# Patient Record
Sex: FEMALE | Race: BLACK OR AFRICAN AMERICAN | Employment: OTHER | URBAN - METROPOLITAN AREA
[De-identification: names, ages, dates, MRNs, and addresses within clinical notes are randomized per-mention and may not be internally consistent; named-entity substitution may affect disease eponyms.]

---

## 2017-01-24 PROBLEM — N64.4 BREAST PAIN: Status: ACTIVE | Noted: 2017-01-24

## 2017-01-30 PROBLEM — N64.4 BREAST PAIN: Status: RESOLVED | Noted: 2017-01-24 | Resolved: 2017-01-30

## 2017-10-05 PROBLEM — M17.12 PRIMARY OSTEOARTHRITIS OF LEFT KNEE: Status: ACTIVE | Noted: 2017-10-05

## 2018-03-22 ENCOUNTER — OFFICE VISIT (OUTPATIENT)
Dept: FAMILY MEDICINE CLINIC | Age: 83
End: 2018-03-22
Payer: MEDICARE

## 2018-03-22 VITALS
BODY MASS INDEX: 31.65 KG/M2 | RESPIRATION RATE: 16 BRPM | TEMPERATURE: 98.2 F | OXYGEN SATURATION: 98 % | WEIGHT: 190 LBS | DIASTOLIC BLOOD PRESSURE: 86 MMHG | HEART RATE: 76 BPM | SYSTOLIC BLOOD PRESSURE: 126 MMHG | HEIGHT: 65 IN

## 2018-03-22 DIAGNOSIS — M17.0 PRIMARY OSTEOARTHRITIS OF BOTH KNEES: Chronic | ICD-10-CM

## 2018-03-22 DIAGNOSIS — R14.0 ABDOMINAL BLOATING: ICD-10-CM

## 2018-03-22 DIAGNOSIS — M48.061 SPINAL STENOSIS OF LUMBAR REGION, UNSPECIFIED WHETHER NEUROGENIC CLAUDICATION PRESENT: Chronic | ICD-10-CM

## 2018-03-22 DIAGNOSIS — L50.9 LOCALIZED HIVES: Primary | ICD-10-CM

## 2018-03-22 PROCEDURE — 99214 OFFICE O/P EST MOD 30 MIN: CPT | Performed by: NURSE PRACTITIONER

## 2018-03-22 PROCEDURE — 99212 OFFICE O/P EST SF 10 MIN: CPT | Performed by: NURSE PRACTITIONER

## 2018-03-22 RX ORDER — TRIAMCINOLONE ACETONIDE 1 MG/G
CREAM TOPICAL
Qty: 60 G | Refills: 1 | Status: SHIPPED | OUTPATIENT
Start: 2018-03-22 | End: 2019-02-04 | Stop reason: ALTCHOICE

## 2018-03-22 RX ORDER — OXYCODONE HYDROCHLORIDE AND ACETAMINOPHEN 5; 325 MG/1; MG/1
1 TABLET ORAL EVERY 8 HOURS PRN
Refills: 0 | COMMUNITY
Start: 2018-03-04 | End: 2019-09-23

## 2018-03-22 RX ORDER — LIDOCAINE 50 MG/G
OINTMENT TOPICAL
Qty: 30 G | Refills: 1 | Status: SHIPPED | OUTPATIENT
Start: 2018-03-22 | End: 2019-02-04 | Stop reason: SDUPTHER

## 2018-03-22 RX ORDER — GREEN TEA/HOODIA GORDONII 315-12.5MG
1 CAPSULE ORAL DAILY
Qty: 30 TABLET | Refills: 2 | Status: SHIPPED | OUTPATIENT
Start: 2018-03-22 | End: 2018-10-14 | Stop reason: SDUPTHER

## 2018-03-22 ASSESSMENT — ENCOUNTER SYMPTOMS
COUGH: 0
BLURRED VISION: 0
DOUBLE VISION: 0
VOMITING: 0
WHEEZING: 0
ROS SKIN COMMENTS: AS ABOVE
SHORTNESS OF BREATH: 0
DIARRHEA: 0
NAUSEA: 0
CONSTIPATION: 0

## 2018-04-24 ENCOUNTER — TELEPHONE (OUTPATIENT)
Dept: FAMILY MEDICINE CLINIC | Age: 83
End: 2018-04-24

## 2018-04-24 DIAGNOSIS — Z96.659 HISTORY OF KNEE REPLACEMENT, UNSPECIFIED LATERALITY: Primary | ICD-10-CM

## 2018-04-24 RX ORDER — AMOXICILLIN 500 MG/1
2000 CAPSULE ORAL ONCE
Qty: 4 CAPSULE | Refills: 0 | Status: SHIPPED | OUTPATIENT
Start: 2018-04-24 | End: 2018-04-24

## 2018-06-28 ENCOUNTER — OFFICE VISIT (OUTPATIENT)
Dept: FAMILY MEDICINE CLINIC | Age: 83
End: 2018-06-28
Payer: MEDICARE

## 2018-06-28 VITALS
HEIGHT: 65 IN | WEIGHT: 193 LBS | OXYGEN SATURATION: 97 % | DIASTOLIC BLOOD PRESSURE: 60 MMHG | BODY MASS INDEX: 32.15 KG/M2 | HEART RATE: 60 BPM | SYSTOLIC BLOOD PRESSURE: 120 MMHG | TEMPERATURE: 98.1 F | RESPIRATION RATE: 16 BRPM

## 2018-06-28 DIAGNOSIS — I10 ESSENTIAL HYPERTENSION: Chronic | ICD-10-CM

## 2018-06-28 DIAGNOSIS — M48.061 SPINAL STENOSIS OF LUMBAR REGION, UNSPECIFIED WHETHER NEUROGENIC CLAUDICATION PRESENT: Chronic | ICD-10-CM

## 2018-06-28 DIAGNOSIS — Z23 NEED FOR SHINGLES VACCINE: ICD-10-CM

## 2018-06-28 DIAGNOSIS — M17.0 PRIMARY OSTEOARTHRITIS OF BOTH KNEES: Primary | Chronic | ICD-10-CM

## 2018-06-28 DIAGNOSIS — R14.0 ABDOMINAL BLOATING: ICD-10-CM

## 2018-06-28 PROCEDURE — G8399 PT W/DXA RESULTS DOCUMENT: HCPCS | Performed by: FAMILY MEDICINE

## 2018-06-28 PROCEDURE — G8427 DOCREV CUR MEDS BY ELIG CLIN: HCPCS | Performed by: FAMILY MEDICINE

## 2018-06-28 PROCEDURE — G8417 CALC BMI ABV UP PARAM F/U: HCPCS | Performed by: FAMILY MEDICINE

## 2018-06-28 PROCEDURE — 1123F ACP DISCUSS/DSCN MKR DOCD: CPT | Performed by: FAMILY MEDICINE

## 2018-06-28 PROCEDURE — 4040F PNEUMOC VAC/ADMIN/RCVD: CPT | Performed by: FAMILY MEDICINE

## 2018-06-28 PROCEDURE — 1090F PRES/ABSN URINE INCON ASSESS: CPT | Performed by: FAMILY MEDICINE

## 2018-06-28 PROCEDURE — 1036F TOBACCO NON-USER: CPT | Performed by: FAMILY MEDICINE

## 2018-06-28 PROCEDURE — 99214 OFFICE O/P EST MOD 30 MIN: CPT | Performed by: FAMILY MEDICINE

## 2018-06-28 PROCEDURE — 99212 OFFICE O/P EST SF 10 MIN: CPT | Performed by: FAMILY MEDICINE

## 2018-06-28 RX ORDER — GREEN TEA/HOODIA GORDONII 315-12.5MG
1 CAPSULE ORAL DAILY
Qty: 30 TABLET | Refills: 2 | Status: SHIPPED | OUTPATIENT
Start: 2018-06-28 | End: 2018-11-08 | Stop reason: SDUPTHER

## 2018-06-28 RX ORDER — SUCRALFATE 1 G/1
TABLET ORAL
Qty: 360 TABLET | Refills: 3 | Status: CANCELLED | OUTPATIENT
Start: 2018-06-28

## 2018-06-28 RX ORDER — VILAZODONE HYDROCHLORIDE 10 MG/1
TABLET ORAL
Refills: 1 | COMMUNITY
Start: 2018-05-22

## 2018-08-15 ENCOUNTER — TELEPHONE (OUTPATIENT)
Dept: FAMILY MEDICINE CLINIC | Age: 83
End: 2018-08-15

## 2018-08-15 ENCOUNTER — OFFICE VISIT (OUTPATIENT)
Dept: FAMILY MEDICINE CLINIC | Age: 83
End: 2018-08-15
Payer: MEDICARE

## 2018-08-15 VITALS
TEMPERATURE: 98.4 F | OXYGEN SATURATION: 97 % | DIASTOLIC BLOOD PRESSURE: 86 MMHG | HEART RATE: 67 BPM | HEIGHT: 61 IN | BODY MASS INDEX: 36.82 KG/M2 | RESPIRATION RATE: 16 BRPM | SYSTOLIC BLOOD PRESSURE: 144 MMHG | WEIGHT: 195 LBS

## 2018-08-15 DIAGNOSIS — K12.0 APHTHOUS ULCER OF TONGUE: Primary | ICD-10-CM

## 2018-08-15 PROCEDURE — 99213 OFFICE O/P EST LOW 20 MIN: CPT | Performed by: NURSE PRACTITIONER

## 2018-08-15 PROCEDURE — G8417 CALC BMI ABV UP PARAM F/U: HCPCS | Performed by: NURSE PRACTITIONER

## 2018-08-15 PROCEDURE — 1090F PRES/ABSN URINE INCON ASSESS: CPT | Performed by: NURSE PRACTITIONER

## 2018-08-15 PROCEDURE — 1123F ACP DISCUSS/DSCN MKR DOCD: CPT | Performed by: NURSE PRACTITIONER

## 2018-08-15 PROCEDURE — G8427 DOCREV CUR MEDS BY ELIG CLIN: HCPCS | Performed by: NURSE PRACTITIONER

## 2018-08-15 PROCEDURE — 1036F TOBACCO NON-USER: CPT | Performed by: NURSE PRACTITIONER

## 2018-08-15 PROCEDURE — 4040F PNEUMOC VAC/ADMIN/RCVD: CPT | Performed by: NURSE PRACTITIONER

## 2018-08-15 PROCEDURE — G8399 PT W/DXA RESULTS DOCUMENT: HCPCS | Performed by: NURSE PRACTITIONER

## 2018-08-15 PROCEDURE — 99212 OFFICE O/P EST SF 10 MIN: CPT | Performed by: NURSE PRACTITIONER

## 2018-08-15 PROCEDURE — 1101F PT FALLS ASSESS-DOCD LE1/YR: CPT | Performed by: NURSE PRACTITIONER

## 2018-08-15 RX ORDER — TRIAMCINOLONE ACETONIDE 0.1 %
PASTE (GRAM) DENTAL
Qty: 1 TUBE | Refills: 1 | Status: SHIPPED | OUTPATIENT
Start: 2018-08-15 | End: 2018-08-22

## 2018-08-15 ASSESSMENT — ENCOUNTER SYMPTOMS
VOMITING: 0
CONSTIPATION: 0
BLURRED VISION: 0
SHORTNESS OF BREATH: 0
WHEEZING: 0
DOUBLE VISION: 0
DIARRHEA: 0
NAUSEA: 0
COUGH: 0

## 2018-08-15 NOTE — PROGRESS NOTES
Chief Complaint   Patient presents with    Mouth Lesions     tongue x 1 month painful        HPI:  Patient presents today for  A sore on her tongue, Reports that it has been there for about a month now. She denies fever, chills. No recent illness. She denies trauma to her tonigue. She does have trouble eating at times because her tongue is sore. She has been drinking hot tea which has been helping. She reports that the sore has not changes since it first appeared. She is a non-smoker and non-drinker. She has dentures therefore she does not follow with a dentist on a regular basis. Prior to Visit Medications    Medication Sig Taking? Authorizing Provider   NONFORMULARY Apply 1 applicator topically every 6 hours as needed Yes Historical Provider, MD   VILAZODONE HCL 10 MG Tablet TK 1 T PO D Yes Historical Provider, MD   Lactobacillus (PROBIOTIC ACIDOPHILUS) TABS Take 1 tablet by mouth daily Yes Sim Vallecillo MD   oxyCODONE-acetaminophen (PERCOCET) 5-325 MG per tablet Take 1 tablet by mouth every 8 hours as needed. Yes Historical Provider, MD   lidocaine (XYLOCAINE) 5 % ointment APPLY EXT TO THE BACK AND LEGS BID PRN Yes JULIANN Hopkins CNP   triamcinolone (KENALOG) 0.1 % cream Apply topically 2 times daily.  Yes JULIANN Hopkins CNP   Multiple Vitamins-Minerals (AIRBORNE PO) Take 3 tablets by mouth daily Yes Historical Provider, MD   amLODIPine-benazepril (LOTREL) 10-20 MG per capsule TAKE 1 CAPSULE DAILY Yes Sim Vallecillo MD   sucralfate (CARAFATE) 1 GM tablet TAKE 1 TABLET FOUR TIMES A DAY Yes Sim Vallecillo MD   omeprazole (PRILOSEC) 20 MG delayed release capsule Take 1 capsule by mouth daily Yes Sim Vallecillo MD   polyvinyl alcohol-povidone (HYPOTEARS) 1.4-0.6 % ophthalmic solution Place 1-2 drops into both eyes as needed Yes Historical Provider, MD   fluticasone (FLONASE) 50 MCG/ACT nasal spray 2 sprays by Nasal route daily 2 sprays in each nostril daily Yes Lainey Navarrete,

## 2018-09-21 ENCOUNTER — HOSPITAL ENCOUNTER (OUTPATIENT)
Age: 83
Setting detail: OBSERVATION
Discharge: HOME | End: 2018-09-22
Attending: EMERGENCY MEDICINE | Admitting: FAMILY MEDICINE
Payer: MEDICARE

## 2018-09-21 ENCOUNTER — APPOINTMENT (OUTPATIENT)
Dept: GENERAL RADIOLOGY | Age: 83
End: 2018-09-21
Payer: MEDICARE

## 2018-09-21 DIAGNOSIS — R07.9 CHEST PAIN, UNSPECIFIED TYPE: Primary | ICD-10-CM

## 2018-09-21 DIAGNOSIS — R11.2 NON-INTRACTABLE VOMITING WITH NAUSEA, UNSPECIFIED VOMITING TYPE: ICD-10-CM

## 2018-09-21 DIAGNOSIS — R42 DIZZINESS: ICD-10-CM

## 2018-09-21 DIAGNOSIS — R79.81 LOW OXYGEN SATURATION: ICD-10-CM

## 2018-09-21 PROBLEM — R11.10 EMESIS: Status: ACTIVE | Noted: 2018-09-21

## 2018-09-21 LAB
ALBUMIN SERPL-MCNC: 4.3 G/DL (ref 3.5–5.2)
ALP BLD-CCNC: 97 U/L (ref 35–104)
ALT SERPL-CCNC: 20 U/L (ref 0–32)
ANION GAP SERPL CALCULATED.3IONS-SCNC: 14 MMOL/L (ref 7–16)
AST SERPL-CCNC: 30 U/L (ref 0–31)
BASOPHILS ABSOLUTE: 0.04 E9/L (ref 0–0.2)
BASOPHILS RELATIVE PERCENT: 0.8 % (ref 0–2)
BILIRUB SERPL-MCNC: 0.5 MG/DL (ref 0–1.2)
BUN BLDV-MCNC: 7 MG/DL (ref 8–23)
CALCIUM SERPL-MCNC: 9.4 MG/DL (ref 8.6–10.2)
CHLORIDE BLD-SCNC: 101 MMOL/L (ref 98–107)
CO2: 24 MMOL/L (ref 22–29)
CREAT SERPL-MCNC: 0.7 MG/DL (ref 0.5–1)
EOSINOPHILS ABSOLUTE: 0.14 E9/L (ref 0.05–0.5)
EOSINOPHILS RELATIVE PERCENT: 2.7 % (ref 0–6)
GFR AFRICAN AMERICAN: >60
GFR NON-AFRICAN AMERICAN: >60 ML/MIN/1.73
GLUCOSE BLD-MCNC: 138 MG/DL (ref 74–109)
HCT VFR BLD CALC: 40.5 % (ref 34–48)
HEMOGLOBIN: 13.8 G/DL (ref 11.5–15.5)
IMMATURE GRANULOCYTES #: 0 E9/L
IMMATURE GRANULOCYTES %: 0 % (ref 0–5)
LACTIC ACID: 1.6 MMOL/L (ref 0.5–2.2)
LIPASE: 18 U/L (ref 13–60)
LYMPHOCYTES ABSOLUTE: 2.65 E9/L (ref 1.5–4)
LYMPHOCYTES RELATIVE PERCENT: 50.2 % (ref 20–42)
MCH RBC QN AUTO: 31.5 PG (ref 26–35)
MCHC RBC AUTO-ENTMCNC: 34.1 % (ref 32–34.5)
MCV RBC AUTO: 92.5 FL (ref 80–99.9)
MONOCYTES ABSOLUTE: 0.47 E9/L (ref 0.1–0.95)
MONOCYTES RELATIVE PERCENT: 8.9 % (ref 2–12)
NEUTROPHILS ABSOLUTE: 1.98 E9/L (ref 1.8–7.3)
NEUTROPHILS RELATIVE PERCENT: 37.4 % (ref 43–80)
PDW BLD-RTO: 13.7 FL (ref 11.5–15)
PLATELET # BLD: 266 E9/L (ref 130–450)
PMV BLD AUTO: 9 FL (ref 7–12)
POTASSIUM SERPL-SCNC: 3.4 MMOL/L (ref 3.5–5)
PRO-BNP: 42 PG/ML (ref 0–450)
RBC # BLD: 4.38 E12/L (ref 3.5–5.5)
SODIUM BLD-SCNC: 139 MMOL/L (ref 132–146)
TOTAL PROTEIN: 7.5 G/DL (ref 6.4–8.3)
TROPONIN: <0.01 NG/ML (ref 0–0.03)
TROPONIN: <0.01 NG/ML (ref 0–0.03)
WBC # BLD: 5.3 E9/L (ref 4.5–11.5)

## 2018-09-21 PROCEDURE — 83690 ASSAY OF LIPASE: CPT

## 2018-09-21 PROCEDURE — 83880 ASSAY OF NATRIURETIC PEPTIDE: CPT

## 2018-09-21 PROCEDURE — 6360000002 HC RX W HCPCS: Performed by: STUDENT IN AN ORGANIZED HEALTH CARE EDUCATION/TRAINING PROGRAM

## 2018-09-21 PROCEDURE — 71046 X-RAY EXAM CHEST 2 VIEWS: CPT

## 2018-09-21 PROCEDURE — 2580000003 HC RX 258: Performed by: PHYSICIAN ASSISTANT

## 2018-09-21 PROCEDURE — 96375 TX/PRO/DX INJ NEW DRUG ADDON: CPT

## 2018-09-21 PROCEDURE — 83605 ASSAY OF LACTIC ACID: CPT

## 2018-09-21 PROCEDURE — 85025 COMPLETE CBC W/AUTO DIFF WBC: CPT

## 2018-09-21 PROCEDURE — 84484 ASSAY OF TROPONIN QUANT: CPT

## 2018-09-21 PROCEDURE — 99285 EMERGENCY DEPT VISIT HI MDM: CPT

## 2018-09-21 PROCEDURE — 96376 TX/PRO/DX INJ SAME DRUG ADON: CPT

## 2018-09-21 PROCEDURE — 87486 CHLMYD PNEUM DNA AMP PROBE: CPT

## 2018-09-21 PROCEDURE — 2580000003 HC RX 258: Performed by: STUDENT IN AN ORGANIZED HEALTH CARE EDUCATION/TRAINING PROGRAM

## 2018-09-21 PROCEDURE — 96372 THER/PROPH/DIAG INJ SC/IM: CPT

## 2018-09-21 PROCEDURE — 93005 ELECTROCARDIOGRAM TRACING: CPT | Performed by: PHYSICIAN ASSISTANT

## 2018-09-21 PROCEDURE — G0378 HOSPITAL OBSERVATION PER HR: HCPCS

## 2018-09-21 PROCEDURE — 87633 RESP VIRUS 12-25 TARGETS: CPT

## 2018-09-21 PROCEDURE — 80053 COMPREHEN METABOLIC PANEL: CPT

## 2018-09-21 PROCEDURE — 96374 THER/PROPH/DIAG INJ IV PUSH: CPT

## 2018-09-21 PROCEDURE — 6360000002 HC RX W HCPCS: Performed by: PHYSICIAN ASSISTANT

## 2018-09-21 PROCEDURE — 2700000000 HC OXYGEN THERAPY PER DAY

## 2018-09-21 PROCEDURE — 36415 COLL VENOUS BLD VENIPUNCTURE: CPT

## 2018-09-21 PROCEDURE — 87581 M.PNEUMON DNA AMP PROBE: CPT

## 2018-09-21 PROCEDURE — 87798 DETECT AGENT NOS DNA AMP: CPT

## 2018-09-21 RX ORDER — GUAIFENESIN 400 MG/1
400 TABLET ORAL 3 TIMES DAILY
Status: DISCONTINUED | OUTPATIENT
Start: 2018-09-21 | End: 2018-09-22 | Stop reason: HOSPADM

## 2018-09-21 RX ORDER — ASPIRIN 81 MG/1
81 TABLET, CHEWABLE ORAL DAILY
Status: DISCONTINUED | OUTPATIENT
Start: 2018-09-22 | End: 2018-09-22 | Stop reason: HOSPADM

## 2018-09-21 RX ORDER — VILAZODONE HYDROCHLORIDE 10 MG/1
10 TABLET ORAL DAILY
Status: DISCONTINUED | OUTPATIENT
Start: 2018-09-21 | End: 2018-09-22 | Stop reason: HOSPADM

## 2018-09-21 RX ORDER — PRAVASTATIN SODIUM 20 MG
40 TABLET ORAL DAILY
Status: DISCONTINUED | OUTPATIENT
Start: 2018-09-21 | End: 2018-09-22 | Stop reason: HOSPADM

## 2018-09-21 RX ORDER — LANOLIN ALCOHOL/MO/W.PET/CERES
1000 CREAM (GRAM) TOPICAL DAILY
Status: DISCONTINUED | OUTPATIENT
Start: 2018-09-21 | End: 2018-09-22 | Stop reason: HOSPADM

## 2018-09-21 RX ORDER — POTASSIUM CHLORIDE 7.45 MG/ML
10 INJECTION INTRAVENOUS
Status: COMPLETED | OUTPATIENT
Start: 2018-09-21 | End: 2018-09-21

## 2018-09-21 RX ORDER — MECLIZINE HCL 12.5 MG/1
12.5 TABLET ORAL ONCE
Status: DISCONTINUED | OUTPATIENT
Start: 2018-09-21 | End: 2018-09-22 | Stop reason: HOSPADM

## 2018-09-21 RX ORDER — ONDANSETRON 2 MG/ML
4 INJECTION INTRAMUSCULAR; INTRAVENOUS ONCE
Status: COMPLETED | OUTPATIENT
Start: 2018-09-21 | End: 2018-09-21

## 2018-09-21 RX ORDER — ONDANSETRON 2 MG/ML
4 INJECTION INTRAMUSCULAR; INTRAVENOUS EVERY 6 HOURS PRN
Status: DISCONTINUED | OUTPATIENT
Start: 2018-09-21 | End: 2018-09-22 | Stop reason: HOSPADM

## 2018-09-21 RX ORDER — SODIUM CHLORIDE 0.9 % (FLUSH) 0.9 %
10 SYRINGE (ML) INJECTION PRN
Status: DISCONTINUED | OUTPATIENT
Start: 2018-09-21 | End: 2018-09-22 | Stop reason: HOSPADM

## 2018-09-21 RX ORDER — PANTOPRAZOLE SODIUM 40 MG/1
40 TABLET, DELAYED RELEASE ORAL
Status: DISCONTINUED | OUTPATIENT
Start: 2018-09-22 | End: 2018-09-22 | Stop reason: HOSPADM

## 2018-09-21 RX ORDER — SUCRALFATE 1 G/1
1 TABLET ORAL EVERY 6 HOURS SCHEDULED
Status: DISCONTINUED | OUTPATIENT
Start: 2018-09-21 | End: 2018-09-22 | Stop reason: HOSPADM

## 2018-09-21 RX ORDER — LACTOBACILLUS RHAMNOSUS GG 10B CELL
1 CAPSULE ORAL DAILY
Status: DISCONTINUED | OUTPATIENT
Start: 2018-09-21 | End: 2018-09-22 | Stop reason: HOSPADM

## 2018-09-21 RX ORDER — LABETALOL HYDROCHLORIDE 5 MG/ML
10 INJECTION, SOLUTION INTRAVENOUS EVERY 6 HOURS PRN
Status: DISCONTINUED | OUTPATIENT
Start: 2018-09-21 | End: 2018-09-22 | Stop reason: HOSPADM

## 2018-09-21 RX ORDER — 0.9 % SODIUM CHLORIDE 0.9 %
1000 INTRAVENOUS SOLUTION INTRAVENOUS ONCE
Status: COMPLETED | OUTPATIENT
Start: 2018-09-21 | End: 2018-09-21

## 2018-09-21 RX ORDER — FLUTICASONE PROPIONATE 50 MCG
2 SPRAY, SUSPENSION (ML) NASAL DAILY
Status: DISCONTINUED | OUTPATIENT
Start: 2018-09-21 | End: 2018-09-22 | Stop reason: HOSPADM

## 2018-09-21 RX ORDER — OXYCODONE HYDROCHLORIDE AND ACETAMINOPHEN 5; 325 MG/1; MG/1
1 TABLET ORAL EVERY 8 HOURS PRN
Status: DISCONTINUED | OUTPATIENT
Start: 2018-09-21 | End: 2018-09-22 | Stop reason: HOSPADM

## 2018-09-21 RX ORDER — SODIUM CHLORIDE 9 MG/ML
INJECTION, SOLUTION INTRAVENOUS CONTINUOUS
Status: DISCONTINUED | OUTPATIENT
Start: 2018-09-21 | End: 2018-09-22

## 2018-09-21 RX ORDER — ACETAMINOPHEN 325 MG/1
650 TABLET ORAL EVERY 4 HOURS PRN
Status: DISCONTINUED | OUTPATIENT
Start: 2018-09-21 | End: 2018-09-22 | Stop reason: HOSPADM

## 2018-09-21 RX ORDER — SODIUM CHLORIDE 0.9 % (FLUSH) 0.9 %
10 SYRINGE (ML) INJECTION EVERY 12 HOURS SCHEDULED
Status: DISCONTINUED | OUTPATIENT
Start: 2018-09-21 | End: 2018-09-22 | Stop reason: HOSPADM

## 2018-09-21 RX ADMIN — ONDANSETRON 4 MG: 2 INJECTION INTRAMUSCULAR; INTRAVENOUS at 15:49

## 2018-09-21 RX ADMIN — POTASSIUM CHLORIDE 10 MEQ: 7.46 INJECTION, SOLUTION INTRAVENOUS at 20:00

## 2018-09-21 RX ADMIN — POTASSIUM CHLORIDE 10 MEQ: 7.46 INJECTION, SOLUTION INTRAVENOUS at 22:00

## 2018-09-21 RX ADMIN — SODIUM CHLORIDE 1000 ML: 9 INJECTION, SOLUTION INTRAVENOUS at 15:49

## 2018-09-21 RX ADMIN — POTASSIUM CHLORIDE 10 MEQ: 7.46 INJECTION, SOLUTION INTRAVENOUS at 21:22

## 2018-09-21 RX ADMIN — ENOXAPARIN SODIUM 40 MG: 40 INJECTION SUBCUTANEOUS at 21:20

## 2018-09-21 RX ADMIN — Medication 10 ML: at 20:59

## 2018-09-21 RX ADMIN — SODIUM CHLORIDE: 9 INJECTION, SOLUTION INTRAVENOUS at 20:55

## 2018-09-21 ASSESSMENT — PAIN SCALES - GENERAL: PAINLEVEL_OUTOF10: 0

## 2018-09-21 NOTE — H&P
[facility] - Family Medicine Resident Inpatient  History and Physical      HPI: History obtained from patient. Manolo Gould is a 81 y.o. female with a PMH of diverticular disease, HTN, GERD, anxiety and depression, and spinal stenosis, who presents to ED for Emesis (pt states she took her morning pills twice today but can't remember all of pills-did remember percocet and prilosec-took at 0800 today-[id number]. )    Patient reports that when she woke up this morning she felt heartburn and took 2 of her Prilosec tablets. A few hours later she suddenly developed dizziness, described as lightheadedness, followed by emesis. She vomited once consistent of yellow vomit, nonbloody nonbilious. The lightheadedness has been constant. Before getting to the ED she also developed sharp midsternal chest pain, which she describes as burning pain, with radiation to her left shoulder. She reports that the event lasted just a minute and resolved afterwards. However her dizziness, nausea and vomited persisted. She had a couple of more episodes of emesis in the ED. Patient denies any cardiac history. Patient denies any fever or chills. However she reports URI symptoms have been going on for a few weeks now, including dry cough and congestion. ED Course: The patient remained unchanged. She remained nauseous with lightheadedness. Patient was given bolus fluids and Zofran. . EKG Rhythm strip normal sinus rhythm, RBBB, left axis deviation, unchanged from previous tracings. Troponin was negative. The significant laboratory data obtained by ED work up was significant for hypokalemia with potassium of 3.4. Chest x-ray with possible bilateral pleural effusion otherwise unremarkable.     Medications   0.9 % sodium chloride bolus (0 mLs Intravenous Stopped 9/21/18 1809)   ondansetron (ZOFRAN) injection 4 mg (4 mg Intravenous Given 9/21/18 2899)        ED orders:   Orders Placed This Encounter   Procedures    Respiratory Panel, Film Array    XR CHEST

## 2018-09-21 NOTE — Clinical Note
Patient Class: Observation [104]   REQUIRED: Diagnosis: Chest pain [804577]   Estimated Length of Stay: Estimated stay of less than 2 midnights   Future Attending Provider: Jolanta Fowler   Telemetry Bed Required?: Yes

## 2018-09-21 NOTE — ED PROVIDER NOTES
ED Attending  CC: No     HPI:  9/21/18, Time: 2:39 PM         Li Hurtado is a 81 y.o. female presenting to the ED for emesis beginning approximately 2-3 hours ago. Patient states that she woke up this morning and was having some acid reflux and she decided to take 2 of her Prilosec. Then patient proceeded to take her amlodipine and Vicodin. Patient then decided to take another Prilosec due to her stomach but still bothering her. Patient then left for the day and then around 12:00 became dizzy and started vomiting. Patient states that when that started to happen a \"warmth came over top of her and she started to get chest pain that radiated into her left arm\". Patient then came to the emergency department. The complaint has been persistent with the vomitting, moderate in severity, and worsened by nothing. Patient is no longer having any chest pain at all. Patient states that it came and went at that exact time and has not returned. Patient denies any cardiac symptoms. Patient denies fever/chills, cough, congestion, shortness of breath, edema, headache, visual disturbances, hemoptysis, focal paresthesias, focal weakness, abdominal pain, nausea,  diarrhea, constipation, dysuria, hematuria, trauma, neck or back pain or other complaints. ROS:   Pertinent positives and negatives are stated within HPI, all other systems reviewed and are negative.      --------------------------------------------- PAST HISTORY ---------------------------------------------  Past Medical History:  has a past medical history of Anxiety; Breast cancer ([facility] [address] Depression; Diverticular disease; GERD (gastroesophageal reflux disease); HTN (hypertension); Hyperlipidemia; Osteoarthritis; Osteoporosis; Spinal stenosis; and Use of cane as ambulatory aid. Past Surgical History:  has a past surgical history that includes Hysterectomy (1980s); Hemorrhoid surgery (1980s); Carpal tunnel release (1980s); Carpal tunnel release (1980s);  Breast RADIOLOGY:  Interpreted by Radiologist.  XR CHEST STANDARD (2 VW)   Final Result   Tortuous ectatic aorta   Cardiomegaly    Airspace disease compatible with atelectasis or pneumonia   There are small bilateral pleural effusions                        EKG Interpretation  Interpreted by emergency department physician,    Time: 1525  Rhythm: 1 degree AV block, sinus rhythm  Rate: 66 bpm  Axis: left  Conduction: right bundle branch block (incomplete)  ST Segments: no acute change  T Waves: no acute change  Clinical Impression: right bundle branch block and 1st degree AV block  Comparison to prior EKG: stable as compared to patient's most recent EKG, done 9/14/17      ------------------------- NURSING NOTES AND VITALS REVIEWED ---------------------------   The nursing notes within the ED encounter and vital signs as below have been reviewed by myself. /69   Pulse 71   Temp 97.8 °F (36.6 °C) (Oral)   Resp 15   Ht 5' 5\" (1.651 m)   Wt 193 lb (87.5 kg)   SpO2 93%   BMI 32.12 kg/m²   Oxygen Saturation Interpretation: Normal    The patients available past medical records and past encounters were reviewed. ------------------------------ ED COURSE/MEDICAL DECISION MAKING----------------------  Medications   meclizine (ANTIVERT) tablet 12.5 mg (not administered)   0.9 % sodium chloride bolus (1,000 mLs Intravenous New Bag 9/21/18 1549)   ondansetron (ZOFRAN) injection 4 mg (4 mg Intravenous Given 9/21/18 1549)         Medical Decision Making:    The patient is an 41-year-old female that presented to emergency Department with emesis, dizziness and acute onset of chest pain radiating to left arm. Labs as well as a chest x-ray done were done. Due to new onset of chest pain, bilateral pleural effusions noted on chest x-ray, and low SPO2 stats, Family Medicine was called for admission. Dr. Rachel Martin returned the phone call and admitted the patient for cardiac rule out.     This patient's ED course

## 2018-09-22 ENCOUNTER — APPOINTMENT (OUTPATIENT)
Dept: NUCLEAR MEDICINE | Age: 83
End: 2018-09-22
Payer: MEDICARE

## 2018-09-22 VITALS
WEIGHT: 196.5 LBS | SYSTOLIC BLOOD PRESSURE: 152 MMHG | HEIGHT: 65 IN | HEART RATE: 78 BPM | DIASTOLIC BLOOD PRESSURE: 80 MMHG | RESPIRATION RATE: 16 BRPM | BODY MASS INDEX: 32.74 KG/M2 | TEMPERATURE: 97.8 F | OXYGEN SATURATION: 98 %

## 2018-09-22 PROBLEM — R42 DIZZINESS: Status: RESOLVED | Noted: 2018-09-21 | Resolved: 2018-09-22

## 2018-09-22 PROBLEM — R07.9 CHEST PAIN: Status: RESOLVED | Noted: 2018-09-21 | Resolved: 2018-09-22

## 2018-09-22 PROBLEM — R11.10 EMESIS: Status: RESOLVED | Noted: 2018-09-21 | Resolved: 2018-09-22

## 2018-09-22 LAB
ANION GAP SERPL CALCULATED.3IONS-SCNC: 16 MMOL/L (ref 7–16)
BILIRUBIN URINE: NEGATIVE
BLOOD, URINE: NEGATIVE
BUN BLDV-MCNC: 5 MG/DL (ref 8–23)
CALCIUM SERPL-MCNC: 9.2 MG/DL (ref 8.6–10.2)
CHLORIDE BLD-SCNC: 100 MMOL/L (ref 98–107)
CHOLESTEROL, TOTAL: 207 MG/DL (ref 0–199)
CLARITY: CLEAR
CO2: 21 MMOL/L (ref 22–29)
COLOR: YELLOW
CREAT SERPL-MCNC: 0.7 MG/DL (ref 0.5–1)
EKG ATRIAL RATE: 71 BPM
EKG P AXIS: 40 DEGREES
EKG P-R INTERVAL: 264 MS
EKG Q-T INTERVAL: 446 MS
EKG QRS DURATION: 162 MS
EKG QTC CALCULATION (BAZETT): 484 MS
EKG R AXIS: -34 DEGREES
EKG T AXIS: 23 DEGREES
EKG VENTRICULAR RATE: 71 BPM
FILM ARRAY ADENOVIRUS: NORMAL
FILM ARRAY BORDETELLA PERTUSSIS: NORMAL
FILM ARRAY CHLAMYDOPHILIA PNEUMONIAE: NORMAL
FILM ARRAY CORONAVIRUS 229E: NORMAL
FILM ARRAY CORONAVIRUS HKU1: NORMAL
FILM ARRAY CORONAVIRUS NL63: NORMAL
FILM ARRAY CORONAVIRUS OC43: NORMAL
FILM ARRAY INFLUENZA A VIRUS 09H1: NORMAL
FILM ARRAY INFLUENZA A VIRUS H1: NORMAL
FILM ARRAY INFLUENZA A VIRUS H3: NORMAL
FILM ARRAY INFLUENZA A VIRUS: NORMAL
FILM ARRAY INFLUENZA B: NORMAL
FILM ARRAY METAPNEUMOVIRUS: NORMAL
FILM ARRAY MYCOPLASMA PNEUMONIAE: NORMAL
FILM ARRAY PARAINFLUENZA VIRUS 1: NORMAL
FILM ARRAY PARAINFLUENZA VIRUS 2: NORMAL
FILM ARRAY PARAINFLUENZA VIRUS 3: NORMAL
FILM ARRAY PARAINFLUENZA VIRUS 4: NORMAL
FILM ARRAY RESPIRATORY SYNCITIAL VIRUS: NORMAL
FILM ARRAY RHINOVIRUS/ENTEROVIRUS: NORMAL
GFR AFRICAN AMERICAN: >60
GFR NON-AFRICAN AMERICAN: >60 ML/MIN/1.73
GLUCOSE BLD-MCNC: 105 MG/DL (ref 74–109)
GLUCOSE URINE: NEGATIVE MG/DL
HCT VFR BLD CALC: 36.9 % (ref 34–48)
HDLC SERPL-MCNC: 63 MG/DL
HEMOGLOBIN: 12.7 G/DL (ref 11.5–15.5)
KETONES, URINE: NEGATIVE MG/DL
LDL CHOLESTEROL CALCULATED: 127 MG/DL (ref 0–99)
LEFT VENTRICULAR EJECTION FRACTION MODE: NORMAL
LEUKOCYTE ESTERASE, URINE: NEGATIVE
LV EF: 64 %
LV EF: 64 %
LVEF MODALITY: NORMAL
MCH RBC QN AUTO: 31.7 PG (ref 26–35)
MCHC RBC AUTO-ENTMCNC: 34.4 % (ref 32–34.5)
MCV RBC AUTO: 92 FL (ref 80–99.9)
NITRITE, URINE: NEGATIVE
PDW BLD-RTO: 13.7 FL (ref 11.5–15)
PH UA: 7 (ref 5–9)
PLATELET # BLD: 264 E9/L (ref 130–450)
PMV BLD AUTO: 9.6 FL (ref 7–12)
POTASSIUM REFLEX MAGNESIUM: 4 MMOL/L (ref 3.5–5)
PROTEIN UA: NEGATIVE MG/DL
RBC # BLD: 4.01 E12/L (ref 3.5–5.5)
SODIUM BLD-SCNC: 137 MMOL/L (ref 132–146)
SPECIFIC GRAVITY UA: 1.01 (ref 1–1.03)
TRIGL SERPL-MCNC: 83 MG/DL (ref 0–149)
TROPONIN: <0.01 NG/ML (ref 0–0.03)
UROBILINOGEN, URINE: 0.2 E.U./DL
VLDLC SERPL CALC-MCNC: 17 MG/DL
WBC # BLD: 6.2 E9/L (ref 4.5–11.5)

## 2018-09-22 PROCEDURE — 36415 COLL VENOUS BLD VENIPUNCTURE: CPT

## 2018-09-22 PROCEDURE — 99236 HOSP IP/OBS SAME DATE HI 85: CPT | Performed by: FAMILY MEDICINE

## 2018-09-22 PROCEDURE — A9500 TC99M SESTAMIBI: HCPCS | Performed by: RADIOLOGY

## 2018-09-22 PROCEDURE — 6360000002 HC RX W HCPCS: Performed by: STUDENT IN AN ORGANIZED HEALTH CARE EDUCATION/TRAINING PROGRAM

## 2018-09-22 PROCEDURE — G0378 HOSPITAL OBSERVATION PER HR: HCPCS

## 2018-09-22 PROCEDURE — 93016 CV STRESS TEST SUPVJ ONLY: CPT | Performed by: INTERNAL MEDICINE

## 2018-09-22 PROCEDURE — 81003 URINALYSIS AUTO W/O SCOPE: CPT

## 2018-09-22 PROCEDURE — 78452 HT MUSCLE IMAGE SPECT MULT: CPT

## 2018-09-22 PROCEDURE — 6370000000 HC RX 637 (ALT 250 FOR IP): Performed by: STUDENT IN AN ORGANIZED HEALTH CARE EDUCATION/TRAINING PROGRAM

## 2018-09-22 PROCEDURE — 3430000000 HC RX DIAGNOSTIC RADIOPHARMACEUTICAL: Performed by: RADIOLOGY

## 2018-09-22 PROCEDURE — 80061 LIPID PANEL: CPT

## 2018-09-22 PROCEDURE — 93018 CV STRESS TEST I&R ONLY: CPT | Performed by: INTERNAL MEDICINE

## 2018-09-22 PROCEDURE — 80048 BASIC METABOLIC PNL TOTAL CA: CPT

## 2018-09-22 PROCEDURE — 93017 CV STRESS TEST TRACING ONLY: CPT

## 2018-09-22 PROCEDURE — 84484 ASSAY OF TROPONIN QUANT: CPT

## 2018-09-22 PROCEDURE — 85027 COMPLETE CBC AUTOMATED: CPT

## 2018-09-22 RX ADMIN — SUCRALFATE 1 G: 1 TABLET ORAL at 11:57

## 2018-09-22 RX ADMIN — Medication 1 CAPSULE: at 11:55

## 2018-09-22 RX ADMIN — VILAZODONE HYDROCHLORIDE 10 MG: 10 TABLET ORAL at 11:57

## 2018-09-22 RX ADMIN — OXYCODONE AND ACETAMINOPHEN 1 TABLET: 5; 325 TABLET ORAL at 12:03

## 2018-09-22 RX ADMIN — Medication 34 MILLICURIE: at 09:42

## 2018-09-22 RX ADMIN — Medication 11 MILLICURIE: at 07:48

## 2018-09-22 RX ADMIN — ASPIRIN 81 MG 81 MG: 81 TABLET ORAL at 11:54

## 2018-09-22 RX ADMIN — REGADENOSON 0.4 MG: 0.08 INJECTION, SOLUTION INTRAVENOUS at 09:29

## 2018-09-22 RX ADMIN — FLUTICASONE PROPIONATE 2 SPRAY: 50 SPRAY, METERED NASAL at 11:54

## 2018-09-22 RX ADMIN — Medication 1000 MCG: at 11:56

## 2018-09-22 ASSESSMENT — PAIN DESCRIPTION - LOCATION
LOCATION: BACK
LOCATION: BACK

## 2018-09-22 ASSESSMENT — PAIN SCALES - GENERAL
PAINLEVEL_OUTOF10: 8
PAINLEVEL_OUTOF10: 0

## 2018-09-22 ASSESSMENT — PAIN DESCRIPTION - PAIN TYPE
TYPE: CHRONIC PAIN
TYPE: CHRONIC PAIN

## 2018-09-22 NOTE — PROGRESS NOTES
[facility] Inpatient   Resident Progress Note    S:  Hospital day: 0:   Brief Synopsis: Macario Khan is a 81 y.o. female with a PMH of diverticular disease, HTN, GERD, anxiety and depression, and spinal stenosis, who presents to ED for emesis, dizziness, and a short episode of chest pain. No Acute events overnight. Patient was seen and examined this morning. Patient reports to be feeling much better this AM. Dizziness and nausea are gone and she didn't have any more episodes of chest pain. No fever or chills. Cont meds:    sodium chloride 937.26 mL/hr at 09/22/18 6537     Scheduled meds:    meclizine  12.5 mg Oral Once    fluticasone  2 spray Nasal Daily    lactobacillus  1 capsule Oral Daily    pantoprazole  40 mg Oral QAM AC    pravastatin  40 mg Oral Daily    sucralfate  1 g Oral 4 times per day    vilazodone HCl  10 mg Oral Daily    vitamin B-12  1,000 mcg Oral Daily    sodium chloride flush  10 mL Intravenous 2 times per day    aspirin  81 mg Oral Daily    enoxaparin  40 mg Subcutaneous Daily    guaiFENesin  400 mg Oral TID     PRN meds: oxyCODONE-acetaminophen, sodium chloride flush, acetaminophen, magnesium hydroxide, ondansetron, labetalol, regadenoson     I reviewed the patient's past medical and surgical history, Medications and Allergies. O:  Vitals:    09/21/18 1809 09/21/18 1940 09/22/18 0015 09/22/18 0600   BP: (!) 140/68 139/66 (!) 158/77    Pulse: 93 59 70    Resp: 18 16 16    Temp: 98.6 °F (37 °C) 97.8 °F (36.6 °C) 98.4 °F (36.9 °C)    TempSrc: Oral Temporal Temporal    SpO2: 98% 95% 94%    Weight:    196 lb 8 oz (89.1 kg)   Height:         24 hour I&O: I/O last 3 completed shifts:  In: -   Out: 1350 [Urine:1350]  No intake/output data recorded. General: Alert, cooperative, no acute distress. HEENT: Normocephalic, atraumatic. PERRLA, conjunctiva/corneas clear, EOM's intact, no pallor or icterus. Neck: Supple, symmetrical, trachea midline, no cervical LAD. No carotid bruit or JVD  Chest: No tenderness or deformity, full & symmetric excursion  Lung: Clear to auscultation bilaterally,  respirations unlabored. No rales/wheezing/rubs  Heart: RRR, S1 and S2 normal, no murmur, rub or gallop. DP pulses 2/4  Abdomen: SNTND, no masses, no organomegaly, no guarding, rebound or rigidity. Extremities:  Extremities normal, atraumatic, no cyanosis or edema. Distal pulses equal bilaterally  Skin: Skin color, texture, turgor normal, no rashes or lesions  Musculoskeletal: No joint swelling, no muscle tenderness. Normal ROM in extremities. Lymph nodes: no lymph node enlargement appreciated  Neurologic: Alert & Oriented;  CNII-XII intact. Labs:  Na/K/Cl/CO2:  139/3.4/101/24 ( 1445)  BUN/Cr/glu/ALT/AST/amyl/lip:  7/0.7/--/20/30/--/18 ( 1445)  WBC/Hgb/Hct/Plts:  6.2/12.7/36.9/264 ( 0556)  [unfilled]  estimated creatinine clearance is 68 mL/min (based on SCr of 0.7 mg/dL). Other pertinent labs as noted below    New Imaging:  Xr Chest Standard (2 Vw)    Result Date: 2018  Patient MRN: [id number] : 1935 Age:  81 years Gender: Female Order Date: 2018 3:00 PM Exam: XR CHEST (2 VW) Number of Images: 2 views Indication:   pain, emesis pain, emesis Comparison: 2016 Findings: There is a suggestion of bilateral pleural effusions The heart is enlarged. The lung fields demonstrate evidence for airspace disease. The aorta is tortuous and ectatic. Tortuous ectatic aorta Cardiomegaly Airspace disease compatible with atelectasis or pneumonia There are small bilateral pleural effusions       A/P:  Principal Problem:    Chest pain  Active Problems:    Hyperlipidemia    Anxiety    Depression    GERD (gastroesophageal reflux disease)    Diverticulosis of colon    Dizziness    Emesis  Resolved Problems:    * No resolved hospital problems.  *    Chest pain, ACS r/o  Initial and cycled troponin negative  EKG unchanged from previously - F/U repeat EKG in the morning  Stress test in the morning     Dizziness   With Emesis  Possibly due to ACS vs dehydration vs vertigo  Given bolus fluids in the ED, currently on maintenance fluids - symptoms much improved today  Zofran PRN  Monitor vitals, I/O     URI symptoms  F/U respiratory panel - negative  Mucinex     Hypokalemia  Likely due to emesis  Resolved after receiving potassium chloride IV x 3    HTN  Patient on amlodipine-benazepril home  BP controlled for now  Labetalol PRN  Monitor vitals     HLD  Continue home Pravachol     GERD  On PPI     Diverticulosis  Patient on stool softeners at home, regular BMs in the past couple days, no d/c  MoM PRN     Anxiety and depression  Continue home Vilazodone    DVT / GI prophylaxis: Lovenox and PPI     Dispo - observation on telemetry     Diet - NPO after midnight (stress test)     Code - full   Electronically signed by Anant Roman MD PGY-2 on 9/22/2018 at 7:19 AM  This case was discussed with senior resident and attending physician: Dr. Carlo Barnes

## 2018-09-24 ENCOUNTER — TELEPHONE (OUTPATIENT)
Dept: FAMILY MEDICINE CLINIC | Age: 83
End: 2018-09-24

## 2018-09-28 LAB
EKG ATRIAL RATE: 73 BPM
EKG P AXIS: 51 DEGREES
EKG P-R INTERVAL: 240 MS
EKG Q-T INTERVAL: 420 MS
EKG QRS DURATION: 146 MS
EKG QTC CALCULATION (BAZETT): 462 MS
EKG R AXIS: -33 DEGREES
EKG T AXIS: 31 DEGREES
EKG VENTRICULAR RATE: 73 BPM

## 2018-10-03 ENCOUNTER — OFFICE VISIT (OUTPATIENT)
Dept: CARDIOLOGY CLINIC | Age: 83
End: 2018-10-03
Payer: MEDICARE

## 2018-10-03 VITALS
DIASTOLIC BLOOD PRESSURE: 60 MMHG | HEART RATE: 65 BPM | SYSTOLIC BLOOD PRESSURE: 122 MMHG | RESPIRATION RATE: 16 BRPM | WEIGHT: 198.6 LBS | BODY MASS INDEX: 33.09 KG/M2 | HEIGHT: 65 IN

## 2018-10-03 DIAGNOSIS — I10 ESSENTIAL HYPERTENSION: Primary | ICD-10-CM

## 2018-10-03 DIAGNOSIS — I44.0 FIRST DEGREE AV BLOCK: ICD-10-CM

## 2018-10-03 DIAGNOSIS — R07.9 CHEST PAIN, UNSPECIFIED TYPE: ICD-10-CM

## 2018-10-03 DIAGNOSIS — I45.10 RBBB: ICD-10-CM

## 2018-10-03 PROCEDURE — G8399 PT W/DXA RESULTS DOCUMENT: HCPCS | Performed by: NURSE PRACTITIONER

## 2018-10-03 PROCEDURE — G8417 CALC BMI ABV UP PARAM F/U: HCPCS | Performed by: NURSE PRACTITIONER

## 2018-10-03 PROCEDURE — G8484 FLU IMMUNIZE NO ADMIN: HCPCS | Performed by: NURSE PRACTITIONER

## 2018-10-03 PROCEDURE — 99213 OFFICE O/P EST LOW 20 MIN: CPT | Performed by: NURSE PRACTITIONER

## 2018-10-03 PROCEDURE — G8427 DOCREV CUR MEDS BY ELIG CLIN: HCPCS | Performed by: NURSE PRACTITIONER

## 2018-10-03 PROCEDURE — 1101F PT FALLS ASSESS-DOCD LE1/YR: CPT | Performed by: NURSE PRACTITIONER

## 2018-10-03 PROCEDURE — 1036F TOBACCO NON-USER: CPT | Performed by: NURSE PRACTITIONER

## 2018-10-03 PROCEDURE — 4040F PNEUMOC VAC/ADMIN/RCVD: CPT | Performed by: NURSE PRACTITIONER

## 2018-10-03 PROCEDURE — 1123F ACP DISCUSS/DSCN MKR DOCD: CPT | Performed by: NURSE PRACTITIONER

## 2018-10-03 PROCEDURE — 93000 ELECTROCARDIOGRAM COMPLETE: CPT | Performed by: INTERNAL MEDICINE

## 2018-10-03 PROCEDURE — 1090F PRES/ABSN URINE INCON ASSESS: CPT | Performed by: NURSE PRACTITIONER

## 2018-10-03 NOTE — PROGRESS NOTES
[facility] PHYSICIAN CARDIOLOGY   OFFICE VISIT        PRIMARY CARE PHYSICIAN:      Flaquita Blackmon MD         ALLERGIES / Gold Aliya:      No Known Allergies       REVIEWED MEDICATIONS:       Current Outpatient Prescriptions   Medication Sig Dispense Refill    NONFORMULARY Apply 1 applicator topically every 6 hours as needed  3    Magic Mouthwash (MIRACLE MOUTHWASH) Swish and spit 5 mLs 4 times daily as needed for Irritation or Pain 60 mL 0    VILAZODONE HCL 10 MG Tablet TK 1 T PO D  1    Lactobacillus (PROBIOTIC ACIDOPHILUS) TABS Take 1 tablet by mouth daily 30 tablet 2    oxyCODONE-acetaminophen (PERCOCET) 5-325 MG per tablet Take 1 tablet by mouth every 8 hours as needed. 0    lidocaine (XYLOCAINE) 5 % ointment APPLY EXT TO THE BACK AND LEGS BID PRN 30 g 1    triamcinolone (KENALOG) 0.1 % cream Apply topically 2 times daily. 60 g 1    Multiple Vitamins-Minerals (AIRBORNE PO) Take 3 tablets by mouth daily      amLODIPine-benazepril (LOTREL) 10-20 MG per capsule TAKE 1 CAPSULE DAILY 90 capsule 3    sucralfate (CARAFATE) 1 GM tablet TAKE 1 TABLET FOUR TIMES A  tablet 3    omeprazole (PRILOSEC) 20 MG delayed release capsule Take 1 capsule by mouth daily 90 capsule 3    polyvinyl alcohol-povidone (HYPOTEARS) 1.4-0.6 % ophthalmic solution Place 1-2 drops into both eyes as needed      fluticasone (FLONASE) 50 MCG/ACT nasal spray 2 sprays by Nasal route daily 2 sprays in each nostril daily 1 Bottle 3    MOVANTIK 12.5 MG TABS tablet Take 1 tablet by mouth every morning 90 tablet 3    vitamin B-12 (CYANOCOBALAMIN) 1000 MCG tablet Take 1,000 mcg by mouth daily       Pyridoxine HCl (VITAMIN B-6) 100 MG tablet Take 100 mg by mouth daily      Multiple Vitamin TABS Take 1 capsule by mouth daily Hair ,skin and nails       No current facility-administered medications for this visit.             S: REASON FOR VISIT:     She is followed here by  and was seen for cardiac risk stratification

## 2018-10-12 DIAGNOSIS — R14.0 ABDOMINAL BLOATING: ICD-10-CM

## 2018-10-14 RX ORDER — LACTOBACILLUS ACIDOPHILUS 0.5 MG
TABLET ORAL
Qty: 30 TABLET | Refills: 1 | Status: SHIPPED | OUTPATIENT
Start: 2018-10-14 | End: 2018-11-09 | Stop reason: ALTCHOICE

## 2018-11-08 ENCOUNTER — OFFICE VISIT (OUTPATIENT)
Dept: FAMILY MEDICINE CLINIC | Age: 83
End: 2018-11-08
Payer: MEDICARE

## 2018-11-08 VITALS
TEMPERATURE: 98.4 F | BODY MASS INDEX: 33.42 KG/M2 | OXYGEN SATURATION: 92 % | HEIGHT: 65 IN | RESPIRATION RATE: 16 BRPM | SYSTOLIC BLOOD PRESSURE: 110 MMHG | WEIGHT: 200.6 LBS | HEART RATE: 83 BPM | DIASTOLIC BLOOD PRESSURE: 63 MMHG

## 2018-11-08 DIAGNOSIS — R14.0 ABDOMINAL BLOATING: ICD-10-CM

## 2018-11-08 DIAGNOSIS — Z85.3 HISTORY OF BREAST CANCER: ICD-10-CM

## 2018-11-08 DIAGNOSIS — R42 VERTIGO: ICD-10-CM

## 2018-11-08 DIAGNOSIS — R11.2 NON-INTRACTABLE VOMITING WITH NAUSEA, UNSPECIFIED VOMITING TYPE: ICD-10-CM

## 2018-11-08 PROCEDURE — 1090F PRES/ABSN URINE INCON ASSESS: CPT | Performed by: STUDENT IN AN ORGANIZED HEALTH CARE EDUCATION/TRAINING PROGRAM

## 2018-11-08 PROCEDURE — G8417 CALC BMI ABV UP PARAM F/U: HCPCS | Performed by: STUDENT IN AN ORGANIZED HEALTH CARE EDUCATION/TRAINING PROGRAM

## 2018-11-08 PROCEDURE — G8399 PT W/DXA RESULTS DOCUMENT: HCPCS | Performed by: STUDENT IN AN ORGANIZED HEALTH CARE EDUCATION/TRAINING PROGRAM

## 2018-11-08 PROCEDURE — G8427 DOCREV CUR MEDS BY ELIG CLIN: HCPCS | Performed by: STUDENT IN AN ORGANIZED HEALTH CARE EDUCATION/TRAINING PROGRAM

## 2018-11-08 PROCEDURE — 99212 OFFICE O/P EST SF 10 MIN: CPT | Performed by: STUDENT IN AN ORGANIZED HEALTH CARE EDUCATION/TRAINING PROGRAM

## 2018-11-08 PROCEDURE — 1123F ACP DISCUSS/DSCN MKR DOCD: CPT | Performed by: STUDENT IN AN ORGANIZED HEALTH CARE EDUCATION/TRAINING PROGRAM

## 2018-11-08 PROCEDURE — 99213 OFFICE O/P EST LOW 20 MIN: CPT | Performed by: STUDENT IN AN ORGANIZED HEALTH CARE EDUCATION/TRAINING PROGRAM

## 2018-11-08 PROCEDURE — 4040F PNEUMOC VAC/ADMIN/RCVD: CPT | Performed by: STUDENT IN AN ORGANIZED HEALTH CARE EDUCATION/TRAINING PROGRAM

## 2018-11-08 PROCEDURE — 1036F TOBACCO NON-USER: CPT | Performed by: STUDENT IN AN ORGANIZED HEALTH CARE EDUCATION/TRAINING PROGRAM

## 2018-11-08 PROCEDURE — 1101F PT FALLS ASSESS-DOCD LE1/YR: CPT | Performed by: STUDENT IN AN ORGANIZED HEALTH CARE EDUCATION/TRAINING PROGRAM

## 2018-11-08 PROCEDURE — G8484 FLU IMMUNIZE NO ADMIN: HCPCS | Performed by: STUDENT IN AN ORGANIZED HEALTH CARE EDUCATION/TRAINING PROGRAM

## 2018-11-08 RX ORDER — OMEPRAZOLE 20 MG/1
20 CAPSULE, DELAYED RELEASE ORAL DAILY
Qty: 90 CAPSULE | Refills: 3 | Status: SHIPPED | OUTPATIENT
Start: 2018-11-08 | End: 2018-12-10 | Stop reason: SDUPTHER

## 2018-11-08 RX ORDER — GREEN TEA/HOODIA GORDONII 315-12.5MG
1 CAPSULE ORAL DAILY
Qty: 30 TABLET | Refills: 2 | Status: SHIPPED | OUTPATIENT
Start: 2018-11-08 | End: 2019-02-04 | Stop reason: SDUPTHER

## 2018-11-08 NOTE — PROGRESS NOTES
MG Tablet TK 1 T PO D  1    Lactobacillus (PROBIOTIC ACIDOPHILUS) TABS Take 1 tablet by mouth daily 30 tablet 2    oxyCODONE-acetaminophen (PERCOCET) 5-325 MG per tablet Take 1 tablet by mouth every 8 hours as needed. 0    lidocaine (XYLOCAINE) 5 % ointment APPLY EXT TO THE BACK AND LEGS BID PRN 30 g 1    triamcinolone (KENALOG) 0.1 % cream Apply topically 2 times daily. 60 g 1    Multiple Vitamins-Minerals (AIRBORNE PO) Take 3 tablets by mouth daily      sucralfate (CARAFATE) 1 GM tablet TAKE 1 TABLET FOUR TIMES A  tablet 3    omeprazole (PRILOSEC) 20 MG delayed release capsule Take 1 capsule by mouth daily 90 capsule 3    polyvinyl alcohol-povidone (HYPOTEARS) 1.4-0.6 % ophthalmic solution Place 1-2 drops into both eyes as needed      fluticasone (FLONASE) 50 MCG/ACT nasal spray 2 sprays by Nasal route daily 2 sprays in each nostril daily 1 Bottle 3    MOVANTIK 12.5 MG TABS tablet Take 1 tablet by mouth every morning 90 tablet 3    Pyridoxine HCl (VITAMIN B-6) 100 MG tablet Take 100 mg by mouth daily      Multiple Vitamin TABS Take 1 capsule by mouth daily Hair ,skin and nails      amLODIPine-benazepril (LOTREL) 10-20 MG per capsule TAKE 1 CAPSULE DAILY 90 capsule 3    vitamin B-12 (CYANOCOBALAMIN) 1000 MCG tablet Take 1,000 mcg by mouth daily        No current facility-administered medications for this visit. Allergies: Patient has no known allergies. Review of Systems   Constitutional: Negative for chills, diaphoresis, fatigue and fever. HENT: Negative for congestion, ear discharge, ear pain, hearing loss, rhinorrhea, sinus pain, sinus pressure, sore throat and tinnitus. Eyes: Negative for pain, discharge and visual disturbance. Respiratory: Negative for cough and shortness of breath. Cardiovascular: Negative for palpitations. Gastrointestinal: Positive for nausea and vomiting. Negative for constipation and diarrhea. Musculoskeletal: Negative for gait problem.

## 2018-11-09 RX ORDER — OLOPATADINE HYDROCHLORIDE 1 MG/ML
SOLUTION/ DROPS OPHTHALMIC
COMMUNITY
Start: 2018-10-10 | End: 2019-02-04 | Stop reason: SDUPTHER

## 2018-11-09 ASSESSMENT — ENCOUNTER SYMPTOMS
EYE PAIN: 0
RHINORRHEA: 0
SINUS PAIN: 0
COUGH: 0
VOMITING: 1
CONSTIPATION: 0
NAUSEA: 1
EYE DISCHARGE: 0
SHORTNESS OF BREATH: 0
SORE THROAT: 0
DIARRHEA: 0
SINUS PRESSURE: 0

## 2018-12-04 ENCOUNTER — HOSPITAL ENCOUNTER (OUTPATIENT)
Dept: ULTRASOUND IMAGING | Age: 83
End: 2018-12-04
Payer: MEDICARE

## 2018-12-04 ENCOUNTER — TELEPHONE (OUTPATIENT)
Dept: FAMILY MEDICINE CLINIC | Age: 83
End: 2018-12-04

## 2018-12-04 ENCOUNTER — HOSPITAL ENCOUNTER (OUTPATIENT)
Dept: MAMMOGRAPHY | Age: 83
Discharge: HOME | End: 2018-12-06
Payer: MEDICARE

## 2018-12-04 DIAGNOSIS — Z85.3 HISTORY OF BREAST CANCER: ICD-10-CM

## 2018-12-04 PROCEDURE — 77066 DX MAMMO INCL CAD BI: CPT

## 2018-12-10 ENCOUNTER — OFFICE VISIT (OUTPATIENT)
Dept: FAMILY MEDICINE CLINIC | Age: 83
End: 2018-12-10
Payer: MEDICARE

## 2018-12-10 VITALS
HEART RATE: 87 BPM | DIASTOLIC BLOOD PRESSURE: 71 MMHG | BODY MASS INDEX: 33.42 KG/M2 | WEIGHT: 200.6 LBS | OXYGEN SATURATION: 97 % | TEMPERATURE: 98.1 F | HEIGHT: 65 IN | SYSTOLIC BLOOD PRESSURE: 115 MMHG

## 2018-12-10 DIAGNOSIS — E78.5 HYPERLIPIDEMIA, UNSPECIFIED HYPERLIPIDEMIA TYPE: Primary | Chronic | ICD-10-CM

## 2018-12-10 DIAGNOSIS — K21.9 GASTROESOPHAGEAL REFLUX DISEASE, ESOPHAGITIS PRESENCE NOT SPECIFIED: Chronic | ICD-10-CM

## 2018-12-10 DIAGNOSIS — I10 ESSENTIAL HYPERTENSION: Chronic | ICD-10-CM

## 2018-12-10 PROCEDURE — G8417 CALC BMI ABV UP PARAM F/U: HCPCS | Performed by: FAMILY MEDICINE

## 2018-12-10 PROCEDURE — G8428 CUR MEDS NOT DOCUMENT: HCPCS | Performed by: FAMILY MEDICINE

## 2018-12-10 PROCEDURE — 1101F PT FALLS ASSESS-DOCD LE1/YR: CPT | Performed by: FAMILY MEDICINE

## 2018-12-10 PROCEDURE — 1123F ACP DISCUSS/DSCN MKR DOCD: CPT | Performed by: FAMILY MEDICINE

## 2018-12-10 PROCEDURE — 1036F TOBACCO NON-USER: CPT | Performed by: FAMILY MEDICINE

## 2018-12-10 PROCEDURE — 1090F PRES/ABSN URINE INCON ASSESS: CPT | Performed by: FAMILY MEDICINE

## 2018-12-10 PROCEDURE — 4040F PNEUMOC VAC/ADMIN/RCVD: CPT | Performed by: FAMILY MEDICINE

## 2018-12-10 PROCEDURE — G8484 FLU IMMUNIZE NO ADMIN: HCPCS | Performed by: FAMILY MEDICINE

## 2018-12-10 PROCEDURE — G8399 PT W/DXA RESULTS DOCUMENT: HCPCS | Performed by: FAMILY MEDICINE

## 2018-12-10 PROCEDURE — 99212 OFFICE O/P EST SF 10 MIN: CPT | Performed by: FAMILY MEDICINE

## 2018-12-10 PROCEDURE — 99213 OFFICE O/P EST LOW 20 MIN: CPT | Performed by: FAMILY MEDICINE

## 2018-12-10 RX ORDER — OMEPRAZOLE 20 MG/1
20 CAPSULE, DELAYED RELEASE ORAL DAILY
Qty: 90 CAPSULE | Refills: 3 | Status: SHIPPED | OUTPATIENT
Start: 2018-12-10 | End: 2019-02-04 | Stop reason: SDUPTHER

## 2018-12-10 RX ORDER — AZELASTINE HYDROCHLORIDE 0.5 MG/ML
1 SOLUTION/ DROPS OPHTHALMIC 2 TIMES DAILY
Qty: 6 ML | Refills: 2 | Status: SHIPPED | OUTPATIENT
Start: 2018-12-10 | End: 2019-01-09

## 2018-12-10 RX ORDER — AMLODIPINE BESYLATE AND BENAZEPRIL HYDROCHLORIDE 5; 20 MG/1; MG/1
1 CAPSULE ORAL DAILY
Qty: 90 CAPSULE | Refills: 3 | Status: SHIPPED | OUTPATIENT
Start: 2018-12-10 | End: 2019-12-14 | Stop reason: SDUPTHER

## 2018-12-10 NOTE — PROGRESS NOTES
[facility]   Ambulatory visit note  DATE OF VISIT : 12/10/2018    Patient : Sabra Lanes   Sex : female   Age : 81 y.o.  : 1935   MRN : [id number]            Chief Complaint :   Chief Complaint   Patient presents with    1 Month Follow-Up    Dizziness    Nausea    Choking     all the time when she is eating    Tinnitus     right    Bloated     has lots of gas    Medication Refill     90 day supply, pt would like Nexium prescribed       HPI:  Sabra Lanes presents to the office today for evaluation of vertigo, nausea and dizziness   Current status:      Symptoms related to above problems: No    Tolerating medication:   Yes   Patient does report new complaints today. Frequent choking episodes. Says that she needs refills of her stomach medicine   Also has chronic bloating and would like refill of her probiotic   Dizziness and nausea have resolved. Eye irritation and itching. Relieved with patanol, but cost is prohibitive. Past Medical History :  has a past medical history of Anxiety; Breast cancer ([address] Depression; Diverticular disease; GERD (gastroesophageal reflux disease); HTN (hypertension); Hyperlipidemia; Osteoarthritis; Osteoporosis; Spinal stenosis; and Use of cane as ambulatory aid.     Past Surgical history :    Past Surgical History:   Procedure Laterality Date    BACK SURGERY      BONE GRAFT      with back surgery    BREAST LUMPECTOMY      left, with chemo and rad, [facility]    CARPAL TUNNEL RELEASE      left    CARPAL TUNNEL RELEASE      repeat left    CATARACT REMOVAL  2010    bilateral [facility]    COLONOSCOPY  2/3/2011    extensive diverticulosis, Dr. Garcia Me, [facility] COLONOSCOPY      \"could not get around colon due to diverticulitis, [address] COLONOSCOPY  2015    severe diverticulosis transverse and left colon without diverticulitis, rectal polyp bx, Dr. Hemalatha Weston, [facility] ROM  LUNGS : Clear to auscultation, without wheezes, rales, or rhonchi. Normal chest expansion. HEART : RRR, normal S1/S2, no murmurs, gallops, or abnormal sounds  SKIN : Warm and dry, no rash or abnormal skin lesions noted  PSYCHIATRIC : Appropriate affect, AAO X 3         Assessment & Plan : Denisse Hitchcock was seen today for 1 month follow-up, dizziness, nausea, choking, tinnitus, bloated and medication refill. Diagnoses and all orders for this visit:    Hyperlipidemia, unspecified hyperlipidemia type    Essential hypertension    Gastroesophageal reflux disease, esophagitis presence not specified    Other orders  -     omeprazole (PRILOSEC) 20 MG delayed release capsule; Take 1 capsule by mouth daily  -     amLODIPine-benazepril (LOTREL) 5-20 MG per capsule; Take 1 capsule by mouth daily  -     azelastine (OPTIVAR) 0.05 % ophthalmic solution; Place 1 drop into both eyes 2 times daily        Health Maintenance     Health Maintenance Due   Topic Date Due    Shingles Vaccine (1 of 2 - 2 Dose Series) 05/07/1986    Flu vaccine (1) 09/01/2018       Other plans: Will decrease BP med to 5 mg amlodipine   Trial with azelastine drops -she will check on price and effectiveness   RF her omeprazole. If dysphagia persists, would recommend EGD/barium swallow   She has numerous other questions and concerns and questions were answered. She states that her medicine coverage will change next month, so I will wait to here from her regarding adjusting meds further at that time. Reviewed indicated health maintenance and answered questions as needed. Advised patient of risks of foregoing needed health maintenance. Patient advised of all findings and recommendations. Questions answered, and instructions provided where indicated, including reasons for contacting office or returning sooner than requested. Reviewed indications and potential side-effects of medications, including any new medications ordered today.     Future

## 2019-02-04 ENCOUNTER — HOSPITAL ENCOUNTER (OUTPATIENT)
Age: 84
Discharge: HOME | End: 2019-02-06
Payer: MEDICARE

## 2019-02-04 ENCOUNTER — HOSPITAL ENCOUNTER (OUTPATIENT)
Dept: GENERAL RADIOLOGY | Age: 84
Discharge: HOME | End: 2019-02-06
Payer: MEDICARE

## 2019-02-04 ENCOUNTER — TELEPHONE (OUTPATIENT)
Dept: FAMILY MEDICINE CLINIC | Age: 84
End: 2019-02-04

## 2019-02-04 ENCOUNTER — OFFICE VISIT (OUTPATIENT)
Dept: FAMILY MEDICINE CLINIC | Age: 84
End: 2019-02-04
Payer: MEDICARE

## 2019-02-04 VITALS
TEMPERATURE: 98.8 F | WEIGHT: 212 LBS | HEIGHT: 65 IN | RESPIRATION RATE: 16 BRPM | SYSTOLIC BLOOD PRESSURE: 164 MMHG | BODY MASS INDEX: 35.32 KG/M2 | HEART RATE: 77 BPM | DIASTOLIC BLOOD PRESSURE: 77 MMHG | OXYGEN SATURATION: 98 %

## 2019-02-04 DIAGNOSIS — M25.511 ACUTE PAIN OF RIGHT SHOULDER: Primary | ICD-10-CM

## 2019-02-04 DIAGNOSIS — M17.0 PRIMARY OSTEOARTHRITIS OF BOTH KNEES: Chronic | ICD-10-CM

## 2019-02-04 DIAGNOSIS — M48.061 SPINAL STENOSIS OF LUMBAR REGION, UNSPECIFIED WHETHER NEUROGENIC CLAUDICATION PRESENT: Chronic | ICD-10-CM

## 2019-02-04 DIAGNOSIS — M25.511 ACUTE PAIN OF RIGHT SHOULDER: ICD-10-CM

## 2019-02-04 DIAGNOSIS — K21.9 GASTROESOPHAGEAL REFLUX DISEASE WITHOUT ESOPHAGITIS: ICD-10-CM

## 2019-02-04 DIAGNOSIS — R14.0 ABDOMINAL BLOATING: ICD-10-CM

## 2019-02-04 PROCEDURE — 99212 OFFICE O/P EST SF 10 MIN: CPT | Performed by: STUDENT IN AN ORGANIZED HEALTH CARE EDUCATION/TRAINING PROGRAM

## 2019-02-04 PROCEDURE — G8484 FLU IMMUNIZE NO ADMIN: HCPCS | Performed by: STUDENT IN AN ORGANIZED HEALTH CARE EDUCATION/TRAINING PROGRAM

## 2019-02-04 PROCEDURE — 1123F ACP DISCUSS/DSCN MKR DOCD: CPT | Performed by: STUDENT IN AN ORGANIZED HEALTH CARE EDUCATION/TRAINING PROGRAM

## 2019-02-04 PROCEDURE — 1036F TOBACCO NON-USER: CPT | Performed by: STUDENT IN AN ORGANIZED HEALTH CARE EDUCATION/TRAINING PROGRAM

## 2019-02-04 PROCEDURE — 1090F PRES/ABSN URINE INCON ASSESS: CPT | Performed by: STUDENT IN AN ORGANIZED HEALTH CARE EDUCATION/TRAINING PROGRAM

## 2019-02-04 PROCEDURE — 99213 OFFICE O/P EST LOW 20 MIN: CPT | Performed by: STUDENT IN AN ORGANIZED HEALTH CARE EDUCATION/TRAINING PROGRAM

## 2019-02-04 PROCEDURE — 4040F PNEUMOC VAC/ADMIN/RCVD: CPT | Performed by: STUDENT IN AN ORGANIZED HEALTH CARE EDUCATION/TRAINING PROGRAM

## 2019-02-04 PROCEDURE — 72040 X-RAY EXAM NECK SPINE 2-3 VW: CPT

## 2019-02-04 PROCEDURE — 73030 X-RAY EXAM OF SHOULDER: CPT

## 2019-02-04 PROCEDURE — G8417 CALC BMI ABV UP PARAM F/U: HCPCS | Performed by: STUDENT IN AN ORGANIZED HEALTH CARE EDUCATION/TRAINING PROGRAM

## 2019-02-04 PROCEDURE — G8427 DOCREV CUR MEDS BY ELIG CLIN: HCPCS | Performed by: STUDENT IN AN ORGANIZED HEALTH CARE EDUCATION/TRAINING PROGRAM

## 2019-02-04 PROCEDURE — G8399 PT W/DXA RESULTS DOCUMENT: HCPCS | Performed by: STUDENT IN AN ORGANIZED HEALTH CARE EDUCATION/TRAINING PROGRAM

## 2019-02-04 PROCEDURE — 1101F PT FALLS ASSESS-DOCD LE1/YR: CPT | Performed by: STUDENT IN AN ORGANIZED HEALTH CARE EDUCATION/TRAINING PROGRAM

## 2019-02-04 RX ORDER — GREEN TEA/HOODIA GORDONII 315-12.5MG
1 CAPSULE ORAL DAILY
Qty: 30 TABLET | Refills: 2 | Status: SHIPPED | OUTPATIENT
Start: 2019-02-04 | End: 2019-09-23 | Stop reason: SDUPTHER

## 2019-02-04 RX ORDER — LIDOCAINE 50 MG/G
OINTMENT TOPICAL
Qty: 30 G | Refills: 1 | Status: SHIPPED
Start: 2019-02-04 | End: 2021-06-14

## 2019-02-04 RX ORDER — NALOXONE HYDROCHLORIDE 4 MG/.1ML
0.1 SPRAY NASAL ONCE
Refills: 1 | COMMUNITY
Start: 2019-01-15

## 2019-02-04 RX ORDER — OLOPATADINE HYDROCHLORIDE 1 MG/ML
1 SOLUTION/ DROPS OPHTHALMIC 2 TIMES DAILY
Qty: 5 ML | Refills: 0 | Status: SHIPPED | OUTPATIENT
Start: 2019-02-04 | End: 2019-03-04

## 2019-02-04 RX ORDER — TIZANIDINE 2 MG/1
2 TABLET ORAL EVERY 6 HOURS PRN
Qty: 60 TABLET | Refills: 0 | Status: SHIPPED | OUTPATIENT
Start: 2019-02-04 | End: 2019-03-04

## 2019-02-04 RX ORDER — OMEPRAZOLE 20 MG/1
20 CAPSULE, DELAYED RELEASE ORAL DAILY
Qty: 90 CAPSULE | Refills: 3 | Status: SHIPPED | OUTPATIENT
Start: 2019-02-04 | End: 2019-09-23 | Stop reason: SDUPTHER

## 2019-02-04 ASSESSMENT — ENCOUNTER SYMPTOMS
COUGH: 0
NAUSEA: 0
VOMITING: 0
ABDOMINAL PAIN: 0
SHORTNESS OF BREATH: 0

## 2019-02-07 ENCOUNTER — TELEPHONE (OUTPATIENT)
Dept: FAMILY MEDICINE CLINIC | Age: 84
End: 2019-02-07

## 2019-02-07 DIAGNOSIS — M75.41 IMPINGEMENT SYNDROME OF RIGHT SHOULDER: Primary | ICD-10-CM

## 2019-02-08 ENCOUNTER — TELEPHONE (OUTPATIENT)
Dept: FAMILY MEDICINE CLINIC | Age: 84
End: 2019-02-08

## 2019-03-01 ENCOUNTER — HOSPITAL ENCOUNTER (OUTPATIENT)
Dept: PHYSICAL THERAPY | Age: 84
Setting detail: THERAPIES SERIES
Discharge: HOME | End: 2019-03-01
Payer: MEDICARE

## 2019-03-01 PROCEDURE — 97162 PT EVAL MOD COMPLEX 30 MIN: CPT

## 2019-03-04 ENCOUNTER — HOSPITAL ENCOUNTER (OUTPATIENT)
Age: 84
Discharge: HOME | End: 2019-03-06
Payer: MEDICARE

## 2019-03-04 ENCOUNTER — OFFICE VISIT (OUTPATIENT)
Dept: FAMILY MEDICINE CLINIC | Age: 84
End: 2019-03-04
Payer: MEDICARE

## 2019-03-04 VITALS
RESPIRATION RATE: 20 BRPM | DIASTOLIC BLOOD PRESSURE: 83 MMHG | SYSTOLIC BLOOD PRESSURE: 129 MMHG | HEART RATE: 80 BPM | HEIGHT: 65 IN | WEIGHT: 198.3 LBS | BODY MASS INDEX: 33.04 KG/M2 | OXYGEN SATURATION: 97 %

## 2019-03-04 DIAGNOSIS — E78.5 HYPERLIPIDEMIA, UNSPECIFIED HYPERLIPIDEMIA TYPE: Chronic | ICD-10-CM

## 2019-03-04 DIAGNOSIS — I10 ESSENTIAL HYPERTENSION: ICD-10-CM

## 2019-03-04 DIAGNOSIS — Z91.81 AT HIGH RISK FOR FALLS: ICD-10-CM

## 2019-03-04 DIAGNOSIS — M25.511 ACUTE PAIN OF RIGHT SHOULDER: ICD-10-CM

## 2019-03-04 DIAGNOSIS — M48.061 SPINAL STENOSIS OF LUMBAR REGION, UNSPECIFIED WHETHER NEUROGENIC CLAUDICATION PRESENT: Chronic | ICD-10-CM

## 2019-03-04 DIAGNOSIS — M17.0 PRIMARY OSTEOARTHRITIS OF BOTH KNEES: Primary | Chronic | ICD-10-CM

## 2019-03-04 DIAGNOSIS — M75.41 SHOULDER IMPINGEMENT SYNDROME, RIGHT: ICD-10-CM

## 2019-03-04 DIAGNOSIS — F33.41 RECURRENT MAJOR DEPRESSIVE DISORDER, IN PARTIAL REMISSION (HCC): Chronic | ICD-10-CM

## 2019-03-04 PROBLEM — M17.12 PRIMARY OSTEOARTHRITIS OF LEFT KNEE: Status: RESOLVED | Noted: 2017-10-05 | Resolved: 2019-03-04

## 2019-03-04 PROCEDURE — 36415 COLL VENOUS BLD VENIPUNCTURE: CPT

## 2019-03-04 PROCEDURE — G8417 CALC BMI ABV UP PARAM F/U: HCPCS | Performed by: FAMILY MEDICINE

## 2019-03-04 PROCEDURE — 36415 COLL VENOUS BLD VENIPUNCTURE: CPT | Performed by: FAMILY MEDICINE

## 2019-03-04 PROCEDURE — G8399 PT W/DXA RESULTS DOCUMENT: HCPCS | Performed by: FAMILY MEDICINE

## 2019-03-04 PROCEDURE — 99214 OFFICE O/P EST MOD 30 MIN: CPT | Performed by: FAMILY MEDICINE

## 2019-03-04 PROCEDURE — 1123F ACP DISCUSS/DSCN MKR DOCD: CPT | Performed by: FAMILY MEDICINE

## 2019-03-04 PROCEDURE — G8484 FLU IMMUNIZE NO ADMIN: HCPCS | Performed by: FAMILY MEDICINE

## 2019-03-04 PROCEDURE — 4040F PNEUMOC VAC/ADMIN/RCVD: CPT | Performed by: FAMILY MEDICINE

## 2019-03-04 PROCEDURE — 99212 OFFICE O/P EST SF 10 MIN: CPT | Performed by: FAMILY MEDICINE

## 2019-03-04 PROCEDURE — G8427 DOCREV CUR MEDS BY ELIG CLIN: HCPCS | Performed by: FAMILY MEDICINE

## 2019-03-04 PROCEDURE — 80053 COMPREHEN METABOLIC PANEL: CPT

## 2019-03-04 PROCEDURE — 80061 LIPID PANEL: CPT

## 2019-03-04 PROCEDURE — 1090F PRES/ABSN URINE INCON ASSESS: CPT | Performed by: FAMILY MEDICINE

## 2019-03-04 PROCEDURE — 1101F PT FALLS ASSESS-DOCD LE1/YR: CPT | Performed by: FAMILY MEDICINE

## 2019-03-04 PROCEDURE — 1036F TOBACCO NON-USER: CPT | Performed by: FAMILY MEDICINE

## 2019-03-04 RX ORDER — GABAPENTIN 800 MG/1
TABLET ORAL
COMMUNITY
Start: 2019-02-11 | End: 2019-03-04

## 2019-03-04 RX ORDER — AZELASTINE HYDROCHLORIDE 0.5 MG/ML
SOLUTION/ DROPS OPHTHALMIC
Refills: 2 | COMMUNITY
Start: 2019-02-04 | End: 2019-06-27 | Stop reason: SDUPTHER

## 2019-03-05 LAB
ALBUMIN SERPL-MCNC: 4.5 G/DL (ref 3.5–5.2)
ALP BLD-CCNC: 104 U/L (ref 35–104)
ALT SERPL-CCNC: 17 U/L (ref 0–32)
ANION GAP SERPL CALCULATED.3IONS-SCNC: 19 MMOL/L (ref 7–16)
AST SERPL-CCNC: 21 U/L (ref 0–31)
BILIRUB SERPL-MCNC: 0.3 MG/DL (ref 0–1.2)
BUN BLDV-MCNC: 8 MG/DL (ref 8–23)
CALCIUM SERPL-MCNC: 9.6 MG/DL (ref 8.6–10.2)
CHLORIDE BLD-SCNC: 102 MMOL/L (ref 98–107)
CHOLESTEROL, TOTAL: 231 MG/DL (ref 0–199)
CO2: 19 MMOL/L (ref 22–29)
CREAT SERPL-MCNC: 0.7 MG/DL (ref 0.5–1)
GFR AFRICAN AMERICAN: >60
GFR NON-AFRICAN AMERICAN: >60 ML/MIN/1.73
GLUCOSE BLD-MCNC: 99 MG/DL (ref 74–99)
HDLC SERPL-MCNC: 64 MG/DL
LDL CHOLESTEROL CALCULATED: 136 MG/DL (ref 0–99)
POTASSIUM SERPL-SCNC: 3.8 MMOL/L (ref 3.5–5)
SODIUM BLD-SCNC: 140 MMOL/L (ref 132–146)
TOTAL PROTEIN: 7.9 G/DL (ref 6.4–8.3)
TRIGL SERPL-MCNC: 153 MG/DL (ref 0–149)
VLDLC SERPL CALC-MCNC: 31 MG/DL

## 2019-03-05 RX ORDER — ATORVASTATIN CALCIUM 10 MG/1
10 TABLET, FILM COATED ORAL DAILY
Qty: 30 TABLET | Refills: 5 | Status: SHIPPED | OUTPATIENT
Start: 2019-03-05 | End: 2019-03-28

## 2019-03-08 ENCOUNTER — HOSPITAL ENCOUNTER (OUTPATIENT)
Dept: PHYSICAL THERAPY | Age: 84
Setting detail: THERAPIES SERIES
Discharge: HOME | End: 2019-03-08
Payer: MEDICARE

## 2019-03-08 PROCEDURE — 97110 THERAPEUTIC EXERCISES: CPT

## 2019-03-08 PROCEDURE — 97530 THERAPEUTIC ACTIVITIES: CPT

## 2019-03-13 ENCOUNTER — APPOINTMENT (OUTPATIENT)
Dept: PHYSICAL THERAPY | Age: 84
End: 2019-03-13
Payer: MEDICARE

## 2019-03-15 ENCOUNTER — HOSPITAL ENCOUNTER (OUTPATIENT)
Dept: PHYSICAL THERAPY | Age: 84
Setting detail: THERAPIES SERIES
Discharge: HOME | End: 2019-03-15
Payer: MEDICARE

## 2019-03-15 PROCEDURE — 97110 THERAPEUTIC EXERCISES: CPT

## 2019-03-15 PROCEDURE — 97530 THERAPEUTIC ACTIVITIES: CPT

## 2019-03-18 ENCOUNTER — HOSPITAL ENCOUNTER (OUTPATIENT)
Dept: PHYSICAL THERAPY | Age: 84
Setting detail: THERAPIES SERIES
Discharge: HOME | End: 2019-03-18
Payer: MEDICARE

## 2019-03-18 PROCEDURE — 97110 THERAPEUTIC EXERCISES: CPT

## 2019-03-18 PROCEDURE — 97530 THERAPEUTIC ACTIVITIES: CPT

## 2019-03-19 ENCOUNTER — HOSPITAL ENCOUNTER (OUTPATIENT)
Dept: PHYSICAL THERAPY | Age: 84
Setting detail: THERAPIES SERIES
Discharge: HOME | End: 2019-03-19
Payer: MEDICARE

## 2019-03-19 PROCEDURE — 97110 THERAPEUTIC EXERCISES: CPT

## 2019-03-19 PROCEDURE — 97530 THERAPEUTIC ACTIVITIES: CPT

## 2019-03-27 ENCOUNTER — HOSPITAL ENCOUNTER (OUTPATIENT)
Dept: PHYSICAL THERAPY | Age: 84
Setting detail: THERAPIES SERIES
Discharge: HOME | End: 2019-03-27
Payer: MEDICARE

## 2019-03-27 PROCEDURE — 97530 THERAPEUTIC ACTIVITIES: CPT

## 2019-03-27 PROCEDURE — 97110 THERAPEUTIC EXERCISES: CPT

## 2019-03-28 ENCOUNTER — HOSPITAL ENCOUNTER (OUTPATIENT)
Age: 84
Discharge: HOME | End: 2019-03-30
Payer: MEDICARE

## 2019-03-28 ENCOUNTER — OFFICE VISIT (OUTPATIENT)
Dept: FAMILY MEDICINE CLINIC | Age: 84
End: 2019-03-28
Payer: MEDICARE

## 2019-03-28 VITALS
DIASTOLIC BLOOD PRESSURE: 79 MMHG | BODY MASS INDEX: 32.65 KG/M2 | HEART RATE: 74 BPM | TEMPERATURE: 98 F | SYSTOLIC BLOOD PRESSURE: 132 MMHG | OXYGEN SATURATION: 99 % | WEIGHT: 196 LBS | HEIGHT: 65 IN

## 2019-03-28 DIAGNOSIS — Z20.6 CONTACT WITH AND (SUSPECTED) EXPOSURE TO HUMAN IMMUNODEFICIENCY VIRUS (HIV): ICD-10-CM

## 2019-03-28 DIAGNOSIS — Z20.2 POSSIBLE EXPOSURE TO STD: ICD-10-CM

## 2019-03-28 DIAGNOSIS — Z20.2 POSSIBLE EXPOSURE TO STD: Primary | ICD-10-CM

## 2019-03-28 PROCEDURE — 87491 CHLMYD TRACH DNA AMP PROBE: CPT

## 2019-03-28 PROCEDURE — 36415 COLL VENOUS BLD VENIPUNCTURE: CPT | Performed by: FAMILY MEDICINE

## 2019-03-28 PROCEDURE — 86703 HIV-1/HIV-2 1 RESULT ANTBDY: CPT

## 2019-03-28 PROCEDURE — G8399 PT W/DXA RESULTS DOCUMENT: HCPCS | Performed by: FAMILY MEDICINE

## 2019-03-28 PROCEDURE — 36415 COLL VENOUS BLD VENIPUNCTURE: CPT

## 2019-03-28 PROCEDURE — G8427 DOCREV CUR MEDS BY ELIG CLIN: HCPCS | Performed by: FAMILY MEDICINE

## 2019-03-28 PROCEDURE — G8417 CALC BMI ABV UP PARAM F/U: HCPCS | Performed by: FAMILY MEDICINE

## 2019-03-28 PROCEDURE — 4040F PNEUMOC VAC/ADMIN/RCVD: CPT | Performed by: FAMILY MEDICINE

## 2019-03-28 PROCEDURE — 99213 OFFICE O/P EST LOW 20 MIN: CPT | Performed by: FAMILY MEDICINE

## 2019-03-28 PROCEDURE — 1090F PRES/ABSN URINE INCON ASSESS: CPT | Performed by: FAMILY MEDICINE

## 2019-03-28 PROCEDURE — G8484 FLU IMMUNIZE NO ADMIN: HCPCS | Performed by: FAMILY MEDICINE

## 2019-03-28 PROCEDURE — 87210 SMEAR WET MOUNT SALINE/INK: CPT | Performed by: FAMILY MEDICINE

## 2019-03-28 PROCEDURE — 1123F ACP DISCUSS/DSCN MKR DOCD: CPT | Performed by: FAMILY MEDICINE

## 2019-03-28 PROCEDURE — 99214 OFFICE O/P EST MOD 30 MIN: CPT | Performed by: FAMILY MEDICINE

## 2019-03-28 PROCEDURE — 1036F TOBACCO NON-USER: CPT | Performed by: FAMILY MEDICINE

## 2019-03-28 PROCEDURE — 86592 SYPHILIS TEST NON-TREP QUAL: CPT

## 2019-03-28 PROCEDURE — 87591 N.GONORRHOEAE DNA AMP PROB: CPT

## 2019-03-28 RX ORDER — CHLORHEXIDINE GLUCONATE 0.12 MG/ML
15 RINSE ORAL 2 TIMES DAILY
COMMUNITY
Start: 2019-03-15 | End: 2019-06-27 | Stop reason: SDUPTHER

## 2019-03-28 RX ORDER — PRAVASTATIN SODIUM 40 MG
TABLET ORAL
Qty: 90 TABLET | Refills: 3 | Status: SHIPPED | OUTPATIENT
Start: 2019-03-28 | End: 2019-09-23 | Stop reason: SDUPTHER

## 2019-03-29 ENCOUNTER — HOSPITAL ENCOUNTER (OUTPATIENT)
Dept: PHYSICAL THERAPY | Age: 84
Setting detail: THERAPIES SERIES
Discharge: HOME | End: 2019-03-29
Payer: MEDICARE

## 2019-03-29 LAB
HIV-1 AND HIV-2 ANTIBODIES: NORMAL
RPR: NORMAL

## 2019-03-29 PROCEDURE — 97530 THERAPEUTIC ACTIVITIES: CPT

## 2019-03-29 PROCEDURE — 97110 THERAPEUTIC EXERCISES: CPT

## 2019-04-01 LAB
CHLAMYDIA TRACHOMATIS AMPLIFIED DET: NORMAL
N GONORRHOEAE AMPLIFIED DET: NORMAL

## 2019-04-11 ENCOUNTER — OFFICE VISIT (OUTPATIENT)
Dept: ENT CLINIC | Age: 84
End: 2019-04-11
Payer: MEDICARE

## 2019-04-11 ENCOUNTER — PROCEDURE VISIT (OUTPATIENT)
Dept: AUDIOLOGY | Age: 84
End: 2019-04-11
Payer: MEDICARE

## 2019-04-11 VITALS
WEIGHT: 195 LBS | OXYGEN SATURATION: 98 % | HEIGHT: 65 IN | SYSTOLIC BLOOD PRESSURE: 148 MMHG | BODY MASS INDEX: 32.49 KG/M2 | HEART RATE: 71 BPM | DIASTOLIC BLOOD PRESSURE: 76 MMHG

## 2019-04-11 DIAGNOSIS — J30.1 SEASONAL ALLERGIC RHINITIS DUE TO POLLEN: Primary | ICD-10-CM

## 2019-04-11 DIAGNOSIS — H90.3 SENSORINEURAL HEARING LOSS (SNHL) OF BOTH EARS: ICD-10-CM

## 2019-04-11 PROCEDURE — 1090F PRES/ABSN URINE INCON ASSESS: CPT | Performed by: OTOLARYNGOLOGY

## 2019-04-11 PROCEDURE — 99213 OFFICE O/P EST LOW 20 MIN: CPT | Performed by: OTOLARYNGOLOGY

## 2019-04-11 PROCEDURE — 4040F PNEUMOC VAC/ADMIN/RCVD: CPT | Performed by: OTOLARYNGOLOGY

## 2019-04-11 PROCEDURE — 92567 TYMPANOMETRY: CPT | Performed by: AUDIOLOGIST

## 2019-04-11 PROCEDURE — 1123F ACP DISCUSS/DSCN MKR DOCD: CPT | Performed by: OTOLARYNGOLOGY

## 2019-04-11 PROCEDURE — G8417 CALC BMI ABV UP PARAM F/U: HCPCS | Performed by: OTOLARYNGOLOGY

## 2019-04-11 PROCEDURE — G8427 DOCREV CUR MEDS BY ELIG CLIN: HCPCS | Performed by: OTOLARYNGOLOGY

## 2019-04-11 PROCEDURE — 1036F TOBACCO NON-USER: CPT | Performed by: OTOLARYNGOLOGY

## 2019-04-11 PROCEDURE — 92557 COMPREHENSIVE HEARING TEST: CPT | Performed by: AUDIOLOGIST

## 2019-04-11 PROCEDURE — G8399 PT W/DXA RESULTS DOCUMENT: HCPCS | Performed by: OTOLARYNGOLOGY

## 2019-04-11 ASSESSMENT — ENCOUNTER SYMPTOMS
SHORTNESS OF BREATH: 0
ABDOMINAL PAIN: 0
EYES NEGATIVE: 1
GASTROINTESTINAL NEGATIVE: 1
RESPIRATORY NEGATIVE: 1
SINUS PRESSURE: 1
COLOR CHANGE: 0

## 2019-04-11 NOTE — PROGRESS NOTES
Subjective:      Patient ID:  Ileana Riley is a 81 y.o. female. HPI:    Ileana Riley presents for recheck today for hearing loss. There are hearing changes noted by the patient. There are not new medical issues. Patient's medications, allergies, past medical, surgical, social and family histories were reviewed and updated as appropriate. Review of Systems   Constitutional: Negative. HENT: Positive for hearing loss, nosebleeds, postnasal drip and sinus pressure. Negative for ear pain. Eyes: Negative. Negative for visual disturbance. Respiratory: Negative. Negative for shortness of breath. Cardiovascular: Negative. Negative for chest pain. Gastrointestinal: Negative. Negative for abdominal pain. Genitourinary: Negative. Musculoskeletal: Negative. Skin: Negative. Negative for color change. Neurological: Negative. Psychiatric/Behavioral: Negative. Negative for behavioral problems and hallucinations. All other systems reviewed and are negative. Objective:   Physical Exam   Constitutional: She is oriented to person, place, and time. She appears well-developed and well-nourished. HENT:   Head: Normocephalic and atraumatic. Right Ear: Hearing, tympanic membrane, external ear and ear canal normal.   Left Ear: Hearing, tympanic membrane, external ear and ear canal normal.   Nose: Mucosal edema and rhinorrhea present. Mouth/Throat: Uvula is midline, oropharynx is clear and moist and mucous membranes are normal.   Inferior turbinates pale, swollen, edematous     DNS - Yes   Side - left   Amount - 30%       Eyes: Pupils are equal, round, and reactive to light. Conjunctivae and EOM are normal.   Neck: Normal range of motion. Neck supple. Cardiovascular: Normal rate, regular rhythm and normal heart sounds. Pulmonary/Chest: Effort normal and breath sounds normal.   Abdominal: Soft.  Bowel sounds are normal.   Neurological: She is alert and oriented to person, place, and time. Skin: Skin is warm and dry. Nursing note and vitals reviewed. Audiogram -                        Assessment:       Diagnosis Orders   1. Seasonal allergic rhinitis due to pollen     2. Sensorineural hearing loss (SNHL) of both ears     3. Asymmetrical hearing loss of right ear                Plan:      Hearing is diminished. will send new audio to [facility] for recheck of her hearing aids. Pt has hearing aids at this time. Pt is  a candidate for hearing aids and is  interested in discussing this with audiology. Also discussed the importance of hearing protection from now on to preserve remaining hearing.      Call or return to clinic prn if these symptoms worsen or fail to improve as anticipated    Follow up in 1 year(s)

## 2019-04-11 NOTE — PROGRESS NOTES
This patient was referred for audiometric/tympanometric testing by Dr. Mil Ferguson due to hearing loss. Audiometry revealed mild mostly sensorineural hearing loss in the left ear - conductive component at 4000 Hz. Right ear revealed a moderate to severe mostly sensorineural hearing loss - conductive component at 4000 Hz. . Reliability was good. Speech reception thresholds were in fair agreement with the pure tone averages, bilaterally. Speech discrimination scores were poor, in the right ear and excellent in the left ear. Tympanometry revealed normal peak pressure and high compliance (2.45 ml) in the right ear and no seal obtained in the left ear. .    The results were reviewed with the patient and Dr. Mil Ferguson. Both ears have decreased since last audiogram.     Recommendations for follow up will be made pending physician consult. [address] [place] [address], [facility]  [address] # [id number]    Electronically signed by JC Cruz on 4/11/2019 at 4:40 PM

## 2019-04-26 ENCOUNTER — OFFICE VISIT (OUTPATIENT)
Dept: FAMILY MEDICINE CLINIC | Age: 84
End: 2019-04-26
Payer: MEDICARE

## 2019-04-26 VITALS
HEIGHT: 65 IN | DIASTOLIC BLOOD PRESSURE: 78 MMHG | BODY MASS INDEX: 33.32 KG/M2 | HEART RATE: 82 BPM | WEIGHT: 200 LBS | SYSTOLIC BLOOD PRESSURE: 131 MMHG | TEMPERATURE: 98.2 F | OXYGEN SATURATION: 95 % | RESPIRATION RATE: 18 BRPM
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
## 2019-04-26 DIAGNOSIS — Z91.81 AT LOW RISK FOR FALL: ICD-10-CM

## 2019-04-26 DIAGNOSIS — M54.5 LOW BACK PAIN, UNSPECIFIED BACK PAIN LATERALITY, UNSPECIFIED CHRONICITY, WITH SCIATICA PRESENCE UNSPECIFIED: ICD-10-CM

## 2019-04-26 PROCEDURE — 1123F ACP DISCUSS/DSCN MKR DOCD: CPT | Performed by: STUDENT IN AN ORGANIZED HEALTH CARE EDUCATION/TRAINING PROGRAM

## 2019-04-26 PROCEDURE — 4040F PNEUMOC VAC/ADMIN/RCVD: CPT | Performed by: STUDENT IN AN ORGANIZED HEALTH CARE EDUCATION/TRAINING PROGRAM

## 2019-04-26 PROCEDURE — G8427 DOCREV CUR MEDS BY ELIG CLIN: HCPCS | Performed by: STUDENT IN AN ORGANIZED HEALTH CARE EDUCATION/TRAINING PROGRAM

## 2019-04-26 PROCEDURE — G8417 CALC BMI ABV UP PARAM F/U: HCPCS | Performed by: STUDENT IN AN ORGANIZED HEALTH CARE EDUCATION/TRAINING PROGRAM

## 2019-04-26 PROCEDURE — 1036F TOBACCO NON-USER: CPT | Performed by: STUDENT IN AN ORGANIZED HEALTH CARE EDUCATION/TRAINING PROGRAM

## 2019-04-26 PROCEDURE — G8399 PT W/DXA RESULTS DOCUMENT: HCPCS | Performed by: STUDENT IN AN ORGANIZED HEALTH CARE EDUCATION/TRAINING PROGRAM

## 2019-04-26 PROCEDURE — 99213 OFFICE O/P EST LOW 20 MIN: CPT | Performed by: STUDENT IN AN ORGANIZED HEALTH CARE EDUCATION/TRAINING PROGRAM

## 2019-04-26 PROCEDURE — 1090F PRES/ABSN URINE INCON ASSESS: CPT | Performed by: STUDENT IN AN ORGANIZED HEALTH CARE EDUCATION/TRAINING PROGRAM

## 2019-04-26 PROCEDURE — 99212 OFFICE O/P EST SF 10 MIN: CPT | Performed by: STUDENT IN AN ORGANIZED HEALTH CARE EDUCATION/TRAINING PROGRAM

## 2019-04-26 NOTE — PROGRESS NOTES
[address] [facility]  Department of Family Medicine  Family Medicine Residency Program      Patient:  Shantal Hollis 81 y.o. female     Date of Service: 4/26/19      Chief complaint:   Chief Complaint   Patient presents with    Leg Pain     left with sore right painful patient fell going up the steps 6 days ago    Back Pain    Dry Eye         History of Present Illness   The patient is a 81 y.o. female who presented to the clinic for leg pain from fall. Patient fell 5 days ago while climbing stairs. She fell forward and in the process her hit her legs on the stairs and suffered some bruises. She denies losing consciousness, before or after the fall. She denies any preceding dizziness, headaches, visual changes, or palpitations. She denies weakness. She says she just tripped and fell. She did not hit her head during the fall. She reports some leg soreness. Patient also reports acute on chronic low back pain since the fall. Her right lower back has been her chronic problem but since the fall she feels pain also from left lower back. She denies any sciatica symptoms. She already taking Percocet for back pain.     Past Medical History:      Diagnosis Date    Anxiety 10/11/2010    Breast cancer ([facility] [address] approx 2000    right, treated lumpectomy, radiation, chemo    Depression 10/11/2010    no issues    Diverticular disease 10/11/2010    GERD (gastroesophageal reflux disease)     HTN (hypertension) 10/11/2010    Hyperlipidemia 10/11/2010    Osteoarthritis 10/11/2010    fot left knee arthroplasty 10/21/2016    Osteoporosis 10/11/2010    Spinal stenosis 10/11/2010    Use of cane as ambulatory aid        Past Surgical History:        Procedure Laterality Date    BACK SURGERY      BONE GRAFT      with back surgery    BREAST LUMPECTOMY  2000    left, with [facility] and rad, [facility]    CARPAL TUNNEL RELEASE  1980s    left    CARPAL TUNNEL RELEASE  1980s    repeat left    CATARACT REMOVAL  2010    [facility]    COLONOSCOPY  2/3/2011    extensive diverticulosis, Dr. Gregory Chadwick, [facility] COLONOSCOPY  2013    \"could not get around colon due to diverticulitis, [address] COLONOSCOPY  2/24/2015    severe diverticulosis transverse and left colon without diverticulitis, rectal polyp bx, Dr. Anju Del Cid, [address] [place] [address]    HYSTERECTOMY  1980s    still has ovaries, no cancer, [facility] SPINAL FUSION  2009    [facility], pins and rods in place    TOTAL KNEE ARTHROPLASTY Right 10/21/2016    TOTAL KNEE ARTHROPLASTY Left 10/2017    replacement, dr Goldstein Salvage  2/3/2011    gastritis, Dr. Gregory Chadwick, [address] GASTROINTESTINAL ENDOSCOPY  2013    gastritis, [facility]    UPPER GASTROINTESTINAL ENDOSCOPY  2/24/2015    mild to moderate gastritis and duodenitis, gastric bx for H pylori, Dr. Anju Del Cid       Allergies:    Patient has no known allergies. Social History:   Social History     Socioeconomic History    Marital status:       Spouse name: Not on file    Number of children: Not on file    Years of education: Not on file    Highest education level: Not on file   Occupational History    Not on file   Social Needs    Financial resource strain: Not on file    Food insecurity:     Worry: Not on file     Inability: Not on file    Transportation needs:     Medical: Not on file     Non-medical: Not on file   Tobacco Use    Smoking status: Never Smoker    Smokeless tobacco: Never Used   Substance and Sexual Activity    Alcohol use: No     Alcohol/week: 0.0 oz    Drug use: No    Sexual activity: Never   Lifestyle    Physical activity:     Days per week: Not on file     Minutes per session: Not on file    Stress: Not on file   Relationships    Social connections:     Talks on phone: Not on file     Gets together: Not on file     Attends Latter day service: Not on file     Active member of club or organization: Not on file     Attends meetings of clubs or organizations: Not on file     Relationship status: Not on file    Intimate partner violence:     Fear of current or ex partner: Not on file     Emotionally abused: Not on file     Physically abused: Not on file     Forced sexual activity: Not on file   Other Topics Concern    Not on file   Social History Narrative    Drinks 3 cups of tea daily       Family History:       Problem Relation Age of Onset    Cancer Mother         throat    High Blood Pressure Mother     Mental Illness Father     Colon Cancer Father     Cancer Brother     Heart Disease Brother     Mental Illness Daughter     Mental Illness Son     Arthritis Sister     Cancer Brother     Pacemaker Brother     No Known Problems Brother        Reviewof Systems:   Review of Systems   Constitutional: Negative for fever. Respiratory: Negative for shortness of breath and wheezing. Cardiovascular: Negative for chest pain and palpitations. Gastrointestinal: Negative for abdominal pain. Musculoskeletal: Positive for back pain. Bilateral shin soreness   Neurological: Negative for dizziness, syncope, weakness, numbness and headaches. Physical Exam   Vitals: /78 (Site: Right Upper Arm, Position: Sitting, Cuff Size: Medium Adult)   Pulse 82   Temp 98.2 °F (36.8 °C) (Oral)   Resp 18   Ht 5' 5\" (1.651 m)   Wt 200 lb (90.7 kg)   SpO2 95%   BMI 33.28 kg/m²   General Appearance: alert, oriented, no acute distress  Chest wall/Lung: Clear to auscultation bilaterally,  respirations unlabored. No ronchi/wheezing/rales  Heart: Regular rate and rhythm, S1 and S2 normal, no murmur, rub or gallop.   Extremities: Bruises on bilateral shins, small wound scab on right shin; no edema  Back: Tenderness to palpation on left lumbar paraspinal, tenderness to palpation on the left SI joint, normal sensation, strength 5/5  Musculokeletal: ROM grossly normal in all joints of extremities, no obvious joint swelling  Neurologic: alert & oriented        Psychiatric: has a normal mood and affect. Behavior is normal.       Assessment and Plan       1. Fall (on) (from) other stairs and steps, initial encounter  Supportive management. 2. At low risk for fall  Discussed about preventative measures, including home environment safety. Referral to PT    3. Low back pain, unspecified back pain laterality, unspecified chronicity, with sciatica presence unspecified  Continue present management. Referral to PT      Issues to address at future appointment/s: none    Return to Office:Return if symptoms worsen or fail to improve. Medication List:    Current Outpatient Medications   Medication Sig Dispense Refill    chlorhexidine (PERIDEX) 0.12 % solution       pravastatin (PRAVACHOL) 40 MG tablet TAKE 1 TABLET DAILY 90 tablet 3    azelastine (OPTIVAR) 0.05 % ophthalmic solution INSTILL ONE DROP INTO EACH EYE TWICE DAILY  2    diclofenac sodium 1 % GEL Apply 2 g topically 4 times daily 1 Tube 0    sucralfate (CARAFATE) 1 GM tablet TAKE ONE TABLET BY MOUTH FOUR TIMES A  tablet 2    NARCAN 4 MG/0.1ML LIQD nasal spray 0.1 mLs by Nasal route once  1    omeprazole (PRILOSEC) 20 MG delayed release capsule Take 1 capsule by mouth daily 90 capsule 3    Lactobacillus (PROBIOTIC ACIDOPHILUS) TABS Take 1 tablet by mouth daily 30 tablet 2    lidocaine (XYLOCAINE) 5 % ointment APPLY EXT TO THE BACK AND LEGS BID PRN 30 g 1    amLODIPine-benazepril (LOTREL) 5-20 MG per capsule Take 1 capsule by mouth daily 90 capsule 3    VILAZODONE HCL 10 MG Tablet TK 1 T PO D  1    oxyCODONE-acetaminophen (PERCOCET) 5-325 MG per tablet Take 1 tablet by mouth every 8 hours as needed.   0    Multiple Vitamins-Minerals (AIRBORNE PO) Take 3 tablets by mouth daily      polyvinyl alcohol-povidone (HYPOTEARS) 1.4-0.6 % ophthalmic solution Place 1-2 drops into both eyes as needed      fluticasone (FLONASE) 50 MCG/ACT nasal spray 2 sprays by Nasal route daily 2 sprays in each nostril daily 1 Bottle 3    MOVANTIK 12.5 MG TABS tablet Take 1 tablet by mouth every morning 90 tablet 3    vitamin B-12 (CYANOCOBALAMIN) 1000 MCG tablet Take 1,000 mcg by mouth daily       Pyridoxine HCl (VITAMIN B-6) 100 MG tablet Take 100 mg by mouth daily      Multiple Vitamin TABS Take 1 capsule by mouth daily Hair ,skin and nails       No current facility-administered medications for this visit.          Abena Pedro MD     Electronically signed by Abena Pedro MD on 4/29/2019 at 10:16 AM

## 2019-04-26 NOTE — PROGRESS NOTES
S: 81 y.o. female here for complaints of a fall 6 days ago. She was walking up stairs and fell and hit her shins/feet. Acute on chronic back pain. Started on the right and now is on the left since the fall. No red flag symptoms. O: VS: /78 (Site: Right Upper Arm, Position: Sitting, Cuff Size: Medium Adult)   Pulse 82   Temp 98.2 °F (36.8 °C) (Oral)   Resp 18   Ht 5' 5\" (1.651 m)   Wt 200 lb (90.7 kg)   SpO2 95%   BMI 33.28 kg/m²    General: NAD   CV:  RRR, no gallops, rubs, or murmurs   Resp: CTAB no R/R/W   Abd:  Soft, nontender, no masses    Ext:  no C/C/' tr b/l Edema; ecchymoses and crusting on lower legs   Back: left lumbar paraspinal muscles and si joint ttp; ls spine nonttp, nl sensation, ms 5/5   Knees: no ecchymoses  Impression/Plan:   1. Fall-no inciting incident, discussed safe environment maintenance  2. Generalized muscle aches-heat, conservative measures and PT  3. Lumbar muscle spasm-as above, declined additional medications    rto 6/27 with pcp    Attending Physician Statement  I have discussed the case, including pertinent history and exam findings with the resident. I agree with the documented assessment and plan.         Brianna Urena MD

## 2019-04-28 ASSESSMENT — ENCOUNTER SYMPTOMS
WHEEZING: 0
ABDOMINAL PAIN: 0
SHORTNESS OF BREATH: 0

## 2019-04-29 ASSESSMENT — ENCOUNTER SYMPTOMS: BACK PAIN: 1

## 2019-05-14 ENCOUNTER — APPOINTMENT (OUTPATIENT)
Dept: PHYSICAL THERAPY | Age: 84
End: 2019-05-14
Payer: MEDICARE

## 2019-05-21 ENCOUNTER — HOSPITAL ENCOUNTER (OUTPATIENT)
Dept: PHYSICAL THERAPY | Age: 84
Setting detail: THERAPIES SERIES
Discharge: HOME | End: 2019-05-21
Payer: MEDICARE

## 2019-05-21 PROCEDURE — 97162 PT EVAL MOD COMPLEX 30 MIN: CPT

## 2019-05-21 NOTE — PROGRESS NOTES
[address] [facility]                Phone: [phone]   Fax: [phone]    Physical Therapy Daily Treatment Note  Date:  2019    Patient Name:  Inna Martinez    :  1935  MRN: [id number]    Referring Physician:  Dr. Tristan Lea Information:  Medicare      Evaluation date:  2019  Diagnosis:  LBP  Cert Dates:  4605 - 2019  ICD-10 Codes:  M54.9, R29.3  Evaluating Physical Therapist:  Viviana Ann PT, LUCERO      Visit# / total visits:  -   Time In:    Time Out:      Subjective:      Exercises:   Exercise/Equipment Resistance/Repetitions During  After  Other comments      Press-ups (modified)                    ANYA                    Standing hip 3-way          Mini-squats                                         Assessment/Comments:      Home Exercise Program:  2019 - posture/use of lumbar roll      Treatment/Activity Tolerance:  [] Patient tolerated treatment well [] Patient limited by fatigue  [] Patient limited by pain  [] Patient limited by other medical complications  [] Other:     Prognosis: [] Good [x] Fair  [x] Poor    Patient Requires Follow-up: [x] Yes  [] No    Plan:   [] Continue per plan of care [] Alter current plan (see comments)  [x] Plan of care initiated [] Hold pending MD visit [] Discharge    Plan for Next Session:  Begin extension principle trial and assess pt's response/tolerance.     See Progress Note: []  Yes  [x]  No  Next due:        Electronically signed by:  Viviana Ann PT, LUCERO

## 2019-05-21 NOTE — PROGRESS NOTES
[address] [facility]                Phone: [phone]   Fax: [phone]    Physical Therapy Initial Evaluation  Date:  2019    Patient Name:  Jimena Pickering    :  1935  MRN: [id number]    Referring Physician:  Dr. Ramesh Claire Information:  Medicare     Evaluation date:  2019  Diagnosis:  LBP  Cert Dates:   - 2019  ICD-10 Codes:  M54.9, R29.3  Evaluating Physical Therapist:  Bebe Snyder, PT, DPT      [facility] [place] [address] Lumbar Spine Assessment    Functional disability score: Oswestry LBP Disability Questionnaire score 72% (indicates pt is crippled)  VAS Score (0-10): 7/10 currently; ranges between 5/10 and 10/10    HISTORY  Present symptoms: nagging and aching across LB with intermittent sharp pain on R LB  Present since: about 1 month, improving  Commenced as a result of: Pt fell up her steps. Symptoms at onset: back  Constant symptoms: back  Intermittent symptoms: NT    Worse: bending, standing, walking, am, as the day progress     Better: sitting, lying, as the day progresses, when still     Disturbed sleep: yes    Sleeping posture: R side lying      Previous episodes: 11+  Year of first episode: Per pt, about 8 years ago. Previous history: Pt stated she had previous spinal surgeries at [facility], including laminectomy, fusion, and yessenia placement. Pt not sure of what levels of her lumbar spine she had surgery on. SPECIFIC QUESTIONS  cough/sneeze/strain/+ve/-ve    Bladder: Pt reported urinary urgency and difficulty initiating urination since falling 1 month ago.   Gait: forward flexed posture, mild unsteadiness  Medications: Percocet 3x/day       Recent or major surgery: B TKA  Night pain: no  Accidents: fall about 1 month ago  Unexplained weight loss: no  Other: NA      EXAMINATION    POSTURE  Sitting: poor  Standing: fair   Lordosis: reduced       Lateral shift: nil  Relevant: NA      NEUROLOGICAL  Motor deficit: B hips 3/5, B knees 4/5, B ankles 4+/5  Sensory deficit: denies numbness/tingling to all extremities  Reflexes: NT  Dural signs: NT    MOVEMENT LOSS   Alli Mod Min Nil Pain   Flexion   x  Increases    Extension x    Increases    Side gliding R   x  Increases    Side gliding L   x  Increases          PROVISIONAL CLASSIFICATION    Derangement vs Other       PRINCIPLE OF MANAGEMENT    Education - posture/use of lumbar roll  Mechanical therapy - trial  Extension principle    Pt will be seen for 1-2 visits per week for 5-6 weeks for a total of 6-8 visits to accomplish goals set below:        Short/Long Term Goals: (5-6 weeks)      1. Pt will report decreased LBP to 3-4/10 with activity. 2.  Pt will improve lumbar spine extension AROM to minimal movement loss. 3.  Pt will improve posture to good. 4.  Pt will be independent with HEP. Pt's potential for reaching Physical Therapy goals: fair-poor. Time In:  1300  Time Out:  [address] [facility], [address] [facility], [facility]   [id number]      Anna Garcias  T: [phone]   F: [phone]     If you have any questions or concerns, please don't hesitate to call. Thank you for your referral.    Physician Signature:________________________________Date:__________________  By signing above, therapists plan is approved by physician. All patients under [employer]   must be signed by physician.

## 2019-05-28 ENCOUNTER — HOSPITAL ENCOUNTER (OUTPATIENT)
Dept: PHYSICAL THERAPY | Age: 84
Setting detail: THERAPIES SERIES
Discharge: HOME | End: 2019-05-28
Payer: MEDICARE

## 2019-05-28 PROCEDURE — 97530 THERAPEUTIC ACTIVITIES: CPT

## 2019-05-28 PROCEDURE — 97110 THERAPEUTIC EXERCISES: CPT

## 2019-05-28 NOTE — PROGRESS NOTES
[address] [facility]                Phone: [phone]   Fax: [phone]    Physical Therapy Daily Treatment Note  Date:  2019    Patient Name:  Anamika Urbina    :  1935  MRN: [id number]    Referring Physician:  Dr. Sebastien Ryder Information:  Medicare      Evaluation date:  2019  Diagnosis:  LBP  Cert Dates:   - 2019  ICD-10 Codes:  M54.9, R29.3  Evaluating Physical Therapist:  James Frazier PT, DPT      Visit# / total visits:  -   Time In:  1400  Time Out:  1454    Subjective:  Pt c/o chronic LBP and also very concerned about scars on B lower legs from her fall. Exercises:   Exercise/Equipment Resistance/Repetitions Other comments      Bike  10:00, L5               ANYA 3x10 Parallel bar for support; attempting to achieve neutral spine              Standing hip 3-way 2x10 B LE's Parallel bars for support      Mini-squats 3x10 Parallel bars for support                               Assessment/Comments:  Upon arrival, pt somewhat agitated that her script was not for previous wounds on B LE's. PT explained to pt that her script was for falls and LBP; pt verbalized understanding. Pt educated on importance of maintaining upright posture with all exercises and at all times when at home. Pt given demonstrations and verbal cues for proper form with all exercises this afternoon. Pt tolerated session well without any c/o increased pain.       Home Exercise Program:  2019 - posture/use of lumbar roll      Treatment/Activity Tolerance:  [x] Patient tolerated treatment well [] Patient limited by fatigue  [] Patient limited by pain  [] Patient limited by other medical complications  [] Other:     Prognosis: [] Good [x] Fair  [x] Poor    Patient Requires Follow-up: [x] Yes  [] No    Plan:   [x] Continue per plan of care [] Alter current plan (see comments)  [] Plan of care initiated [] Hold pending MD visit [] Discharge    Plan for Next Session:  Progress LE exercises with focus on upright posture.     See Progress Note: []  Yes  [x]  No  Next due:        Electronically signed by:  Jayce Delacruz PT, DPT

## 2019-06-04 ENCOUNTER — HOSPITAL ENCOUNTER (OUTPATIENT)
Dept: PHYSICAL THERAPY | Age: 84
Setting detail: THERAPIES SERIES
Discharge: HOME | End: 2019-06-04
Payer: MEDICARE

## 2019-06-04 PROCEDURE — 97530 THERAPEUTIC ACTIVITIES: CPT

## 2019-06-04 PROCEDURE — 97110 THERAPEUTIC EXERCISES: CPT

## 2019-06-04 NOTE — PROGRESS NOTES
[address] [facility]                Phone: [phone]   Fax: [phone]    Physical Therapy Daily Treatment Note  Date:  2019    Patient Name:  Marciano Moore    :  1935  MRN: [id number]    Referring Physician:  Dr. Jarrett Maldonado Information:  Medicare      Evaluation date:  2019  Diagnosis:  LBP  Cert Dates:   - 2019  ICD-10 Codes:  M54.9, R29.3  Evaluating Physical Therapist:  Jimbo Casiano PT, DPT      Visit# / total visits:  3/6-8   Time In:  1409  Time Out:  1449    Subjective:  Pt with no new complaints this afternoon. Exercises:   Exercise/Equipment Resistance/Repetitions Other comments      Bike  10:00, L5               ANYA 3x10 Parallel bar for support; attempting to achieve neutral spine              Standing hip 3-way 2x10 B LE's Parallel bars for support      Mini-squats 3x10 Parallel bars for support                               Assessment/Comments:  Pt tolerated session well without any complaints. Pt given verbal cues for upright posture throughout session. Home Exercise Program:  2019 - posture/use of lumbar roll      Treatment/Activity Tolerance:  [x] Patient tolerated treatment well [] Patient limited by fatigue  [] Patient limited by pain  [] Patient limited by other medical complications  [] Other:     Prognosis: [] Good [x] Fair  [x] Poor    Patient Requires Follow-up: [x] Yes  [] No    Plan:   [x] Continue per plan of care [] Alter current plan (see comments)  [] Plan of care initiated [] Hold pending MD visit [] Discharge    Plan for Next Session:  Progress LE exercises with focus on upright posture.     See Progress Note: []  Yes  [x]  No  Next due:        Electronically signed by:  Jimbo Casiano PT, LUCERO

## 2019-06-12 ENCOUNTER — HOSPITAL ENCOUNTER (OUTPATIENT)
Dept: PHYSICAL THERAPY | Age: 84
Setting detail: THERAPIES SERIES
Discharge: HOME | End: 2019-06-12
Payer: MEDICARE

## 2019-06-12 PROCEDURE — 97110 THERAPEUTIC EXERCISES: CPT

## 2019-06-12 PROCEDURE — 97530 THERAPEUTIC ACTIVITIES: CPT

## 2019-06-12 NOTE — PROGRESS NOTES
[address] [facility]                Phone: [phone]   Fax: [phone]    Physical Therapy Daily Treatment Note  Date:  2019    Patient Name:  Jimena Pickering    :  1935  MRN: [id number]    Referring Physician:  Dr. Ramesh Claire Information:  Medicare      Evaluation date:  2019  Diagnosis:  LBP  Cert Dates:  2288 - 2019  ICD-10 Codes:  M54.9, R29.3  Evaluating Physical Therapist:  Bebe Snyder PT, DPT      Visit# / total visits:     Time In:  5947  Time Out:  0935    Subjective:  Pt with no new complaints this morning. Exercises:   Exercise/Equipment Resistance/Repetitions Other comments      Bike  10:00, L5               ANYA 3x10 Parallel bar for support; attempting to achieve neutral spine              Standing hip 3-way 3x10 B LE's Parallel bars for support      Mini-squats 3x10 Parallel bars for support                               Assessment/Comments:  Pt tolerated session well without any complaints. Pt given verbal cues for upright posture throughout session. Home Exercise Program:  2019 - posture/use of lumbar roll      Treatment/Activity Tolerance:  [x] Patient tolerated treatment well [] Patient limited by fatigue  [] Patient limited by pain  [] Patient limited by other medical complications  [] Other:     Prognosis: [] Good [x] Fair  [x] Poor    Patient Requires Follow-up: [x] Yes  [] No    Plan:   [x] Continue per plan of care [] Alter current plan (see comments)  [] Plan of care initiated [] Hold pending MD visit [] Discharge    Plan for Next Session:  Progress LE exercises with focus on upright posture.     See Progress Note: []  Yes  [x]  No  Next due:        Electronically signed by:  Bebe Snyder PT, DPT

## 2019-06-18 ENCOUNTER — HOSPITAL ENCOUNTER (OUTPATIENT)
Dept: PHYSICAL THERAPY | Age: 84
Setting detail: THERAPIES SERIES
Discharge: HOME | End: 2019-06-18
Payer: MEDICARE

## 2019-06-18 PROCEDURE — 97110 THERAPEUTIC EXERCISES: CPT

## 2019-06-18 PROCEDURE — 97530 THERAPEUTIC ACTIVITIES: CPT

## 2019-06-18 NOTE — PROGRESS NOTES
[address] [facility]                Phone: [phone]   Fax: [phone]    Physical Therapy Daily Treatment Note  Date:  2019    Patient Name:  Sally Torres    :  1935  MRN: [id number]    Referring Physician:  Dr. Jose Armando Ames Information:  Medicare      Evaluation date:  2019  Diagnosis:  LBP  Cert Dates:   - 2019  ICD-10 Codes:  M54.9, R29.3  Evaluating Physical Therapist:  Gissel Javier PT, DPT      Visit# / total visits:     Time In:  1400  Time Out:  1443    Subjective:  Pt with no new complaints this afternoon. Exercises:   Exercise/Equipment Resistance/Repetitions Other comments      Bike  10:00, L5               ANYA 3x10 Parallel bar for support; attempting to achieve neutral spine              Standing hip 3-way 3x10 B LE's Parallel bars for support      Mini-squats 3x10 Parallel bars for support                               Assessment/Comments:  Pt tolerated session well without any complaints. Pt agreeable to discharge at this time with HEP. Home Exercise Program:  2019 - posture/use of lumbar roll   2019 - handout administered      Treatment/Activity Tolerance:  [x] Patient tolerated treatment well [] Patient limited by fatigue  [] Patient limited by pain  [] Patient limited by other medical complications  [] Other:     Prognosis: [] Good [x] Fair  [x] Poor    Patient Requires Follow-up: [] Yes  [x] No    Plan:   [] Continue per plan of care [] Alter current plan (see comments)  [] Plan of care initiated [] Hold pending MD visit [x] Discharge    Plan for Next Session:  Pt now discharged.     See Progress Note: []  Yes  [x]  No  Next due:        Electronically signed by:  Gissel Javier PT, DPT

## 2019-06-27 ENCOUNTER — HOSPITAL ENCOUNTER (OUTPATIENT)
Age: 84
Discharge: HOME | End: 2019-06-29
Payer: MEDICARE

## 2019-06-27 ENCOUNTER — OFFICE VISIT (OUTPATIENT)
Dept: FAMILY MEDICINE CLINIC | Age: 84
End: 2019-06-27
Payer: MEDICARE

## 2019-06-27 VITALS
HEART RATE: 72 BPM | WEIGHT: 199 LBS | SYSTOLIC BLOOD PRESSURE: 106 MMHG | HEIGHT: 65 IN | BODY MASS INDEX: 33.15 KG/M2 | DIASTOLIC BLOOD PRESSURE: 66 MMHG | TEMPERATURE: 98.3 F | RESPIRATION RATE: 18 BRPM

## 2019-06-27 DIAGNOSIS — I10 ESSENTIAL HYPERTENSION: Chronic | ICD-10-CM

## 2019-06-27 DIAGNOSIS — M75.41 SHOULDER IMPINGEMENT SYNDROME, RIGHT: ICD-10-CM

## 2019-06-27 DIAGNOSIS — M15.9 PRIMARY OSTEOARTHRITIS INVOLVING MULTIPLE JOINTS: ICD-10-CM

## 2019-06-27 DIAGNOSIS — E78.5 HYPERLIPIDEMIA, UNSPECIFIED HYPERLIPIDEMIA TYPE: Primary | Chronic | ICD-10-CM

## 2019-06-27 DIAGNOSIS — M25.511 ACUTE PAIN OF RIGHT SHOULDER: ICD-10-CM

## 2019-06-27 DIAGNOSIS — E78.5 HYPERLIPIDEMIA, UNSPECIFIED HYPERLIPIDEMIA TYPE: Chronic | ICD-10-CM

## 2019-06-27 LAB
ALBUMIN SERPL-MCNC: 4.4 G/DL (ref 3.5–5.2)
ALP BLD-CCNC: 87 U/L (ref 35–104)
ALT SERPL-CCNC: 16 U/L (ref 0–32)
ANION GAP SERPL CALCULATED.3IONS-SCNC: 16 MMOL/L (ref 7–16)
AST SERPL-CCNC: 26 U/L (ref 0–31)
BILIRUB SERPL-MCNC: 0.3 MG/DL (ref 0–1.2)
BUN BLDV-MCNC: 11 MG/DL (ref 8–23)
CALCIUM SERPL-MCNC: 10.3 MG/DL (ref 8.6–10.2)
CHLORIDE BLD-SCNC: 95 MMOL/L (ref 98–107)
CHOLESTEROL, TOTAL: 179 MG/DL (ref 0–199)
CO2: 22 MMOL/L (ref 22–29)
CREAT SERPL-MCNC: 0.7 MG/DL (ref 0.5–1)
GFR AFRICAN AMERICAN: >60
GFR NON-AFRICAN AMERICAN: >60 ML/MIN/1.73
GLUCOSE BLD-MCNC: 89 MG/DL (ref 74–99)
HDLC SERPL-MCNC: 53 MG/DL
LDL CHOLESTEROL CALCULATED: 95 MG/DL (ref 0–99)
POTASSIUM SERPL-SCNC: 4.7 MMOL/L (ref 3.5–5)
SODIUM BLD-SCNC: 133 MMOL/L (ref 132–146)
TOTAL PROTEIN: 7.6 G/DL (ref 6.4–8.3)
TRIGL SERPL-MCNC: 153 MG/DL (ref 0–149)
VLDLC SERPL CALC-MCNC: 31 MG/DL

## 2019-06-27 PROCEDURE — 80061 LIPID PANEL: CPT

## 2019-06-27 PROCEDURE — 99214 OFFICE O/P EST MOD 30 MIN: CPT | Performed by: FAMILY MEDICINE

## 2019-06-27 PROCEDURE — G8399 PT W/DXA RESULTS DOCUMENT: HCPCS | Performed by: FAMILY MEDICINE

## 2019-06-27 PROCEDURE — 1036F TOBACCO NON-USER: CPT | Performed by: FAMILY MEDICINE

## 2019-06-27 PROCEDURE — 80053 COMPREHEN METABOLIC PANEL: CPT

## 2019-06-27 PROCEDURE — 1123F ACP DISCUSS/DSCN MKR DOCD: CPT | Performed by: FAMILY MEDICINE

## 2019-06-27 PROCEDURE — G8417 CALC BMI ABV UP PARAM F/U: HCPCS | Performed by: FAMILY MEDICINE

## 2019-06-27 PROCEDURE — 4040F PNEUMOC VAC/ADMIN/RCVD: CPT | Performed by: FAMILY MEDICINE

## 2019-06-27 PROCEDURE — 1090F PRES/ABSN URINE INCON ASSESS: CPT | Performed by: FAMILY MEDICINE

## 2019-06-27 PROCEDURE — G8427 DOCREV CUR MEDS BY ELIG CLIN: HCPCS | Performed by: FAMILY MEDICINE

## 2019-06-27 PROCEDURE — 99212 OFFICE O/P EST SF 10 MIN: CPT | Performed by: FAMILY MEDICINE

## 2019-06-27 PROCEDURE — 36415 COLL VENOUS BLD VENIPUNCTURE: CPT

## 2019-06-27 RX ORDER — CHLORHEXIDINE GLUCONATE 0.12 MG/ML
15 RINSE ORAL 2 TIMES DAILY
Qty: 473 ML | Refills: 5 | Status: SHIPPED | OUTPATIENT
Start: 2019-06-27 | End: 2020-01-27 | Stop reason: SDUPTHER

## 2019-06-27 RX ORDER — AZELASTINE HYDROCHLORIDE 0.5 MG/ML
SOLUTION/ DROPS OPHTHALMIC
Qty: 1 BOTTLE | Refills: 2 | Status: SHIPPED | OUTPATIENT
Start: 2019-06-27 | End: 2019-09-23 | Stop reason: SDUPTHER

## 2019-06-27 NOTE — PROGRESS NOTES
10/11/2010    Use of cane as ambulatory aid        Current Outpatient Medications on File Prior to Visit   Medication Sig Dispense Refill    pravastatin (PRAVACHOL) 40 MG tablet TAKE 1 TABLET DAILY 90 tablet 3    sucralfate (CARAFATE) 1 GM tablet TAKE ONE TABLET BY MOUTH FOUR TIMES A  tablet 2    NARCAN 4 MG/0.1ML LIQD nasal spray 0.1 mLs by Nasal route once  1    omeprazole (PRILOSEC) 20 MG delayed release capsule Take 1 capsule by mouth daily 90 capsule 3    Lactobacillus (PROBIOTIC ACIDOPHILUS) TABS Take 1 tablet by mouth daily 30 tablet 2    lidocaine (XYLOCAINE) 5 % ointment APPLY EXT TO THE BACK AND LEGS BID PRN 30 g 1    amLODIPine-benazepril (LOTREL) 5-20 MG per capsule Take 1 capsule by mouth daily 90 capsule 3    VILAZODONE HCL 10 MG Tablet TK 1 T PO D  1    oxyCODONE-acetaminophen (PERCOCET) 5-325 MG per tablet Take 1 tablet by mouth every 8 hours as needed. 0    Multiple Vitamins-Minerals (AIRBORNE PO) Take 3 tablets by mouth daily      polyvinyl alcohol-povidone (HYPOTEARS) 1.4-0.6 % ophthalmic solution Place 1-2 drops into both eyes as needed      fluticasone (FLONASE) 50 MCG/ACT nasal spray 2 sprays by Nasal route daily 2 sprays in each nostril daily 1 Bottle 3    MOVANTIK 12.5 MG TABS tablet Take 1 tablet by mouth every morning 90 tablet 3    vitamin B-12 (CYANOCOBALAMIN) 1000 MCG tablet Take 1,000 mcg by mouth daily       Pyridoxine HCl (VITAMIN B-6) 100 MG tablet Take 100 mg by mouth daily      Multiple Vitamin TABS Take 1 capsule by mouth daily Hair ,skin and nails       No current facility-administered medications on file prior to visit.         No Known Allergies    Family History   Problem Relation Age of Onset    Cancer Mother         throat    High Blood Pressure Mother     Mental Illness Father     Colon Cancer Father     Cancer Brother     Heart Disease Brother     Mental Illness Daughter     Mental Illness Son     Arthritis Sister     Cancer Brother    [place] Pacemaker Brother     No Known Problems Brother        Past Surgical History:   Procedure Laterality Date    BACK SURGERY      BONE GRAFT      with back surgery    BREAST LUMPECTOMY  2000    left, with chemo and rad, [facility]    CARPAL TUNNEL RELEASE  1980s    left    CARPAL TUNNEL RELEASE  1980s    repeat left    CATARACT REMOVAL  2010    [facility]    COLONOSCOPY  2/3/2011    extensive diverticulosis, Dr. Traci Ann, [facility] COLONOSCOPY  2013    \"could not get around colon due to diverticulitis, [address] COLONOSCOPY  2/24/2015    severe diverticulosis transverse and left colon without diverticulitis, rectal polyp bx, Dr. Fannie Apodaca, [address] [place] [address]    HYSTERECTOMY  1980s    still has ovaries, no cancer, [facility]    SPINAL FUSION  2009    [facility], pins and rods in place    TOTAL KNEE ARTHROPLASTY Right 10/21/2016    TOTAL KNEE ARTHROPLASTY Left 10/2017    replacement, dr Scott Howe  2/3/2011    gastritis, Dr. Traci Ann, [address] ENDOSCOPY  2013    gastritis, [facility]    UPPER GASTROINTESTINAL ENDOSCOPY  2/24/2015    mild to moderate gastritis and duodenitis, gastric bx for H pylori, Dr. Johana George History     Tobacco Use    Smoking status: Never Smoker    Smokeless tobacco: Never Used   Substance Use Topics    Alcohol use: No     Alcohol/week: 0.0 oz    Drug use: No       Review of Systems - History obtained from the patient  General ROS: positive for  - fatigue  Psychological ROS: negative  Respiratory ROS: no cough, shortness of breath, or wheezing  Cardiovascular ROS: no chest pain or dyspnea on exertion  Gastrointestinal ROS: positive for - gas/bloating  Genito-Urinary ROS: positive for - urinary frequency/urgency  Musculoskeletal ROS: positive for - joint pain, joint stiffness and pain in left hand and bilateral knee  Dermatological ROS: positive for - pruritus    Objective:    VS:  Blood pressure 106/66, pulse 72, temperature 98.3 °F (36.8 °C), temperature source Oral, resp. rate 18, height 5' 5\" (1.651 m), weight 199 lb (90.3 kg), not currently breastfeeding. General Appearance: Well developed, awake, alert, oriented, no acute distress  Neck: Supple, symmetrical, trachea midline. No JVD. Thyroid smooth, not enlarged. Chest wall/Lung: Clear to auscultation bilaterally,  respirations unlabored. No rhonchi/wheezing/rales  Heart[de-identified]  Regular rate and rhythm, S1and S2 normal, no murmur, rub or gallop. Abdomen: Soft, non-tender, bowel sounds normoactive, no masses, no organomegaly  Extremities:  Extremities normal, atraumatic, no cyanosis. no edema. Skin: Skin color, texture, turgor normal, no rashes or lesions. No lesion on back where she complains of pruritus  Musculoskeletal: Swelling of MC and DIP joints of right hand. Some decreased  strength. No erythema or warmth  Neurologic:    Alert, oriented. Normal gait and coordination   No focal neurologic deficits noted. Psychiatric: has a normal mood and affect. Behavior is normal.     Most recent labs and imaging reviewed. Findings include:     N/A    Beatrice Abdi was seen today for pain, bloated, back pain and other. Diagnoses and all orders for this visit:    Hyperlipidemia, unspecified hyperlipidemia type  -     Lipid Panel; Future    Acute pain of right shoulder  -     diclofenac sodium 1 % GEL; Apply 2 g topically 4 times daily    Shoulder impingement syndrome, right  -     diclofenac sodium 1 % GEL; Apply 2 g topically 4 times daily    Essential hypertension  -     Comprehensive Metabolic Panel; Future    Primary osteoarthritis involving multiple joints    Other orders  -     chlorhexidine (PERIDEX) 0.12 % solution;  Take 15 mLs by mouth 2 times daily  -     azelastine (OPTIVAR) 0.05 % ophthalmic solution; INSTILL ONE DROP INTO EACH EYE TWICE DAILY        Additional Plan:  Refilled meds, obtained labs   I did sign a prescription for home health aide and for paraffin bath treatment unit    RTO in in 4 month(s) or sooner for any persistent, new, or worsening symptoms. Please see Patient Instructions for further counseling and information given. Advised to be adherent to the treatment plans discussed today, and please call with any questions or concerns, letting the office know of any reasons that the plans may not be followed. The risks of untreated conditions include worsening illness, injury, disability, and possibly, death. Please call if symptoms change in any way, worsen, or fail to completely resolve, as this could necessitate a change to treatment plans. Patient and/or caregiver expressed understanding. Indications and proper use of medication(s) reviewed. Potential side-effects and risks of medication(s) also explained. Patient and/or caregiver was instructed to call if any new symptoms develop prior to next visit. Health risk factors discussed and addressed. I have personally reviewed labs and/or imaging (if any).       Signed by : Leydi Alonso M.D.

## 2019-06-27 NOTE — PATIENT INSTRUCTIONS
Patient Education        Learning About Durable Power of  for Health Care  What is a durable power of  for health care? A durable power of  for health care is one type of the legal forms called advance directives. It lets you decide who you want to make treatment decisions for you if you cannot speak or decide for yourself. The person you choose is called your health care agent. Another type of advance directive is a living will. It lets you write down what kinds of treatment or life support you want or do not want. What should you think about when choosing a health care agent? Choose your health care agent carefully. This person may or may not be a family member. Talk to the person before you make your final decision. Make sure he or she is comfortable with this responsibility. It's a good idea to choose someone who:  · Is at least 25years old. · Knows you well and understands what makes life meaningful for you. · Understands your Shinto and moral values. · Will do what you want, not what he or she wants. · Will be able to make difficult choices at a stressful time. · Will be able to refuse or stop treatment, if that is what you would want, even if you could die. · Will be firm and confident with health professionals if needed. · Will ask questions to get necessary information. · Lives near you or agrees to travel to you if needed. Your family may help you make medical decisions while you can still be part of that process. But it is important to choose one person to be your health care agent in case you are not able to make decisions for yourself. If you don't fill out the legal form and name a health care agent, the decisions your family can make may be limited. Who will make decisions for you if you do not have a health care agent? If you don't have a health care agent or a living will, your family members may disagree about your medical care.  And then some medical professionals who may not know you as well might have to make decisions for you. In some cases, a  makes the decisions. When you name a health care agent, it is very clear who has the power to make health decisions for you. How do you name a health care agent? You name your health care agent on a legal form. It is usually called a durable power of  for health care. Ask your hospital, state bar association, or office on aging where to find these forms. You must sign the form to make it legal. Some states require you to get the form notarized. This means that a person called a  watches you sign the form and then he or she signs the form. Some states also require that two or more witnesses sign the form. Be sure to tell your family members and doctors who your health care agent is. Keep your forms in a safe place. But make sure that your loved ones know where the forms are. This could be in your desk where you keep other important papers. Make sure your doctor has a copy of your forms. Where can you learn more? Go to https://chpepiceweb.[email or web address] org and sign in to your Testin account. Enter [id number] in the Gradalis box to learn more about \"Learning About Durable Power of  for Health Care. \"     If you do not have an account, please click on the \"Sign Up Now\" link. Current as of: April 18, 2018  Content Version: 12.0  © 2855-2534 Healthwise, Incorporated. Care instructions adapted under license by [facility] ([facility]). If you have questions about a medical condition or this instruction, always ask your healthcare professional. [place] [address] any warranty or liability for your use of this information. Patient Education        Learning About Living Madhavi Giraldo  What is a living will? A living will is a legal form you use to write down the kind of care you want at the end of your life.  It is used by the health professionals who will treat you if you aren't able to decide for yourself. If you put your wishes in writing, your loved ones and others will know what kind of care you want. They won't need to guess. This can ease your mind and be helpful to others. A living will is not the same as an estate or property will. An estate will explains what you want to happen with your money and property after you die. Is a living will a legal document? A living will is a legal document. Each state has its own laws about living [place]. If you move to another state, make sure that your living will is legal in the state where you now live. Or you might use a universal form that has been approved by many states. This kind of form can sometimes be completed and stored online. Your electronic copy will then be available wherever you have a connection to the Internet. In most cases, doctors will respect your wishes even if you have a form from a different state. · You don't need an  to complete a living will. But legal advice can be helpful if your state's laws are unclear, your health history is complicated, or your family can't agree on what should be in your living will. · You can change your living will at any time. Some people find that their wishes about end-of-life care change as their health changes. · In addition to making a living will, think about completing a medical power of  form. This form lets you name the person you want to make end-of-life treatment decisions for you (your \"health care agent\") if you're not able to. Many hospitals and nursing homes will give you the forms you need to complete a living will and a medical power of . · Your living will is used only if you can't make or communicate decisions for yourself anymore. If you become able to make decisions again, you can accept or refuse any treatment, no matter what you wrote in your living will. · Your state may offer an online registry.  This is a place where you can store your living will online so the doctors and nurses who need to treat you can find it right away. What should you think about when creating a living will? Talk about your end-of-life wishes with your family members and your doctor. Let them know what you want. That way the people making decisions for you won't be surprised by your choices. Think about these questions as you make your living will:  · Do you know enough about life support methods that might be used? If not, talk to your doctor so you know what might be done if you can't breathe on your own, your heart stops, or you're unable to swallow. · What things would you still want to be able to do after you receive life-support methods? Would you want to be able to walk? To speak? To eat on your own? To live without the help of machines? · If you have a choice, where do you want to be cared for? In your home? At a hospital or nursing home? · Do you want certain Quaker practices performed if you become very ill? · If you have a choice at the end of your life, where would you prefer to die? At home? In a hospital or nursing home? Somewhere else? · Would you prefer to be buried or cremated? · Do you want your organs to be donated after you die? What should you do with your living will? · Make sure that your family members and your health care agent have copies of your living will. · Give your doctor a copy of your living will to keep in your medical record. If you have more than one doctor, make sure that each one has a copy. · You may want to put a copy of your living will where it can be easily found. Where can you learn more? Go to https://chpehoracioeweb.[email or web address] org and sign in to your OneMln account. Enter B131 in the T[place]ah box to learn more about \"Learning About Living [place]. \"     If you do not have an account, please click on the \"Sign Up Now\" link.   Current as of: April 18, 2018  Content Version: 12.0  © 4949-2016 Healthwise, Incorporated. Care instructions adapted under license by [facility] ([facility]). If you have questions about a medical condition or this instruction, always ask your healthcare professional. [address] any warranty or liability for your use of this information. Patient Education        Preventing Falls: Care Instructions  Your Care Instructions    Getting around your home safely can be a challenge if you have injuries or health problems that make it easy for you to fall. Loose rugs and furniture in walkways are among the dangers for many older people who have problems walking or who have poor eyesight. People who have conditions such as arthritis, osteoporosis, or dementia also have to be careful not to fall. You can make your home safer with a few simple measures. Follow-up care is a key part of your treatment and safety. Be sure to make and go to all appointments, and call your doctor if you are having problems. It's also a good idea to know your test results and keep a list of the medicines you take. How can you care for yourself at home? Taking care of yourself  · You may get dizzy if you do not drink enough water. To prevent dehydration, drink plenty of fluids, enough so that your urine is light yellow or clear like water. Choose water and other caffeine-free clear liquids. If you have kidney, heart, or liver disease and have to limit fluids, talk with your doctor before you increase the amount of fluids you drink. · Exercise regularly to improve your strength, muscle tone, and balance. Walk if you can. Swimming may be a good choice if you cannot walk easily. · Have your vision and hearing checked each year or any time you notice a change. If you have trouble seeing and hearing, you might not be able to avoid objects and could lose your balance. · Know the side effects of the medicines you take.  Ask your doctor or pharmacist whether the medicines you take can live in an area that gets snow and ice in the winter, sprinkle salt on slippery steps and sidewalks. Preventing falls in the bath  · Install grab bars and nonskid mats inside and outside your shower or tub and near the toilet and sinks. · Use shower chairs and bath benches. · Use a hand-held shower head that will allow you to sit while showering. · Get into a tub or shower by putting the weaker leg in first. Get out of a tub or shower with your strong side first.  · Repair loose toilet seats and consider installing a raised toilet seat to make getting on and off the toilet easier. · Keep your bathroom door unlocked while you are in the shower. Where can you learn more? Go to https://[email or web address].[email or web address] org and sign in to your Bill.Forward account. Enter [phone] in the [facility] box to learn more about \"Preventing Falls: Care Instructions. \"     If you do not have an account, please click on the \"Sign Up Now\" link. Current as of: November 7, 2018  Content Version: 12.0  © 6876-9831 Healthwise, Incorporated. Care instructions adapted under license by [facility] ([facility]). If you have questions about a medical condition or this instruction, always ask your healthcare professional. [address] any warranty or liability for your use of this information.

## 2019-08-13 ENCOUNTER — TELEPHONE (OUTPATIENT)
Dept: ADMINISTRATIVE | Age: 84
End: 2019-08-13

## 2019-09-23 ENCOUNTER — OFFICE VISIT (OUTPATIENT)
Dept: FAMILY MEDICINE CLINIC | Age: 84
End: 2019-09-23
Payer: MEDICARE

## 2019-09-23 VITALS
SYSTOLIC BLOOD PRESSURE: 124 MMHG | RESPIRATION RATE: 16 BRPM | DIASTOLIC BLOOD PRESSURE: 72 MMHG | WEIGHT: 199 LBS | TEMPERATURE: 98 F | BODY MASS INDEX: 33.15 KG/M2 | HEIGHT: 65 IN

## 2019-09-23 DIAGNOSIS — M17.0 PRIMARY OSTEOARTHRITIS OF BOTH KNEES: Chronic | ICD-10-CM

## 2019-09-23 DIAGNOSIS — K21.9 GASTROESOPHAGEAL REFLUX DISEASE WITHOUT ESOPHAGITIS: ICD-10-CM

## 2019-09-23 DIAGNOSIS — R14.0 ABDOMINAL BLOATING: ICD-10-CM

## 2019-09-23 DIAGNOSIS — L29.9 PRURITUS: ICD-10-CM

## 2019-09-23 DIAGNOSIS — E78.5 HYPERLIPIDEMIA, UNSPECIFIED HYPERLIPIDEMIA TYPE: Chronic | ICD-10-CM

## 2019-09-23 DIAGNOSIS — F33.41 RECURRENT MAJOR DEPRESSIVE DISORDER, IN PARTIAL REMISSION (HCC): Primary | ICD-10-CM

## 2019-09-23 DIAGNOSIS — L29.9 SCALP PRURITUS: ICD-10-CM

## 2019-09-23 DIAGNOSIS — M48.061 SPINAL STENOSIS OF LUMBAR REGION, UNSPECIFIED WHETHER NEUROGENIC CLAUDICATION PRESENT: Chronic | ICD-10-CM

## 2019-09-23 DIAGNOSIS — I10 ESSENTIAL HYPERTENSION: Chronic | ICD-10-CM

## 2019-09-23 PROCEDURE — 99212 OFFICE O/P EST SF 10 MIN: CPT | Performed by: FAMILY MEDICINE

## 2019-09-23 PROCEDURE — G8417 CALC BMI ABV UP PARAM F/U: HCPCS | Performed by: FAMILY MEDICINE

## 2019-09-23 PROCEDURE — 99214 OFFICE O/P EST MOD 30 MIN: CPT | Performed by: FAMILY MEDICINE

## 2019-09-23 PROCEDURE — G8399 PT W/DXA RESULTS DOCUMENT: HCPCS | Performed by: FAMILY MEDICINE

## 2019-09-23 PROCEDURE — 1123F ACP DISCUSS/DSCN MKR DOCD: CPT | Performed by: FAMILY MEDICINE

## 2019-09-23 PROCEDURE — 1090F PRES/ABSN URINE INCON ASSESS: CPT | Performed by: FAMILY MEDICINE

## 2019-09-23 PROCEDURE — G8427 DOCREV CUR MEDS BY ELIG CLIN: HCPCS | Performed by: FAMILY MEDICINE

## 2019-09-23 PROCEDURE — 4040F PNEUMOC VAC/ADMIN/RCVD: CPT | Performed by: FAMILY MEDICINE

## 2019-09-23 PROCEDURE — 1036F TOBACCO NON-USER: CPT | Performed by: FAMILY MEDICINE

## 2019-09-23 RX ORDER — GREEN TEA/HOODIA GORDONII 315-12.5MG
1 CAPSULE ORAL DAILY
Qty: 90 TABLET | Refills: 3 | Status: SHIPPED | OUTPATIENT
Start: 2019-09-23 | End: 2020-01-27 | Stop reason: SDUPTHER

## 2019-09-23 RX ORDER — FLUOCINOLONE ACETONIDE 0.1 MG/ML
SOLUTION TOPICAL
Qty: 60 ML | Refills: 2 | Status: SHIPPED
Start: 2019-09-23 | End: 2021-06-14

## 2019-09-23 RX ORDER — AZELASTINE HYDROCHLORIDE 0.5 MG/ML
SOLUTION/ DROPS OPHTHALMIC
Qty: 1 BOTTLE | Refills: 2 | Status: SHIPPED | OUTPATIENT
Start: 2019-09-23 | End: 2020-01-27 | Stop reason: SDUPTHER

## 2019-09-23 RX ORDER — PRAVASTATIN SODIUM 40 MG
TABLET ORAL
Qty: 90 TABLET | Refills: 3 | Status: SHIPPED | OUTPATIENT
Start: 2019-09-23 | End: 2020-01-27 | Stop reason: SDUPTHER

## 2019-09-23 RX ORDER — OMEPRAZOLE 20 MG/1
20 CAPSULE, DELAYED RELEASE ORAL DAILY
Qty: 90 CAPSULE | Refills: 3 | Status: SHIPPED | OUTPATIENT
Start: 2019-09-23 | End: 2020-01-27 | Stop reason: SDUPTHER

## 2019-09-23 NOTE — PROGRESS NOTES
knees    Spinal stenosis of lumbar region, unspecified whether neurogenic claudication present    Essential hypertension    Scalp pruritus    Pruritus    Other orders  -     pravastatin (PRAVACHOL) 40 MG tablet; TAKE 1 TABLET DAILY  -     azelastine (OPTIVAR) 0.05 % ophthalmic solution; INSTILL ONE DROP INTO EACH EYE TWICE DAILY  -     fluocinolone acetonide (SYNALAR) 0.01 % external solution; Apply topically 2 times daily. -     zoster recombinant adjuvanted vaccine [facility]) 50 MCG/0.5ML SUSR injection; Inject 0.5 mLs into the muscle once for 1 dose        Additional Plan:  She was reassured that her symptoms were annoying, but not dangerous. She did not want additional medication, but agreed to try steroid solution on scalp. I suggest trying a larger size shoe, as I suspect that she has some compression neuropathy (previous labs all normal)  She will obtain flu and shingles vaccine at her pharmacy    RTO in in 4 month(s) or sooner for any persistent, new, or worsening symptoms. Please see Patient Instructions for further counseling and information given. Advised to be adherent to the treatment plans discussed today, and please call with any questions or concerns, letting the office know of any reasons that the plans may not be followed. The risks of untreated conditions include worsening illness, injury, disability, and possibly, death. Please call if symptoms change in any way, worsen, or fail to completely resolve, as this could necessitate a change to treatment plans. Patient and/or caregiver expressed understanding. Indications and proper use of medication(s) reviewed. Potential side-effects and risks of medication(s) also explained. Patient and/or caregiver was instructed to call if any new symptoms develop prior to next visit. Health risk factors discussed and addressed. I have personally reviewed labs and/or imaging (if any).       Signed by : Gabe Herrera M.D.

## 2019-11-21 ENCOUNTER — OFFICE VISIT (OUTPATIENT)
Dept: FAMILY MEDICINE CLINIC | Age: 84
End: 2019-11-21
Payer: MEDICARE

## 2019-11-21 VITALS
RESPIRATION RATE: 18 BRPM | SYSTOLIC BLOOD PRESSURE: 128 MMHG | OXYGEN SATURATION: 99 % | HEART RATE: 75 BPM | TEMPERATURE: 98.7 F | BODY MASS INDEX: 32.32 KG/M2 | DIASTOLIC BLOOD PRESSURE: 75 MMHG | WEIGHT: 194 LBS | HEIGHT: 65 IN

## 2019-11-21 DIAGNOSIS — L82.0 SEBORRHEIC KERATOSES, INFLAMED: Primary | ICD-10-CM

## 2019-11-21 PROCEDURE — 17110 DESTRUCTION B9 LES UP TO 14: CPT | Performed by: FAMILY MEDICINE

## 2019-11-21 PROCEDURE — 17003 DESTRUCT PREMALG LES 2-14: CPT | Performed by: FAMILY MEDICINE

## 2019-11-21 PROCEDURE — 99212 OFFICE O/P EST SF 10 MIN: CPT | Performed by: FAMILY MEDICINE

## 2019-11-21 PROCEDURE — 17000 DESTRUCT PREMALG LESION: CPT | Performed by: FAMILY MEDICINE

## 2019-11-21 RX ORDER — OXYCODONE HYDROCHLORIDE AND ACETAMINOPHEN 5; 325 MG/1; MG/1
TABLET ORAL
Refills: 0 | COMMUNITY
Start: 2019-11-13 | End: 2020-01-27

## 2019-11-21 RX ORDER — FLUTICASONE PROPIONATE 50 MCG
2 SPRAY, SUSPENSION (ML) NASAL DAILY
Qty: 1 BOTTLE | Refills: 3 | Status: SHIPPED | OUTPATIENT
Start: 2019-11-21 | End: 2020-01-27 | Stop reason: SDUPTHER

## 2019-12-06 ENCOUNTER — TELEPHONE (OUTPATIENT)
Dept: FAMILY MEDICINE CLINIC | Age: 84
End: 2019-12-06

## 2019-12-06 DIAGNOSIS — Z85.3 PERSONAL HISTORY OF MALIGNANT NEOPLASM OF BREAST: Primary | Chronic | ICD-10-CM

## 2019-12-16 ENCOUNTER — HOSPITAL ENCOUNTER (OUTPATIENT)
Dept: GENERAL RADIOLOGY | Age: 84
Discharge: HOME | End: 2019-12-18
Payer: MEDICARE

## 2019-12-16 DIAGNOSIS — Z85.3 PERSONAL HISTORY OF MALIGNANT NEOPLASM OF BREAST: Chronic | ICD-10-CM

## 2019-12-16 PROCEDURE — G0279 TOMOSYNTHESIS, MAMMO: HCPCS

## 2019-12-16 RX ORDER — AMLODIPINE BESYLATE AND BENAZEPRIL HYDROCHLORIDE 5; 20 MG/1; MG/1
CAPSULE ORAL
Qty: 90 CAPSULE | Refills: 0 | Status: SHIPPED | OUTPATIENT
Start: 2019-12-16 | End: 2020-01-27 | Stop reason: SDUPTHER

## 2019-12-19 ENCOUNTER — TELEPHONE (OUTPATIENT)
Dept: FAMILY MEDICINE CLINIC | Age: 84
End: 2019-12-19

## 2020-01-27 ENCOUNTER — HOSPITAL ENCOUNTER (OUTPATIENT)
Age: 85
Discharge: HOME | End: 2020-01-29
Payer: MEDICARE

## 2020-01-27 ENCOUNTER — OFFICE VISIT (OUTPATIENT)
Dept: FAMILY MEDICINE CLINIC | Age: 85
End: 2020-01-27
Payer: MEDICARE

## 2020-01-27 VITALS
SYSTOLIC BLOOD PRESSURE: 116 MMHG | TEMPERATURE: 98 F | OXYGEN SATURATION: 96 % | WEIGHT: 199 LBS | HEART RATE: 61 BPM | BODY MASS INDEX: 33.15 KG/M2 | HEIGHT: 65 IN | DIASTOLIC BLOOD PRESSURE: 78 MMHG

## 2020-01-27 PROBLEM — J31.0 CHRONIC RHINITIS: Status: ACTIVE | Noted: 2020-01-27

## 2020-01-27 LAB
ALBUMIN SERPL-MCNC: 4.4 G/DL (ref 3.5–5.2)
ALP BLD-CCNC: 86 U/L (ref 35–104)
ALT SERPL-CCNC: 17 U/L (ref 0–32)
ANION GAP SERPL CALCULATED.3IONS-SCNC: 14 MMOL/L (ref 7–16)
AST SERPL-CCNC: 25 U/L (ref 0–31)
BILIRUB SERPL-MCNC: 0.3 MG/DL (ref 0–1.2)
BUN BLDV-MCNC: 8 MG/DL (ref 8–23)
CALCIUM SERPL-MCNC: 9.8 MG/DL (ref 8.6–10.2)
CHLORIDE BLD-SCNC: 96 MMOL/L (ref 98–107)
CHOLESTEROL, TOTAL: 173 MG/DL (ref 0–199)
CO2: 23 MMOL/L (ref 22–29)
CREAT SERPL-MCNC: 0.8 MG/DL (ref 0.5–1)
GFR AFRICAN AMERICAN: >60
GFR NON-AFRICAN AMERICAN: >60 ML/MIN/1.73
GLUCOSE BLD-MCNC: 89 MG/DL (ref 74–99)
HDLC SERPL-MCNC: 62 MG/DL
LDL CHOLESTEROL CALCULATED: 86 MG/DL (ref 0–99)
POTASSIUM SERPL-SCNC: 4.5 MMOL/L (ref 3.5–5)
SODIUM BLD-SCNC: 133 MMOL/L (ref 132–146)
TOTAL PROTEIN: 7.8 G/DL (ref 6.4–8.3)
TRIGL SERPL-MCNC: 125 MG/DL (ref 0–149)
VLDLC SERPL CALC-MCNC: 25 MG/DL

## 2020-01-27 PROCEDURE — 80053 COMPREHEN METABOLIC PANEL: CPT

## 2020-01-27 PROCEDURE — G8427 DOCREV CUR MEDS BY ELIG CLIN: HCPCS | Performed by: FAMILY MEDICINE

## 2020-01-27 PROCEDURE — 4040F PNEUMOC VAC/ADMIN/RCVD: CPT | Performed by: FAMILY MEDICINE

## 2020-01-27 PROCEDURE — 99214 OFFICE O/P EST MOD 30 MIN: CPT | Performed by: FAMILY MEDICINE

## 2020-01-27 PROCEDURE — G8399 PT W/DXA RESULTS DOCUMENT: HCPCS | Performed by: FAMILY MEDICINE

## 2020-01-27 PROCEDURE — G8417 CALC BMI ABV UP PARAM F/U: HCPCS | Performed by: FAMILY MEDICINE

## 2020-01-27 PROCEDURE — 99212 OFFICE O/P EST SF 10 MIN: CPT | Performed by: FAMILY MEDICINE

## 2020-01-27 PROCEDURE — 1123F ACP DISCUSS/DSCN MKR DOCD: CPT | Performed by: FAMILY MEDICINE

## 2020-01-27 PROCEDURE — G8482 FLU IMMUNIZE ORDER/ADMIN: HCPCS | Performed by: FAMILY MEDICINE

## 2020-01-27 PROCEDURE — 80061 LIPID PANEL: CPT

## 2020-01-27 PROCEDURE — 1090F PRES/ABSN URINE INCON ASSESS: CPT | Performed by: FAMILY MEDICINE

## 2020-01-27 PROCEDURE — 1036F TOBACCO NON-USER: CPT | Performed by: FAMILY MEDICINE

## 2020-01-27 PROCEDURE — 36415 COLL VENOUS BLD VENIPUNCTURE: CPT | Performed by: FAMILY MEDICINE

## 2020-01-27 RX ORDER — AZELASTINE HYDROCHLORIDE 0.5 MG/ML
SOLUTION/ DROPS OPHTHALMIC
Qty: 1 BOTTLE | Refills: 2 | Status: SHIPPED
Start: 2020-01-27 | End: 2020-08-03 | Stop reason: SDUPTHER

## 2020-01-27 RX ORDER — FLUOCINOLONE ACETONIDE 0.1 MG/ML
SOLUTION TOPICAL
Qty: 60 ML | Refills: 2 | Status: CANCELLED | OUTPATIENT
Start: 2020-01-27

## 2020-01-27 RX ORDER — GREEN TEA/HOODIA GORDONII 315-12.5MG
1 CAPSULE ORAL DAILY
Qty: 90 TABLET | Refills: 3 | Status: SHIPPED
Start: 2020-01-27 | End: 2020-12-03 | Stop reason: SDUPTHER

## 2020-01-27 RX ORDER — CHLORHEXIDINE GLUCONATE 0.12 MG/ML
15 RINSE ORAL 2 TIMES DAILY
Qty: 473 ML | Refills: 5 | Status: SHIPPED
Start: 2020-01-27 | End: 2020-06-08 | Stop reason: SDUPTHER

## 2020-01-27 RX ORDER — OMEPRAZOLE 20 MG/1
20 CAPSULE, DELAYED RELEASE ORAL DAILY
Qty: 90 CAPSULE | Refills: 3 | Status: SHIPPED
Start: 2020-01-27 | End: 2020-12-03 | Stop reason: SDUPTHER

## 2020-01-27 RX ORDER — AMLODIPINE BESYLATE AND BENAZEPRIL HYDROCHLORIDE 5; 20 MG/1; MG/1
CAPSULE ORAL
Qty: 90 CAPSULE | Refills: 3 | Status: SHIPPED
Start: 2020-01-27 | End: 2020-11-30

## 2020-01-27 RX ORDER — FLUTICASONE PROPIONATE 50 MCG
2 SPRAY, SUSPENSION (ML) NASAL DAILY
Qty: 1 BOTTLE | Refills: 3 | Status: SHIPPED
Start: 2020-01-27 | End: 2020-06-08 | Stop reason: SDUPTHER

## 2020-01-27 RX ORDER — PRAVASTATIN SODIUM 40 MG
TABLET ORAL
Qty: 90 TABLET | Refills: 3 | Status: SHIPPED
Start: 2020-01-27 | End: 2020-12-03 | Stop reason: SDUPTHER

## 2020-01-27 NOTE — PROGRESS NOTES
boggy with mucoserous discharge  Neck: Supple, symmetrical, trachea midline. No JVD. Thyroid smooth, not enlarged. Chest wall/Lung: Clear to auscultation bilaterally,  respirations unlabored. No rhonchi/wheezing/rales  Heart[de-identified]  Regular rate and rhythm, S1and S2 normal, no murmur, rub or gallop. Skin: Skin color, texture, turgor normal, no rashes or lesions. No lesions noted on areas of pruritus  Previous seborrheic keratoses gone and areas are well healed  Musculoskeletal: Bony enlargement of PIP joints of all fingers. ROM full but somewhat painful  Neurologic:    Alert, oriented. Normal gait and coordination   No focal neurologic deficits noted. Psychiatric: has a normal mood and affect. Behavior is normal.     Most recent labs and imaging reviewed. Findings include:     N/A    Dani Osman was seen today for other, nasal congestion, pharyngitis and headache. Diagnoses and all orders for this visit:    Essential hypertension  -     Comprehensive Metabolic Panel; Future    Gastroesophageal reflux disease, esophagitis presence not specified    Hyperlipidemia, unspecified hyperlipidemia type  -     Lipid Panel; Future    Anxiety    Primary osteoarthritis of both knees    Recurrent major depressive disorder, in partial remission (HCC)    Gastroesophageal reflux disease without esophagitis  -     omeprazole (PRILOSEC) 20 MG delayed release capsule; Take 1 capsule by mouth daily    Abdominal bloating  -     Lactobacillus (PROBIOTIC ACIDOPHILUS) TABS; Take 1 tablet by mouth daily    Pruritus    Chronic rhinitis    Other orders  -     amLODIPine-benazepril (LOTREL) 5-20 MG per capsule; TAKE ONE CAPSULE BY MOUTH EVERY DAY  -     pravastatin (PRAVACHOL) 40 MG tablet; TAKE 1 TABLET DAILY  -     azelastine (OPTIVAR) 0.05 % ophthalmic solution; INSTILL ONE DROP INTO EACH EYE TWICE DAILY  -     chlorhexidine (PERIDEX) 0.12 % solution;  Take 15 mLs by mouth 2 times daily  -     fluticasone (FLONASE) 50 MCG/ACT nasal spray; 2 sprays by Nasal route daily 2 sprays in each nostril daily  -     carboxymethylcellulose 1 % ophthalmic solution; Place 1 drop into both eyes 3 times daily        Additional Plan:  Refilled flonase and some artificial tears for her dry eyes. Refilled all other meds. Obtained labs. Reassured her that she was doing well, and should just use emollients on skin  I talked to her again about usual treatment for OA. She has not really tried many of the things that we had previously spoken about. I will see her every 6 months or prn. I would consider evaluating her for some memory loss, since she seems to forget much of what I tell her to do. RTO in in 6 month(s) or sooner for any persistent, new, or worsening symptoms. Please see Patient Instructions for further counseling and information given. Advised to be adherent to the treatment plans discussed today, and please call with any questions or concerns, letting the office know of any reasons that the plans may not be followed. The risks of untreated conditions include worsening illness, injury, disability, and possibly, death. Please call if symptoms change in any way, worsen, or fail to completely resolve, as this could necessitate a change to treatment plans. Patient and/or caregiver expressed understanding. Indications and proper use of medication(s) reviewed. Potential side-effects and risks of medication(s) also explained. Patient and/or caregiver was instructed to call if any new symptoms develop prior to next visit. Health risk factors discussed and addressed. I have personally reviewed labs and/or imaging (if any).       Signed by : Olesya Lozada M.D.

## 2020-03-18 ENCOUNTER — TELEPHONE (OUTPATIENT)
Dept: ADMINISTRATIVE | Age: 85
End: 2020-03-18

## 2020-06-08 ENCOUNTER — OFFICE VISIT (OUTPATIENT)
Dept: FAMILY MEDICINE CLINIC | Age: 85
End: 2020-06-08
Payer: MEDICARE

## 2020-06-08 VITALS
TEMPERATURE: 98.6 F | HEIGHT: 65 IN | OXYGEN SATURATION: 96 % | WEIGHT: 210 LBS | SYSTOLIC BLOOD PRESSURE: 134 MMHG | HEART RATE: 70 BPM | DIASTOLIC BLOOD PRESSURE: 72 MMHG | BODY MASS INDEX: 34.99 KG/M2 | RESPIRATION RATE: 18 BRPM

## 2020-06-08 PROCEDURE — G8417 CALC BMI ABV UP PARAM F/U: HCPCS | Performed by: FAMILY MEDICINE

## 2020-06-08 PROCEDURE — 4040F PNEUMOC VAC/ADMIN/RCVD: CPT | Performed by: FAMILY MEDICINE

## 2020-06-08 PROCEDURE — 1123F ACP DISCUSS/DSCN MKR DOCD: CPT | Performed by: FAMILY MEDICINE

## 2020-06-08 PROCEDURE — 99212 OFFICE O/P EST SF 10 MIN: CPT | Performed by: FAMILY MEDICINE

## 2020-06-08 PROCEDURE — G8399 PT W/DXA RESULTS DOCUMENT: HCPCS | Performed by: FAMILY MEDICINE

## 2020-06-08 PROCEDURE — 1036F TOBACCO NON-USER: CPT | Performed by: FAMILY MEDICINE

## 2020-06-08 PROCEDURE — 1090F PRES/ABSN URINE INCON ASSESS: CPT | Performed by: FAMILY MEDICINE

## 2020-06-08 PROCEDURE — 99213 OFFICE O/P EST LOW 20 MIN: CPT | Performed by: FAMILY MEDICINE

## 2020-06-08 PROCEDURE — G8427 DOCREV CUR MEDS BY ELIG CLIN: HCPCS | Performed by: FAMILY MEDICINE

## 2020-06-08 RX ORDER — NALOXONE HYDROCHLORIDE 4 MG/.1ML
SPRAY NASAL
Refills: 1 | Status: CANCELLED | OUTPATIENT
Start: 2020-06-08

## 2020-06-08 RX ORDER — CHLORHEXIDINE GLUCONATE 0.12 MG/ML
15 RINSE ORAL 2 TIMES DAILY
Qty: 473 ML | Refills: 5 | Status: SHIPPED
Start: 2020-06-08 | End: 2020-12-03 | Stop reason: SDUPTHER

## 2020-06-08 RX ORDER — SUCRALFATE 1 G/1
TABLET ORAL
Qty: 360 TABLET | Refills: 2 | Status: SHIPPED
Start: 2020-06-08 | End: 2021-08-30 | Stop reason: SDUPTHER

## 2020-06-08 RX ORDER — FLUTICASONE PROPIONATE 50 MCG
2 SPRAY, SUSPENSION (ML) NASAL DAILY
Qty: 1 BOTTLE | Refills: 3 | Status: SHIPPED
Start: 2020-06-08 | End: 2020-12-03 | Stop reason: SDUPTHER

## 2020-06-10 ENCOUNTER — HOSPITAL ENCOUNTER (OUTPATIENT)
Dept: PHYSICAL THERAPY | Age: 85
Setting detail: THERAPIES SERIES
Discharge: HOME | End: 2020-06-10
Payer: MEDICARE

## 2020-06-10 PROCEDURE — 97161 PT EVAL LOW COMPLEX 20 MIN: CPT | Performed by: PHYSICAL THERAPIST

## 2020-06-10 ASSESSMENT — PAIN SCALES - GENERAL: PAINLEVEL_OUTOF10: 10

## 2020-06-10 ASSESSMENT — PAIN DESCRIPTION - ORIENTATION: ORIENTATION: RIGHT;LEFT

## 2020-06-10 ASSESSMENT — PAIN DESCRIPTION - LOCATION: LOCATION: FOOT

## 2020-06-10 ASSESSMENT — PAIN DESCRIPTION - FREQUENCY: FREQUENCY: INTERMITTENT

## 2020-06-10 ASSESSMENT — PAIN DESCRIPTION - PAIN TYPE: TYPE: ACUTE PAIN

## 2020-06-10 NOTE — PROGRESS NOTES
Physical Therapy  Initial Assessment  Date: 6/10/2020  Patient Name: Ariadna Heller  MRN: [id number]  : 1935          Restrictions       Subjective   General  Chart Reviewed: Yes  Referring Practitioner: Aquilino Cho  Diagnosis: plantar fasciitis right foot   Follows Commands: Within Functional Limits  PT Visit Information  PT Insurance Information: Medicare  cert dates:  to 9/10/20   ICD10: M72.2  Total # of Visits to Date: 1  Subjective  Subjective: Patient presents to PT with c/o pain across bilateral feet. Pain located across plantar surface between arch and heel region. Pain present for approx 2 weeks with insidious onset. Symptoms are worse when initially standing with pain decreasing slightly with activity. She reports having no symptom relief. Not taking any medication for pain reduction. she has not attempted any exercises. No apparent weakness   Pain Screening  Patient Currently in Pain: Yes  Pain Assessment  Pain Assessment: 0-10  Pain Level: 10(8/10 at rest; 10/10 with activity )  Pain Type: Acute pain  Pain Location: Foot  Pain Orientation: Right;Left  Pain Frequency: Intermittent  Vital Signs  Patient Currently in Pain: Yes    Objective     Observation/Palpation  Palpation: pain with palpation across B plantar surfaces R > L.    Edema: no obvious edema across LEs     AROM RLE (degrees)  RLE AROM: WFL  AROM LLE (degrees)  LLE AROM : WFL    Strength RLE  Comment: R hip/knee- WFL; R ankle- grossly 4/5   Strength LLE  Comment: L hip/knee- WFL; L ankle- grossly 4/5         Sensation  Overall Sensation Status: WFL             Ambulation  Ambulation?: Yes  Ambulation 1  Surface: level tile  Device: No Device  Assistance: Independent  Quality of Gait: fwd trunk flexion with limited heel-toe mechanics; no AD; mild med/lat trunk deviation   Balance  Sitting - Static: Good  Sitting - Dynamic: Good  Standing - Static: Good;-  Standing - Dynamic: Good;-     Assessment   Conditions Requiring Skilled Therapeutic Intervention  Body structures, Functions, Activity limitations: Decreased strength; Increased pain;Decreased posture  Assessment: pt presents to PT with c/o B foot pain; pain noted across plantar surfaces bilaterally R > L. Limited strength noted across B ankle with hindered gait/posturing   Prognosis: Fair;Good  Decision Making: Low Complexity  REQUIRES PT FOLLOW UP: Yes  Activity Tolerance  Activity Tolerance: Patient Tolerated treatment well         Plan   Plan  Times per week: 1-2x/week x 5 weeks   Current Treatment Recommendations: Strengthening, Functional Mobility Training, Gait Training, Manual Therapy - Soft Tissue Mobilization, Pain Management, Home Exercise Program, Safety Education & Training, Patient/Caregiver Education & Training, Modalities         Goals  Short term goals  Time Frame for Short term goals: 3 weeks  Short term goal 1: increase strength B ankles to grossly 4 to 4+/5 to improve stability and improve functional gait   Short term goal 2: decrease pain to < 5/10 across B feet with activity   Long term goals  Time Frame for Long term goals : 5 weeks   Long term goal 1: increase strength B ankles to grossly 4+ to 5/5 to improve stability and improve functional gait   Long term goal 2: decrease pain to < 3/10 across B feet with activity   Long term goal 3: pt demonstrates independence with HEP  Patient Goals   Patient goals : to reduce foot pain        Therapy Time   Individual Concurrent Group Co-treatment   Time In 1505         Time Out 1548         Minutes [address]   T: [phone]   F: [phone]     If you have any questions or concerns, please don't hesitate to call. Thank you for your referral.    Physician Signature:________________________________Date:__________________  By signing above, therapists plan is approved by physician. All patients under [employer]   must be signed by physician.

## 2020-06-15 ENCOUNTER — HOSPITAL ENCOUNTER (OUTPATIENT)
Dept: PHYSICAL THERAPY | Age: 85
Setting detail: THERAPIES SERIES
Discharge: HOME | End: 2020-06-15
Payer: MEDICARE

## 2020-06-15 PROCEDURE — 97140 MANUAL THERAPY 1/> REGIONS: CPT | Performed by: PHYSICAL THERAPIST

## 2020-06-15 PROCEDURE — G0283 ELEC STIM OTHER THAN WOUND: HCPCS | Performed by: PHYSICAL THERAPIST

## 2020-06-17 ENCOUNTER — HOSPITAL ENCOUNTER (OUTPATIENT)
Dept: PHYSICAL THERAPY | Age: 85
Setting detail: THERAPIES SERIES
Discharge: HOME | End: 2020-06-17
Payer: MEDICARE

## 2020-06-17 PROCEDURE — 97140 MANUAL THERAPY 1/> REGIONS: CPT | Performed by: PHYSICAL THERAPIST

## 2020-06-17 PROCEDURE — G0283 ELEC STIM OTHER THAN WOUND: HCPCS | Performed by: PHYSICAL THERAPIST

## 2020-06-17 PROCEDURE — 97110 THERAPEUTIC EXERCISES: CPT | Performed by: PHYSICAL THERAPIST

## 2020-06-22 ENCOUNTER — HOSPITAL ENCOUNTER (OUTPATIENT)
Dept: PHYSICAL THERAPY | Age: 85
Setting detail: THERAPIES SERIES
Discharge: HOME | End: 2020-06-22
Payer: MEDICARE

## 2020-06-22 ENCOUNTER — TELEPHONE (OUTPATIENT)
Dept: FAMILY MEDICINE CLINIC | Age: 85
End: 2020-06-22

## 2020-06-22 PROCEDURE — 97140 MANUAL THERAPY 1/> REGIONS: CPT | Performed by: PHYSICAL THERAPIST

## 2020-06-22 PROCEDURE — G0283 ELEC STIM OTHER THAN WOUND: HCPCS | Performed by: PHYSICAL THERAPIST

## 2020-06-22 PROCEDURE — 97110 THERAPEUTIC EXERCISES: CPT | Performed by: PHYSICAL THERAPIST

## 2020-06-22 NOTE — PROGRESS NOTES
[facility]  Phone: [phone] Fax: [phone]       Physical Therapy Daily Treatment Note  Date:  2020    Patient Name:  Jeff Rice    :  1935  MRN: [id number]    Restrictions/Precautions:    Diagnosis:  plantar fasciitis right foot   Treatment Diagnosis:    Insurance/Certification information:  Medicare  Referring Physician:  Guillermo Orellana of care signed (Y/N):    Visit# / total visits:  4/  Pain level: 8/10  Time In:    1108  Time Out:   1203     Subjective:  Pt presents to PT for third treatment session. Pt reports plantar fascia regions are feeling better. Exercises:  Exercise/Equipment Resistance/Repetitions Other comments    bike   [id number]            Towel str DF/IV/EV  5x10s ea (bilaterally)                                                                                                                         Other Therapeutic Activities:      Home Exercise Program:      Manual Treatments:   PROM/STM [id number] (10mins each LE)     Modalities: MH/premod x 15mins (bilateral plantar fascia regions)     Timed Code Treatment Minutes: Total Treatment Minutes:      Treatment/Activity Tolerance:  [x] Patient tolerated treatment well [] Patient limited by fatigue  [] Patient limited by pain  [] Patient limited by other medical complications  [] Other: No noticeable pain with palpation across plantar surfaces. Cont there ex followed by manual ROM/STM to increase flexibility of plantar region. Cont modalities following for pain relief.      Plan:   [x] Continue per plan of care [] Alter current plan (see comments)  [] Plan of care initiated [] Hold pending MD visit [] Discharge  Plan for Next Session:         Treatment Charges: Mins Units   Initial Evaluation     Re-Evaluation     Ther Exercise         TE 15 1   Manual Therapy     MT 20 1   Ther Activities        TA     Gait Training          GT     Neuro Re-education NR     Modalities 15 1   Non-Billable

## 2020-06-22 NOTE — TELEPHONE ENCOUNTER
Patient advised and verbalized understanding. She would like her order to be faxed to [address] [place] [address] in [facility].

## 2020-06-23 ENCOUNTER — TELEPHONE (OUTPATIENT)
Dept: FAMILY MEDICINE CLINIC | Age: 85
End: 2020-06-23

## 2020-06-23 NOTE — TELEPHONE ENCOUNTER
[facility] called and stated that they do not do insurance for Thumb Spica Splint. Informed patient she could purchase it. Patient was upset and would like it sent to some place else.   Thanks, Alejandra RICE

## 2020-06-23 NOTE — TELEPHONE ENCOUNTER
[facility] does not have thumb spica splints, however [facility] does and they take the patient's insurance. I faxed demographics and the script to them and they will review and call the patient to set up an appointment to fit appropriately once they are able to get the splint covered by insurance provider.

## 2020-06-23 NOTE — TELEPHONE ENCOUNTER
Thank you so much! I would greatly appreciate it if you could let Ms. Queenjenny Rinne know that Dennis Gill is working on this for her. Thank you!

## 2020-06-23 NOTE — TELEPHONE ENCOUNTER
Please call [facility] to find out if they can provide the thumb spica splint through her insurance. If they can, please fax them the prescription. If they cannot, perhaps they can direct us to other medical supply companies in the area. Please let me know. Thank you!

## 2020-06-24 ENCOUNTER — HOSPITAL ENCOUNTER (OUTPATIENT)
Dept: PHYSICAL THERAPY | Age: 85
Setting detail: THERAPIES SERIES
Discharge: HOME | End: 2020-06-24
Payer: MEDICARE

## 2020-06-24 PROCEDURE — 97110 THERAPEUTIC EXERCISES: CPT | Performed by: PHYSICAL THERAPIST

## 2020-06-24 PROCEDURE — G0283 ELEC STIM OTHER THAN WOUND: HCPCS | Performed by: PHYSICAL THERAPIST

## 2020-06-24 PROCEDURE — 97140 MANUAL THERAPY 1/> REGIONS: CPT | Performed by: PHYSICAL THERAPIST

## 2020-06-24 NOTE — PROGRESS NOTES
[facility]  Phone: [phone] Fax: [phone]       Physical Therapy Daily Treatment Note  Date:  2020    Patient Name:  Vira Shelton    :  1935  MRN: [id number]    Restrictions/Precautions:    Diagnosis:  plantar fasciitis right foot   Treatment Diagnosis:    Insurance/Certification information:  Medicare  Referring Physician:  Chris Davis of care signed (Y/N):    Visit# / total visits:  5/  Pain level: 8/10  Time In:    1110  Time Out:   1205     Subjective:  Pt presents to PT for fourth treatment session. Pt reports plantar fascia regions cont to feel better. She is c/o pain to wrist region. Did receive call to begin OT. Exercises:  Exercise/Equipment Resistance/Repetitions Other comments    bike   [id number]            Towel str DF/IV/EV  5x10s ea (bilaterally)                                                                                                                         Other Therapeutic Activities:      Home Exercise Program:      Manual Treatments:   PROM/STM [id number] (10mins each LE)     Modalities: MH/premod x 15mins (bilateral plantar fascia regions)     Timed Code Treatment Minutes: Total Treatment Minutes:      Treatment/Activity Tolerance:  [x] Patient tolerated treatment well [] Patient limited by fatigue  [] Patient limited by pain  [] Patient limited by other medical complications  [] Other: No noticeable pain with palpation across plantar surfaces. Cont there ex followed by manual ROM/STM to increase flexibility of plantar region. Cont modalities following for pain relief. Patient ambulating with mild trunk deviation otherwise heel-toe gait with no c/o pain. PT to see pt for 1-2 more sessions- if there are no increase in symptoms pt will be discharged to [facility].      Plan:   [x] Continue per plan of care [] Alter current plan (see comments)  [] Plan of care initiated [] Hold pending MD visit [] Discharge  Plan for Next Session: Treatment Charges: Mins Units   Initial Evaluation     Re-Evaluation     Ther Exercise         TE 15 1   Manual Therapy     MT 20 1   Ther Activities        TA     Gait Training          GT     Neuro Re-education NR     Modalities 15 1   Non-Billable Service Time 5 0   Other     Total Time/Units 55 3     Electronically signed by:  Neo Pizarro DPT

## 2020-07-01 ENCOUNTER — HOSPITAL ENCOUNTER (OUTPATIENT)
Dept: OCCUPATIONAL THERAPY | Age: 85
Setting detail: THERAPIES SERIES
Discharge: HOME | End: 2020-07-01
Payer: MEDICARE

## 2020-07-01 PROCEDURE — 97018 PARAFFIN BATH THERAPY: CPT | Performed by: OCCUPATIONAL THERAPIST

## 2020-07-01 PROCEDURE — 97165 OT EVAL LOW COMPLEX 30 MIN: CPT | Performed by: OCCUPATIONAL THERAPIST

## 2020-07-01 PROCEDURE — 97140 MANUAL THERAPY 1/> REGIONS: CPT | Performed by: OCCUPATIONAL THERAPIST

## 2020-07-01 PROCEDURE — 97110 THERAPEUTIC EXERCISES: CPT | Performed by: OCCUPATIONAL THERAPIST

## 2020-07-01 NOTE — PROGRESS NOTES
Occupational Therapy      OCCUPATIONAL THERAPY INITIAL EVALUATION    [facility]  [facility]  [address] [facility] [address]  Dept: [phone]  Loc: [phone]   [facility] Fax: [phone]    Date:  2020  Initial Evaluation Date: 2020   Evaluating Therapist: Oleg Langley    Patient Name:  Joana Schulte    :  1935    Restrictions/Precautions:  OT Eval and treat, low fall risk  Diagnosis:  M65.4 (ICD-10-CM) - Burnie Poles Quervain's syndrome (tenosynovitis)       Insurance/Certification information: Medicare  Referring Practitioner: Kristal Bello MD  Date of Surgery/Injury: 2020  Plan of care signed (Y/N):  N  Visit# / total visits:     Past Medical History:   Past Medical History:   Diagnosis Date    Anxiety 10/11/2010    Breast cancer ([address] approx     right, treated lumpectomy, radiation, chemo    Depression 10/11/2010    no issues    Diverticular disease 10/11/2010    GERD (gastroesophageal reflux disease)     HTN (hypertension) 10/11/2010    Hyperlipidemia 10/11/2010    Osteoarthritis 10/11/2010    fot left knee arthroplasty 10/21/2016    Osteoporosis 10/11/2010    Spinal stenosis 10/11/2010    Use of cane as ambulatory aid      Past Surgical History:   Past Surgical History:   Procedure Laterality Date    BACK SURGERY      BONE GRAFT      with back surgery    BREAST LUMPECTOMY      left, with chemo and rad, [facility]    CARPAL TUNNEL RELEASE      left    CARPAL TUNNEL RELEASE      repeat left    CATARACT REMOVAL      b[facility]    COLONOSCOPY  2/3/2011    extensive diverticulosis, Dr. Pilar Goldman, [facility] COLONOSCOPY      \"could not get around colon due to diverticulitis, [address] COLONOSCOPY  2015    severe diverticulosis transverse and left colon without diverticulitis, rectal polyp bx, Dr. Jaquelin Lake, [address] [place] [address] 1980s    still has ovaries, no cancer, [facility]    SPINAL FUSION  2009    [facility], pins and rods in place    TOTAL KNEE ARTHROPLASTY Right 10/21/2016    TOTAL KNEE ARTHROPLASTY Left 10/2017    replacement, dr Jania Brown  2/3/2011    gastritis, Dr. Shantell Iniguez, [address] GASTROINTESTINAL ENDOSCOPY  2013    gastritis, [facility]    UPPER GASTROINTESTINAL ENDOSCOPY  2/24/2015    mild to moderate gastritis and duodenitis, gastric bx for H pylori, Dr. Daren Luevano       Reason for Referral: Pt is a 81 yr old female who was diagnosed with [facility] Quervain's syndrome (tenosynovitis) on her R hand. Her family doctor had ordered a thumb spica splint from  to be worn as much as possible which is still being processed. Pt was referred for outpatient Occupational therapy and reports pain and weakness. Home Living: Pt lives alone  Prior Level of Function: Independent    Cognition:   Alert/Oriented x3     IADL STATUS:      Ind  Mod I  Min A  Mod A  Max A  Dep  N/A    Homemaking Responsibility:  []   []   []   [x]   []   []  []  Shopping Responsibility:  [x]   []   []   []   []   []  []  Mode of Transportation:  [x]   []   []   []   []   []  []  Leisure & Hobbies:   []   []   [x]   []   []   []  []  Work:     []   []   []   []   []   []  [x]  Comments:Retired and is able to complete puzzles and crossword books however reports pain in thumb. Pt has assistance with cooking, cleaning and laundry 2x/week.       ADL STATUS:    Ind      Mod I     Min A  Mod A  Max A  Dep  N/A  Feeding:  [x]   []   []   []   []   []  []  Grooming:  [x]   []   []   []   []   []  []  Bathing:  []   []   []   []   [x]   []  []  UE Dressing:  [x]   []   []   []   []   []  []  LE Dressing:  [x]   []   []   []   []   []  []  Toileting:  [x]   []   []   []   []   []  []  Transfer:  [x]   []   []   []   []   []  []  Comments: Pt receives assistance with bathing 2x a week, Pt unable to lift up objects, open management ( contrast baths)    Assessment of current deficits   Functional mobility []  ADLs [] Strength [x]  Cognition []  Functional transfers  [] IADLs [] Safety Awareness [x]  Endurance [x]  Fine Motor Coordination [x] Balance [] Vision/perception [] Sensation [x]   Gross Motor Coordination [x] ROM [x]     Eval Complexity: low  Profile and History- Chart reviewed  Assessment of Occupational Performance and Identification of Deficits- 7   Clinical Decision Making- no additional modifications required    Rehab Potential:                                 [x] Good  [] Fair  [] Poor        Suggested Professional Referral:       [x] No  [] Yes:  Barriers to Goal Achievement[de-identified]          [x] No  [] Yes:  Domestic Concerns:                           [x] No  [] Yes:     Goal Formulation: Patient  Time In: 1:00 pm            Time Out: 2:00 pm                      Timed Code Treatment Minutes: 40 minutes        PLAN      Treatment to include:   [x] Instruction in HEP                   Modalities:  [x] Therapeutic Exercise        [x] Ultrasound               [x] Electrical Stimulation/Attended  [x] PROM/Stretching                    [x] Fluidotherapy          [x]  Paraffin                   [x] AAROM  [x] AROM                 [x] Iontophoresis: 4 mg/mL;  Dexamethasone Sodium                                        [] Neuromuscular Re-education [x] Splinting         [x] Desensitization          [x] Therapeutic Activity       [x] Pain Management with/without modalities PRN                 [x] Manual Therapy/Fascial release                            [x] Tendon Glides        [x]Joint Protection/Training  []Ergonomics                             [x] Joint Mobilization        [] Adaptive Equipment Assessment/Training                             [x] Manual Edema Mobilization    [] Energy Conservation/Work Simplification  [x] GM/FM Coordination       [x] Safety retraining/education per  individual diagnosis/goals  [] Scar Management [] ADL/IADL re-training       Patient Specific Goal: Pt would like functional use of RUE with no pain                              GOALS (Long term same as Short term):  1) Patient will demonstrate good understanding of home program (exercises/activities/diagnosis/prognosis/goals) with good accuracy. 2) Patient will demonstrate increased active/passive range of motion of their RUE to [facility] for ADL/IADL completion. 3) Patient will demonstrate increased /pinch strength of at least 10 / 5 pinch pounds of their RUE hand. 4) Patient to report decreased pain in their affected R upper extremity from 8/10 to 2/10 or less with resistive functional use. 5) Increase in fine motor function as evidenced by decreased time to complete 9-hole peg test and/or MRMT test by at least 5-10 seconds. 6) Patient to report 100% compliance with their splint wear, care, and precautions if needed. 7) Patient to demonstrate decreased guarding of their affected extremity from 100% to 20% or less. 8) Patient will be knowledgeable of edema control techniques as evident with decreases from mild to none. 9) Patient will report ADL functions as Mod I/I using RUE. 10) Patient will demonstrate improved functional activity tolerance from good to fair for ADL/IADL completion. 11) Patient will decrease QuickDASH score by 50% for increased participation in daily functional activities. Patient. Education:  [x] Plans/Goals, Risks/Benefits discussed  [x] Home exercise program  Method of Education: [x] Verbal  [x] Demo  [x] Written  Comprehension of Education:  [x] Verbalizes understanding. [x] Demonstrates understanding. [x] Needs Review. [x] Demonstrates/verbalizes understanding of HEP/Ed previously given.         Patient understands diagnosis/prognosis and consents to treatment, plan and goals: [x] Yes    [] No      Electronically signed by: Jaison Guillen [address], OTR/L #[id number]      [facility] Certification / Comments Frequency/Duration 1-2x / week for 12-18 visits. Certification period From: 07/01/2020  To: 09/23/2020     I have reviewed the Plan of Care established for skilled therapy services and certify that the services are required and that they will be provided while the patient is under my care. Physician's Comments/Revisions:                   Physicians's Printed Name: Marika Lara MD                                   Physician's Signature:                                                               Date:       Please review Patient's OT evaluation and if you agree sign/date and fax back to us at our [facility] OT Fax: [phone].  Thank you for your referral!

## 2020-07-06 ENCOUNTER — HOSPITAL ENCOUNTER (OUTPATIENT)
Dept: OCCUPATIONAL THERAPY | Age: 85
Setting detail: THERAPIES SERIES
Discharge: HOME | End: 2020-07-06
Payer: MEDICARE

## 2020-07-06 PROCEDURE — 97110 THERAPEUTIC EXERCISES: CPT

## 2020-07-06 PROCEDURE — 97018 PARAFFIN BATH THERAPY: CPT

## 2020-07-06 PROCEDURE — 97140 MANUAL THERAPY 1/> REGIONS: CPT

## 2020-07-06 NOTE — PROGRESS NOTES
Occupational Therapy    OCCUPATIONAL THERAPY PROGRESS NOTE    Date:  2020 Initial Evaluation Date: 2020                          Evaluating Therapist: Darin Fonseca     Patient Name:  Fabian Marshall                      :  1935     Restrictions/Precautions:  OT Eval and treat, low fall risk  Diagnosis:  M65.4 (ICD-10-CM) - Shanice Conquest Quervain's syndrome (tenosynovitis)                                                             Insurance/Certification information: Medicare  Referring Practitioner: Conor Shane MD  Date of Surgery/Injury: 2020  Plan of care signed (Y/N):  N  Visit# / total visits:     Pain LevelPain Level: 8 on scale of 1-10, aching, sharp, shooting, tight (pulling) and uncomfortable    Subjective: \" I don't have that splint yet. \"     Objective:  Updated POC to be completed by 10th visit. INTERVENTION: COMPLETED: SPECIFICS/COMMENTS:   Modality:     Paraffin Bath X 10 mins Soft tissue warm up start of session   Icing with ice cube to first dorsal compartment X 8 mins Pt applied the ice to herself   AROM:     Thumb, digits, wrist, forearm, all planes x         AAROM:               PROM/Stretching:     Thumb,digits, wrist, forearm, all planes x Thru first dorsal compt as donna        Scar Mass/Edema Control:     Cross friction massage to first dorsal compartment x         Strengthening:               Other:     Issued & instructed pt with universal thumb spica splint for L UE x           Assessment/Comments: Pt is making Good progress toward stated plan of care. Pt presents to therapy reporting 7/10 pain level. Pt reports she has been doing radial abduction exercise at home but that's it. Therapist encouraged her to add palmar abduction to her exercise regimen. Therapist also encouraged pt to add massage & ice cube icing to first dorsal compartment. Pt completed in clinic x 8 mins with good results of decreased pain.  Will cont to progress as tolerated and will monitor to see if thumb spica splint is helping pt rest her tendon and decrease her pain level.    -Rehab Potential: Good  -Requires OT Follow Up: Yes  Time In:1:00 pm            Time Out: 2:00 pm             Visit #: 2    Treatment Charges: Mins Units   Modalities-Wax 10/8 1/0   Ther Exercise 12 1   Manual Therapy 30 2   Thera Activities     ADL/Home Mgt      Neuro Re-education     Gait Training     Group Therapy     Non-Billable Service Time     Other     Total Time/Units 60 4       -Response to Treatment: Good. Pt is happy to have splint to try. GOALS (Long term same as Short term):  1) Patient will demonstrate good understanding of home program (exercises/activities/diagnosis/prognosis/goals) with good accuracy. 2) Patient will demonstrate increased active/passive range of motion of their RUE to [facility] for ADL/IADL completion. 3) Patient will demonstrate increased /pinch strength of at least 10 / 5 pinch pounds of their RUE hand. 4) Patient to report decreased pain in their affected R upper extremity from 8/10 to 2/10 or less with resistive functional use. 5) Increase in fine motor function as evidenced by decreased time to complete 9-hole peg test and/or MRMT test by at least 5-10 seconds. 6) Patient to report 100% compliance with their splint wear, care, and precautions if needed. 7) Patient to demonstrate decreased guarding of their affected extremity from 100% to 20% or less. 8) Patient will be knowledgeable of edema control techniques as evident with decreases from mild to none. 9) Patient will report ADL functions as Mod I/I using RUE. 10) Patient will demonstrate improved functional activity tolerance from good to fair for ADL/IADL completion. 11) Patient will decrease QuickDASH score by 50% for increased participation in daily functional activities.     Murali Chambers:   [x]  Continues Plan of care: Treatment covered based on POC and graduated to patient's progress.  Pt education continues at each visit to obtain maximum benefits from skilled OT intervention.   []  Alter Plan of care:   []  Discharge:      Manjula DAMIAN/GRIS, [id number] no exudate/uvula midline/no vesicles/no tonsilar swelling/NO DROOLING

## 2020-07-07 ENCOUNTER — OFFICE VISIT (OUTPATIENT)
Dept: PRIMARY CARE CLINIC | Age: 85
End: 2020-07-07
Payer: MEDICARE

## 2020-07-07 ENCOUNTER — TELEPHONE (OUTPATIENT)
Dept: ADMINISTRATIVE | Age: 85
End: 2020-07-07

## 2020-07-07 VITALS
SYSTOLIC BLOOD PRESSURE: 112 MMHG | HEART RATE: 67 BPM | WEIGHT: 200 LBS | OXYGEN SATURATION: 98 % | TEMPERATURE: 98 F | BODY MASS INDEX: 33.32 KG/M2 | HEIGHT: 65 IN | DIASTOLIC BLOOD PRESSURE: 70 MMHG

## 2020-07-07 PROCEDURE — 1123F ACP DISCUSS/DSCN MKR DOCD: CPT | Performed by: NURSE PRACTITIONER

## 2020-07-07 PROCEDURE — 1036F TOBACCO NON-USER: CPT | Performed by: NURSE PRACTITIONER

## 2020-07-07 PROCEDURE — G8399 PT W/DXA RESULTS DOCUMENT: HCPCS | Performed by: NURSE PRACTITIONER

## 2020-07-07 PROCEDURE — 1090F PRES/ABSN URINE INCON ASSESS: CPT | Performed by: NURSE PRACTITIONER

## 2020-07-07 PROCEDURE — 99213 OFFICE O/P EST LOW 20 MIN: CPT | Performed by: NURSE PRACTITIONER

## 2020-07-07 PROCEDURE — G8417 CALC BMI ABV UP PARAM F/U: HCPCS | Performed by: NURSE PRACTITIONER

## 2020-07-07 PROCEDURE — 4040F PNEUMOC VAC/ADMIN/RCVD: CPT | Performed by: NURSE PRACTITIONER

## 2020-07-07 PROCEDURE — G8427 DOCREV CUR MEDS BY ELIG CLIN: HCPCS | Performed by: NURSE PRACTITIONER

## 2020-07-07 RX ORDER — DIPHENHYDRAMINE HCL 25 MG
1 CAPSULE ORAL EVERY 4 HOURS PRN
Qty: 15 ML | Refills: 1 | Status: SHIPPED | OUTPATIENT
Start: 2020-07-07 | End: 2020-08-06

## 2020-07-07 NOTE — PROGRESS NOTES
Wiley Hale  1935    Chief Complaint   Patient presents with    Nasal Congestion     x 1 mo    Pharyngitis    Eye Pain     bilat       Respiratory Symptoms:  Patient complains of 1 month(s) history of nasal congestion, eye pain and nasal congestion. She reports that her eyes are still bothering her despite her going to her eye doctor and getting on various eye drops. Denies fever, chills, cough, shortness of breath. Occasional headache. Symptoms have been unchanged with time. She denies any other symptoms . Relevant PMH: No pertinent PMH. Smoking history:  She  reports that she has never smoked. She has never used smokeless tobacco.     She has had no known ill contacts. Treatment to date: none. Travel screen completed:  Yes          Vitals:    07/07/20 0903   BP: 112/70   Pulse: 67   Temp: 98 °F (36.7 °C)   SpO2: 98%   Weight: 200 lb (90.7 kg)   Height: 5' 5\" (1.651 m)      Physical Exam  Constitutional:       Appearance: She is well-developed. HENT:      Head: Normocephalic. Eyes:      Pupils: Pupils are equal, round, and reactive to light. Neck:      Musculoskeletal: Normal range of motion and neck supple. Thyroid: No thyromegaly. Cardiovascular:      Rate and Rhythm: Normal rate and regular rhythm. Pulmonary:      Effort: Pulmonary effort is normal.      Breath sounds: Normal breath sounds. Abdominal:      General: Bowel sounds are normal.      Palpations: Abdomen is soft. Musculoskeletal: Normal range of motion. Lymphadenopathy:      Cervical: No cervical adenopathy. Skin:     General: Skin is warm and dry. Neurological:      Mental Status: She is alert and oriented to person, place, and time. Psychiatric:         Behavior: Behavior normal.              Assessment/Plan:    1. COVID-19  Tylenol as needed for pain/fever  - COVID-19; Future    2.  Pain of both eyes  Follow with opthamology  - dextran 70-hypromellose (ARTIFICIAL TEARS) 0.1-0.3 % SOLN opthalmic solution; Place 1 drop into both eyes every 4 hours as needed (dry eyes)  Dispense: 15 mL; Refill: 1          Advised of current Covid-19 pandemic and lack of availability of testing. It is possible that this could be a covid infection and as such certain precautions/isolation should be followed. Gave CDC info on both flu and Covid-19 precautions including remaining at home while sick and avoiding people who could possibly develop severe infection if exposed. JULIANN Choi CNP  7/7/20     This visit was provided as a focused evaluation during the COVID -19 pandemic/national emergency. A comprehensive review of all previous patient history and testing was not conducted. Pertinent findings were elicited during the visit.

## 2020-07-07 NOTE — PATIENT INSTRUCTIONS
Patient Education        Coronavirus (KJP-22): Care Instructions  Overview  The coronavirus disease (COVID-19) is caused by a virus. Symptoms may include a fever, a cough, and shortness of breath. It mainly spreads person-to-person through droplets from coughing and sneezing. The virus also can spread when people are in close contact with someone who is infected. Most people have mild symptoms and can take care of themselves at home. If their symptoms get worse, they may need care in a hospital. There is no medicine to fight the virus. It's important to not spread the virus to others. If you have COVID-19, wear a face cover anytime you are around other people. You need to isolate yourself while you are sick. Your doctor or local public health official will tell you when you no longer need to be isolated. Leave your home only if you need to get medical care. Follow-up care is a key part of your treatment and safety. Be sure to make and go to all appointments, and call your doctor if you are having problems. It's also a good idea to know your test results and keep a list of the medicines you take. How can you care for yourself at home? · Get extra rest. It can help you feel better. · Drink plenty of fluids. This helps replace fluids lost from fever. Fluids also help ease a scratchy throat. Water, soup, fruit juice, and hot tea with lemon are good choices. · Take acetaminophen (such as Tylenol) to reduce a fever. It may also help with muscle aches. Read and follow all instructions on the label. · Sponge your body with lukewarm water to help with fever. Don't use cold water or ice. · Use petroleum jelly on sore skin. This can help if the skin around your nose and lips becomes sore from rubbing a lot with tissues. Tips for isolation  · Wear a cloth face cover when you are around other people. It can help stop the spread of the virus when you cough or sneeze. · Limit contact with people in your home.  If possible, stay in a separate bedroom and use a separate bathroom. · If you have to leave home, avoid crowds and try to stay at least 6 feet away from other people. · Avoid contact with pets and other animals. · Cover your mouth and nose with a tissue when you cough or sneeze. Then throw it in the trash right away. · Wash your hands often, especially after you cough or sneeze. Use soap and water, and scrub for at least 20 seconds. If soap and water aren't available, use an alcohol-based hand . · Don't share personal household items. These include bedding, towels, cups and glasses, and eating utensils. · [address] in the warmest water allowed for the fabric type, and dry it completely. It's okay to wash other people's laundry with yours. · Clean and disinfect your home every day. Use household  and disinfectant wipes or sprays. Take special care to clean things that you grab with your hands. These include doorknobs, remote controls, phones, and handles on your refrigerator and microwave. And don't forget countertops, tabletops, bathrooms, and computer keyboards. When should you call for help? [id number] anytime you think you may need emergency care. For example, call if you have life-threatening symptoms, such as:  · You have severe trouble breathing. (You can't talk at all.)  · You have constant chest pain or pressure. · You are severely dizzy or lightheaded. · You are confused or can't think clearly. · Your face and lips have a blue color. · You pass out (lose consciousness) or are very hard to wake up. Call your doctor now or seek immediate medical care if:  · You have moderate trouble breathing. (You can't speak a full sentence.)  · You are coughing up blood (more than about 1 teaspoon). · You have signs of low blood pressure. These include feeling lightheaded; being too weak to stand; and having cold, pale, clammy skin.   Watch closely for changes in your health, and be sure to

## 2020-07-08 ENCOUNTER — HOSPITAL ENCOUNTER (OUTPATIENT)
Age: 85
Discharge: HOME | End: 2020-07-10
Payer: MEDICARE

## 2020-07-08 ENCOUNTER — HOSPITAL ENCOUNTER (OUTPATIENT)
Dept: OCCUPATIONAL THERAPY | Age: 85
Setting detail: THERAPIES SERIES
Discharge: HOME | End: 2020-07-08
Payer: MEDICARE

## 2020-07-08 PROCEDURE — U0003 INFECTIOUS AGENT DETECTION BY NUCLEIC ACID (DNA OR RNA); SEVERE ACUTE RESPIRATORY SYNDROME CORONAVIRUS 2 (SARS-COV-2) (CORONAVIRUS DISEASE [COVID-19]), AMPLIFIED PROBE TECHNIQUE, MAKING USE OF HIGH THROUGHPUT TECHNOLOGIES AS DESCRIBED BY CMS-2020-01-R: HCPCS

## 2020-07-08 PROCEDURE — 97035 APP MDLTY 1+ULTRASOUND EA 15: CPT | Performed by: OCCUPATIONAL THERAPIST

## 2020-07-08 PROCEDURE — 97530 THERAPEUTIC ACTIVITIES: CPT | Performed by: OCCUPATIONAL THERAPIST

## 2020-07-08 PROCEDURE — 97140 MANUAL THERAPY 1/> REGIONS: CPT | Performed by: OCCUPATIONAL THERAPIST

## 2020-07-08 PROCEDURE — 97110 THERAPEUTIC EXERCISES: CPT | Performed by: OCCUPATIONAL THERAPIST

## 2020-07-08 NOTE — PROGRESS NOTES
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 Less this date. Subjective: \" I think my wrist and thumb felt worse after wearing this splint. Do I have to wear it at night? \"     Objective:  Updated POC to be completed by 10th visit. INTERVENTION: COMPLETED: SPECIFICS/COMMENTS:   Modality:     Paraffin Bath  10 mins Soft tissue warm up start of session   fluiotherapy X 10 min  For soft tissue warm -up while intermittently moving her wrist and thumb- and for desensitization. Icing with ice cube to first dorsal compartment  8 mins Pt applied the ice to herself. Not doing at home. AROM:     Thumb, digits, wrist, forearm, all planes x After gentle PROM, small arc ROM    pom poms  x 1. grasp and release with fingers 2. Thumb opposition alternatly to first 3 fingers only                  AAROM:               PROM/Stretching:     Gentle Thumb,digits, wrist, forearm, all planes x Thru first dorsal compt as donna        Scar Mass/Edema Control:     Retrograde massage x Thumb and wrist radially   Compression sleeve x Fitted with C doubled at wrist to decrease edema and pain - to wear as needed - to try at night.     Gentle cross friction massage to first dorsal compartment          Strengthening:               Other:     Issued & instructed pt with universal thumb spica splint for L UE x To try not wearing at night. Wear as needed during the day. HEP  x Told to do smaller ROM with her thumb and wrist exercises so to NOT ^ pain. Assessment/Comments: Pt is making Good progress toward stated plan of care. Pt did not c/o pain as frequently this date - therapist instructed her to not do her ROM exercises as far ( sm arc ROM) so she does not ^ pain. Therapist also did US to wrist and thumb area radially to decrease pain and ^ circulation. Will cont to progress as tolerated. -Rehab Potential: Good  -Requires OT Follow Up: Yes  Time In:12:10 pm            Time Out: 1:10 pm             Visit #: 3    Treatment Charges: Mins Units   Modalities-fluiot/US 10/8 1/0   Ther Exercise 12 1   Manual Therapy 15 1   Thera Activities 15 1   ADL/Home Mgt      Neuro Re-education     Gait Training     Group Therapy     Non-Billable Service Time     Other     Total Time/Units 60 4       -Response to Treatment: Good. Pt was glad her pain did not ^ by the end of the session but is anxious  that it still hurts. GOALS (Long term same as Short term):  1) Patient will demonstrate good understanding of home program (exercises/activities/diagnosis/prognosis/goals) with good accuracy. 2) Patient will demonstrate increased active/passive range of motion of their RUE to [facility] for ADL/IADL completion. 3) Patient will demonstrate increased /pinch strength of at least 10 / 5 pinch pounds of their RUE hand. 4) Patient to report decreased pain in their affected R upper extremity from 8/10 to 2/10 or less with resistive functional use. 5) Increase in fine motor function as evidenced by decreased time to complete 9-hole peg test and/or MRMT test by at least 5-10 seconds. 6) Patient to report 100% compliance with their splint wear, care, and precautions if needed.    7) Patient to demonstrate decreased guarding of their affected extremity from 100% to 20% or less. 8) Patient will be knowledgeable of edema control techniques as evident with decreases from mild to none. 9) Patient will report ADL functions as Mod I/I using RUE. 10) Patient will demonstrate improved functional activity tolerance from good to fair for ADL/IADL completion. 11) Patient will decrease QuickDASH score by 50% for increased participation in daily functional activities.     Ivette Orozco:   [x]  Continues Plan of care: Treatment covered based on POC and graduated to patient's progress. Pt education continues at each visit to obtain maximum benefits from skilled OT intervention.   []  Alter Plan of care:   []  Discharge:      Linsey Gabriel OT/L, T

## 2020-07-10 ENCOUNTER — HOSPITAL ENCOUNTER (OUTPATIENT)
Dept: PHYSICAL THERAPY | Age: 85
Setting detail: THERAPIES SERIES
Discharge: HOME | End: 2020-07-10
Payer: MEDICARE

## 2020-07-10 PROCEDURE — 97110 THERAPEUTIC EXERCISES: CPT | Performed by: PHYSICAL THERAPIST

## 2020-07-10 PROCEDURE — G0283 ELEC STIM OTHER THAN WOUND: HCPCS | Performed by: PHYSICAL THERAPIST

## 2020-07-10 PROCEDURE — 97140 MANUAL THERAPY 1/> REGIONS: CPT | Performed by: PHYSICAL THERAPIST

## 2020-07-10 NOTE — PROGRESS NOTES
[facility]  Phone: [phone] Fax: [phone]       Physical Therapy Daily Treatment Note  Date:  7/10/2020    Patient Name:  Tisha Chavez    :  1935  MRN: [id number]    Restrictions/Precautions:    Diagnosis:  plantar fasciitis right foot   Treatment Diagnosis:    Insurance/Certification information:  Medicare  Referring Physician:  Guillermo Orellana of care signed (Y/N):    Visit# / total visits:  6/  Pain level: /10  Time In:    1300  Time Out:   1350     Subjective:  Pt presents to PT for fifth treatment session. Pt reports plantar fascia regions cont to feel better. She is c/o pain to wrist region. Did receive call to begin OT. Exercises:  Exercise/Equipment Resistance/Repetitions Other comments    bike   [id number]            Towel str DF/IV/EV  HEP                                                                                                                         Other Therapeutic Activities:      Home Exercise Program:      Manual Treatments:   PROM/STM [id number] (10mins each LE)     Modalities: MH/premod x 15mins (bilateral plantar fascia regions)     Timed Code Treatment Minutes: Total Treatment Minutes:      Treatment/Activity Tolerance:  [x] Patient tolerated treatment well [] Patient limited by fatigue  [] Patient limited by pain  [] Patient limited by other medical complications  [] Other: Patient cont to have little to no symptoms across plantar surfaces. Cont there ex followed by manual ROM/STM to increase flexibility of plantar region. Cont modalities following for pain relief. Patient ambulating with mild trunk deviation otherwise heel-toe gait with no c/o pain. PT instructed pt to cont with HEP and to avoid sandals/shoes that provide little to no support. PT to see pt for 1 more session-  if there are no increase in symptoms pt will be discharged to [facility].      Plan:   [x] Continue per plan of care [] Alter current plan (see comments)  [] Plan of care initiated [] Hold pending MD visit [] Discharge  Plan for Next Session:         Treatment Charges: Mins Units   Initial Evaluation     Re-Evaluation     Ther Exercise         TE 10 1   Manual Therapy     MT 20 1   Ther Activities        TA     Gait Training          GT     Neuro Re-education NR     Modalities 15 1   Non-Billable Service Time  0   Other     Total Time/Units 45 3     Electronically signed by:  Tiago Gerber DPT

## 2020-07-12 LAB — SARS-COV-2: NOT DETECTED

## 2020-07-13 ENCOUNTER — HOSPITAL ENCOUNTER (OUTPATIENT)
Dept: PHYSICAL THERAPY | Age: 85
Setting detail: THERAPIES SERIES
Discharge: HOME | End: 2020-07-13
Payer: MEDICARE

## 2020-07-13 ENCOUNTER — HOSPITAL ENCOUNTER (OUTPATIENT)
Dept: OCCUPATIONAL THERAPY | Age: 85
Setting detail: THERAPIES SERIES
Discharge: HOME | End: 2020-07-13
Payer: MEDICARE

## 2020-07-13 PROCEDURE — 97140 MANUAL THERAPY 1/> REGIONS: CPT | Performed by: OCCUPATIONAL THERAPIST

## 2020-07-13 PROCEDURE — 97530 THERAPEUTIC ACTIVITIES: CPT | Performed by: OCCUPATIONAL THERAPIST

## 2020-07-13 PROCEDURE — 97110 THERAPEUTIC EXERCISES: CPT | Performed by: OCCUPATIONAL THERAPIST

## 2020-07-13 PROCEDURE — G0283 ELEC STIM OTHER THAN WOUND: HCPCS | Performed by: PHYSICAL THERAPIST

## 2020-07-13 PROCEDURE — 97140 MANUAL THERAPY 1/> REGIONS: CPT | Performed by: PHYSICAL THERAPIST

## 2020-07-13 PROCEDURE — 97110 THERAPEUTIC EXERCISES: CPT | Performed by: PHYSICAL THERAPIST

## 2020-07-13 NOTE — PROGRESS NOTES
[facility]  Phone: [phone] Fax: [phone]       Physical Therapy Daily Treatment Note  Date:  2020    Patient Name:  Jose Mliler    :  1935  MRN: [id number]    Restrictions/Precautions:    Diagnosis:  plantar fasciitis right foot   Treatment Diagnosis:    Insurance/Certification information:  Medicare  Referring Physician:  Guillermo Orellana of care signed (Y/N):    Visit# / total visits:  7/  Pain level: /10  Time In:    6091  Time Out:   1130     Subjective:  Pt presents to PT for sixth treatment session. Pt reports having no current plantar fascia symptoms   Exercises:  Exercise/Equipment Resistance/Repetitions Other comments    bike   [id number]            Towel str DF/IV/EV  HEP                                                                                                                         Other Therapeutic Activities:      Home Exercise Program:      Manual Treatments:   PROM/STM [id number] (10mins each LE)     Modalities: MH/premod x 15mins (bilateral plantar fascia regions)     Timed Code Treatment Minutes: Total Treatment Minutes:      Treatment/Activity Tolerance:  [x] Patient tolerated treatment well [] Patient limited by fatigue  [] Patient limited by pain  [] Patient limited by other medical complications  [] Other: Patient has no current plantar fascia symptoms. Cont there ex followed by manual ROM/STM to increase flexibility of plantar region. Cont modalities following for pain relief. Patient ambulating with mild trunk deviation otherwise heel-toe gait with no c/o pain. PT instructed pt to cont with HEP and to avoid sandals/shoes that provide little to no support.  Pt to be discharged from PT to HEP    Plan:   [] Continue per plan of care [] Alter current plan (see comments)  [] Plan of care initiated [] Hold pending MD visit [x] Discharge  Plan for Next Session:         Treatment Charges: Mins Units   Initial Evaluation     Re-Evaluation Ther Exercise         TE 10 1   Manual Therapy     MT 20 1   Ther Activities        TA     Gait Training          GT     Neuro Re-education NR     Modalities 15 1   Non-Billable Service Time 5 0   Other     Total Time/Units 50 3     Electronically signed by:  Chari Burks DPT

## 2020-07-13 NOTE — PROGRESS NOTES
Occupational Therapy    OCCUPATIONAL THERAPY PROGRESS NOTE    Date:  2020 Initial Evaluation Date: 2020                          Evaluating Therapist: Rosa Ryan     Patient Name:  Nemesio Allen                      :  1935     Restrictions/Precautions:  OT Eval and treat, low fall risk  Diagnosis:  M65.4 (ICD-10-CM) - Dorene Mater Quervain's syndrome (tenosynovitis)                                                             Insurance/Certification information: Medicare  Referring Practitioner: Iliana Sykes MD  Date of Surgery/Injury: 2020  Plan of care signed (Y/N):  N  Visit# / total visits:     Pain LevelPain Level: 6  on scale of 1-10, aching, sharp, shooting, tight (pulling) and uncomfortable. Less this date. Subjective: \" I really hurt over the weekend\"     Objective:  Updated POC to be completed by 10th visit. INTERVENTION: COMPLETED: SPECIFICS/COMMENTS:   Modality:     Paraffin Bath  10 mins Completed to decrease pain and increase tissue elasticity   fluiotherapy  For soft tissue warm -up while intermittently moving her wrist and thumb- and for desensitization. Icing with ice cube to first dorsal compartment  Pt applied the ice to herself. Not doing at home. AROM:     Thumb, digits, wrist, forearm, all planes x After gentle PROM, small arc ROM; tightness supination this date    pom poms  x 1. grasp and release with fingers 2.  Thumb opposition alternatly to first 3 fingers only   In-hand manipulation x  Patient unable to completed palm to fingertip translation w/ small objects; completed w/ medium sized pegs to increase functional use R hand             AAROM:               PROM/Stretching:     Gentle Thumb,digits, wrist, forearm, all planes x Thru first dorsal compt as donna        Scar Mass/Edema Control:     Retrograde massage x Thumb and wrist radially   Compression sleeve  Fitted with C doubled at wrist to decrease edema and pain - to wear as needed - to try at night.    Gentle cross friction massage to first dorsal compartment          Strengthening:          ADLs x Patient reported difficulty w/ tying shoes, opening bottles ect. She was provided w/ gadget to use leverage for opening tabs on cans. Will trial elastic shoe laces. Other:     Issued & instructed pt with universal thumb spica splint for L UE x To try not wearing at night. Wear as needed during the day. HEP  x Told to do smaller ROM with her thumb and wrist exercises so to NOT ^ pain. Assessment/Comments: Pt is making Good progress toward stated plan of care. Initial c/o pain 7/10 w/ decreased pain during functional tasks this date. Patient reported that she started playing piano. She was instructed to rest R hand as needed. - therapist instructed her to not do her ROM exercises as far ( sm arc ROM) so she does not ^ pain. Will cont to progress as tolerated. -Rehab Potential: Good  -Requires OT Follow Up: Yes  Time In:1pm            Time Out: 2:05 pm             Visit #: 4    Treatment Charges: Mins Units   Modalities-fluiot/US 10 0   Ther Exercise 15 1   Manual Therapy 25 2   Thera Activities 15 1   ADL/Home Mgt      Neuro Re-education     Gait Training     Group Therapy     Non-Billable Service Time     Other     Total Time/Units 55 4       -Response to Treatment: Good. Pt was glad her pain did not ^ by the end of the session but is anxious  that it still hurts. GOALS (Long term same as Short term):  1) Patient will demonstrate good understanding of home program (exercises/activities/diagnosis/prognosis/goals) with good accuracy. 2) Patient will demonstrate increased active/passive range of motion of their RUE to [facility] for ADL/IADL completion. 3) Patient will demonstrate increased /pinch strength of at least 10 / 5 pinch pounds of their RUE hand. 4) Patient to report decreased pain in their affected R upper extremity from 8/10 to 2/10 or less with resistive functional use.

## 2020-07-15 ENCOUNTER — HOSPITAL ENCOUNTER (OUTPATIENT)
Dept: OCCUPATIONAL THERAPY | Age: 85
Setting detail: THERAPIES SERIES
Discharge: HOME | End: 2020-07-15
Payer: MEDICARE

## 2020-07-15 PROCEDURE — 97530 THERAPEUTIC ACTIVITIES: CPT | Performed by: OCCUPATIONAL THERAPIST

## 2020-07-15 PROCEDURE — 97140 MANUAL THERAPY 1/> REGIONS: CPT | Performed by: OCCUPATIONAL THERAPIST

## 2020-07-15 PROCEDURE — 97022 WHIRLPOOL THERAPY: CPT | Performed by: OCCUPATIONAL THERAPIST

## 2020-07-15 PROCEDURE — 97110 THERAPEUTIC EXERCISES: CPT | Performed by: OCCUPATIONAL THERAPIST

## 2020-07-15 NOTE — PROGRESS NOTES
Fitted with C doubled at wrist to decrease edema and pain - to wear as needed - to try at night. - Patient continues to wear   Gentle cross friction massage to first dorsal compartment          Strengthening:          ADLs  Patient reported difficulty w/ tying shoes, opening bottles ect. She was provided w/ gadget to use leverage for opening tabs on cans. Will trial elastic shoe laces. Continued to address difficulty w/ ADLs. Patient voiced concern regarding increased weight gain. She may benefit from referral to dietitian    Other:     Issued & instructed pt with universal thumb spica splint for L UE x To try not wearing at night. Wear as needed during the day. HEP  x Told to do smaller ROM with her thumb and wrist exercises so to NOT ^ pain. Continues to require cues to avoid over use     Assessment/Comments: Pt is making Good progress toward stated plan of care. Continues to report decreasing pain during functional tasks. Patient reported that she is limiting playing piano 10 minutes. She was instructed to rest R hand as needed. - therapist continued to reinforce to not do her ROM exercises as far ( sm arc ROM) so she does not ^ pain. Will cont to progress as tolerated. -Rehab Potential: Good  -Requires OT Follow Up: Yes  Time In: 1205 pm            Time Out: 1 pm             Visit #: 5    Treatment Charges: Mins Units   Modalities-fluiot/US 10 1   Ther Exercise 15 1   Manual Therapy 20 2   Thera Activities 10 1   ADL/Home Mgt      Neuro Re-education     Gait Training     Group Therapy     Non-Billable Service Time     Other     Total Time/Units 55 4       -Response to Treatment: Good. Pt was glad her pain did not ^ by the end of the session but is anxious  that it still hurts. GOALS (Long term same as Short term):  1) Patient will demonstrate good understanding of home program (exercises/activities/diagnosis/prognosis/goals) with good accuracy.    2) Patient will demonstrate increased active/passive

## 2020-07-20 ENCOUNTER — HOSPITAL ENCOUNTER (OUTPATIENT)
Dept: OCCUPATIONAL THERAPY | Age: 85
Setting detail: THERAPIES SERIES
Discharge: HOME | End: 2020-07-20
Payer: MEDICARE

## 2020-07-20 PROCEDURE — 97110 THERAPEUTIC EXERCISES: CPT | Performed by: OCCUPATIONAL THERAPIST

## 2020-07-20 PROCEDURE — 97140 MANUAL THERAPY 1/> REGIONS: CPT | Performed by: OCCUPATIONAL THERAPIST

## 2020-07-20 PROCEDURE — 97022 WHIRLPOOL THERAPY: CPT | Performed by: OCCUPATIONAL THERAPIST

## 2020-07-20 PROCEDURE — 97530 THERAPEUTIC ACTIVITIES: CPT | Performed by: OCCUPATIONAL THERAPIST

## 2020-07-20 NOTE — PROGRESS NOTES
Occupational Therapy    OCCUPATIONAL THERAPY PROGRESS NOTE    Date:  2020 Initial Evaluation Date: 2020                          Evaluating Therapist: Geno Shone     Patient Name:  Ling Randall                      :  1935     Restrictions/Precautions:  OT Eval and treat, low fall risk  Diagnosis:  M65.4 (ICD-10-CM) - [place]  Quervain's syndrome (tenosynovitis)                                                             Insurance/Certification information: Medicare  Referring Practitioner: Frankie Negrete MD  Date of Surgery/Injury: 2020  Plan of care signed (Y/N):  N  Visit# / total visits:     Pain LevelPain Level: 4-5 on scale of 1-10 @ rest, aching, shooting, tight (pulling) and uncomfortable. Continues to decrease. 7/10 during functional use       Subjective: \" My hand feels stiff today\"     Objective:  Updated POC to be completed by 10th visit. INTERVENTION: COMPLETED: SPECIFICS/COMMENTS:   Modality:     Paraffin Bath   Completed to decrease pain and increase tissue elasticity   fluiotherapy 10 minutes Completed to decrease pain and increase tissue elasticity while intermittently moving her wrist and thumb- and for desensitization. Icing with ice cube to first dorsal compartment x Decrease pain and inflammation following functional use   AROM:     Thumb, digits, wrist, forearm, all planes x After gentle PROM, small arc ROM; tightness R hand for hook, flat, and full . Patient instructed on tendon glides - Full AROM for flat/full achieved. Towel scrunches completed 5 minutes   Fine Motor -tip to tip prehension x Thumb opposition alternatly to first 3 fingers only for prehension of marbles. Patient able to complete with IF, LF, and RF. Tightness thumb to RF this date.       In-hand manipulation x  Patient unable to completed palm to fingertip translation w/ small objects; completed w/ medium sized pegs to increase functional use R hand             AAROM:      x Tendon glides completed - Initially AAROM > AROM        PROM/Stretching:     Gentle Thumb,digits, wrist, forearm, all planes x Thru first dorsal compt as donna        Scar Mass/Edema Control:     Manual edema mobilization x R UE   Compression sleeve  Fitted with C doubled at wrist to decrease edema and pain - to wear as needed - to try at night. - Patient continues to wear   Gentle cross friction massage to first dorsal compartment x Patient noted to have decreased sensitivity this date. Tolerate 8 minute friction massage        Strengthening:          ADLs  Patient reported difficulty w/ tying shoes, opening bottles ect. She was provided w/ gadget to use leverage for opening tabs on cans. Will trial elastic shoe laces. Continued to address difficulty w/ ADLs. Patient voiced concern regarding increased weight gain. She may benefit from referral to dietitian    Other:     Issued & instructed pt with universal thumb spica splint for L UE x To try not wearing at night. Wear as needed during the day. HEP  x Told to do smaller ROM with her thumb and wrist exercises so to NOT ^ pain. Continues to require cues to avoid over use     Assessment/Comments: Pt is making Good progress toward stated plan of care. Continues to report decreasing pain during functional tasks. She was instructed to rest R hand as needed. - therapist continued to reinforce to not do her ROM exercises as far ( sm arc ROM) so she does not ^ pain. Patient demonstrating decreased sensitivity and increased thumb ROM for radial Abd ^ 6* and palmar Abd ^10*.   Issued tendon gliding this date d/t initial tightness R hand      7/01/20 7/20/20     Thumb AROM  Radial Abduction 53-71* 40*/0* 46*-0*          Palmar Abduction 50-71* 45*/0* 55*-0*              -Rehab Potential: Good  -Requires OT Follow Up: Yes  Time In: 1235 pm            Time Out: 1:35 pm             Visit #: 6    Treatment Charges: Mins Units   Modalities-fluiot/US 10 1   Ther Exercise 20 1   Manual

## 2020-07-22 ENCOUNTER — APPOINTMENT (OUTPATIENT)
Dept: OCCUPATIONAL THERAPY | Age: 85
End: 2020-07-22
Payer: MEDICARE

## 2020-07-22 ENCOUNTER — HOSPITAL ENCOUNTER (OUTPATIENT)
Dept: OCCUPATIONAL THERAPY | Age: 85
Setting detail: THERAPIES SERIES
Discharge: HOME | End: 2020-07-22
Payer: MEDICARE

## 2020-07-22 PROCEDURE — 97022 WHIRLPOOL THERAPY: CPT | Performed by: OCCUPATIONAL THERAPIST

## 2020-07-22 PROCEDURE — 97110 THERAPEUTIC EXERCISES: CPT | Performed by: OCCUPATIONAL THERAPIST

## 2020-07-22 PROCEDURE — 97530 THERAPEUTIC ACTIVITIES: CPT | Performed by: OCCUPATIONAL THERAPIST

## 2020-07-22 PROCEDURE — 97140 MANUAL THERAPY 1/> REGIONS: CPT | Performed by: OCCUPATIONAL THERAPIST

## 2020-07-22 NOTE — PROGRESS NOTES
Occupational Therapy    OCCUPATIONAL THERAPY PROGRESS NOTE    Date:  2020 Initial Evaluation Date: 2020                          Evaluating Therapist: Darin Fonseca     Patient Name:  Fabian Marshall                      :  1935     Restrictions/Precautions:  OT Eval and treat, low fall risk  Diagnosis:  M65.4 (ICD-10-CM) - [place] Conquest Quervain's syndrome (tenosynovitis)                                                             Insurance/Certification information: Medicare  Referring Practitioner: Conor Shane MD  Date of Surgery/Injury: 2020  Plan of care signed (Y/N):  N  Visit# / total visits:        Pain LevelPain Level: 4  on scale of 1-10 @ rest, aching, shooting, tight (pulling) and uncomfortable. Decreased slightly to 5  /10 during light functional use       Subjective: \"The pain is going down a little in my R hand. I am not wearing the splint at night but still have to wear it during the day. \"     Objective:  Updated POC to be completed by 10th visit. INTERVENTION: COMPLETED: SPECIFICS/COMMENTS:   Modality:     Paraffin Bath x  Completed to decrease pain and increase tissue elasticity   fluiotherapy 10 minutes Completed to decrease pain and increase tissue elasticity while intermittently moving her wrist and thumb- and for desensitization. Icing with ice cube to first dorsal compartment  Decrease pain and inflammation following functional use   AROM:     Thumb, digits, wrist, forearm, all planes x After gentle PROM, small arc ROM; tightness R hand for hook, flat, and full . Patient instructed on tendon glides - Full AROM for flat/full achieved. Towel scrunches completed 5 minutes   Fine Motor -tip to tip prehension x Thumb opposition alternatly to first 3 fingers only for prehension of marbles. Patient able to complete with IF, LF,  RF and to SF this date. Over extending upon release. Less thumb tightness today.      In-hand manipulation   Patient unable to completed palm to fingertip translation w/ small objects; completed w/ medium sized pegs to increase functional use R hand   pom poms  X- 5 min. Grasp and release in tenodesis pattern - stretching ext with fingers and thumb - no ^ in pain              AAROM:     Tendon gliding x Tendon glides completed - Initially AAROM > AROM. Pt showing ^'ed finger mobility this date. PROM/Stretching:     Gentle Thumb,digits, wrist, forearm, all planes x Thru first dorsal compt as donna        Scar Mass/Edema Control:     Manual edema mobilization x R UE   Compression sleeve  Fitted with C doubled at wrist to decrease edema and pain - to wear as needed - to try at night. - Patient continues to wear   Gentle cross friction massage to first dorsal compartment x Very gentle - lifting skin more then cross friction. No pain              Strengthening:     Rubber band. X -10 rep's ea  Finger and thumb ext - sm arc ROM and did thumb flexion - donna well - no ^ in pain             ADLs  Patient reported difficulty w/ tying shoes, opening bottles ect. She was provided w/ gadget to use leverage for opening tabs on cans. Will trial elastic shoe laces. Continued to address difficulty w/ ADLs. Patient voiced concern regarding increased weight gain. She may benefit from referral to dietitian    Other:     Issued & instructed pt with universal thumb spica splint for L UE x Pt not wearing at night . Talked about weaning with light activity during the day. Wear as needed during the day. HEP  x Told to do smaller ROM with her thumb and wrist exercises so to NOT ^ pain. Continues to require cues to avoid over use. Tendon gliding for digits. To do thumb AROM 3 x's a day to glide tendon within pain tolerance, pt to try 2 light functional tasks with L alone - no splint. Assessment/Comments: Pt is making Good progress toward stated plan of care. Continues to report decreasing pain during functional tasks.   - Therapist continued to reinforce to not do her ROM exercises as far ( sm arc ROM) so she does not ^ pain but she does need to glide the tendons so was reminded to do her AROM thumb exercises 3 -4 x's a day. .  Patient demonstrating decreased sensitivity and decreased pain in thumb area this date ( tolerated activities in session better). 7/01/20 7/20/20     Thumb AROM  Radial Abduction 53-71* 40*/0* 46*-0*          Palmar Abduction 50-71* 45*/0* 55*-0*              -Rehab Potential: Good  -Requires OT Follow Up: Yes  Time In: 9:05 am            Time Out: 10:05 am             Visit #: 7    Treatment Charges: Mins Units   Modalities-paraff 10 1   Ther Exercise 10 1   Manual Therapy 20 1   Thera Activities 20 1   ADL/Home Mgt      Neuro Re-education     Gait Training     Group Therapy     Non-Billable Service Time     Other     Total Time/Units 60 4       -Response to Treatment: Good. Pt was pleased today she did all activities with no ^ in pain . Will ^ program as pt tolerates. GOALS (Long term same as Short term):  1) Patient will demonstrate good understanding of home program (exercises/activities/diagnosis/prognosis/goals) with good accuracy. 2) Patient will demonstrate increased active/passive range of motion of their RUE to [facility] for ADL/IADL completion. 3) Patient will demonstrate increased /pinch strength of at least 10 / 5 pinch pounds of their RUE hand. 4) Patient to report decreased pain in their affected R upper extremity from 8/10 to 2/10 or less with resistive functional use. 5) Increase in fine motor function as evidenced by decreased time to complete 9-hole peg test and/or MRMT test by at least 5-10 seconds. 6) Patient to report 100% compliance with their splint wear, care, and precautions if needed. 7) Patient to demonstrate decreased guarding of their affected extremity from 100% to 20% or less. 8) Patient will be knowledgeable of edema control techniques as evident with decreases from mild to none.    9) Patient will report ADL functions as Mod I/I using RUE. 10) Patient will demonstrate improved functional activity tolerance from good to fair for ADL/IADL completion. 11) Patient will decrease QuickDASH score by 50% for increased participation in daily functional activities.     Jere Deng:   [x]  Continues Plan of care: Treatment covered based on POC and graduated to patient's progress. Pt education continues at each visit to obtain maximum benefits from skilled OT intervention.   []  Alter Plan of care:   []  Discharge:      Tracy Jensen OT/L,  T

## 2020-07-27 ENCOUNTER — HOSPITAL ENCOUNTER (OUTPATIENT)
Dept: OCCUPATIONAL THERAPY | Age: 85
Setting detail: THERAPIES SERIES
Discharge: HOME | End: 2020-07-27
Payer: MEDICARE

## 2020-07-27 PROCEDURE — 97110 THERAPEUTIC EXERCISES: CPT | Performed by: OCCUPATIONAL THERAPIST

## 2020-07-27 PROCEDURE — 97530 THERAPEUTIC ACTIVITIES: CPT | Performed by: OCCUPATIONAL THERAPIST

## 2020-07-27 PROCEDURE — 97140 MANUAL THERAPY 1/> REGIONS: CPT | Performed by: OCCUPATIONAL THERAPIST

## 2020-07-27 NOTE — PROGRESS NOTES
Occupational Therapy    OCCUPATIONAL THERAPY PROGRESS NOTE    Date:  2020 Initial Evaluation Date: 2020                          Evaluating Therapist: Parker Wiggins     Patient Name:  Leny Ricci                      :  1935     Restrictions/Precautions:  OT Eval and treat, low fall risk  Diagnosis:  M65.4 (ICD-10-CM) - [place] Quervain's syndrome (tenosynovitis)                                                             Insurance/Certification information: Medicare  Referring Practitioner: Katiuska Lopes MD  Date of Surgery/Injury: 2020  Plan of care signed (Y/N):  N  Visit# / total visits:        Pain LevelPain Level: 4 on scale of 1-10 @ rest, aching, shooting, tight (pulling) and uncomfortable. 5-/10 during light functional use     Subjective: \" It's getting better, I tried to do some things w/o the brace \"     Objective:  Updated POC to be completed by 10th visit. INTERVENTION: COMPLETED: SPECIFICS/COMMENTS:   Modality:     Paraffin Bath x  Completed to decrease pain and increase tissue elasticity prior to ROM   fluiotherapy  Completed to decrease pain and increase tissue elasticity while intermittently moving her wrist and thumb- and for desensitization. Icing with ice cube to first dorsal compartment  Decrease pain and inflammation following functional use   AROM:     Thumb, digits, wrist, forearm, all planes x After gentle PROM, small arc ROM; tightness R hand for hook, flat, and full . Patient instructed on tendon glides - Full AROM for flat/full achieved. Towel scrunches completed 5 minutes   Fine Motor -tip to tip prehension x Thumb opposition alternatly  for prehension small and medium pegs. Patient able to complete with IF, LF,  RF and to SF this date.     In-hand manipulation x  Patient  completed palm to fingertip translation w/ medium objects; completed w/ medium sized pegs to increase functional use R hand   pom poms   Grasp and release in tenodesis pattern - stretching ext with fingers and thumb - no ^ in pain              AAROM:     Tendon gliding x Tendon glides completed - Initially AAROM > AROM. Continued ^'ed finger mobility this date. PROM/Stretching:     Gentle Thumb,digits, wrist, forearm, all planes x Thru first dorsal compt as donna        Scar Mass/Edema Control:     Manual edema mobilization x R UE - increased visualization of bony landmarks   Compression sleeve  Fitted with C doubled at wrist to decrease edema and pain - to wear as needed - to try at night. - Patient continues to wear   Gentle cross friction massage to first dorsal compartment x Very gentle - lifting skin more then cross friction. No pain              Strengthening:     Rubber band. X -10 rep's ea  Finger and thumb ext - sm arc ROM and did thumb flexion - donna well - no ^ in pain   Thumb/hand 10 minutes  utilized 3-point and lateral pinch to pull medium pegs from board w/ mild resistance        ADLs  Patient reported difficulty w/ tying shoes, opening bottles ect. She was provided w/ gadget to use leverage for opening tabs on cans. Will trial elastic shoe laces. Continued to address difficulty w/ ADLs. Patient voiced concern regarding increased weight gain. She may benefit from referral to dietitian    Other:     Issued & instructed pt with universal thumb spica splint for L UE x Pt not wearing at night . Continued instruction on  Weaning wrist splint with light activity during the day. Wear as needed during the day. HEP  x Told to do smaller ROM with her thumb and wrist exercises so to NOT ^ pain. Continues to require cues to avoid over use. Tendon gliding for digits. To do thumb AROM 3 x's a day to glide tendon within pain tolerance, pt to try 2 light functional tasks with L alone - no splint. Assessment/Comments: Pt is making Good progress toward stated plan of care. Continues to report decreasing pain during functional tasks.   - Patient stated that she is trying to wean resistive functional use. 5) Increase in fine motor function as evidenced by decreased time to complete 9-hole peg test and/or MRMT test by at least 5-10 seconds. 6) Patient to report 100% compliance with their splint wear, care, and precautions if needed. 7) Patient to demonstrate decreased guarding of their affected extremity from 100% to 20% or less. 8) Patient will be knowledgeable of edema control techniques as evident with decreases from mild to none. 9) Patient will report ADL functions as Mod I/I using RUE. 10) Patient will demonstrate improved functional activity tolerance from good to fair for ADL/IADL completion. 11) Patient will decrease QuickDASH score by 50% for increased participation in daily functional activities.     Davian Sprain:   [x]  Continues Plan of care: Treatment covered based on POC and graduated to patient's progress. Pt education continues at each visit to obtain maximum benefits from skilled OT intervention.   []  [address] of care:   []  Discharge:      Selene Fox OTR/L; [id number]

## 2020-07-29 ENCOUNTER — HOSPITAL ENCOUNTER (OUTPATIENT)
Dept: OCCUPATIONAL THERAPY | Age: 85
Setting detail: THERAPIES SERIES
Discharge: HOME | End: 2020-07-29
Payer: MEDICARE

## 2020-08-03 ENCOUNTER — OFFICE VISIT (OUTPATIENT)
Dept: FAMILY MEDICINE CLINIC | Age: 85
End: 2020-08-03
Payer: MEDICARE

## 2020-08-03 ENCOUNTER — HOSPITAL ENCOUNTER (OUTPATIENT)
Age: 85
Discharge: HOME | End: 2020-08-05
Payer: MEDICARE

## 2020-08-03 VITALS
HEART RATE: 80 BPM | SYSTOLIC BLOOD PRESSURE: 117 MMHG | BODY MASS INDEX: 34.16 KG/M2 | DIASTOLIC BLOOD PRESSURE: 72 MMHG | WEIGHT: 205 LBS | HEIGHT: 65 IN | OXYGEN SATURATION: 97 % | TEMPERATURE: 97.1 F

## 2020-08-03 LAB
ALBUMIN SERPL-MCNC: 4.2 G/DL (ref 3.5–5.2)
ALP BLD-CCNC: 91 U/L (ref 35–104)
ALT SERPL-CCNC: 19 U/L (ref 0–32)
ANION GAP SERPL CALCULATED.3IONS-SCNC: 13 MMOL/L (ref 7–16)
AST SERPL-CCNC: 31 U/L (ref 0–31)
BILIRUB SERPL-MCNC: 0.3 MG/DL (ref 0–1.2)
BILIRUBIN URINE: NEGATIVE
BLOOD, URINE: NEGATIVE
BUN BLDV-MCNC: 12 MG/DL (ref 8–23)
CALCIUM SERPL-MCNC: 10 MG/DL (ref 8.6–10.2)
CHLORIDE BLD-SCNC: 99 MMOL/L (ref 98–107)
CHOLESTEROL, TOTAL: 168 MG/DL (ref 0–199)
CLARITY: CLEAR
CO2: 24 MMOL/L (ref 22–29)
COLOR: YELLOW
CREAT SERPL-MCNC: 1 MG/DL (ref 0.5–1)
GFR AFRICAN AMERICAN: >60
GFR NON-AFRICAN AMERICAN: >60 ML/MIN/1.73
GLUCOSE BLD-MCNC: 111 MG/DL (ref 74–99)
GLUCOSE URINE: NEGATIVE MG/DL
HDLC SERPL-MCNC: 59 MG/DL
KETONES, URINE: NEGATIVE MG/DL
LDL CHOLESTEROL CALCULATED: 83 MG/DL (ref 0–99)
LEUKOCYTE ESTERASE, URINE: NEGATIVE
NITRITE, URINE: NEGATIVE
PH UA: 6.5 (ref 5–9)
POTASSIUM SERPL-SCNC: 4.6 MMOL/L (ref 3.5–5)
PROTEIN UA: NEGATIVE MG/DL
SODIUM BLD-SCNC: 136 MMOL/L (ref 132–146)
SPECIFIC GRAVITY UA: 1.01 (ref 1–1.03)
TOTAL PROTEIN: 7.6 G/DL (ref 6.4–8.3)
TRIGL SERPL-MCNC: 132 MG/DL (ref 0–149)
UROBILINOGEN, URINE: 0.2 E.U./DL
VLDLC SERPL CALC-MCNC: 26 MG/DL

## 2020-08-03 PROCEDURE — 1123F ACP DISCUSS/DSCN MKR DOCD: CPT | Performed by: FAMILY MEDICINE

## 2020-08-03 PROCEDURE — 80053 COMPREHEN METABOLIC PANEL: CPT

## 2020-08-03 PROCEDURE — 4040F PNEUMOC VAC/ADMIN/RCVD: CPT | Performed by: FAMILY MEDICINE

## 2020-08-03 PROCEDURE — 1090F PRES/ABSN URINE INCON ASSESS: CPT | Performed by: FAMILY MEDICINE

## 2020-08-03 PROCEDURE — 81003 URINALYSIS AUTO W/O SCOPE: CPT | Performed by: FAMILY MEDICINE

## 2020-08-03 PROCEDURE — 80061 LIPID PANEL: CPT

## 2020-08-03 PROCEDURE — 99214 OFFICE O/P EST MOD 30 MIN: CPT | Performed by: FAMILY MEDICINE

## 2020-08-03 PROCEDURE — 85025 COMPLETE CBC W/AUTO DIFF WBC: CPT

## 2020-08-03 PROCEDURE — 87088 URINE BACTERIA CULTURE: CPT

## 2020-08-03 PROCEDURE — 99212 OFFICE O/P EST SF 10 MIN: CPT | Performed by: FAMILY MEDICINE

## 2020-08-03 PROCEDURE — 36415 COLL VENOUS BLD VENIPUNCTURE: CPT

## 2020-08-03 PROCEDURE — G8399 PT W/DXA RESULTS DOCUMENT: HCPCS | Performed by: FAMILY MEDICINE

## 2020-08-03 PROCEDURE — 36415 COLL VENOUS BLD VENIPUNCTURE: CPT | Performed by: FAMILY MEDICINE

## 2020-08-03 PROCEDURE — 81003 URINALYSIS AUTO W/O SCOPE: CPT

## 2020-08-03 PROCEDURE — G8427 DOCREV CUR MEDS BY ELIG CLIN: HCPCS | Performed by: FAMILY MEDICINE

## 2020-08-03 PROCEDURE — 1036F TOBACCO NON-USER: CPT | Performed by: FAMILY MEDICINE

## 2020-08-03 PROCEDURE — G8417 CALC BMI ABV UP PARAM F/U: HCPCS | Performed by: FAMILY MEDICINE

## 2020-08-03 RX ORDER — AZELASTINE HYDROCHLORIDE 0.5 MG/ML
SOLUTION/ DROPS OPHTHALMIC
Qty: 1 BOTTLE | Refills: 2 | Status: SHIPPED
Start: 2020-08-03 | End: 2020-10-15

## 2020-08-03 RX ORDER — PREDNISOLONE ACETATE 10 MG/ML
SUSPENSION/ DROPS OPHTHALMIC
COMMUNITY
Start: 2020-06-15 | End: 2020-09-14 | Stop reason: ALTCHOICE

## 2020-08-03 RX ORDER — ERYTHROMYCIN 5 MG/G
OINTMENT OPHTHALMIC
COMMUNITY
Start: 2020-07-08 | End: 2022-02-10 | Stop reason: SDUPTHER

## 2020-08-03 RX ORDER — OXYCODONE HYDROCHLORIDE AND ACETAMINOPHEN 5; 325 MG/1; MG/1
TABLET ORAL
COMMUNITY
Start: 2020-07-26 | End: 2022-04-18

## 2020-08-03 NOTE — PROGRESS NOTES
[facility]  FAMILY MEDICINE RESIDENCY PROGRAM   OFFICE PROGRESS NOTE  DATE OF VISIT : 8/3/2020    Patient : Ricardo Barfield   Sex : female   Age : 81 y.o.  : 1935   MRN : [id number]            Chief Complaint :   Chief Complaint   Patient presents with    Wrist Pain    Hypertension     F/U    Back Pain       HPI:   Ricardo Barfield comes to clinic today for     F/U of chronic problem(s)     Chronic problems reviewed today include:     Hypertension, Hyperlipidemia, Chronic pain, Osteoarthritis, Anxiety and Depression and new complaint of urinary hesitancy    Current status of this/these condition(s):  stable    Tolerating meds: Yes    Additional history: Mrs. Erica Bianchi is here for follow-up of all her chronic problems. Her most recent complaint is a feeling of urinary hesitancy. She states that she feels as though she must urinate, but it takes her a while to get started. She denies any dysuria with this and does not have any unusual incontinence. She has had some urge incontinence for some time. She continues to have pain over her right wrist and thumb. She did receive a thumb spica splint which she wears several hours each day. She also is going to occupational therapy for this. It does seem to be improved but still is painful at times. All of her other chronic problems are unchanged. She has been using the allergy eyedrops with some benefit. She notes that her symptoms are much worse if she does not use these and would like a refill. She continues to have some problems with loose stools. She denies any abdominal pain, dyspnea, chest pain. She does have pain in her lower back and joints, but no different than usual.  She is also complaining of weight gain, which has occurred due to inactivity related to the COVID virus. She notes that when she is at home and unable to be active, she does tend to eat more.     Patient Active Problem List   Diagnosis    Hyperlipidemia    Personal history of malignant neoplasm of breast    Anxiety    Recurrent major depressive disorder, in partial remission (HCC)    Osteoporosis    GERD (gastroesophageal reflux disease)    Diarrhea    Acquired spondylolisthesis    Spinal stenosis of lumbar region    Diverticulosis of colon    Rectal polyp    Gastritis and duodenitis    Primary osteoarthritis of both knees    Essential hypertension    RBBB    First degree AV block    Scalp pruritus    Pruritus    Chronic rhinitis       Past Medical History:   Diagnosis Date    Anxiety 10/11/2010    Breast cancer ([address] approx 2000    right, treated lumpectomy, radiation, chemo    Depression 10/11/2010    no issues    Diverticular disease 10/11/2010    GERD (gastroesophageal reflux disease)     HTN (hypertension) 10/11/2010    Hyperlipidemia 10/11/2010    Osteoarthritis 10/11/2010    fot left knee arthroplasty 10/21/2016    Osteoporosis 10/11/2010    Spinal stenosis 10/11/2010    Use of cane as ambulatory aid        Current Outpatient Medications on File Prior to Visit   Medication Sig Dispense Refill    erythromycin (ROMYCIN) 5 MG/GM ophthalmic ointment APPLY A SMALL AMOUNT INTO BOTH EYES AT BEDTIME FOR 14 DAYS      oxyCODONE-acetaminophen (PERCOCET) 5-325 MG per tablet TK 1 T PO Q 8 H PRN P      prednisoLONE acetate (PRED FORTE) 1 % ophthalmic suspension INSTILL ONE DROP INTO EACH EYE THREE TIMES A DAY FOR 2 WEEKS  THEN 1 DROP INTO EACH EYE ONCE DAILY FOR 2 WEEKS  THEN STOP.  dextran 70-hypromellose (ARTIFICIAL TEARS) 0.1-0.3 % SOLN opthalmic solution Place 1 drop into both eyes every 4 hours as needed (dry eyes) 15 mL 1    Thumb Spica MISC by Does not apply route Thumb Spica splint for right wrist, fit to patient. Use daily as directed.       Dx:  DeQuervain tenosynovitis 1 each 0    fluticasone (FLONASE) 50 MCG/ACT nasal spray 2 sprays by Nasal route daily 2 sprays in each nostril daily 1 Bottle 3    sucralfate (CARAFATE) 1 Son     Arthritis Sister     Cancer Brother     Pacemaker Brother     No Known Problems Brother        Past Surgical History:   Procedure Laterality Date    BACK SURGERY      BONE GRAFT      with back surgery    BREAST LUMPECTOMY  2000    left, with chemo and rad, [facility]    CARPAL TUNNEL RELEASE  1980s    left    CARPAL TUNNEL RELEASE  1980s    repeat left    CATARACT REMOVAL  2010    bilateral [facility]    COLONOSCOPY  2/3/2011    extensive diverticulosis, Dr. Nesha Patrick, [facility] COLONOSCOPY  2013    \"could not get around colon due to diverticulitis, [address] COLONOSCOPY  2/24/2015    severe diverticulosis transverse and left colon without diverticulitis, rectal polyp bx, Dr. Valentin Garcia, [address] [place] [address]    HYSTERECTOMY  1980s    still has ovaries, no cancer, [facility]    SPINAL FUSION  2009    [facility], pins and rods in place    TOTAL KNEE ARTHROPLASTY Right 10/21/2016    TOTAL KNEE ARTHROPLASTY Left 10/2017    replacement, dr Deanne Roca  2/3/2011    gastritis, Dr. Nesha Patrick, [address] ENDOSCOPY  2013    gastritis, [facility]    UPPER GASTROINTESTINAL ENDOSCOPY  2/24/2015    mild to moderate gastritis and duodenitis, gastric bx for H pylori, Dr. Lei Ada History     Tobacco Use    Smoking status: Never Smoker    Smokeless tobacco: Never Used   Substance Use Topics    Alcohol use: No     Alcohol/week: 0.0 standard drinks    Drug use: No       Review of Systems - Negative except as per HPI    Objective:    VS:  Blood pressure 117/72, pulse 80, temperature 97.1 °F (36.2 °C), temperature source Temporal, height 5' 5\" (1.651 m), weight 205 lb (93 kg), SpO2 97 %, not currently breastfeeding. General Appearance: Well developed, awake, alert, oriented, no acute distress  Neck: Supple, symmetrical, trachea midline. No JVD. Thyroid smooth, not enlarged.   Chest wall/Lung: Clear to auscultation bilaterally,  respirations unlabored. No rhonchi/wheezing/rales  Heart[de-identified]  Regular rate and rhythm, S1and S2 normal, no murmur, rub or gallop. Extremities:   She is wearing a thumb spica splint on her right wrist.  I had her remove this for the exam.  She is tender over her distal radius and in the anatomical snuffbox area her range of motion is full but she does have pain with Finkelstein maneuver. Skin: Skin color, texture, turgor normal, no rashes or lesions  Neurologic:    Alert, oriented. Psychiatric: has a normal mood and affect. Behavior is normal.     Most recent labs and imaging reviewed. Findings include:     N/A    Vaughn Kaiser was seen today for wrist pain, hypertension and back pain. Diagnoses and all orders for this visit:    Essential hypertension    Chronic rhinitis    Gastroesophageal reflux disease, esophagitis presence not specified    Acquired spondylolisthesis    Scalp pruritus    Hyperlipidemia, unspecified hyperlipidemia type    Anxiety    Spinal stenosis of lumbar region, unspecified whether neurogenic claudication present    Primary osteoarthritis of both knees    Recurrent major depressive disorder, in partial remission ([facility] [address]        Additional Plan: She will continue on all of her current medications. I advised her regarding health maintenance and she will check into immunizations. Labs were obtained today and we will notify her of those results. I did refill her eyedrops, and will refill other medications as needed. We discussed diet and exercise today and I advised her that I would not recommend any weight loss medications. RTO in in 4 month(s) or sooner for any persistent, new, or worsening symptoms. Please see Patient Instructions for further counseling and information given.        Advised to be adherent to the treatment plans discussed today, and please call with any questions or concerns, letting the office know of any reasons that the plans may not be followed. The risks of untreated conditions include worsening illness, injury, disability, and possibly, death. Please call if symptoms change in any way, worsen, or fail to completely resolve, as this could necessitate a change to treatment plans. Patient and/or caregiver expressed understanding. Indications and proper use of medication(s) reviewed. Potential side-effects and risks of medication(s) also explained. Patient and/or caregiver was instructed to call if any new symptoms develop prior to next visit. Health risk factors discussed and addressed. I have personally reviewed labs and/or imaging (if any).       Signed by : Lucinda Ruggiero M.D.

## 2020-08-04 ENCOUNTER — HOSPITAL ENCOUNTER (OUTPATIENT)
Dept: OCCUPATIONAL THERAPY | Age: 85
Setting detail: THERAPIES SERIES
Discharge: HOME | End: 2020-08-04
Payer: MEDICARE

## 2020-08-04 LAB
ANISOCYTOSIS: ABNORMAL
BASOPHILS ABSOLUTE: 0.05 E9/L (ref 0–0.2)
BASOPHILS RELATIVE PERCENT: 0.9 % (ref 0–2)
BURR CELLS: ABNORMAL
EOSINOPHILS ABSOLUTE: 0.14 E9/L (ref 0.05–0.5)
EOSINOPHILS RELATIVE PERCENT: 2.6 % (ref 0–6)
HCT VFR BLD CALC: 41.7 % (ref 34–48)
HEMOGLOBIN: 13.7 G/DL (ref 11.5–15.5)
LYMPHOCYTES ABSOLUTE: 2.44 E9/L (ref 1.5–4)
LYMPHOCYTES RELATIVE PERCENT: 46.5 % (ref 20–42)
MCH RBC QN AUTO: 31.9 PG (ref 26–35)
MCHC RBC AUTO-ENTMCNC: 32.9 % (ref 32–34.5)
MCV RBC AUTO: 97.2 FL (ref 80–99.9)
MONOCYTES ABSOLUTE: 0.36 E9/L (ref 0.1–0.95)
MONOCYTES RELATIVE PERCENT: 7 % (ref 2–12)
NEUTROPHILS ABSOLUTE: 2.24 E9/L (ref 1.8–7.3)
NEUTROPHILS RELATIVE PERCENT: 43 % (ref 43–80)
OVALOCYTES: ABNORMAL
PDW BLD-RTO: 13.8 FL (ref 11.5–15)
PLATELET # BLD: 282 E9/L (ref 130–450)
PMV BLD AUTO: 9 FL (ref 7–12)
POIKILOCYTES: ABNORMAL
RBC # BLD: 4.29 E12/L (ref 3.5–5.5)
WBC # BLD: 5.2 E9/L (ref 4.5–11.5)

## 2020-08-04 NOTE — PROGRESS NOTES
Occupational Therapy      Phone: [phone] Fax: [phone]     Occupational Therapy   Cancellation Note     Patient Name:  Anthony Temple  : 1935  Date:  2020  MRN: [id number]    For today's appointment patient:   [x]  Cancelled   []  Rescheduled appointment   []  No-show     Reason given by patient:   []  Patient ill   []  Conflicting appointment   []  No transportation   []  Conflict with work   [x]  No reason given   [x]  Other:    Comments: Pt arrived to therapy and started in [address]. Therapist removed pt from [facility] after comfortably completing x 15 mins and was seating her at therapy table at which time pt told therapist she was leaving and left.     Electronically signed by: Pascual Gallegos, [id number]

## 2020-08-05 LAB — URINE CULTURE, ROUTINE: NORMAL

## 2020-09-03 ENCOUNTER — OFFICE VISIT (OUTPATIENT)
Dept: ENT CLINIC | Age: 85
End: 2020-09-03
Payer: MEDICARE

## 2020-09-03 ENCOUNTER — PROCEDURE VISIT (OUTPATIENT)
Dept: AUDIOLOGY | Age: 85
End: 2020-09-03
Payer: MEDICARE

## 2020-09-03 VITALS — WEIGHT: 200 LBS | TEMPERATURE: 97.6 F | HEIGHT: 65 IN | BODY MASS INDEX: 33.32 KG/M2

## 2020-09-03 PROCEDURE — 92567 TYMPANOMETRY: CPT | Performed by: AUDIOLOGIST

## 2020-09-03 PROCEDURE — 4040F PNEUMOC VAC/ADMIN/RCVD: CPT | Performed by: OTOLARYNGOLOGY

## 2020-09-03 PROCEDURE — 1090F PRES/ABSN URINE INCON ASSESS: CPT | Performed by: OTOLARYNGOLOGY

## 2020-09-03 PROCEDURE — G8417 CALC BMI ABV UP PARAM F/U: HCPCS | Performed by: OTOLARYNGOLOGY

## 2020-09-03 PROCEDURE — G8427 DOCREV CUR MEDS BY ELIG CLIN: HCPCS | Performed by: OTOLARYNGOLOGY

## 2020-09-03 PROCEDURE — G8399 PT W/DXA RESULTS DOCUMENT: HCPCS | Performed by: OTOLARYNGOLOGY

## 2020-09-03 PROCEDURE — 1123F ACP DISCUSS/DSCN MKR DOCD: CPT | Performed by: OTOLARYNGOLOGY

## 2020-09-03 PROCEDURE — 92557 COMPREHENSIVE HEARING TEST: CPT | Performed by: AUDIOLOGIST

## 2020-09-03 PROCEDURE — 1036F TOBACCO NON-USER: CPT | Performed by: OTOLARYNGOLOGY

## 2020-09-03 PROCEDURE — 99213 OFFICE O/P EST LOW 20 MIN: CPT | Performed by: OTOLARYNGOLOGY

## 2020-09-03 ASSESSMENT — ENCOUNTER SYMPTOMS
GASTROINTESTINAL NEGATIVE: 1
COLOR CHANGE: 0
RESPIRATORY NEGATIVE: 1
SINUS PRESSURE: 0
SHORTNESS OF BREATH: 0
EYES NEGATIVE: 1
ABDOMINAL PAIN: 0

## 2020-09-03 NOTE — PROGRESS NOTES
Subjective:      Patient ID:  Mamadou Hobbs is a 81 y.o. female. HPI:    Mamadou Hobbs presents for recheck today for hearing loss. There are hearing changes noted by the patient. States hearing is worsened in the left ear. There are not new medical issues. Patient is wearing aids. No vertigo. Admits to tinnitus, bilaterally, intermittently. Patient's medications, allergies, past medical, surgical, social and family histories were reviewed and updated as appropriate. Review of Systems   Constitutional: Negative. HENT: Positive for hearing loss and postnasal drip. Negative for ear pain, nosebleeds and sinus pressure. Eyes: Negative. Negative for visual disturbance. Respiratory: Negative. Negative for shortness of breath. Cardiovascular: Negative. Negative for chest pain. Gastrointestinal: Negative. Negative for abdominal pain. Genitourinary: Negative. Musculoskeletal: Negative. Skin: Negative. Negative for color change. Neurological: Negative. Psychiatric/Behavioral: Negative. Negative for behavioral problems and hallucinations. All other systems reviewed and are negative. Objective:   Physical Exam  Vitals signs and nursing note reviewed. Constitutional:       Appearance: She is well-developed. HENT:      Head: Normocephalic and atraumatic. Right Ear: Hearing, tympanic membrane, ear canal and external ear normal.      Left Ear: Hearing, tympanic membrane, ear canal and external ear normal.      Nose: Mucosal edema and rhinorrhea present. Mouth/Throat:      Pharynx: Uvula midline. Eyes:      Conjunctiva/sclera: Conjunctivae normal.      Pupils: Pupils are equal, round, and reactive to light. Neck:      Musculoskeletal: Normal range of motion and neck supple. Cardiovascular:      Rate and Rhythm: Normal rate and regular rhythm. Heart sounds: Normal heart sounds.    Pulmonary:      Effort: Pulmonary effort is normal.      Breath sounds: Normal breath sounds. Abdominal:      General: Bowel sounds are normal.      Palpations: Abdomen is soft. Skin:     General: Skin is warm and dry. Neurological:      Mental Status: She is alert and oriented to person, place, and time. Audiogram -            Assessment:       Diagnosis Orders   1. Asymmetrical hearing loss of both ears                Plan:   Audio is stable from last year. CT IAC ordered to further eval asymmetric hearing loss     Pt has hearing aids at this time. Also discussed the importance of hearing protection from now on to preserve remaining hearing. Call or return to clinic prn if these symptoms worsen or fail to improve as anticipated    Follow up in a month                [facility]  1935    I have discussed the case, including pertinent history and exam findings with the resident. I have seen and examined the patient and the key elements of the encounter have been performed by me. I agree with the assessment, plan and orders as documented by the  resident              Remainder of medical problems as per  resident note. Patient seen and examined. Agree with above exam, assessment and plan.       Electronically signed by Faiza Hood DO on 9/8/20 at 1:18 PM EDT

## 2020-09-03 NOTE — PROGRESS NOTES
This patient was referred for audiometric/tympanometric testing by Dr. Ligia Hodges due to 1 year hearing test.      Audiometry revealed essentially mild through 3000 Hz sloping to moderate sensorineural hearing loss in the left ear. Right ear revealed a moderate to severe sensorineural hearing loss. Asymmetric hearing loss was noted and discussed with the physician. Reliability was good. Speech reception thresholds were in good agreement with the pure tone averages, in the left ear and poor agreement in the right ear. Speech discrimination scores were good, in the left ear and poor in the right ear. Tympanometry revealed normal middle ear peak pressure and compliance, in the right ear. No seal obtained in the left ear. The results were reviewed with the patient and physician. Recommendations for follow up will be made pending physician consult.  No significant changes noted since hearing test April 2019    Gunner Olivares/CCC-A  [place] Lic # [id number]

## 2020-09-08 ENCOUNTER — TELEPHONE (OUTPATIENT)
Dept: ENT CLINIC | Age: 85
End: 2020-09-08

## 2020-09-08 NOTE — TELEPHONE ENCOUNTER
Called radiology scheduling spoke with Enriqueta James scheduled CT IAC Posterior Fossa w/wo Contrast @ [facility] 1:30pm on 9/28/2020. Patient is to arrive by 1:00pm and NPO 4 hours prior. Called patient to confirm appointment, patient confirmed the appointment for testing and follow up 10/6/2020.

## 2020-09-10 ENCOUNTER — NURSE TRIAGE (OUTPATIENT)
Dept: OTHER | Facility: CLINIC | Age: 85
End: 2020-09-10

## 2020-09-10 ENCOUNTER — TELEPHONE (OUTPATIENT)
Dept: ADMINISTRATIVE | Age: 85
End: 2020-09-10

## 2020-09-10 NOTE — TELEPHONE ENCOUNTER
Patient called [facility]) to schedule appointment with red flag complaint; transferred to Nurse Access for triage by Olegario Lester (agent's name). Patient disconnected prior to transfer. Attempted to return call to patient with no answer. Please do not respond to the triage nurse through this encounter. Any subsequent communication should be directly with the patient.     Reason for Disposition   Caller has cancelled the call before the first contact    Protocols used: NO CONTACT OR DUPLICATE CONTACT CALL-ADULT-[place]

## 2020-09-14 ENCOUNTER — OFFICE VISIT (OUTPATIENT)
Dept: FAMILY MEDICINE CLINIC | Age: 85
End: 2020-09-14
Payer: COMMERCIAL

## 2020-09-14 VITALS
RESPIRATION RATE: 16 BRPM | WEIGHT: 209 LBS | DIASTOLIC BLOOD PRESSURE: 61 MMHG | HEART RATE: 76 BPM | BODY MASS INDEX: 34.82 KG/M2 | SYSTOLIC BLOOD PRESSURE: 102 MMHG | HEIGHT: 65 IN | TEMPERATURE: 98.7 F | OXYGEN SATURATION: 96 %

## 2020-09-14 PROCEDURE — 99213 OFFICE O/P EST LOW 20 MIN: CPT | Performed by: FAMILY MEDICINE

## 2020-09-14 PROCEDURE — 1123F ACP DISCUSS/DSCN MKR DOCD: CPT | Performed by: FAMILY MEDICINE

## 2020-09-14 PROCEDURE — 1036F TOBACCO NON-USER: CPT | Performed by: FAMILY MEDICINE

## 2020-09-14 PROCEDURE — G8427 DOCREV CUR MEDS BY ELIG CLIN: HCPCS | Performed by: FAMILY MEDICINE

## 2020-09-14 PROCEDURE — G8399 PT W/DXA RESULTS DOCUMENT: HCPCS | Performed by: FAMILY MEDICINE

## 2020-09-14 PROCEDURE — 1090F PRES/ABSN URINE INCON ASSESS: CPT | Performed by: FAMILY MEDICINE

## 2020-09-14 PROCEDURE — G8417 CALC BMI ABV UP PARAM F/U: HCPCS | Performed by: FAMILY MEDICINE

## 2020-09-14 PROCEDURE — 4040F PNEUMOC VAC/ADMIN/RCVD: CPT | Performed by: FAMILY MEDICINE

## 2020-09-14 RX ORDER — LIDOCAINE 50 MG/G
1 PATCH TOPICAL DAILY
Qty: 30 PATCH | Refills: 0 | Status: SHIPPED | OUTPATIENT
Start: 2020-09-14 | End: 2020-10-14

## 2020-09-14 RX ORDER — ERYTHROMYCIN 5 MG/G
OINTMENT OPHTHALMIC
Status: CANCELLED | OUTPATIENT
Start: 2020-09-14

## 2020-09-14 NOTE — PROGRESS NOTES
CC:  Back pain    HPI:  81 y.o. female presents with back pain. Back pain, had been lifting boxes, about 1 weeks ago, had a flare of back pain, sharp. No known trauma or distinct injury; no popping. Pain did not start during activity but started the next day. Chronic use of Percocet for low back pain, history of laminectomies and fusion, taking every 12 hours. Takes pain from 8 to 5/10. Better with sitting. Worse with laying down. Radiates down bilateral LE, posterior aspect of both legs. Worse with walking. Legs do not hurt really; just feels pain from the back radiating, she states. No weakness. No bowel or bladder symptoms. No numbness or tingling. When lay down at night, has noticed some cramping in both legs. Trying to stay hydrated. Pain in hands and shoulders as well, for about the same amount of time, worsened over the past week, but does have some arthritis known. Interested in GTx.         Patient Active Problem List    Diagnosis Date Noted    Chronic rhinitis 01/27/2020    Scalp pruritus 09/23/2019    Pruritus 09/23/2019    RBBB 10/03/2018    First degree AV block 10/03/2018    Essential hypertension 03/21/2016    Primary osteoarthritis of both knees 08/10/2015    Diverticulosis of colon     Rectal polyp     Gastritis and duodenitis     Diarrhea 01/29/2015    GERD (gastroesophageal reflux disease) 08/28/2014    Hyperlipidemia 10/11/2010    Personal history of malignant neoplasm of breast 10/11/2010    Anxiety 10/11/2010    Recurrent major depressive disorder, in partial remission [facility] 10/11/2010    Osteoporosis 10/11/2010    Acquired spondylolisthesis 02/24/2009    Spinal stenosis of lumbar region 02/24/2009       Current Outpatient Medications on File Prior to Visit   Medication Sig Dispense Refill    oxyCODONE-acetaminophen (PERCOCET) 5-325 MG per tablet TK 1 T PO Q 8 H PRN P      azelastine (OPTIVAR) 0.05 % ophthalmic solution INSTILL ONE DROP INTO EACH EYE TWICE DAILY 1 Bottle 2    Thumb Spica MISC by Does not apply route Thumb Spica splint for right wrist, fit to patient. Use daily as directed. Dx:  DeQuervain tenosynovitis 1 each 0    chlorhexidine (PERIDEX) 0.12 % solution Take 15 mLs by mouth 2 times daily 473 mL 5    fluticasone (FLONASE) 50 MCG/ACT nasal spray 2 sprays by Nasal route daily 2 sprays in each nostril daily 1 Bottle 3    sucralfate (CARAFATE) 1 GM tablet TAKE ONE TABLET BY MOUTH FOUR TIMES A  tablet 2    amLODIPine-benazepril (LOTREL) 5-20 MG per capsule TAKE ONE CAPSULE BY MOUTH EVERY DAY 90 capsule 3    pravastatin (PRAVACHOL) 40 MG tablet TAKE 1 TABLET DAILY 90 tablet 3    omeprazole (PRILOSEC) 20 MG delayed release capsule Take 1 capsule by mouth daily 90 capsule 3    Lactobacillus (PROBIOTIC ACIDOPHILUS) TABS Take 1 tablet by mouth daily 90 tablet 3    carboxymethylcellulose 1 % ophthalmic solution Place 1 drop into both eyes 3 times daily 1 Bottle 4    fluocinolone acetonide (SYNALAR) 0.01 % external solution Apply topically 2 times daily.  60 mL 2    NARCAN 4 MG/0.1ML LIQD nasal spray 0.1 mLs by Nasal route once  1    lidocaine (XYLOCAINE) 5 % ointment APPLY EXT TO THE BACK AND LEGS BID PRN 30 g 1    VILAZODONE HCL 10 MG Tablet TK 1 T PO D  1    Multiple Vitamins-Minerals (AIRBORNE PO) Take 3 tablets by mouth daily      polyvinyl alcohol-povidone (HYPOTEARS) 1.4-0.6 % ophthalmic solution Place 1-2 drops into both eyes as needed      MOVANTIK 12.5 MG TABS tablet Take 1 tablet by mouth every morning 90 tablet 3    vitamin B-12 (CYANOCOBALAMIN) 1000 MCG tablet Take 1,000 mcg by mouth daily       Pyridoxine HCl (VITAMIN B-6) 100 MG tablet Take 100 mg by mouth daily      Multiple Vitamin TABS Take 1 capsule by mouth daily Hair ,skin and nails      erythromycin (ROMYCIN) 5 MG/GM ophthalmic ointment APPLY A SMALL AMOUNT INTO BOTH EYES AT BEDTIME FOR 14 DAYS       No current facility-administered medications on file prior to visit. No Known Allergies    Social History     Tobacco Use    Smoking status: Never Smoker    Smokeless tobacco: Never Used   Substance Use Topics    Alcohol use: No     Alcohol/week: 0.0 standard drinks    Drug use: No       ROS:   Review of Systems - as above    Physical Exam:    VS:  Blood pressure 102/61, pulse 76, temperature 98.7 °F (37.1 °C), temperature source Oral, resp. rate 16, height 5' 5\" (1.651 m), weight 209 lb (94.8 kg), SpO2 96 %, not currently breastfeeding. General Appearance:  awake, alert, oriented, in no acute distress and well developed, well nourished  Head/face:  NCAT  Eyes:  EOMI and Sclera nonicteric  Neck:  neck- supple, no mass, non-tender  Back:  no pain to palpation, good flexion and extension, motor and sensory appear to be normal, negative SLR test  Lungs:  Normal expansion. Clear to auscultation. No rales, rhonchi, or wheezing. Heart:  Heart regular rate and rhythm  Abdomen:  Soft, NT, ND   Extremities: pulses present in all extremities, trace pedal edema and good strength and sensation, equal bilaterally LE.  UE bilaterally show decreased ROM at shoulders; abduction to approx 80 degrees, compensates with shoulder elevation. No tenderness or deformity of shoulders. Bilateral hands with ROM intact at fingers. Cedrick's nodes, some Heberden's nodes. Assessments:      Diagnosis Orders   1. Strain of lumbar region, initial encounter  lidocaine (LIDODERM) 5 %    [facility]  [facility]   2. Primary osteoarthritis involving multiple joints  [facility]  Physical [facility], [address] [place] [address], [facility]/Wellness       Plans:    As Above. Please see Patient Instructions for further counseling and information given. RTO 1 month or sooner prn. PT referral.  Patient prefers warm water aqua therapy at [facility]. OT for hands.       Discussed imaging if symptoms do not improve with

## 2020-09-14 NOTE — PATIENT INSTRUCTIONS
Patient Education        Preventing Falls: Care Instructions  Your Care Instructions     Getting around your home safely can be a challenge if you have injuries or health problems that make it easy for you to fall. Loose rugs and furniture in walkways are among the dangers for many older people who have problems walking or who have poor eyesight. People who have conditions such as arthritis, osteoporosis, or dementia also have to be careful not to fall. You can make your home safer with a few simple measures. Follow-up care is a key part of your treatment and safety. Be sure to make and go to all appointments, and call your doctor if you are having problems. It's also a good idea to know your test results and keep a list of the medicines you take. How can you care for yourself at home? Taking care of yourself  · You may get dizzy if you do not drink enough water. To prevent dehydration, drink plenty of fluids, enough so that your urine is light yellow or clear like water. Choose water and other caffeine-free clear liquids. If you have kidney, heart, or liver disease and have to limit fluids, talk with your doctor before you increase the amount of fluids you drink. · Exercise regularly to improve your strength, muscle tone, and balance. Walk if you can. Swimming may be a good choice if you cannot walk easily. · Have your vision and hearing checked each year or any time you notice a change. If you have trouble seeing and hearing, you might not be able to avoid objects and could lose your balance. · Know the side effects of the medicines you take. Ask your doctor or pharmacist whether the medicines you take can affect your balance. Sleeping pills or sedatives can affect your balance. · Limit the amount of alcohol you drink. Alcohol can impair your balance and other senses. · Ask your doctor whether calluses or corns on your feet need to be removed.  If you wear loose-fitting shoes because of calluses or corns, you can lose your balance and fall. · Talk to your doctor if you have numbness in your feet. Preventing falls at home  · Remove raised doorway thresholds, throw rugs, and clutter. Repair loose carpet or raised areas in the floor. · Move furniture and electrical cords to keep them out of walking paths. · Use nonskid floor wax, and wipe up spills right away, especially on ceramic tile floors. · If you use a walker or cane, put rubber tips on it. If you use crutches, clean the bottoms of them regularly with an abrasive pad, such as steel wool. · Keep your house well lit, especially Audra Sick, and outside walkways. Use night-lights in areas such as hallways and bathrooms. Add extra light switches or use remote switches (such as switches that go on or off when you clap your hands) to make it easier to turn lights on if you have to get up during the night. · Install sturdy handrails on stairways. · Move items in your cabinets so that the things you use a lot are on the lower shelves (about waist level). · Keep a cordless phone and a flashlight with new batteries by your bed. If possible, put a phone in each of the main rooms of your house, or carry a cell phone in case you fall and cannot reach a phone. Or, you can wear a device around your neck or wrist. You push a button that sends a signal for help. · Wear low-heeled shoes that fit well and give your feet good support. Use footwear with nonskid soles. Check the heels and soles of your shoes for wear. Repair or replace worn heels or soles. · Do not wear socks without shoes on wood floors. · Walk on the grass when the sidewalks are slippery. If you live in an area that gets snow and ice in the winter, sprinkle salt on slippery steps and sidewalks. Preventing falls in the bath  · Install grab bars and nonskid mats inside and outside your shower or tub and near the toilet and sinks. · Use shower chairs and bath benches.   · Use a hand-held shower head that will allow you to sit while showering. · Get into a tub or shower by putting the weaker leg in first. Get out of a tub or shower with your strong side first.  · Repair loose toilet seats and consider installing a raised toilet seat to make getting on and off the toilet easier. · Keep your bathroom door unlocked while you are in the shower. Where can you learn more? Go to https://EyesBotpepicm2fxeb.Interfolio. org and sign in to your [employer] account. Enter [phone] in the Globitel box to learn more about \"Preventing Falls: Care Instructions. \"     If you do not have an account, please click on the \"Sign Up Now\" link. Current as of: August 7, 2019               Content Version: 12.5  © 1411-6814 [employer], [employer]. Care instructions adapted under license by [facility] ([facility]). If you have questions about a medical condition or this instruction, always ask your healthcare professional. [address] any warranty or liability for your use of this information.

## 2020-09-22 ENCOUNTER — HOSPITAL ENCOUNTER (OUTPATIENT)
Dept: OCCUPATIONAL THERAPY | Age: 85
Setting detail: THERAPIES SERIES
Discharge: HOME | End: 2020-09-22
Payer: MEDICARE

## 2020-09-22 ENCOUNTER — HOSPITAL ENCOUNTER (OUTPATIENT)
Dept: PHYSICAL THERAPY | Age: 85
Setting detail: THERAPIES SERIES
Discharge: HOME | End: 2020-09-22
Payer: MEDICARE

## 2020-09-22 PROCEDURE — G0283 ELEC STIM OTHER THAN WOUND: HCPCS

## 2020-09-22 PROCEDURE — 97161 PT EVAL LOW COMPLEX 20 MIN: CPT

## 2020-09-22 PROCEDURE — 97140 MANUAL THERAPY 1/> REGIONS: CPT | Performed by: OCCUPATIONAL THERAPIST

## 2020-09-22 PROCEDURE — 97165 OT EVAL LOW COMPLEX 30 MIN: CPT | Performed by: OCCUPATIONAL THERAPIST

## 2020-09-22 PROCEDURE — 97168 OT RE-EVAL EST PLAN CARE: CPT | Performed by: OCCUPATIONAL THERAPIST

## 2020-09-22 PROCEDURE — 97110 THERAPEUTIC EXERCISES: CPT | Performed by: OCCUPATIONAL THERAPIST

## 2020-09-22 NOTE — PROGRESS NOTES
OCCUPATIONAL THERAPY RE- EVALUATION    [facility]  [address] [facility] [address]  Dept: [address]: [phone]   OVIDIO DOMINGO Fax: [phone]    Date:  2020  Initial Evaluation Date: 2020   Evaluating Therapist: Kehinde Barajas    Patient Name:  Fredrick Longoria    :  1935    Restrictions/Precautions:  none, low fall risk  Diagnosis:  M15.0 ICD 10-CM primary osteoarthritis involving multiple joints        Insurance/Certification information:  Medicare   Referring Practitioner:  Charlene Acuña DO  Date of Surgery/Injury: ongoing  Plan of care signed (Y/N):  N  Visit# / total visits:  770 Screening completed upon entering facility and patient cleared for treatment today. Pt followed all protocols set forth by [facility] throughout therapy session. Patient has been made aware of all new hygiene protocols due to COVID-19 set forth by [facility] and agrees to abide by all protocols.      Past Medical History:   Past Medical History:   Diagnosis Date    Anxiety 10/11/2010    Breast cancer ([address] approx     right, treated lumpectomy, radiation, chemo    Depression 10/11/2010    no issues    Diverticular disease 10/11/2010    GERD (gastroesophageal reflux disease)     HTN (hypertension) 10/11/2010    Hyperlipidemia 10/11/2010    Osteoarthritis 10/11/2010    fot left knee arthroplasty 10/21/2016    Osteoporosis 10/11/2010    Spinal stenosis 10/11/2010    Use of cane as ambulatory aid      Past Surgical History:   Past Surgical History:   Procedure Laterality Date    BACK SURGERY      BONE GRAFT      with back surgery    BREAST LUMPECTOMY      left, with chemo and rad, [facility]    CARPAL TUNNEL RELEASE      left    CARPAL TUNNEL RELEASE      repeat left    CATARACT REMOVAL      b[facility]    COLONOSCOPY 2/3/2011    extensive diverticulosis, Dr. Ashley Lorenzo, [facility] COLONOSCOPY      \"could not get around colon due to diverticulitis, [address] COLONOSCOPY  2015    severe diverticulosis transverse and left colon without diverticulitis, rectal polyp bx, Dr. Jasmine Clark, [address] [place] [address]    HYSTERECTOMY  1980s    still has ovaries, no cancer, [facility]    SPINAL FUSION      [facility], pins and rods in place    TOTAL KNEE ARTHROPLASTY Right 10/21/2016    TOTAL KNEE ARTHROPLASTY Left 10/2017    replacement, dr Shravan Bledsoe  2/3/2011    gastritis, Dr. Ashley Lorenzo, [address] ENDOSCOPY      gastritis, [facility]    UPPER GASTROINTESTINAL ENDOSCOPY  2015    mild to moderate gastritis and duodenitis, gastric bx for H pylori, Dr. Jasmine Clark       Reason for Referral: Pt is a 81year old female who presents with stiffness, decreased strength and limited ROM of bilateral UE. Pt complains of increase pain at rest and with movement and is having trouble sleeping due to pain. Pt presents to therapy this date with a long thumb spica/wrist support donned, she states that she wears it on the R hand during the day but not at night. Home Livin[address] story apartment alone  Prior Level of Function: independent     Cognition:   Alert/Oriented x3     IADL STATUS:      Ind  Mod I  Min A  Mod A  Max A  Dep  N/A    Homemaking Responsibility:  []   [x]   []   []   []   []  []  Shopping Responsibility:  []   [x]   []   []   []   []  []  Mode of Transportation:  []   [x]   []   []   []   []  []  Leisure & Hobbies:   []   [x]   []   []   []   []  []  Work:     []   []   []   []   []   []  [x]  Comments: Pt is retired, she states that she can do all activities for herself but it just requires increased time due to pain and limited ROM.  Pt is getting help from an outside service for house work 2 times a week to complete PROM/Stretching                    [x] Fluidotherapy          [x]  Paraffin                   [x] AAROM  [x] AROM                 [] Iontophoresis: 4 mg/mL; Dexamethasone Sodium                                        [] Neuromuscular Re-education [] Splinting         [] Desensitization          [x] Therapeutic Activity       [x] Pain Management with/without modalities PRN                 [] Manual Therapy/Fascial release                            [x] Tendon Glides        [x]Joint Protection/Training  []Ergonomics                             [] Joint Mobilization        [x] Adaptive Equipment Assessment/Training                             [] Manual Edema Mobilization    [x] Energy Conservation/Work Simplification  [x] GM/FM Coordination       [] Safety retraining/education per  individual diagnosis/goals  [] Scar Management        [x] ADL/IADL re-training       Patient Specific Goal: to regain independence in using the computer                             GOALS (Long term same as Short term):  1) Patient will demonstrate good understanding of home program (exercises/activities/diagnosis/prognosis/goals) with good accuracy. 2) Patient will demonstrate increased active/passive range of motion of their BUE to [facility] for ADL/IADL completion. 3) Patient will demonstrate increased /pinch strength of at least 10 / 5 pinch pounds of their B hand. 4) Patient to report decreased pain in their affected B upper extremity from 8-10/10 to 6/10 or less with resistive functional use. 5) Increase in fine motor function as evidenced by decreased time to complete 9-hole peg test and/or MRMT test by at least 5-10 seconds. 6) Patient will report ADL functions as Mod I/I using BUE. 7) Patient will demonstrate improved functional activity tolerance from fair - to good for ADL/IADL completion. 8) Patient will decrease QuickDASH score by 25% for increased participation in daily functional activities.    9) Patient/caregiver to demonstrate proper follow through of home modification/adaptive recommendations to increase safe functional ADLs. 10) Patient will demonstrate/report proper assimilation of compensatory techniques to complete ADLs and IADLs. Patient. Education:  [x] Plans/Goals, Risks/Benefits discussed  [x] Home exercise program  Method of Education: [x] Verbal  [x] Demo  [x] Written  Comprehension of Education:  [x] Verbalizes understanding. [x] Demonstrates understanding. [] Needs Review. [] Demonstrates/verbalizes understanding of HEP/Ed previously given. Patient understands diagnosis/prognosis and consents to treatment, plan and goals: [x] Yes    [] No      Electronically signed by: Pillo [address] [place] [address] #[id number]        CARLOS Certification / Comments      Frequency/Duration 1-2 / week for 12 visits. Certification period From: 9/22/2020  To: 12/22/2020     I have reviewed the Plan of Care established for skilled therapy services and certify that the services are required and that they will be provided while the patient is under my care. Physician's Comments/Revisions:                   Physicians's Printed Name:  Mere Rincon DO                                   [de-identified] Signature:                                                               Date:       Please review Patient's OT evaluation and if you agree sign/date and fax back to us at our [address] [place] [address] OT Fax: [phone].  Thank you for your referral!

## 2020-09-22 NOTE — PROGRESS NOTES
Physical Therapy  Initial Assessment  Date: 2020  Patient Name: Mamadou Hobbs  MRN: [id number]  : 1935          Restrictions       Subjective   General  Chart Reviewed: Yes  Patient assessed for rehabilitation services?: Yes  Additional Pertinent Hx: Patient presents to PT with complaint of acute exacerbation of lower back pain Patent has an extensive hx of back issue, including a lumbar fusion surgery in .  Patient reports current issue began 1-2 weeks ago as a result of lifting boxes at home Patient consulted with her physician who referred patient to PT  Family / Caregiver Present: No  Referring Practitioner: Dr Fanta Healy  Referral Date : 20  Diagnosis: Back Pain  Follows Commands: Within Functional Limits  PT Visit Information  Onset Date: 20  PT Insurance Information: Medicare  Subjective  Subjective: Pain rated Constant Pain is worse with sustained standing and walking LAso with bed mobility Pain located right lower lumbar right LS region  Pain Screening  Patient Currently in Pain: Yes  Vital Signs  Patient Currently in Pain: Yes    Vision/Hearing  Vision  Vision: Within Functional Limits  Hearing  Hearing: Within functional limits    Orientation  Orientation  Overall Orientation Status: Within Functional Limits    Social/Functional History  Social/Functional History  Lives With: Alone  Lives With: Alone  Type of Home: [address] [place] [address] Drive: One level  Homemaking Responsibilities: Yes  Active : Yes  Mode of Transportation: Car    Objective     Observation/Palpation  Posture: Poor  Palpation: Pain with palpation lower lumbar/LS region paraspinally most notable to the right of the spine  Observation: Posture: Flexed trunk with minimal lordosis Bilateral hip flexion    AROM RLE (degrees)  RLE General AROM: Limited hip mobility  AROM LLE (degrees)  LLE General AROM: Limited hip Mobility  Spine  Lumbar: Flexiojn limited 50% ext limited 70% Pain noted with end ranges of trunk ROM    Strength RLE  Comment: 4-/5  Strength LLE  Comment: 4-/5  Strength Other  Other: trunk/core 3+/5  Tone RLE  RLE Tone: Normotonic  Tone LLE  LLE Tone: Normotonic  Motor Control  Gross Motor?: WFL  Additional Measures  Flexibility: General deficits trunk and LE's     Bed mobility  Bridging: Modified independent   Rolling to Left: Modified independent  Rolling to Right: Modified independent  Sit to Supine: Contact guard assistance  Scooting: Independent  Transfers  Sit to Stand: Independent  Stand to sit: Independent       Ambulation  Ambulation?: Yes  Ambulation 1  Surface: level tile  Device: No Device  Assistance: Independent  Quality of Gait: Fair- Maintains flexed trunk Limited step length Slow barney                            Assessment   Conditions Requiring Skilled Therapeutic Intervention  Body structures, Functions, Activity limitations: Decreased functional mobility ; Increased pain;Decreased posture;Decreased ROM; Decreased strength;Decreased endurance  Prognosis: Fair  Decision Making: Low Complexity  REQUIRES PT FOLLOW UP: Yes         Plan   Plan  Times per week: 2  Plan weeks: 5  Current Treatment Recommendations: Strengthening, Home Exercise Program, ROM, Aquatics, Endurance Training, Functional Mobility Training, Modalities    G-Code       OutComes Score                                                  AM-PAC Score             Goals          Therapy Time   Individual Concurrent Group Co-treatment   Time In 0830         Time Out 0930         Minutes 60         Timed Code Treatment Minutes: 39 Minutes       Shanna Ott PT

## 2020-09-23 ENCOUNTER — HOSPITAL ENCOUNTER (OUTPATIENT)
Dept: PHYSICAL THERAPY | Age: 85
Setting detail: THERAPIES SERIES
Discharge: HOME | End: 2020-09-23
Payer: MEDICARE

## 2020-09-23 PROCEDURE — 97113 AQUATIC THERAPY/EXERCISES: CPT

## 2020-09-23 NOTE — PROGRESS NOTES
[facility]  Phone: [phone] Fax: [phone]        Physical Therapy Aquatic Flow Sheet   Date:  2020    Patient Name:  Jazzy Mejia    :  1935  Restrictions/Precautions:    Diagnosis:  Back pain  Treatment Diagnosis:  BACK PAIN  Insurance/Certification information:  Medicare  Referring Physician:  Brando Mckinnon of care signed (Y/N):    Visit# / total visits:  2/10  Pain level: 10/10     Electronically signed by:  Anneliese Farfan PTA [id number]  Time In:1315  Time [id number]    Subjective:  Pt's primary c/o is weakness and inability to stand up straight.     Key  B= Belt DB= Dumbells T= Theratube   H= Hydrotone N= Noodles W= Weights   P= Paddles S= Speedo equipment K= Kickboard     Exercises/Activities   Warm-up/Amb  Minutes  Exercises      Slow forward   5  HR/TR  2    Slow sideways  5  Marches  2    Slow backwards  5  Mini-squats  2    Medium forward    4-way SLR  2    Medium sideways    Hip circles/fig 8  2    Small shuffle    Hamstring curls      Jog    Knee extension      Braiding    Pelvic tilts      Bicycling  5  Scap squeezes          Shoulder flex/ext      Functional    Shoulder abd/add      Step    Shoulder H. abd/add      Lifting    Shoulder IR/ER      Hand to opp knee    Rowing      Push down squat    Bilateral pull down      UE PNF    Push/pull      LE PNF    Push downs      Wall push ups    Arm circles      SLS    Elbow flex/ext          Chin tuck      Stretching    UT shrugs/rolls      Gastroc/Soleus    Rocking horse      Hamstring          SKTC    Other      Piriformis          Hip flexor          Ladder pull          Pec stretch          Post deltoid           Timed Code Treatment Minutes:  30    Total Treatment Minutes:  35  Treatment/Activity Tolerance:  [x] Patient tolerated treatment well [] Patient limited by fatique  [] Patient limited by pain [] Patient limited by other medical complications  [] Other:     Prognosis: [] Good [x] Fair  []

## 2020-09-28 ENCOUNTER — HOSPITAL ENCOUNTER (OUTPATIENT)
Dept: PHYSICAL THERAPY | Age: 85
Setting detail: THERAPIES SERIES
Discharge: HOME | End: 2020-09-28
Payer: MEDICARE

## 2020-09-28 PROCEDURE — 97113 AQUATIC THERAPY/EXERCISES: CPT

## 2020-09-28 NOTE — PROGRESS NOTES
[facility]  Phone: [phone] Fax: [phone]        Physical Therapy Aquatic Flow Sheet   Date:  2020    Patient Name:  Vinay Be    :    Insurance/Certification information:    Referring Physician:    Plan of care signed (Y/N):    Visit# / total visits:  3/10  Pain level: 10/10     Electronically signed by:  Laura Jason PTA [id number]  Time In:1315  Time Out:1345      Key  B= Belt DB= Dumbells T= Theratube   H= Hydrotone N= Noodles W= Weights   P= Paddles S= Speedo equipment K= Kickboard     Exercises/Activities   Warm-up/Amb  Minutes  Exercises      Slow forward 5  HR/TR  2    Slow sideways  5  Marches  2    Slow backwards  5  Mini-squats  2    Medium forward    4-way SLR  2    Medium sideways    Hip circles/fig 8  2    Small shuffle    Hamstring curls      Jog    Knee extension      Braiding    Pelvic tilts      Bicycling  5  Scap squeezes          Shoulder flex/ext      Functional    Shoulder abd/add      Step    Shoulder H. abd/add      Lifting    Shoulder IR/ER      Hand to opp knee    Rowing      Push down squat    Bilateral pull down      UE PNF    Push/pull      LE PNF    Push downs      Wall push ups    Arm circles      SLS    Elbow flex/ext          Chin tuck      Stretching    UT shrugs/rolls      Gastroc/Soleus    Rocking horse      Hamstring          SKTC    Other      Piriformis          Hip flexor          Ladder pull          Pec stretch          Post deltoid           Timed Code Treatment Minutes:  30  Total Treatment Minutes:  30    Treatment/Activity Tolerance:  [x] Patient tolerated treatment well [] Patient limited by fatique  [] Patient limited by pain [] Patient limited by other medical complications  [] Other:     Prognosis: [] Good [x] Fair  [] Poor    Patient Requires Follow-up: [x] Yes  [] No    Plan:   [x] Continue per plan of care [] Alter current plan (see comments)  [] Plan of care initiated [] Hold pending MD visit [] Discharge    Treatment Charges: Mins Units   Initial Evaluation     Re-Evaluation     Ther Exercise         TE     Aquatic Treatment 30 2   Ther Activities        TA     Gait Training          GT     Neuro Re-education NR     Modalities     Non-Billable Service Time     Other     Total Time/Units 30 2            Electronically signed by:  Babita Shirley PTA [id number]

## 2020-09-29 ENCOUNTER — HOSPITAL ENCOUNTER (OUTPATIENT)
Dept: PHYSICAL THERAPY | Age: 85
Setting detail: THERAPIES SERIES
Discharge: HOME | End: 2020-09-29
Payer: MEDICARE

## 2020-09-29 PROCEDURE — 97113 AQUATIC THERAPY/EXERCISES: CPT

## 2020-09-29 NOTE — PROGRESS NOTES
[facility]  Phone: [phone] Fax: [phone]        Physical Therapy Aquatic Flow Sheet   Date:  2020    Patient Name:  Lorene Garay    :  [birth year]  Insurance/Certification information:    Referring Physician:    Plan of care signed (Y/N):    Visit# / total visits:  4/10  Pain level: 10/10     Electronically signed by:  Sabino Alejandre PTA [id number]  Time In:1315  Time VIKKI:[id number]  Subjective:  P attends 2 of 2  Aquatic visits this week. Pt's primary goal is to strengthen spine and straighten up her posture.       Key  B= Belt DB= Dumbells T= Theratube   H= Hydrotone N= Noodles W= Weights   P= Paddles S= Speedo equipment K= Kickboard     Exercises/Activities   Warm-up/Amb  Minutes  Exercises      Slow forward 5  HR/TR  2    Slow sideways  5  Marches  2    Slow backwards  5  Mini-squats  2    Medium forward    4-way SLR  2    Medium sideways    Hip circles/fig 8  2    Small shuffle    Hamstring curls      Jog    Knee extension      Braiding    Pelvic tilts      Bicycling  5  Scap squeezes          Shoulder flex/ext      Functional    Shoulder abd/add      Step    Shoulder H. abd/add      Lifting    Shoulder IR/ER      Hand to opp knee    Rowing      Push down squat    Bilateral pull down      UE PNF    Push/pull      LE PNF    Push downs      Wall push ups    Arm circles      SLS    Elbow flex/ext          Chin tuck      Stretching    UT shrugs/rolls      Gastroc/Soleus    Rocking horse      Hamstring          SKTC    Other      Piriformis          Hip flexor          Ladder pull          Pec stretch          Post deltoid           Timed Code Treatment Minutes:  30  Total Treatment Minutes:  30    Treatment/Activity Tolerance:  [x] Patient tolerated treatment well [] Patient limited by fatique  [] Patient limited by pain [] Patient limited by other medical complications  [] Other:     Prognosis: [] Good [x] Fair  [] Poor    Patient Requires Follow-up: [x] Yes  [] No    Plan:   [x] Continue per plan of care [] Alter current plan (see comments)  [] Plan of care initiated [] Hold pending MD visit [] Discharge    Treatment Charges: Mins Units   Initial Evaluation     Re-Evaluation     Ther Exercise         TE     Aquatic Treatment 30 2   Ther Activities        TA     Gait Training          GT     Neuro Re-education NR     Modalities     Non-Billable Service Time     Other     Total Time/Units 30 2            Electronically signed by:  Juanjo East PTA [id number]

## 2020-10-01 ENCOUNTER — TELEPHONE (OUTPATIENT)
Dept: ENT CLINIC | Age: 85
End: 2020-10-01

## 2020-10-01 NOTE — TELEPHONE ENCOUNTER
Pt no showed to ct iac on 9/28 - lvm advising to contact central at [phone] to reschedule or contact our office if need further assistance.

## 2020-10-05 ENCOUNTER — APPOINTMENT (OUTPATIENT)
Dept: PHYSICAL THERAPY | Age: 85
End: 2020-10-05
Payer: MEDICARE

## 2020-10-06 ENCOUNTER — HOSPITAL ENCOUNTER (OUTPATIENT)
Dept: PHYSICAL THERAPY | Age: 85
Setting detail: THERAPIES SERIES
Discharge: HOME | End: 2020-10-06
Payer: MEDICARE

## 2020-10-06 PROCEDURE — 97113 AQUATIC THERAPY/EXERCISES: CPT

## 2020-10-06 NOTE — PROGRESS NOTES
[facility]  Phone: [phone] Fax: [phone]        Physical Therapy Aquatic Flow Sheet   Date:  10/6/2020    Patient Name:  Mery Cain    :  [birth year]  Insurance/Certification information:    Referring Physician:    Plan of care signed (Y/N):    Visit# / total visits:  5/10  Pain level: 10/10     Electronically signed by:  Michelina Shone, GUY [id number]  Time In:1315  Time Out:1345  SKey  B= Belt DB= Dumbells T= Theratube   H= Hydrotone N= Noodles W= Weights   P= Paddles S= Speedo equipment K= Kickboard     Exercises/Activities   Warm-up/Amb  Minutes  Exercises      Slow forward 5  HR/TR  2    Slow sideways  5  Marches  2    Slow backwards  5  Mini-squats  2    Medium forward    4-way SLR  2    Medium sideways    Hip circles/fig 8  2    Small shuffle    Hamstring curls      Jog    Knee extension      Braiding    Pelvic tilts      Bicycling  5  Scap squeezes          Shoulder flex/ext      Functional    Shoulder abd/add      Step    Shoulder H. abd/add      Lifting    Shoulder IR/ER      Hand to opp knee    Rowing      Push down squat    Bilateral pull down      UE PNF    Push/pull      LE PNF    Push downs      Wall push ups    Arm circles      SLS    Elbow flex/ext          Chin tuck      Stretching    UT shrugs/rolls      Gastroc/Soleus    Rocking horse      Hamstring          SKTC    Other      Piriformis          Hip flexor          Ladder pull          Pec stretch          Post deltoid           Timed Code Treatment Minutes:  30  Total Treatment Minutes:  30    Treatment/Activity Tolerance:  [x] Patient tolerated treatment well [] Patient limited by fatique  [] Patient limited by pain [] Patient limited by other medical complications  [x] Other: Lumbar: Flexiojn limited 50% ext limited 70% Pain noted with end ranges of trunk ROM    Prognosis: [] Good [x] Fair  [] Poor    Patient Requires Follow-up: [x] Yes  [] No    Plan: Increase functional mobility ; Increased pain;Increaseposture; Increased ROM; Increased strength; Increased endurance  [x] Continue per plan of care [] Alter current plan (see comments)  [] Plan of care initiated [] Hold pending MD visit [] Discharge    Treatment Charges: Mins Units   Initial Evaluation     Re-Evaluation     Ther Exercise         TE     Aquatic Treatment 30 2   Ther Activities        TA     Gait Training          GT     Neuro Re-education NR     Modalities     Non-Billable Service Time     Other     Total Time/Units 30 2            Electronically signed by:  Bayron Alegria PTA [id number]

## 2020-10-07 ENCOUNTER — HOSPITAL ENCOUNTER (OUTPATIENT)
Dept: PHYSICAL THERAPY | Age: 85
Setting detail: THERAPIES SERIES
Discharge: HOME | End: 2020-10-07
Payer: MEDICARE

## 2020-10-07 PROCEDURE — 97113 AQUATIC THERAPY/EXERCISES: CPT

## 2020-10-07 NOTE — PROGRESS NOTES
[facility]  Phone: [phone] Fax: [phone]        Physical Therapy Aquatic Flow Sheet   Date:  10/7/2020    Patient Name:  Yue Rome    :    Insurance/Certification information:    Referring Physician:    Plan of care signed (Y/N):    Visit# / total visits:  6/10  Pain level: 10/10     Electronically signed by:  Kinga Jerez PTA [id number]  Time In:1315  Time Out:1345  SKey  B= Belt DB= Dumbells T= Theratube   H= Hydrotone N= Noodles W= Weights   P= Paddles S= Speedo equipment K= Kickboard     Exercises/Activities   Warm-up/Amb  Minutes  Exercises      Slow forward 5  HR/TR  2    Slow sideways  5  Marches  2    Slow backwards  5  Mini-squats  2    Medium forward    4-way SLR  2    Medium sideways    Hip circles/fig 8  2    Small shuffle    Hamstring curls      Jog    Knee extension      Braiding    Pelvic tilts      Bicycling  5  Scap squeezes          Shoulder flex/ext      Functional    Shoulder abd/add      Step    Shoulder H. abd/add      Lifting    Shoulder IR/ER      Hand to opp knee    Rowing      Push down squat    Bilateral pull down      UE PNF    Push/pull      LE PNF    Push downs      Wall push ups    Arm circles      SLS    Elbow flex/ext          Chin tuck      Stretching    UT shrugs/rolls      Gastroc/Soleus    Rocking horse      Hamstring          SKTC    Other      Piriformis          Hip flexor          Ladder pull          Pec stretch          Post deltoid           Timed Code Treatment Minutes:  30  Total Treatment Minutes:  30    Treatment/Activity Tolerance:  [x] Patient tolerated treatment well [] Patient limited by fatique  [] Patient limited by pain [] Patient limited by other medical complications  [x] Other: Lumbar: Flexiojn limited 50% ext limited 70% Pain noted with end ranges of trunk ROM    Prognosis: [] Good [x] Fair  [] Poor    Patient Requires Follow-up: [x] Yes  [] No    Plan: Increase functional mobility ; Increased pain;Increaseposture; Increased ROM; Increased strength; Increased endurance  [x] Continue per plan of care [] Alter current plan (see comments)  [] Plan of care initiated [] Hold pending MD visit [] Discharge    Treatment Charges: Mins Units   Initial Evaluation     Re-Evaluation     Ther Exercise         TE     Aquatic Treatment 30 2   Ther Activities        TA     Gait Training          GT     Neuro Re-education NR     Modalities     Non-Billable Service Time     Other     Total Time/Units 30 2            Electronically signed by:  Sushila Painting PTA [id number]

## 2020-10-07 NOTE — TELEPHONE ENCOUNTER
Spoke with patient - she is refusing to do Ct IAC at this time - states she is fine and can't do at this time. Advised patient due to type of hearing loss she has doctor really wants her to have imaging done to check for a mass. Patient still refused. Notified patient will notify provider of her decision.

## 2020-10-08 ENCOUNTER — HOSPITAL ENCOUNTER (OUTPATIENT)
Dept: OCCUPATIONAL THERAPY | Age: 85
Setting detail: THERAPIES SERIES
Discharge: HOME | End: 2020-10-08
Payer: MEDICARE

## 2020-10-08 PROCEDURE — 97110 THERAPEUTIC EXERCISES: CPT | Performed by: OCCUPATIONAL THERAPIST

## 2020-10-08 PROCEDURE — 97140 MANUAL THERAPY 1/> REGIONS: CPT | Performed by: OCCUPATIONAL THERAPIST

## 2020-10-08 PROCEDURE — 97022 WHIRLPOOL THERAPY: CPT | Performed by: OCCUPATIONAL THERAPIST

## 2020-10-08 NOTE — PROGRESS NOTES
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 5-6/10 during light functional use     Subjective: \" It's getting better,but my pain is about the same\"     Objective:  Updated POC to be completed by  visit. INTERVENTION: COMPLETED: SPECIFICS/COMMENTS:   Modality:     fluiotherapy x Completed to decrease pain and increase tissue elasticity while intermittently moving her wrist and thumb- and for desensitization. AROM:     Thumb, digits, wrist, forearm, all planes x After gentle PROM, small arc ROM; tightness R hand for hook, flat, and full . Patient instructed on tendon glides - Full AROM for flat/full achieved. Towel scrunches completed 5 minutes   Fine Motor -tip to tip prehension x Thumb opposition alternatly  for prehension small and medium pegs. Patient able to complete with IF, LF,  RF and to SF this date. In-hand manipulation x  Patient  completed palm to fingertip translation w/ medium objects; completed w/ small sized pegs to increase functional use R hand   In hand manipulation-chinese balls x 10x clockwise, 10x counterclockwise, BUE             AAROM:     Tendon gliding x Tendon glides completed - Initially AAROM > AROM.          PROM/Stretching:     Gentle Thumb,digits, wrist, forearm, all planes x Tolerated well Scar Mass/Edema Control:     Manual edema mobilization x R UE - increased visualization of bony landmarks             Strengthening:     Yellow putty x 3 point pinch, lateral pinch and full composite fist   Yellow therabar x 1x15, bend up, bend down and twist   Putty tools x Bottle cap, large knob   Other:                 Assessment/Comments: Pt is making Good progress toward stated plan of care. Pt tolerated session well with increase strengthening activities this date to improve ADL and IADL status using BUE. Reinforced HEP with patient, tendon glides and supination stretch. Issued pt a yellow putty to work on strengthening at home as well, instructed patient to use foam intermittently throughout the day to increase strength and ROM. Pt verbalized and demonstrated understanding of HEP. -Rehab Potential: Good  -Requires OT Follow Up: Yes  Time In: 1:00p             Time Out: 2:00p              Visit #: 2    Treatment Charges: Mins Units   Modalities-fluido 20 1   Ther Exercise 15 1   Manual Therapy 25 2   Thera Activities     ADL/Home Mgt      Neuro Re-education     Gait Training     Group Therapy     Non-Billable Service Time     Other     Total Time/Units 60 4       -Response to Treatment: Good. Pt was pleased today with strengthening program with putty. GOALS (Long term same as Short term):  1) Patient will demonstrate good understanding of home program (exercises/activities/diagnosis/prognosis/goals) with good accuracy. 2) Patient will demonstrate increased active/passive range of motion of their BUE to [facility] for ADL/IADL completion. 3) Patient will demonstrate increased /pinch strength of at least 10 / 5 pinch pounds of their B hand. 4) Patient to report decreased pain in their affected B upper extremity from 8-10/10 to 6/10 or less with resistive functional use.    5) Increase in fine motor function as evidenced by decreased time to complete 9-hole peg test and/or MRMT test by at least 5-10 seconds. 6) Patient will report ADL functions as Mod I/I using BUE. 7) Patient will demonstrate improved functional activity tolerance from fair - to good for ADL/IADL completion. 8) Patient will decrease QuickDASH score by 25% for increased participation in daily functional activities. 9) Patient/caregiver to demonstrate proper follow through of home modification/adaptive recommendations to increase safe functional ADLs. 10) Patient will demonstrate/report proper assimilation of compensatory techniques to complete ADLs and IADLs.     Plan:   [x]  Continues Plan of care: Treatment covered based on POC and graduated to patient's progress. Pt education continues at each visit to obtain maximum benefits from skilled OT intervention. []  Alter Plan of care:   []  Discharge:       [address], [place] #[id number]

## 2020-10-12 ENCOUNTER — HOSPITAL ENCOUNTER (OUTPATIENT)
Dept: PHYSICAL THERAPY | Age: 85
Setting detail: THERAPIES SERIES
Discharge: HOME | End: 2020-10-12
Payer: MEDICARE

## 2020-10-12 NOTE — FLOWSHEET NOTE
[facility]  Phone: [phone] Fax: [phone]     Physical Therapy  Out Patient Initial Evaluation    Date:  2020    Patient Name:  Adonna Denver    :  1935  MRN: [id number]    DIAGNOSIS:  Chronic Back Pain   ICD 10 Code: S39.012A  EVALUATION DATE:  2020  REFERRING PHYSICIAN:  Dr Garcia Human:    CERTIFICATION PERIOD:  2020 to 2020    PROBLEMS FOUND DURING EVALUATION  · Back Pain: Patient reports she experienced an acute exacerbation of existing chronic back pain. Pain is rated as Constant   · Limited ROM/Mobility trunk and proximal LE's   · Postural deficits  · Deficits of strength trunk/core region    SHORT TERM GOALS  · Patient will be able to engage in consistent active exercise while reporting no significant increase in acute back pain intensity     LONG TERM GOALS  · Patient will be able to maintain normal ADL's while being able to effectively control back pain at 4/10 or less   · Trunk and hip ROM will be Fair or better  · Postural awareness will be Fair or better  · Strength trunk/core 3+/5 or better  · Patient will be able to maintain and self direct an appropriate follow up independent exercise program     PATIENT GOALS  · Control back pain     REHAB POTENTIAL:  Fair    FREQUENCY/DURATION:  1-2 times per week 5 weeks     PLAN OF CARE:  Aquatic  based active exercise       Thank you for the opportunity to work with your patient. If you have questions or comments, please feel free to contact me by phone or fax.       Electronically Signed by Moshe Ortiz PT [id number]        ___________________________________  2020    Physician     Date

## 2020-10-12 NOTE — PROGRESS NOTES
[facility]  Phone: [phone] Fax: [phone]     Physical Therapy  Cancellation/No-show Note  Patient Name:  Rebekah Ledesma  :  1935   Date:  10/12/2020    For today's appointment patient:  [x]  Cancelled  []  Rescheduled appointment  []  No-show     Reason given by patient:  []  Patient ill  [x]  Conflicting appointment  []  No transportation    []  Conflict with work  []  No reason given  []  Other:     Comments:      Electronically signed by:  Domonique Vergara  PTA  [id number]

## 2020-10-13 ENCOUNTER — HOSPITAL ENCOUNTER (OUTPATIENT)
Dept: PHYSICAL THERAPY | Age: 85
Setting detail: THERAPIES SERIES
Discharge: HOME | End: 2020-10-13
Payer: MEDICARE

## 2020-10-13 PROCEDURE — 97113 AQUATIC THERAPY/EXERCISES: CPT

## 2020-10-13 NOTE — PROGRESS NOTES
[facility]  Phone: [phone] Fax: [phone]        Physical Therapy Aquatic Flow Sheet   Date:  10/13/2020    Patient Name:  Dulce Trimble    :    Insurance/Certification information:    Referring Physician:    Plan of care signed (Y/N):    Visit# / total visits:  77/10  Pain level: 10/10     Electronically signed by:  Sosa Gilliam, GUY [id number]  Time In:1315  Time Out:1345  SKey  B= Belt DB= Dumbells T= Theratube   H= Hydrotone N= Noodles W= Weights   P= Paddles S= Speedo equipment K= Kickboard     Exercises/Activities   Warm-up/Amb  Minutes  Exercises      Slow forward 5  HR/TR  2    Slow sideways  5  Marches  2    Slow backwards  5  Mini-squats  2    Medium forward    4-way SLR  2    Medium sideways    Hip circles/fig 8  2    Small shuffle    Hamstring curls      Jog    Knee extension      Braiding    Pelvic tilts      Bicycling  5  Scap squeezes          Shoulder flex/ext      Functional    Shoulder abd/add      Step    Shoulder H. abd/add      Lifting    Shoulder IR/ER      Hand to opp knee    Rowing      Push down squat    Bilateral pull down      UE PNF    Push/pull      LE PNF    Push downs      Wall push ups    Arm circles      SLS    Elbow flex/ext          Chin tuck      Stretching    UT shrugs/rolls      Gastroc/Soleus    Rocking horse      Hamstring          SKTC    Other      Piriformis          Hip flexor          Ladder pull          Pec stretch          Post deltoid           Timed Code Treatment Minutes:  30  Total Treatment Minutes:  30    Treatment/Activity Tolerance:  [x] Patient tolerated treatment well [] Patient limited by fatique  [] Patient limited by pain [] Patient limited by other medical complications  [x] Other: Lumbar: Flexiojn limited 50% ext limited 70% Pain noted with end ranges of trunk ROM    Prognosis: [] Good [x] Fair  [] Poor    Patient Requires Follow-up: [x] Yes  [] No    Plan: Increase functional mobility ; Increased pain;Increaseposture; Increased ROM; Increased strength; Increased endurance  [x] Continue per plan of care [] Alter current plan (see comments)  [] Plan of care initiated [] Hold pending MD visit [] Discharge    Treatment Charges: Mins Units   Initial Evaluation     Re-Evaluation     Ther Exercise         TE     Aquatic Treatment 30 2   Ther Activities        TA     Gait Training          GT     Neuro Re-education NR     Modalities     Non-Billable Service Time     Other     Total Time/Units 30 2            Electronically signed by:  Yaritza Carr PTA [id number]

## 2020-10-15 ENCOUNTER — HOSPITAL ENCOUNTER (OUTPATIENT)
Dept: OCCUPATIONAL THERAPY | Age: 85
Setting detail: THERAPIES SERIES
Discharge: HOME | End: 2020-10-15
Payer: MEDICARE

## 2020-10-15 PROCEDURE — 97110 THERAPEUTIC EXERCISES: CPT | Performed by: OCCUPATIONAL THERAPIST

## 2020-10-15 PROCEDURE — 97022 WHIRLPOOL THERAPY: CPT | Performed by: OCCUPATIONAL THERAPIST

## 2020-10-15 PROCEDURE — 97140 MANUAL THERAPY 1/> REGIONS: CPT | Performed by: OCCUPATIONAL THERAPIST

## 2020-10-15 RX ORDER — AZELASTINE HYDROCHLORIDE 0.5 MG/ML
SOLUTION/ DROPS OPHTHALMIC
Qty: 6 ML | Refills: 2 | Status: SHIPPED
Start: 2020-10-15 | End: 2020-11-30

## 2020-10-15 NOTE — PROGRESS NOTES
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 INTERVENTION: COMPLETED: SPECIFICS/COMMENTS:   Modality:     fluiotherapy x Completed to decrease pain and increase tissue elasticity while intermittently moving her wrist and thumb- and for desensitization. AROM:     Thumb, digits, wrist, forearm, all planes x After gentle PROM, small arc ROM; tightness R hand for hook, flat, and full . Patient instructed on tendon glides - Full AROM for flat/full achieved. Towel scrunches completed 5 minutes   Fine Motor -tip to tip prehension x Thumb opposition alternatly  for prehension small and medium pegs. Patient able to complete with IF, LF,  RF and to SF this date. In-hand manipulation x  Patient  completed palm to fingertip translation w/ medium objects; completed w/ small sized pegs to increase functional use R hand   In hand manipulation-chinese balls  10x clockwise, 10x counterclockwise, BUE   In hand manipulation with little pegs x 10 LUE, 10 RUE, in and out, one at a time while holding the others in a full composite fist        AAROM:     Tendon gliding x Tendon glides completed - Initially AAROM > AROM.          PROM/Stretching:     Gentle Thumb,digits, wrist, forearm, all planes x Tolerated well        Scar Mass/Edema Control:     Manual edema mobilization x R UE - increased visualization of bony landmarks   Soft tissue massage  x To the base of the MP joints of BUE, pt states that this is a sore area        Strengthening:     Yellow putty  3 point pinch, lateral pinch and full composite fist   Yellow therabar  1x15, bend up, bend down and twist   Putty tools  Bottle cap, large knob   Other:                 Assessment/Comments: Pt is making Good progress toward stated plan of care. Pt tolerated session well with increase strengthening activities this date to improve ADL and IADL status using BUE. Reinforced HEP with patient, tendon glides and supination stretch as well as use of putty. Increased in hand manipulation tasks this date, pt stated that the exercises were helping and that she was having decreased pain and increased ROM. Pt tolerated session well and verbalized and demonstrated understanding of HEP. -Rehab Potential: Good  -Requires OT Follow Up: Yes  Time In: 1:00p             Time Out: 2:00p              Visit #: 3    Treatment Charges: Mins Units   Modalities-fluido 20 1   Ther Exercise 15 1   Manual Therapy 25 2   Thera Activities     ADL/Home Mgt      Neuro Re-education     Gait Training     Group Therapy     Non-Billable Service Time     Other     Total Time/Units 60 4       -Response to Treatment: Good. Pt was pleased today with strengthening program with putty. GOALS (Long term same as Short term):  1) Patient will demonstrate good understanding of home program (exercises/activities/diagnosis/prognosis/goals) with good accuracy. 2) Patient will demonstrate increased active/passive range of motion of their BUE to [facility] for ADL/IADL completion. 3) Patient will demonstrate increased /pinch strength of at least 10 / 5 pinch pounds of their B hand.    4) Patient to report decreased pain in their affected B upper extremity from 8-10/10 to 6/10 or less with resistive functional use. 5) Increase in fine motor function as evidenced by decreased time to complete 9-hole peg test and/or MRMT test by at least 5-10 seconds. 6) Patient will report ADL functions as Mod I/I using BUE. 7) Patient will demonstrate improved functional activity tolerance from fair - to good for ADL/IADL completion. 8) Patient will decrease QuickDASH score by 25% for increased participation in daily functional activities. 9) Patient/caregiver to demonstrate proper follow through of home modification/adaptive recommendations to increase safe functional ADLs. 10) Patient will demonstrate/report proper assimilation of compensatory techniques to complete ADLs and IADLs.     Plan:   [x]  Continues Plan of care: Treatment covered based on POC and graduated to patient's progress. Pt education continues at each visit to obtain maximum benefits from skilled OT intervention. []  Alter Plan of care:   []  Discharge:       [address], [place] #[id number]

## 2020-10-15 NOTE — TELEPHONE ENCOUNTER
Last Appointment:  8/3/2020  Future Appointments   Date Time Provider Tomasz Christenseni   10/19/2020  1:15 PM Rosalba LAURENT PT [facility]   10/20/2020  1:15 PM Daniel LAURENT PT [facility]   10/20/2020  2:00 PM CHAYO Gilbert OT [facility]   12/3/2020 11:00 AM MD Zully Potts [facility]

## 2020-10-19 ENCOUNTER — HOSPITAL ENCOUNTER (OUTPATIENT)
Dept: PHYSICAL THERAPY | Age: 85
Setting detail: THERAPIES SERIES
Discharge: HOME | End: 2020-10-19
Payer: MEDICARE

## 2020-10-19 PROCEDURE — 97113 AQUATIC THERAPY/EXERCISES: CPT

## 2020-10-19 NOTE — PROGRESS NOTES
to preform ADLs easier. Prognosis: [] Good [x] Fair  [] Poor    Patient Requires Follow-up: [x] Yes  [] No    Plan: Increase functional mobility ; Increased pain; Increaseposture; Increased ROM; Increased strength; Increased endurance  [x] Continue per plan of care [] Alter current plan (see comments)  [] Plan of care initiated [] Hold pending MD visit [] Discharge    Treatment Charges: Mins Units   Initial Evaluation     Re-Evaluation     Ther Exercise         TE     Aquatic Treatment 30 2   Ther Activities        TA     Gait Training          GT     Neuro Re-education NR     Modalities     Non-Billable Service Time     Other     Total Time/Units 30 2            Electronically signed by:  Vandana Doe PTA [id number]

## 2020-10-20 ENCOUNTER — HOSPITAL ENCOUNTER (OUTPATIENT)
Dept: OCCUPATIONAL THERAPY | Age: 85
Setting detail: THERAPIES SERIES
Discharge: HOME | End: 2020-10-20
Payer: MEDICARE

## 2020-10-20 ENCOUNTER — HOSPITAL ENCOUNTER (OUTPATIENT)
Dept: PHYSICAL THERAPY | Age: 85
Setting detail: THERAPIES SERIES
Discharge: HOME | End: 2020-10-20
Payer: MEDICARE

## 2020-10-20 PROCEDURE — 97110 THERAPEUTIC EXERCISES: CPT | Performed by: OCCUPATIONAL THERAPIST

## 2020-10-20 PROCEDURE — 97113 AQUATIC THERAPY/EXERCISES: CPT

## 2020-10-20 PROCEDURE — 97140 MANUAL THERAPY 1/> REGIONS: CPT | Performed by: OCCUPATIONAL THERAPIST

## 2020-10-20 PROCEDURE — 97022 WHIRLPOOL THERAPY: CPT | Performed by: OCCUPATIONAL THERAPIST

## 2020-10-20 NOTE — PROGRESS NOTES
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 INTERVENTION: COMPLETED: SPECIFICS/COMMENTS:   Modality:     fluiotherapy x Completed to decrease pain and increase tissue elasticity while intermittently moving her wrist and thumb- and for desensitization. AROM:     Thumb, digits, wrist, forearm, all planes x After gentle PROM, small arc ROM; tightness R hand for hook, flat, and full . Patient instructed on tendon glides - Full AROM for flat/full achieved. Towel scrunches completed 5 minutes   Fine Motor -tip to tip prehension x Thumb opposition alternatly  for prehension small and medium pegs. Patient able to complete with IF, LF,  RF and to SF this date. In-hand manipulation x  Patient  completed palm to fingertip translation w/ medium objects; completed w/ small sized pegs to increase functional use R hand   In hand manipulation-chinese balls x 10x clockwise, 10x counterclockwise, BUE   In hand manipulation with little pegs with yellow putty x 10 LUE, 10 RUE, in and out, one at a time while holding the others in a full composite fist        AAROM:     Tendon gliding x Tendon glides completed - Initially AAROM > AROM. PROM/Stretching:     Gentle Thumb,digits, wrist, forearm, all planes x Tolerated well        Scar Mass/Edema Control:     Manual edema mobilization x R UE - increased visualization of bony landmarks   Soft tissue massage  x To the base of the MP joints of BUE, pt states that this is a sore area        Strengthening:     Yellow putty x 3 point pinch, lateral pinch and full composite fist   Yellow therabar x 1x15, bend up, bend down and twist   Putty tools  Bottle cap, large knob   Other:                 Assessment/Comments: Pt is making Good progress toward stated plan of care. Pt tolerated session well with increase strengthening activities this date to improve ADL and IADL status using BUE. Pt demonstrated increased endurance this date with strengthening activities with yellow putty. Pt states that the base of her fingers (MP joints) are sensitive and tender on BUE, increased soft tissue massage this date. Reinforced HEP with patient, tendon glides and supination stretch as well as use of putty. Pt tolerated session well and verbalized and demonstrated understanding of HEP. -Rehab Potential: Good  -Requires OT Follow Up: Yes  Time In: 2:00p             Time Out: 3:00p              Visit #: 4    Treatment Charges: Mins Units   Modalities-fluido 20 1   Ther Exercise 15 1   Manual Therapy 25 2   Thera Activities     ADL/Home Mgt      Neuro Re-education     Gait Training     Group Therapy     Non-Billable Service Time     Other     Total Time/Units 60 4       -Response to Treatment: Good. Pt was pleased today with strengthening program with putty. GOALS (Long term same as Short term):  1) Patient will demonstrate good understanding of home program (exercises/activities/diagnosis/prognosis/goals) with good accuracy. 2) Patient will demonstrate increased active/passive range of motion of their BUE to [facility] for ADL/IADL completion.   3) Patient will demonstrate increased /pinch strength of at least 10 /

## 2020-10-20 NOTE — PROGRESS NOTES
[facility]  Phone: [phone] Fax: [phone]        Physical Therapy Aquatic Flow Sheet   Date:  10/20/2020    Patient Name:  Tamia Pritchard    :  [birth year]  Insurance/Certification information:    Referring Physician:    Plan of care signed (Y/N):    Visit# / total visits: 9/10  Pain level: 10/10  -same   Electronically signed by:  Gerald Prieto PTA [id number]  Time In:1315  Time MGX:1626  SKey  B= Belt DB= Dumbells T= Theratube   H= Hydrotone N= Noodles W= Weights   P= Paddles S= Speedo equipment K= Kickboard     Exercises/Activities   Warm-up/Amb  Minutes  Exercises      Slow forward 5  HR/TR  2    Slow sideways  5  Marches  2    Slow backwards  5  Mini-squats  2    Medium forward    4-way SLR  2    Medium sideways    Hip circles/fig 8  2    Small shuffle    Hamstring curls      Jog    Knee extension      Braiding    Pelvic tilts      Bicycling  5  Scap squeezes          Shoulder flex/ext      Functional    Shoulder abd/add      Step    Shoulder H. abd/add      Lifting    Shoulder IR/ER      Hand to opp knee    Rowing      Push down squat    Bilateral pull down      UE PNF    Push/pull      LE PNF    Push downs      Wall push ups    Arm circles      SLS    Elbow flex/ext          Chin tuck      Stretching    UT shrugs/rolls      Gastroc/Soleus    Rocking horse      Hamstring          SKTC    Other      Piriformis          Hip flexor          Ladder pull          Pec stretch          Post deltoid           Timed Code Treatment Minutes:  30  Total Treatment Minutes:  30    Treatment/Activity Tolerance:  [x] Patient tolerated treatment well [] Patient limited by fatique  [] Patient limited by pain [] Patient limited by other medical complications  [] Other:   Prognosis: [] Good [x] Fair  [] Poor    Patient Requires Follow-up: [x] Yes  [] No    Plan: Increase functional mobility ; Increased pain; Increaseposture; Increased ROM; Increased strength; Increased endurance  [x] Continue per plan of care [] Alter current plan (see comments)  [] Plan of care initiated [] Hold pending MD visit [] Discharge    Treatment Charges: Mins Units   Initial Evaluation     Re-Evaluation     Ther Exercise         TE     Aquatic Treatment 30 2   Ther Activities        TA     Gait Training          GT     Neuro Re-education NR     Modalities     Non-Billable Service Time     Other     Total Time/Units 30 2            Electronically signed by:  Abhishek Santillan PTA [id number]

## 2020-10-26 ENCOUNTER — HOSPITAL ENCOUNTER (OUTPATIENT)
Dept: PHYSICAL THERAPY | Age: 85
Setting detail: THERAPIES SERIES
Discharge: HOME | End: 2020-10-26
Payer: MEDICARE

## 2020-10-26 PROCEDURE — 97113 AQUATIC THERAPY/EXERCISES: CPT

## 2020-10-26 NOTE — PROGRESS NOTES
plan of care [] Alter current plan (see comments)  [] Plan of care initiated [] Hold pending MD visit [] Discharge    Treatment Charges: Mins Units   Initial Evaluation     Re-Evaluation     Ther Exercise         TE     Aquatic Treatment 30 2   Ther Activities        TA     Gait Training          GT     Neuro Re-education NR     Modalities     Non-Billable Service Time     Other     Total Time/Units 30 2            Electronically signed by:  Seema Mejia PTA [id number]

## 2020-10-27 ENCOUNTER — HOSPITAL ENCOUNTER (OUTPATIENT)
Dept: OCCUPATIONAL THERAPY | Age: 85
Setting detail: THERAPIES SERIES
Discharge: HOME | End: 2020-10-27
Payer: MEDICARE

## 2020-10-27 ENCOUNTER — HOSPITAL ENCOUNTER (OUTPATIENT)
Dept: PHYSICAL THERAPY | Age: 85
Setting detail: THERAPIES SERIES
Discharge: HOME | End: 2020-10-27
Payer: MEDICARE

## 2020-10-27 PROCEDURE — 97110 THERAPEUTIC EXERCISES: CPT | Performed by: OCCUPATIONAL THERAPIST

## 2020-10-27 PROCEDURE — 97113 AQUATIC THERAPY/EXERCISES: CPT

## 2020-10-27 PROCEDURE — 97140 MANUAL THERAPY 1/> REGIONS: CPT | Performed by: OCCUPATIONAL THERAPIST

## 2020-10-27 PROCEDURE — 97022 WHIRLPOOL THERAPY: CPT | Performed by: OCCUPATIONAL THERAPIST

## 2020-10-27 NOTE — PROGRESS NOTES
[facility]  Phone: [phone] Fax: [phone]        Physical Therapy Aquatic Flow Sheet   Date:  10/27/2020    Patient Name:  Terri Andrade    :  [id number]  Insurance/Certification information:    Referring Physician:    Plan of care signed (Y/N):    Visit# / total visits: 10/10  Pain level: 10/10  -same   Electronically signed by:  Seema Mejia PTA [id number]  Time In:1315  Time TIW:7222  SKey  B= Belt DB= Dumbells T= Theratube   H= Hydrotone N= Noodles W= Weights   P= Paddles S= Speedo equipment K= Kickboard     Exercises/Activities   Warm-up/Amb  Minutes  Exercises      Slow forward 5  HR/TR  2    Slow sideways  5  Marches  2    Slow backwards  5  Mini-squats  2    Medium forward    4-way SLR  2    Medium sideways    Hip circles/fig 8  2    Small shuffle    Hamstring curls      Jog    Knee extension      Braiding    Pelvic tilts      Bicycling  5  Scap squeezes          Shoulder flex/ext      Functional    Shoulder abd/add      Step    Shoulder H. abd/add      Lifting    Shoulder IR/ER      Hand to opp knee    Rowing      Push down squat    Bilateral pull down      UE PNF    Push/pull      LE PNF    Push downs      Wall push ups    Arm circles      SLS    Elbow flex/ext          Chin tuck      Stretching    UT shrugs/rolls      Gastroc/Soleus    Rocking horse      Hamstring          SKTC    Other      Piriformis          Hip flexor          Ladder pull          Pec stretch          Post deltoid           Timed Code Treatment Minutes:  30  Total Treatment Minutes:  30    Treatment/Activity Tolerance:  [x] Patient tolerated treatment well [] Patient limited by fatique  [] Patient limited by pain [] Patient limited by other medical complications  [] Other:   Prognosis: [] Good [x] Fair  [] Poor    Patient Requires Follow-up: [x] Yes  [] No    Plan: Increase functional mobility ; Increased pain; Increaseposture; Increased ROM; Increased strength; Increased endurance  [x] Continue per plan of care [] Alter current plan (see comments)  [] Plan of care initiated [] Hold pending MD visit [] Discharge    Treatment Charges: Mins Units   Initial Evaluation     Re-Evaluation     Ther Exercise         TE     Aquatic Treatment 30 2   Ther Activities        TA     Gait Training          GT     Neuro Re-education NR     Modalities     Non-Billable Service Time     Other     Total Time/Units 30 2            Electronically signed by:  Reba Dillon PTA [id number]

## 2020-10-27 NOTE — PROGRESS NOTES
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 INTERVENTION: COMPLETED: SPECIFICS/COMMENTS:   Modality:     fluiotherapy x Completed to decrease pain and increase tissue elasticity while intermittently moving her wrist and thumb- and for desensitization. AROM:     Thumb, digits, wrist, forearm, all planes x After gentle PROM, small arc ROM; tightness R hand for hook, flat, and full . Patient instructed on tendon glides - Full AROM for flat/full achieved. Towel scrunches completed 5 minutes   Fine Motor -tip to tip prehension x Thumb opposition alternatly  for prehension small and medium pegs. Patient able to complete with IF, LF,  RF and to SF this date.     In-hand manipulation x  Patient  completed palm to fingertip translation w/ medium objects; completed w/ small sized pegs to increase functional use R hand   In hand manipulation-chinese balls x 10x clockwise, 10x counterclockwise, BUE   In hand manipulation with little pegs with yellow putty  10 LUE, 10 RUE, in and out, one at a time while holding the others in a full composite fist   In hand manipulation with marbles x Pick one up at a GOALS (Long term same as Short term):  1) Patient will demonstrate good understanding of home program (exercises/activities/diagnosis/prognosis/goals) with good accuracy. 2) Patient will demonstrate increased active/passive range of motion of their BUE to [facility] for ADL/IADL completion. 3) Patient will demonstrate increased /pinch strength of at least 10 / 5 pinch pounds of their B hand. 4) Patient to report decreased pain in their affected B upper extremity from 8-10/10 to 6/10 or less with resistive functional use. 5) Increase in fine motor function as evidenced by decreased time to complete 9-hole peg test and/or MRMT test by at least 5-10 seconds. 6) Patient will report ADL functions as Mod I/I using BUE. 7) Patient will demonstrate improved functional activity tolerance from fair - to good for ADL/IADL completion. 8) Patient will decrease QuickDASH score by 25% for increased participation in daily functional activities. 9) Patient/caregiver to demonstrate proper follow through of home modification/adaptive recommendations to increase safe functional ADLs. 10) Patient will demonstrate/report proper assimilation of compensatory techniques to complete ADLs and IADLs.     Plan:   [x]  Continues Plan of care: Treatment covered based on POC and graduated to patient's progress. Pt education continues at each visit to obtain maximum benefits from skilled OT intervention. []  Alter Plan of care:   []  Discharge:       [address] [facility], OTR/L #[id number]

## 2020-11-02 ENCOUNTER — APPOINTMENT (OUTPATIENT)
Dept: PHYSICAL THERAPY | Age: 85
End: 2020-11-02
Payer: MEDICARE

## 2020-11-03 ENCOUNTER — HOSPITAL ENCOUNTER (OUTPATIENT)
Dept: OCCUPATIONAL THERAPY | Age: 85
Setting detail: THERAPIES SERIES
Discharge: HOME | End: 2020-11-03
Payer: MEDICARE

## 2020-11-03 ENCOUNTER — HOSPITAL ENCOUNTER (OUTPATIENT)
Dept: PHYSICAL THERAPY | Age: 85
Setting detail: THERAPIES SERIES
Discharge: HOME | End: 2020-11-03
Payer: MEDICARE

## 2020-11-03 PROCEDURE — 97022 WHIRLPOOL THERAPY: CPT | Performed by: OCCUPATIONAL THERAPIST

## 2020-11-03 PROCEDURE — 97113 AQUATIC THERAPY/EXERCISES: CPT

## 2020-11-03 PROCEDURE — 97110 THERAPEUTIC EXERCISES: CPT | Performed by: OCCUPATIONAL THERAPIST

## 2020-11-03 PROCEDURE — 97140 MANUAL THERAPY 1/> REGIONS: CPT | Performed by: OCCUPATIONAL THERAPIST

## 2020-11-03 NOTE — PROGRESS NOTES
then place down one at a time, with B hands   AAROM:     Tendon gliding x Tendon glides completed - Initially AAROM > AROM. PROM/Stretching:     Gentle Thumb,digits, wrist, forearm, all planes x Tolerated well        Scar Mass/Edema Control:     Manual edema mobilization x R UE - increased visualization of bony landmarks   Soft tissue massage  x To the base of the MP joints of BUE, pt states that this is a sore area        Strengthening:     Yellow putty x 3 point pinch, lateral pinch and full composite fist   Yellow therabar  1x15, bend up, bend down and twist   3 point pinch-clips  2x15 Yellow, red, blue, green clips using alternating B hands   Putty tools  Bottle cap, large knob   Other:                 Assessment/Comments: Pt is making Good progress toward stated plan of care. Pt tolerated session well with increase strengthening activities this date to improve ADL and IADL status using BUE. Pt presented this date with decreased pain and edema in B hands with increased motion for full composite fist and full opposition to fifth digit of B hands. Pt demonstrated increased endurance this date with fine motor activities and strengthening. Pt tolerated session well and verbalized and demonstrated understanding of HEP. -Rehab Potential: Good  -Requires OT Follow Up: Yes  Time In: 2:00p             Time Out: 3:00p              Visit #: 6    Treatment Charges: Mins Units   Modalities-fluido 20 1   Ther Exercise 15 1   Manual Therapy 25 2   Thera Activities     ADL/Home Mgt      Neuro Re-education     Gait Training     Group Therapy     Non-Billable Service Time     Other     Total Time/Units 60 4       -Response to Treatment: Good. Pt was pleased today increased motion with decreased pain      GOALS (Long term same as Short term):  1) Patient will demonstrate good understanding of home program (exercises/activities/diagnosis/prognosis/goals) with good accuracy.    2) Patient will demonstrate increased

## 2020-11-10 ENCOUNTER — HOSPITAL ENCOUNTER (OUTPATIENT)
Dept: OCCUPATIONAL THERAPY | Age: 85
Setting detail: THERAPIES SERIES
Discharge: HOME | End: 2020-11-10
Payer: MEDICARE

## 2020-11-10 PROCEDURE — 97110 THERAPEUTIC EXERCISES: CPT | Performed by: OCCUPATIONAL THERAPIST

## 2020-11-10 PROCEDURE — 97022 WHIRLPOOL THERAPY: CPT | Performed by: OCCUPATIONAL THERAPIST

## 2020-11-10 PROCEDURE — 97140 MANUAL THERAPY 1/> REGIONS: CPT | Performed by: OCCUPATIONAL THERAPIST

## 2020-11-10 NOTE — PROGRESS NOTES
Occupational Therapy    OCCUPATIONAL THERAPY PROGRESS NOTE    Date:  11/10/2020 Initial Evaluation Date: 2020                          Evaluating Therapist: Jonathan Zaragoza     Patient Name:  Mango Barnes                      :  1935     Restrictions/Precautions:  none, low fall risk  Diagnosis:  M15.0 ICD 10-CM primary osteoarthritis involving multiple joints                                                          Insurance/Certification information:  Medicare   Referring Practitioner:  Shelagh Moritz DO  Date of Surgery/Injury: ongoing  Plan of care signed (Y/N):  N  Visit# / total visits:     Pain LevelPain Level: 3 on scale of 1-10      Subjective: Pt reports no new changes. Objective:  Updated POC to be completed by 10th visit. INTERVENTION: COMPLETED: SPECIFICS/COMMENTS:   Modality:     fluiotherapy x Completed to decrease pain and increase tissue elasticity while intermittently moving her wrist and thumb- and for desensitization. AROM:     Thumb, digits, wrist, forearm, all planes x After gentle PROM, small arc ROM; tightness R hand for hook, flat, and full . Patient instructed on tendon glides - Full AROM for flat/full achieved. Towel scrunches completed 5 minutes   Fine Motor -tip to tip prehension x Thumb opposition alternatly  for prehension small and medium pegs. Patient able to complete with IF, LF,  RF and to SF this date.     In-hand manipulation   Patient  completed palm to fingertip translation w/ medium objects; completed w/ small sized pegs to increase functional use R hand   In hand manipulation-chinese balls  10x clockwise, 10x counterclockwise, BUE   In hand manipulation with little pegs with yellow putty  10 LUE, 10 RUE, in and out, one at a time while holding the others in a full composite fist   In hand manipulation with marbles x Pick one up at a time and hold as many as possible in hand, then place down one at a time, with B hands   AAROM:     Tendon gliding x Tendon glides completed - Initially AAROM > AROM. PROM/Stretching:     Gentle Thumb,digits, wrist, forearm, all planes x Tolerated well        Scar Mass/Edema Control:     Manual edema mobilization x R UE - increased visualization of bony landmarks   Soft tissue massage  x To the base of the MP joints of BUE, pt states that this is a sore area        Strengthening:     Yellow putty x 3 point pinch, lateral pinch and full composite fist   Yellow therabar x 1x15, bend up, bend down and twist   3 point pinch-clips  2x15 Yellow, red, blue, green clips using alternating B hands   Putty tools x Bottle cap, large knob   Other:                 Assessment/Comments: Pt is making Good progress toward stated plan of care. Pt presented this date with decreased pain and edema in B hands with increased motion for full composite fist and full opposition to fifth digit of B hands. Pt tolerated session well and verbalized and demonstrated understanding of HEP. -Rehab Potential: Good  -Requires OT Follow Up: Yes  Time In: 2:00p             Time Out: 3:00p              Visit #: 7    Treatment Charges: Mins Units   Modalities-fluido  20 1   Ther Exercise 15 1   Manual Therapy 25 2   Thera Activities     ADL/Home Mgt      Neuro Re-education     Gait Training     Group Therapy     Non-Billable Service Time     Other     Total Time/Units 60 4       -Response to Treatment: Good. Pt was pleased today increased motion with decreased pain      GOALS (Long term same as Short term):  1) Patient will demonstrate good understanding of home program (exercises/activities/diagnosis/prognosis/goals) with good accuracy. 2) Patient will demonstrate increased active/passive range of motion of their BUE to [facility] for ADL/IADL completion. 3) Patient will demonstrate increased /pinch strength of at least 10 / 5 pinch pounds of their B hand.    4) Patient to report decreased pain in their affected B upper extremity from 8-10/10 to 6/10 or less with resistive functional use. 5) Increase in fine motor function as evidenced by decreased time to complete 9-hole peg test and/or MRMT test by at least 5-10 seconds. 6) Patient will report ADL functions as Mod I/I using BUE. 7) Patient will demonstrate improved functional activity tolerance from fair - to good for ADL/IADL completion. 8) Patient will decrease QuickDASH score by 25% for increased participation in daily functional activities. 9) Patient/caregiver to demonstrate proper follow through of home modification/adaptive recommendations to increase safe functional ADLs. 10) Patient will demonstrate/report proper assimilation of compensatory techniques to complete ADLs and IADLs.     Plan:   [x]  Continues Plan of care: Treatment covered based on POC and graduated to patient's progress. Pt education continues at each visit to obtain maximum benefits from skilled OT intervention. []  Alter Plan of care:   []  Discharge:       [address] [facility], OTR/L #[id number]

## 2020-11-17 ENCOUNTER — HOSPITAL ENCOUNTER (OUTPATIENT)
Dept: OCCUPATIONAL THERAPY | Age: 85
Setting detail: THERAPIES SERIES
Discharge: HOME | End: 2020-11-17
Payer: MEDICARE

## 2020-11-17 PROCEDURE — 97140 MANUAL THERAPY 1/> REGIONS: CPT | Performed by: OCCUPATIONAL THERAPIST

## 2020-11-17 PROCEDURE — 97110 THERAPEUTIC EXERCISES: CPT | Performed by: OCCUPATIONAL THERAPIST

## 2020-11-17 PROCEDURE — 97018 PARAFFIN BATH THERAPY: CPT | Performed by: OCCUPATIONAL THERAPIST

## 2020-11-17 NOTE — PROGRESS NOTES
own to continue with strengthening s/p therapy. Therapist guided her on possible tools to purchase and affordable options. Pt was pleased with her session today, will continue to advance as tolerated. -Rehab Potential: Good  -Requires OT Follow Up: Yes  Time In: 2:00p             Time Out: 3:00p              Visit #: 8    Treatment Charges: Mins Units   Modalities-fluido/par 10 1   Ther Exercise 30 2   Manual Therapy 20 1   Thera Activities     ADL/Home Mgt      Neuro Re-education     Gait Training     Group Therapy     Non-Billable Service Time     Other     Total Time/Units 60 4       -Response to Treatment: Good. Pt was pleased today increased motion with decreased pain      GOALS (Long term same as Short term):  1) Patient will demonstrate good understanding of home program (exercises/activities/diagnosis/prognosis/goals) with good accuracy. 2) Patient will demonstrate increased active/passive range of motion of their BUE to [facility] for ADL/IADL completion. 3) Patient will demonstrate increased /pinch strength of at least 10 / 5 pinch pounds of their B hand. 4) Patient to report decreased pain in their affected B upper extremity from 8-10/10 to 6/10 or less with resistive functional use. 5) Increase in fine motor function as evidenced by decreased time to complete 9-hole peg test and/or MRMT test by at least 5-10 seconds. 6) Patient will report ADL functions as Mod I/I using BUE. 7) Patient will demonstrate improved functional activity tolerance from fair - to good for ADL/IADL completion. 8) Patient will decrease QuickDASH score by 25% for increased participation in daily functional activities. 9) Patient/caregiver to demonstrate proper follow through of home modification/adaptive recommendations to increase safe functional ADLs.   10) Patient will demonstrate/report proper assimilation of compensatory techniques to complete ADLs and IADLs.     Plan:   [x]  Continues Plan of care: Treatment covered based on POC and graduated to patient's progress. Pt education continues at each visit to obtain maximum benefits from skilled OT intervention.   []  Alter Plan of care:   []  Discharge:      Aneta Guillen [address] 88, OTR/L #[id number]

## 2020-11-24 ENCOUNTER — HOSPITAL ENCOUNTER (OUTPATIENT)
Dept: OCCUPATIONAL THERAPY | Age: 85
Setting detail: THERAPIES SERIES
Discharge: HOME | End: 2020-11-24
Payer: MEDICARE

## 2020-11-24 NOTE — PROGRESS NOTES
Phone: [phone] Fax: [phone]     Occupational Therapy   Cancellation/No-show Note     Patient Name:  Xavier Kuhn  : 1935  Date:  2020  MRN: [id number]    For today's appointment patient:       []  Cancelled   []  Rescheduled appointment   [x]  No-show     Reason given by patient:   []  Patient ill   []  Conflicting appointment   []  No transportation   []  Conflict with work   []  No reason given   []  Other:     Comments:     Electronically signed by:  [address] [facility], OTR/L #[id number]

## 2020-11-30 RX ORDER — AZELASTINE HYDROCHLORIDE 0.5 MG/ML
SOLUTION/ DROPS OPHTHALMIC
Qty: 6 ML | Refills: 2 | Status: SHIPPED
Start: 2020-11-30 | End: 2021-01-04 | Stop reason: SDUPTHER

## 2020-11-30 RX ORDER — AMLODIPINE BESYLATE AND BENAZEPRIL HYDROCHLORIDE 5; 20 MG/1; MG/1
CAPSULE ORAL
Qty: 90 CAPSULE | Refills: 0 | Status: SHIPPED
Start: 2020-11-30 | End: 2021-03-18 | Stop reason: SDUPTHER

## 2020-11-30 NOTE — TELEPHONE ENCOUNTER
Last Appointment:  8/3/2020  Future Appointments   Date Time Provider Tomasz Felix   12/3/2020 11:00 AM MD Yee Sanchez [facility]

## 2020-12-03 ENCOUNTER — OFFICE VISIT (OUTPATIENT)
Dept: FAMILY MEDICINE CLINIC | Age: 85
End: 2020-12-03
Payer: MEDICARE

## 2020-12-03 VITALS
SYSTOLIC BLOOD PRESSURE: 130 MMHG | WEIGHT: 200 LBS | HEART RATE: 65 BPM | BODY MASS INDEX: 33.32 KG/M2 | HEIGHT: 65 IN | TEMPERATURE: 96.7 F | DIASTOLIC BLOOD PRESSURE: 80 MMHG | OXYGEN SATURATION: 98 %

## 2020-12-03 PROCEDURE — G8482 FLU IMMUNIZE ORDER/ADMIN: HCPCS | Performed by: FAMILY MEDICINE

## 2020-12-03 PROCEDURE — G8417 CALC BMI ABV UP PARAM F/U: HCPCS | Performed by: FAMILY MEDICINE

## 2020-12-03 PROCEDURE — 99212 OFFICE O/P EST SF 10 MIN: CPT | Performed by: FAMILY MEDICINE

## 2020-12-03 PROCEDURE — G8427 DOCREV CUR MEDS BY ELIG CLIN: HCPCS | Performed by: FAMILY MEDICINE

## 2020-12-03 PROCEDURE — 4040F PNEUMOC VAC/ADMIN/RCVD: CPT | Performed by: FAMILY MEDICINE

## 2020-12-03 PROCEDURE — G8399 PT W/DXA RESULTS DOCUMENT: HCPCS | Performed by: FAMILY MEDICINE

## 2020-12-03 PROCEDURE — 99214 OFFICE O/P EST MOD 30 MIN: CPT | Performed by: FAMILY MEDICINE

## 2020-12-03 PROCEDURE — 1036F TOBACCO NON-USER: CPT | Performed by: FAMILY MEDICINE

## 2020-12-03 PROCEDURE — 1123F ACP DISCUSS/DSCN MKR DOCD: CPT | Performed by: FAMILY MEDICINE

## 2020-12-03 PROCEDURE — 1090F PRES/ABSN URINE INCON ASSESS: CPT | Performed by: FAMILY MEDICINE

## 2020-12-03 RX ORDER — DOCUSATE SODIUM 100 MG/1
100 CAPSULE, LIQUID FILLED ORAL 2 TIMES DAILY
Qty: 60 CAPSULE | Refills: 2 | COMMUNITY
Start: 2020-12-03 | End: 2021-03-18 | Stop reason: SDUPTHER

## 2020-12-03 RX ORDER — ZOSTER VACCINE RECOMBINANT, ADJUVANTED 50 MCG/0.5
0.5 KIT INTRAMUSCULAR SEE ADMIN INSTRUCTIONS
Qty: 0.5 ML | Refills: 0 | Status: SHIPPED | OUTPATIENT
Start: 2020-12-03 | End: 2021-01-04

## 2020-12-03 RX ORDER — GREEN TEA/HOODIA GORDONII 315-12.5MG
1 CAPSULE ORAL DAILY
Qty: 90 TABLET | Refills: 3 | Status: SHIPPED
Start: 2020-12-03 | End: 2021-03-18 | Stop reason: SDUPTHER

## 2020-12-03 RX ORDER — OMEPRAZOLE 20 MG/1
20 CAPSULE, DELAYED RELEASE ORAL DAILY
Qty: 90 CAPSULE | Refills: 3 | Status: SHIPPED
Start: 2020-12-03 | End: 2021-03-18 | Stop reason: SDUPTHER

## 2020-12-03 RX ORDER — FLUTICASONE PROPIONATE 50 MCG
2 SPRAY, SUSPENSION (ML) NASAL DAILY
Qty: 1 BOTTLE | Refills: 3 | Status: SHIPPED
Start: 2020-12-03 | End: 2021-03-18 | Stop reason: SDUPTHER

## 2020-12-03 RX ORDER — CHLORHEXIDINE GLUCONATE 0.12 MG/ML
15 RINSE ORAL 2 TIMES DAILY
Qty: 473 ML | Refills: 5 | Status: SHIPPED
Start: 2020-12-03 | End: 2021-08-03 | Stop reason: SDUPTHER

## 2020-12-03 RX ORDER — PRAVASTATIN SODIUM 40 MG
TABLET ORAL
Qty: 90 TABLET | Refills: 3 | Status: SHIPPED
Start: 2020-12-03 | End: 2021-03-18 | Stop reason: SDUPTHER

## 2020-12-03 NOTE — PATIENT INSTRUCTIONS
Recommendation:    Start colace twice a day with water. Try to drink at least 3-4 8-ounce glasses of water daily. Try one ibuprofen (Advil) once or twice a day for hand or joint pain as needed.

## 2020-12-03 NOTE — PROGRESS NOTES
ANGELIC MANTILLA [facility]  FAMILY MEDICINE RESIDENCY PROGRAM   OFFICE PROGRESS NOTE  DATE OF VISIT : 12/3/2020    Patient : Romana Moritz   Sex : female   Age : 81 y.o.  : 1935   MRN : [id number]            Chief Complaint :   Chief Complaint   Patient presents with    Abdominal Pain    Lower Back Pain     Left side    Leg Pain    Dry Eye       HPI:   Romana Moritz comes to clinic today for     F/U of chronic problem(s) and new or recent complaint of diffuse aching and pains (hands, abdomen, headache)     Chronic problems reviewed today include:     Hypertension, Hyperlipidemia, Osteoarthritis and Depression    Current status of this/these condition(s):  stable    Tolerating meds: Yes    Additional history: Jo Jacobson has all of her chronic complaints but does list several new ones since . She notes that she has been having a great deal of lower abdominal pain, which does seem to be relieved with a bowel movement. She states that she does have bowel movements daily but she has to strain and they are very hard. She does take Movantik along with her Percocet which she gets from pain management. She takes the Percocet because of lower back pain. This does help, but she continues to have low back pain. In addition she has pain in her hands, as well as stiffness. The stiffness is worst in the morning and last for approximately 1 hour with improvement as she uses her hands. She also has other aches and pains in some joints but the hands bother her the most.  She does get intermittent headaches but these tend to be mild and not that frequent. She denies dizziness or blurred vision. She denies chest pain or palpitations, cough or shortness of breath. She does complain of the above-mentioned constipation, but no bleeding. She does have hemorrhoids. She denies urinary symptoms.      Patient Active Problem List   Diagnosis    Hyperlipidemia    Personal history of malignant neoplasm of breast    Anxiety    Recurrent major depressive disorder, in partial remission (HCC)    Osteoporosis    GERD (gastroesophageal reflux disease)    Diarrhea    Acquired spondylolisthesis    Spinal stenosis of lumbar region    Diverticulosis of colon    Rectal polyp    Gastritis and duodenitis    Primary osteoarthritis of both knees    Essential hypertension    RBBB    First degree AV block    Scalp pruritus    Pruritus    Chronic rhinitis       Past Medical History:   Diagnosis Date    Anxiety 10/11/2010    Breast cancer ([address] approx 2000    right, treated lumpectomy, radiation, chemo    Depression 10/11/2010    no issues    Diverticular disease 10/11/2010    GERD (gastroesophageal reflux disease)     HTN (hypertension) 10/11/2010    Hyperlipidemia 10/11/2010    Osteoarthritis 10/11/2010    fot left knee arthroplasty 10/21/2016    Osteoporosis 10/11/2010    Spinal stenosis 10/11/2010    Use of cane as ambulatory aid        Current Outpatient Medications on File Prior to Visit   Medication Sig Dispense Refill    azelastine (OPTIVAR) 0.05 % ophthalmic solution INSTILL ONE DROP INTO EACH EYE TWICE DAILY 6 mL 2    amLODIPine-benazepril (LOTREL) 5-20 MG per capsule TAKE ONE CAPSULE BY MOUTH EVERY DAY 90 capsule 0    erythromycin (ROMYCIN) 5 MG/GM ophthalmic ointment APPLY A SMALL AMOUNT INTO BOTH EYES AT BEDTIME FOR 14 DAYS      oxyCODONE-acetaminophen (PERCOCET) 5-325 MG per tablet TK 1 T PO Q 8 H PRN P      Thumb Spica MISC by Does not apply route Thumb Spica splint for right wrist, fit to patient. Use daily as directed.       Dx:  DeQuervain tenosynovitis 1 each 0    chlorhexidine (PERIDEX) 0.12 % solution Take 15 mLs by mouth 2 times daily 473 mL 5    fluticasone (FLONASE) 50 MCG/ACT nasal spray 2 sprays by Nasal route daily 2 sprays in each nostril daily 1 Bottle 3    sucralfate (CARAFATE) 1 GM tablet TAKE ONE TABLET BY MOUTH FOUR TIMES A  tablet 2    pravastatin (PRAVACHOL) 40 MG tablet TAKE 1 TABLET DAILY 90 tablet 3    omeprazole (PRILOSEC) 20 MG delayed release capsule Take 1 capsule by mouth daily 90 capsule 3    Lactobacillus (PROBIOTIC ACIDOPHILUS) TABS Take 1 tablet by mouth daily 90 tablet 3    carboxymethylcellulose 1 % ophthalmic solution Place 1 drop into both eyes 3 times daily 1 Bottle 4    fluocinolone acetonide (SYNALAR) 0.01 % external solution Apply topically 2 times daily. 60 mL 2    NARCAN 4 MG/0.1ML LIQD nasal spray 0.1 mLs by Nasal route once  1    lidocaine (XYLOCAINE) 5 % ointment APPLY EXT TO THE BACK AND LEGS BID PRN 30 g 1    VILAZODONE HCL 10 MG Tablet TK 1 T PO D  1    Multiple Vitamins-Minerals (AIRBORNE PO) Take 3 tablets by mouth daily      polyvinyl alcohol-povidone (HYPOTEARS) 1.4-0.6 % ophthalmic solution Place 1-2 drops into both eyes as needed      MOVANTIK 12.5 MG TABS tablet Take 1 tablet by mouth every morning 90 tablet 3    vitamin B-12 (CYANOCOBALAMIN) 1000 MCG tablet Take 1,000 mcg by mouth daily       Pyridoxine HCl (VITAMIN B-6) 100 MG tablet Take 100 mg by mouth daily      Multiple Vitamin TABS Take 1 capsule by mouth daily Hair ,skin and nails       No current facility-administered medications on file prior to visit.         No Known Allergies    Family History   Problem Relation Age of Onset    Cancer Mother         throat    High Blood Pressure Mother     Mental Illness Father     Colon Cancer Father     Cancer Brother     Heart Disease Brother     Mental Illness Daughter     Mental Illness Son     Arthritis Sister     Cancer Brother     Pacemaker Brother     No Known Problems Brother        Past Surgical History:   Procedure Laterality Date    BACK SURGERY      BONE GRAFT      with back surgery    BREAST LUMPECTOMY  2000    [facility]    CARPAL TUNNEL RELEASE  1980s    left    CARPAL TUNNEL RELEASE  1980s    repeat left    [facility]ARACT REM[facility]AL  2010    [facility]    COLONOSCOPY  2/3/2011    extensive diverticulosis, Dr. Malia Cosme, [facility] COLONOSCOPY  2013    \"could not get around colon due to diverticulitis, [address] COLONOSCOPY  2/24/2015    severe diverticulosis transverse and left colon without diverticulitis, rectal polyp bx, Dr. Curtis Dubon, [address] [place] [address]    HYSTERECTOMY  1980s    still has ovaries, no cancer, [facility]    SPINAL FUSION  2009    [facility], pins and rods in place    TOTAL KNEE ARTHROPLASTY Right 10/21/2016    TOTAL KNEE ARTHROPLASTY Left 10/2017    replacement, dr Yun Pen  2/3/2011    gastritis, Dr. Malia Cosme, [address] ENDOSCOPY  2013    gastritis, [facility]    UPPER GASTROINTESTINAL ENDOSCOPY  2/24/2015    mild to moderate gastritis and duodenitis, gastric bx for H pylori, Dr. Alpa Foy History     Tobacco Use    Smoking status: Never Smoker    Smokeless tobacco: Never Used   Substance Use Topics    Alcohol use: No     Alcohol/week: 0.0 standard drinks    Drug use: No       Review of Systems - Negative except as per HPI    Objective:    VS:  Blood pressure 130/80, pulse 65, temperature 96.7 °F (35.9 °C), temperature source Temporal, height 5' 5\" (1.651 m), weight 200 lb (90.7 kg), SpO2 98 %, not currently breastfeeding. General Appearance: Well developed, awake, alert, oriented, no acute distress  Neck: Supple, symmetrical, trachea midline. No JVD. Thyroid smooth, not enlarged. Chest wall/Lung: Clear to auscultation bilaterally,  respirations unlabored. No rhonchi/wheezing/rales  Heart[de-identified]  Regular rate and rhythm, S1and S2 normal, no murmur, rub or gallop. Abdomen: Soft, non-tender, bowel sounds normoactive, no masses, no organomegaly  Extremities:  Extremities normal, atraumatic, no cyanosis. No edema.   Skin: Skin color, texture, turgor normal, no rashes or lesions  Musculoskeletal: Her hands show swelling at the PIP joints bilaterally. She does have full range of motion and normal  strength. Neurologic:    Alert, oriented. Difficulty arising from chair and taking 1 step up to the examination table. No focal neurologic deficits noted. Psychiatric: has a normal mood and affect. Behavior is normal.     Most recent labs and imaging reviewed. Findings include:     N/A    Sally Oropeza was seen today for abdominal pain, lower back pain, leg pain and dry eye. Diagnoses and all orders for this visit:    Essential hypertension    Gastroesophageal reflux disease, unspecified whether esophagitis present    Primary osteoarthritis of both knees    Scalp pruritus    Hyperlipidemia, unspecified hyperlipidemia type    Spinal stenosis of lumbar region, unspecified whether neurogenic claudication present    Recurrent major depressive disorder, in partial remission ([address]    Gastroesophageal reflux disease without esophagitis    Abdominal bloating        Additional Plan: I am referring her back to physical therapy for strengthening and gait training. She would prefer to use water therapy as well so I will refer her to [facility] for that. In addition, I am going to send her to occupational therapy for her hands. I have suggested that she start on Colace in addition to increasing her water intake. She is not sure she should take this without speaking with her pain management specialist so I did encourage her to do that. In addition, I am going to suggest that she take 1 or 2 Advil once or twice a day to help with her pain. She is afraid that she is unable to take other pain medicines while she is taking Percocet, but I did reassure her about this. She still wants to check with her pain management doctor, which I fully accept. The remainder of her symptoms are likely stable and no further treatment will be offered today.   I will see her back in 3 to 4 months, but she should keep her appointments with her

## 2020-12-15 ENCOUNTER — HOSPITAL ENCOUNTER (OUTPATIENT)
Dept: OCCUPATIONAL THERAPY | Age: 85
Setting detail: THERAPIES SERIES
Discharge: HOME | End: 2020-12-15
Payer: MEDICARE

## 2020-12-15 ENCOUNTER — HOSPITAL ENCOUNTER (OUTPATIENT)
Dept: PHYSICAL THERAPY | Age: 85
Setting detail: THERAPIES SERIES
Discharge: HOME | End: 2020-12-15
Payer: MEDICARE

## 2020-12-15 PROCEDURE — 97140 MANUAL THERAPY 1/> REGIONS: CPT | Performed by: OCCUPATIONAL THERAPIST

## 2020-12-15 PROCEDURE — 97022 WHIRLPOOL THERAPY: CPT | Performed by: OCCUPATIONAL THERAPIST

## 2020-12-15 PROCEDURE — 97161 PT EVAL LOW COMPLEX 20 MIN: CPT

## 2020-12-15 PROCEDURE — 97110 THERAPEUTIC EXERCISES: CPT | Performed by: OCCUPATIONAL THERAPIST

## 2020-12-15 NOTE — PROGRESS NOTES
Physical Therapy  Initial Assessment  Date: 12/15/2020  Patient Name: Dm Walters  MRN: [id number]  : 1935          Restrictions       Subjective   General  Chart Reviewed: Yes  Patient assessed for rehabilitation services?: Yes  Additional Pertinent Hx: Patient presents to PT to assess and address issues of chronic persistent pain affecting multiple sites Areas affected include the lower back cervical region and bilateral LE's Symptoms have been present many years with variable levels of pain No recent dx tests  Family / Caregiver Present: No  Referring Practitioner: Dr Nicki Valenzuela  Referral Date : 20  Diagnosis: Multi Site Pain  Follows Commands: Within Functional Limits  PT Visit Information  Onset Date: 20  PT Insurance Information: Medicare  Subjective  Subjective: Current Status Back Pain noted as primary area of pain followed by bilateral LE's  Pain Screening  Patient Currently in Pain: Yes  Vital Signs  Patient Currently in Pain: Yes    Vision/Hearing  Vision  Vision: Within Functional Limits  Hearing  Hearing: Within functional limits    Orientation  Orientation  Overall Orientation Status: Within Functional Limits    Social/Functional History  Social/Functional History  Lives With: Alone  Lives With: Alone  Type of Home: [address] [place] [address] Drive: One level  Homemaking Responsibilities: Yes  Active : Yes  Occupation: Retired    Objective     Observation/Palpation  Posture: Poor  Observation: Flexed trunk and flexed hips bilateral Left lateral trunk lean. Patient able to sustain unsuported sitting with no indication of distress. Transitional movements are completed without assist with the exception of supine to sit.  Motor control was WFL's    AROM RLE (degrees)  RLE General AROM: Limited hip Mobility  AROM LLE (degrees)  LLE General AROM: Limited hip Mobility  Spine  Lumbar: flexion limited 25% extension limited 70%    Strength RLE  Comment: 3+/5  Strength LLE  Comment: 3+/5 Strength Other  Other: trunk/core 3/5  Tone RLE  RLE Tone: Normotonic  Tone LLE  LLE Tone: Normotonic  Motor Control  Gross Motor?: WFL        Bed mobility  Bridging: Modified independent   Supine to Sit: Contact guard assistance  Sit to Supine: Independent  Transfers  Sit to Stand: Independent  Stand to sit: Independent       Ambulation  Ambulation?: Yes  Ambulation 1  Surface: level tile  Device: No Device  Assistance: Modified Independent  Gait Deviations: Slow Debbie; Increased VIRGIL; Decreased step length;Decreased step height;Decreased arm swing                            Assessment   Conditions Requiring Skilled Therapeutic Intervention  Body structures, Functions, Activity limitations: Decreased functional mobility ; Increased pain;Decreased posture;Decreased ROM; Decreased strength;Decreased endurance  Prognosis: Fair  Decision Making: Low Complexity  REQUIRES PT FOLLOW UP: Yes         Plan   Plan  Times per week: 1-2  Plan weeks: 5  Current Treatment Recommendations: Aquatics    G-Code       OutComes Score                                                  AM-PAC Score             Goals          Therapy Time   Individual Concurrent Group Co-treatment   Time In 1300         Time Out 1330         Minutes 30         Timed Code Treatment Minutes: 27 Minutes       Josefina Garber, PT

## 2020-12-15 NOTE — PROGRESS NOTES
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 INTERVENTION: COMPLETED: SPECIFICS/COMMENTS:   Modality:     fluiotherapy x Completed to decrease pain and increase tissue elasticity while intermittently moving her wrist and thumb- and for desensitization. Paraffin  10 min bilateral hands to loosen soft tissue and reduce inflammation in the joints. AROM:     Thumb, digits, wrist, forearm, all planes x After gentle PROM, small arc ROM; tightness R hand for hook, flat, and full . Patient instructed on tendon glides - Full AROM for flat/full achieved. Towel scrunches completed 5 minutes   Fine Motor -tip to tip prehension x Thumb opposition alternatly  for prehension small and medium pegs. Patient able to complete with IF, LF,  RF and to SF this date.   -Pinching mini pegs with tweezers x 15 reps each with bilateral hands for fine motor, precision, and pinch stability.     In-hand manipulation   Patient  completed palm to fingertip translation w/ medium objects; completed w/ small sized pegs to increase functional use R hand -Completed with pennies this date in bilateral hands. Picking up one ge at a time and collecting in hands. Then place in cup one by one without dropping. Wrist-O-Cizer  5 reps bilateral hands to stretch tendons in wrists and hands, focus on maintaining grasp. In hand manipulation-chinese balls  10x clockwise, 10x counterclockwise, BUE   In hand manipulation with little pegs with yellow putty  10 LUE, 10 RUE, in and out, one at a time while holding the others in a full composite fist   In hand manipulation with marbles  Pick one up at a time and hold as many as possible in hand, then place down one at a time, with B hands   AAROM:     Tendon gliding x Tendon glides completed - Initially AAROM > AROM. PROM/Stretching:     Gentle Thumb,digits, wrist, forearm, all planes x Tolerated well  - Main focus on R IF this date with gentle joint distraction        Scar Mass/Edema Control:     Manual edema mobilization x R UE - increased visualization of bony landmarks   Soft tissue massage  x To the base of the MP joints of BUE, pt states that this is a sore area        Strengthening:     Yellow putty  3 point pinch, lateral pinch and full composite fist  -Added large , pull, and twist (focus on maintaining digit flexion). Digi-Flex  Completed yellow x 15 reps, red x 15 reps, then green x 10 reps with bilateral hands. All digits together then alternating. Pt plans to purchase these for her HEP.    Yellow therabar  1x15, bend up, bend down and twist   3 point pinch-clips  2x15 Yellow, red, blue, green clips using alternating B hands   Putty tools  Bottle cap, large knob   Other:     Reassessment x OTR Reassessment Assessment/Comments: Pt is making Good progress toward stated plan of care. Pt was being seen by this facility prior to taking a break for 2 weeks due to the holiday. Pt states she has not been completing HEP during her break-states she lost her theraputty. Pt reassessed this date, all measurements have increased for ROM and strength. Will continue plan of care with pt to continue strengthening, endurance and pain management. Pt states that she is in 10/10 pain at the MCP joints of the hand prior to fluidotherapy modality. Will continue to increase as tolerated.                 9/22/2020  12/15/2020                                                                          L          R  L         R  FOREARM Pronation 80-90* WFL WFL  WFL WFL     Supination 80-90* 71 55 74 60                                                                              L          R  L  R  WRIST Flexion 0-70* 89 78 WFL  WFL     Extension 0-80* 42 40 45 45     Radial Deviation 0-20* 10 11 13 15     Ulnar Deviation 0-30* 29 15 unchanged 17      Hand ROM                                                                          L          R  L R  IF Digit MCP Flexion/Extension */ 0-45 H 65 80  75 84      PIP Flexion/Extension 100*/ 0 84/-25 65/-27  93  79     DIP Flexion/Extension 70-90*/0 48 44 55   53         MF Digit MCP Flexion/Extension */ 0-45 H 75 75  85  84     PIP Flexion/Extension 100*/ 0 90/-35 72/-34 93   85     DIP Flexion/Extension 70-90*/0 73 58 75  68       RF Digit MCP Flexion/Extension */ 0-45 H 80 81 80   89     PIP Flexion/Extension 100*/ 0 82/0 74/-21  87 90      DIP Flexion/Extension 70-90*/0 45 58  55 62       SF Digit MCP Flexion/Extension */ 0-45 H 85 79  90  85     PIP Flexion/Extension 100*/ 0 72 61  85  85     DIP Flexion/Extension 70-90*/0 51 30 55  40         Dynamometer (setting 2):                              Left: 30#, 35# Right: 25# 40#                    Pinch Meter:              Lateral: Left= 11# 13# ,Right= 10# 10#              Palmar 3 point: Left= 5#,7# Right= 7#, 8#   9 Hole Peg Test:              Left: 27.5 seconds, 27 seconds              Right: 31.0 seconds, 29 seconds     QuickDASH Score: 86.4% disability, 86.4% disability-unchanged       -Rehab Potential: Good  -Requires OT Follow Up: Yes  Time In: 2:00p             Time Out: 3:00p              Visit #: 9    Treatment Charges: Mins Units   Modalities-fluido/par 20 1   Ther Exercise 30 2   Manual Therapy 10 1   Thera Activities     ADL/Home Mgt      Neuro Re-education     Gait Training     Group Therapy     Non-Billable Service Time     Other     Total Time/Units 60 4       -Response to Treatment: Good. Pt was pleased today increased motion with decreased pain      GOALS (Long term same as Short term):  1) Patient will demonstrate good understanding of home program (exercises/activities/diagnosis/prognosis/goals) with good accuracy. Goal progressing. 2) Patient will demonstrate increased active/passive range of motion of their BUE to [facility] for ADL/IADL completion. Goal met. 3) Patient will demonstrate increased /pinch strength of at least 10 / 5 pinch pounds of their B hand. Goal progressing. 4) Patient to report decreased pain in their affected B upper extremity from 8-10/10 to 6/10 or less with resistive functional use. Goal making slow gains. 5) Increase in fine motor function as evidenced by decreased time to complete 9-hole peg test and/or MRMT test by at least 5-10 seconds. Goal progressing. 6) Patient will report ADL functions as Mod I/I using BUE. Goal progressing. 7) Patient will demonstrate improved functional activity tolerance from fair - to good for ADL/IADL completion. Goal progressing. 8) Patient will decrease QuickDASH score by 25% for increased participation in daily functional activities. Goal making slow gains. 9) Patient/caregiver to demonstrate proper follow through of home modification/adaptive recommendations to increase safe functional ADLs. Goal progressing. 10) Patient will demonstrate/report proper assimilation of compensatory techniques to complete ADLs and IADLs. Goal progressing.      Plan:   [x]  Continues Plan of care: Treatment covered based on POC and graduated to patient's progress. Pt education continues at each visit to obtain maximum benefits from skilled OT intervention. []  Alter Plan of care:   []  Discharge:       [facility], OTR/L #[id number]

## 2020-12-15 NOTE — FLOWSHEET NOTE
[facility]  Phone: [phone] Fax: [phone]     Physical Therapy  Out Patient Initial Evaluation    Date:  12/15/2020    Patient Name:  Deloris Barnes  :  1935  MRN: [id number]    DIAGNOSIS:  Multi Site Pain  ICD 10 Code: M89.49  EVALUATION DATE:  12/15/2020  REFERRING PHYSICIAN:  Dr Ramo Pan:    CERTIFICATION PERIOD:  12/15/2020 to 02/15/2021    PROBLEMS FOUND DURING EVALUATION  · Pain: Affects multiple sites but is noted most strongly at the lower back and bilateral LE's at the present time   · Postural deficits   · Limited ROM/Mobility trunk and bilateral Hips  · Deficits of strength: General     SHORT TERM GOALS  · Patient will be able to sustain and progress appropriate active exercise while reporting no increase in acute pain intensity     LONG TERM GOALS  · Patient will demonstrate improved ability to engage in ADL's and functional ambulation while also being able to effectively control pain at 4/10 or less  · AROM of bilateral hips will be Fair or better  · Strength: Trunk/core 3 to 3+/5 Proximal LE' s3+/5  · Patient will be able to maintain and self direct an appropriate follow up independent exercise program     PATIENT GOALS  · Maintain function with pain control    REHAB POTENTIAL:  Fair    FREQUENCY/DURATION:  1-2 times per week 10 sessions    PLAN OF CARE:  Aquatic based active exercise       Thank you for the opportunity to work with your patient. If you have questions or comments, please feel free to contact me by phone or fax.       Electronically Signed by Keith Junior  PT [id number]        ___________________________________  12/15/2020    Physician     Date

## 2020-12-16 ENCOUNTER — HOSPITAL ENCOUNTER (OUTPATIENT)
Dept: PHYSICAL THERAPY | Age: 85
Setting detail: THERAPIES SERIES
Discharge: HOME | End: 2020-12-16
Payer: MEDICARE

## 2020-12-16 PROCEDURE — 97113 AQUATIC THERAPY/EXERCISES: CPT

## 2020-12-16 NOTE — PROGRESS NOTES
[facility]  Phone: [phone] Fax: [phone]        Physical Therapy Aquatic Flow Sheet   Date:  2020    Patient Name:  Yazan Higginbotham    :  1935  Restrictions/Precautions:    Diagnosis:  Back pain  Treatment Diagnosis:  BACK PAIN  Insurance/Certification information:  Medicare  Referring Physician:  Alyssa Fregoso of care signed (Y/N):    Visit# / total visits:  2/10  Pain level: 10/10     Electronically signed by:  Candie Lea PTA [id number]  Time In:1315  Time LSW:9465    Subjective:  Pt's primary c/o is weakness and inability to stand up straight.     Key  B= Belt DB= Dumbells T= Theratube   H= Hydrotone N= Noodles W= Weights   P= Paddles S= Speedo equipment K= Kickboard     Exercises/Activities   Warm-up/Amb  Minutes  Exercises      Slow forward   5  HR/TR  2    Slow sideways  5  Marches  2    Slow backwards  5  Mini-squats  2    Medium forward    4-way SLR  2    Medium sideways    Hip circles/fig 8  2    Small shuffle    Hamstring curls      Jog    Knee extension      Braiding    Pelvic tilts      Bicycling  5  Scap squeezes          Shoulder flex/ext      Functional    Shoulder abd/add      Step    Shoulder H. abd/add      Lifting    Shoulder IR/ER      Hand to opp knee    Rowing      Push down squat    Bilateral pull down      UE PNF    Push/pull      LE PNF    Push downs      Wall push ups    Arm circles      SLS    Elbow flex/ext          Chin tuck      Stretching    UT shrugs/rolls      Gastroc/Soleus    Rocking horse      Hamstring          SKTC    Other      Piriformis          Hip flexor          Ladder pull          Pec stretch          Post deltoid           Timed Code Treatment Minutes:  30    Total Treatment Minutes:  35  Treatment/Activity Tolerance:  [x] Patient tolerated treatment well [] Patient limited by fatique  [] Patient limited by pain [] Patient limited by other medical complications  [] Other:     Prognosis: [] Good [x] Fair  [] Poor Patient Requires Follow-up: [x] Yes  [] No    Plan:   [x] Continue per plan of care [] Alter current plan (see comments)  [] Plan of care initiated [] Hold pending MD visit [] Discharge    Treatment Charges: Mins Units   Initial Evaluation     Re-Evaluation     Ther Exercise         TE     Aquatic Treatment 30 2   Ther Activities        TA     Gait Training          GT     Neuro Re-education NR     Modalities     Non-Billable Service Time     Other     Total Time/Units 30 2              Electronically signed by:  Liban Gu  PTA [id number]

## 2020-12-21 ENCOUNTER — HOSPITAL ENCOUNTER (OUTPATIENT)
Dept: PHYSICAL THERAPY | Age: 85
Setting detail: THERAPIES SERIES
Discharge: HOME | End: 2020-12-21
Payer: MEDICARE

## 2020-12-21 PROCEDURE — 97113 AQUATIC THERAPY/EXERCISES: CPT

## 2020-12-21 NOTE — PROGRESS NOTES
[facility]  Phone: [phone] Fax: [phone]        Physical Therapy Aquatic Flow Sheet   Date:  2020    Patient Name:  Carla Miranda    :  1935  Restrictions/Precautions:    Diagnosis:  Back pain  Treatment Diagnosis:  BACK PAIN  Insurance/Certification information:  Medicare  Referring Physician:  Ayesha Core of care signed (Y/N):    Visit# / total visits:  3/10  Pain level: 10/10     Electronically signed by:  Sonia Oropeza PTA [id number]  Time In:1315  Time Out:1345      Key  B= Belt DB= Dumbells T= Theratube   H= Hydrotone N= Noodles W= Weights   P= Paddles S= Speedo equipment K= Kickboard     Exercises/Activities   Warm-up/Amb  Minutes  Exercises      Slow forward   5  HR/TR  2    Slow sideways  5  Marches  2    Slow backwards  5  Mini-squats  2    Medium forward    4-way SLR  2    Medium sideways    Hip circles/fig 8  2    Small shuffle    Hamstring curls      Jog    Knee extension      Braiding    Pelvic tilts      Bicycling  5  Scap squeezes          Shoulder flex/ext      Functional    Shoulder abd/add      Step    Shoulder H. abd/add      Lifting    Shoulder IR/ER      Hand to opp knee    Rowing      Push down squat    Bilateral pull down      UE PNF    Push/pull      LE PNF    Push downs      Wall push ups    Arm circles      SLS    Elbow flex/ext          Chin tuck      Stretching    UT shrugs/rolls      Gastroc/Soleus    Rocking horse      Hamstring          SKTC    Other      Piriformis          Hip flexor          Ladder pull          Pec stretch          Post deltoid           Timed Code Treatment Minutes:  30    Total Treatment Minutes:  35  Treatment/Activity Tolerance:  [x] Patient tolerated treatment well [] Patient limited by fatique  [] Patient limited by pain [] Patient limited by other medical complications  [] Other:     Prognosis: [] Good [x] Fair  [] Poor    Patient Requires Follow-up: [x] Yes  [] No    Plan: [x] Continue per plan of care [] Alter current plan (see comments)  [] Plan of care initiated [] Hold pending MD visit [] Discharge    Treatment Charges: Mins Units   Initial Evaluation     Re-Evaluation     Ther Exercise         TE     Aquatic Treatment 30 2   Ther Activities        TA     Gait Training          GT     Neuro Re-education NR     Modalities     Non-Billable Service Time     Other     Total Time/Units 30 2              Electronically signed by:  Ashish Wells  PTA [id number]

## 2020-12-23 ENCOUNTER — HOSPITAL ENCOUNTER (OUTPATIENT)
Dept: PHYSICAL THERAPY | Age: 85
Setting detail: THERAPIES SERIES
Discharge: HOME | End: 2020-12-23
Payer: MEDICARE

## 2020-12-23 ENCOUNTER — HOSPITAL ENCOUNTER (OUTPATIENT)
Dept: OCCUPATIONAL THERAPY | Age: 85
Setting detail: THERAPIES SERIES
Discharge: HOME | End: 2020-12-23
Payer: MEDICARE

## 2020-12-23 PROCEDURE — 97110 THERAPEUTIC EXERCISES: CPT

## 2020-12-23 PROCEDURE — 97113 AQUATIC THERAPY/EXERCISES: CPT

## 2020-12-23 PROCEDURE — 97140 MANUAL THERAPY 1/> REGIONS: CPT

## 2020-12-23 PROCEDURE — 97022 WHIRLPOOL THERAPY: CPT

## 2020-12-23 NOTE — PROGRESS NOTES
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 \"     Objective:  Updated POC to be completed     INTERVENTION: COMPLETED: SPECIFICS/COMMENTS:   Modality:     fluidotherapy- jeanie hands x Completed to decrease pain and increase tissue elasticity while intermittently moving her wrist and thumb- and for desensitization - start of session   Paraffin  10 min bilateral hands to loosen soft tissue and reduce inflammation in the joints. AROM:     Thumb, digits, wrist, forearm, all planes x After gentle PROM, small arc ROM; tightness R hand for hook, flat, and full . Patient instructed on tendon glides - Full AROM for flat/full achieved. Towel scrunches completed 5 minutes   Fine Motor -tip to tip prehension x Thumb opposition alternatly  for prehension small and medium pegs. Patient able to complete with IF, LF,  RF and to SF this date.   -Pinching mini pegs with tweezers x 15 reps each with bilateral hands for fine motor, precision, and pinch stability.     In-hand manipulation   Patient  completed palm to fingertip translation w/ medium objects; completed w/ small sized pegs to increase functional use R hand -Completed with pennies this date in bilateral hands. Picking up one ge at a time and collecting in hands. Then place in cup one by one without dropping. Wrist-O-Cizer  5 reps bilateral hands to stretch tendons in wrists and hands, focus on maintaining grasp. In hand manipulation-chinese balls  10x clockwise, 10x counterclockwise, BUE   In hand manipulation with little pegs with yellow putty  10 LUE, 10 RUE, in and out, one at a time while holding the others in a full composite fist   In hand manipulation with marbles  Pick one up at a time and hold as many as possible in hand, then place down one at a time, with B hands   AAROM:     Tendon gliding x Tendon glides completed - Initially AAROM > AROM. PROM/Stretching:     Gentle Thumb,digits, wrist, forearm, all planes x Tolerated well          Scar Mass/Edema Control:     Manual edema mobilization x R UE - increased visualization of bony landmarks   Soft tissue massage  x To the base of the MP joints of BUE, pt states that this is a sore area        Strengthening:     Yellow putty- reissued to pt today secondary to her reporting she lost hers x 3 point pinch, lateral pinch and full composite fist  large , pull, and twist (focus on maintaining digit flexion), rolling to massage   Hands on resistive ex's jeanie wrist & digits, all planes x    Digi-Flex  Completed yellow x 15 reps, red x 15 reps, then green x 10 reps with bilateral hands. All digits together then alternating. Pt plans to purchase these for her HEP.    Yellow therabar  1x15, bend up, bend down and twist   3 point pinch-clips  2x15 Yellow, red, blue, green clips using alternating B hands   Putty tools  Bottle cap, large knob   Other: Assessment/Comments: Pt is making Good progress toward stated plan of care. Pt arrives to therapy reporting her hands have ^d discomfort today. Therapist completed FluidoWP tx to decrease discomfort jeanie hands followed by massage, stretches, jt mobs & resistive ex's. Therapist reissued pt theraputty for home use secondary to her reporting she lost hers. Reviewed ex's for home with putty. Pt reports decreased discomfort in jeanie hands by end of session. Will continue to increase as tolerated.                 9/22/2020  12/15/2020                                                                          L          R  L         R  FOREARM Pronation 80-90* WFL WFL  WFL WFL     Supination 80-90* 71 55 74 60                                                                              L          R  L  R  WRIST Flexion 0-70* 89 78 WFL  WFL     Extension 0-80* 42 40 45 45     Radial Deviation 0-20* 10 11 13 15     Ulnar Deviation 0-30* 29 15 unchanged 17      Hand ROM                                                                          L          R  L R  IF Digit MCP Flexion/Extension */ 0-45 H 65 80  75 84      PIP Flexion/Extension 100*/ 0 84/-25 65/-27  93  79     DIP Flexion/Extension 70-90*/0 48 44 55   53         MF Digit MCP Flexion/Extension */ 0-45 H 75 75  85  84     PIP Flexion/Extension 100*/ 0 90/-35 72/-34 93   85     DIP Flexion/Extension 70-90*/0 73 58 75  68       RF Digit MCP Flexion/Extension */ 0-45 H 80 81 80   89     PIP Flexion/Extension 100*/ 0 82/0 74/-21  87 90      DIP Flexion/Extension 70-90*/0 45 58  55 62       SF Digit MCP Flexion/Extension */ 0-45 H 85 79  90  85     PIP Flexion/Extension 100*/ 0 72 61  85  85     DIP Flexion/Extension 70-90*/0 51 30 55  40         Dynamometer (setting 2):                              Left: 30#, 35#                                               Right: 25# 40#                    Pinch Meter: Lateral: Left= 11# 13# ,Right= 10# 10#              Palmar 3 point: Left= 5#,7# Right= 7#, 8#   9 Hole Peg Test:              Left: 27.5 seconds, 27 seconds              Right: 31.0 seconds, 29 seconds     QuickDASH Score: 86.4% disability, 86.4% disability-unchanged       -Rehab Potential: Good  -Requires OT Follow Up: Yes  Time In: 10:00 am             Time Out: 11:00 am          Visit #: 10    Treatment Charges: Mins Units   Modalities-fluido 20 1   Ther Exercise 15 1   Manual Therapy 25 2   Thera Activities     ADL/Home Mgt      Neuro Re-education     Gait Training     Group Therapy     Non-Billable Service Time     Other     Total Time/Units 60 4       -Response to Treatment: Good. Pt was glad her hands felt better post tx    GOALS (Long term same as Short term):  1) Patient will demonstrate good understanding of home program (exercises/activities/diagnosis/prognosis/goals) with good accuracy. Goal progressing. 2) Patient will demonstrate increased active/passive range of motion of their BUE to [facility] for ADL/IADL completion. Goal met. 3) Patient will demonstrate increased /pinch strength of at least 10 / 5 pinch pounds of their B hand. Goal progressing. 4) Patient to report decreased pain in their affected B upper extremity from 8-10/10 to 6/10 or less with resistive functional use. Goal making slow gains. 5) Increase in fine motor function as evidenced by decreased time to complete 9-hole peg test and/or MRMT test by at least 5-10 seconds. Goal progressing. 6) Patient will report ADL functions as Mod I/I using BUE. Goal progressing. 7) Patient will demonstrate improved functional activity tolerance from fair - to good for ADL/IADL completion. Goal progressing. 8) Patient will decrease QuickDASH score by 25% for increased participation in daily functional activities. Goal making slow gains. 9) Patient/caregiver to demonstrate proper follow through of home modification/adaptive recommendations to increase safe functional ADLs. Goal progressing. 10) Patient will demonstrate/report proper assimilation of compensatory techniques to complete ADLs and IADLs. Goal progressing.      Plan:   [x]  Continues Plan of care: Treatment covered based on POC and graduated to patient's progress. Pt education continues at each visit to obtain maximum benefits from skilled OT intervention.   []  Alter Plan of care:   []  Discharge:      Fab DAMIAN/GRIS, [id number]

## 2020-12-28 ENCOUNTER — HOSPITAL ENCOUNTER (OUTPATIENT)
Dept: PHYSICAL THERAPY | Age: 85
Setting detail: THERAPIES SERIES
Discharge: HOME | End: 2020-12-28
Payer: MEDICARE

## 2020-12-28 PROCEDURE — 97113 AQUATIC THERAPY/EXERCISES: CPT

## 2020-12-28 NOTE — PROGRESS NOTES
[facility]  Phone: [phone] Fax: [phone]        Physical Therapy Aquatic Flow Sheet   Date:  2020    Patient Name:  Lawanda Palencia    :  1935  Restrictions/Precautions:    Diagnosis:  Back pain  Treatment Diagnosis:  BACK PAIN  Insurance/Certification information:  Medicare  Referring Physician:  Zoltan Minium of care signed (Y/N):    Visit# / total visits:  5/10  Pain level: 10/10     Electronically signed by:  Vandana Doe PTA [id number]  Time In:1315  Time JBT:0300    Subjective:  Pt here this afternoon for her first of 2 scheduled aquatic visits. She rates LBP is 10/10 which is constant.   Key  B= Belt DB= Dumbells T= Theratube   H= Hydrotone N= Noodles W= Weights   P= Paddles S= Speedo equipment K= Kickboard     Exercises/Activities   Warm-up/Amb  Minutes  Exercises  Minutes    Slow forward  5  HR/TR  2    Slow sideways  5  Marches  2    Slow backwards  5  Mini-squats  2    Medium forward    4-way SLR  2    Medium sideways    Hip circles/fig 8  2    Small shuffle    Hamstring curls      Jog    Knee extension      Braiding    Pelvic tilts      Bicycling  5  Scap squeezes          Shoulder flex/ext      Functional    Shoulder abd/add      Step    Shoulder H. abd/add      Lifting    Shoulder IR/ER      Hand to opp knee    Rowing      Push down squat    Bilateral pull down      UE PNF    Push/pull      LE PNF    Push downs      Wall push ups    Arm circles      SLS    Elbow flex/ext          Chin tuck      Stretching    UT shrugs/rolls      Gastroc/Soleus    Rocking horse      Hamstring          SKTC    Other      Piriformis          Hip flexor          Ladder pull          Pec stretch          Post deltoid           Timed Code Treatment Minutes:  30    Total Treatment Minutes:  35  Treatment/Activity Tolerance:  [x] Patient tolerated treatment well [] Patient limited by fatique  [] Patient limited by pain [] Patient limited by other medical complications [] Other:     Prognosis: [] Good [x] Fair  [] Poor    Patient Requires Follow-up: [x] Yes  [] No    Plan: (x 5 min )Patient education on energy conservation. Instruct pt to alternate seated and standing activities-Pt receptive.   [x] Continue per plan of care [] Alter current plan (see comments)  [] Plan of care initiated [] Hold pending MD visit [] Discharge    Treatment Charges: Mins Units   Initial Evaluation     Re-Evaluation     Ther Exercise         TE     Aquatic Treatment 30 2   Ther Activities        TA     Gait Training          GT     Neuro Re-education NR     Modalities     Non-Billable Service Time 5 min 5  min   Other     Total Time/Units 30 2              Electronically signed by:  Anastasiya Guzman  PTA [id number]

## 2020-12-29 ENCOUNTER — HOSPITAL ENCOUNTER (OUTPATIENT)
Dept: OCCUPATIONAL THERAPY | Age: 85
Setting detail: THERAPIES SERIES
Discharge: HOME | End: 2020-12-29
Payer: MEDICARE

## 2020-12-29 ENCOUNTER — APPOINTMENT (OUTPATIENT)
Dept: PHYSICAL THERAPY | Age: 85
End: 2020-12-29
Payer: MEDICARE

## 2020-12-29 ENCOUNTER — HOSPITAL ENCOUNTER (OUTPATIENT)
Dept: PHYSICAL THERAPY | Age: 85
Setting detail: THERAPIES SERIES
Discharge: HOME | End: 2020-12-29
Payer: MEDICARE

## 2020-12-29 NOTE — PROGRESS NOTES
Occupational Therapy      Phone: [phone] Fax: [phone]     Occupational Therapy   Cancellation Note     Patient Name:  Loy Valentin  : 1935  Date:  2020  MRN: [id number]    For today's appointment patient:   [x]  Cancelled   []  Rescheduled appointment   []  No-show     Reason given by patient:   []  Patient ill   []  Conflicting appointment   []  No transportation   []  Conflict with work   []  No reason given   []  Other:    Comments:     Electronically signed by: Gypsy Olsen 80, [id number]

## 2020-12-29 NOTE — PROGRESS NOTES
[facility]  Phone: [phone] Fax: [phone]     Physical Therapy  Cancellation/No-show Note  Patient Name:  Jeanette Pickering  :  1935   Date:  2020    For today's appointment patient:  [x]  Cancelled  []  Rescheduled appointment  []  No-show     Reason given by patient:  []  Patient ill  []  Conflicting appointment  []  No transportation    []  Conflict with work  []  No reason given  []  Other:     Comments:      Electronically signed by:  Pardeep Bauer  PTA  [id number]

## 2021-01-04 ENCOUNTER — HOSPITAL ENCOUNTER (OUTPATIENT)
Dept: PHYSICAL THERAPY | Age: 86
Setting detail: THERAPIES SERIES
Discharge: HOME | End: 2021-01-04
Payer: MEDICARE

## 2021-01-04 ENCOUNTER — OFFICE VISIT (OUTPATIENT)
Dept: FAMILY MEDICINE CLINIC | Age: 86
End: 2021-01-04
Payer: COMMERCIAL

## 2021-01-04 VITALS
HEART RATE: 73 BPM | HEIGHT: 65 IN | OXYGEN SATURATION: 96 % | WEIGHT: 199 LBS | SYSTOLIC BLOOD PRESSURE: 122 MMHG | BODY MASS INDEX: 33.15 KG/M2 | DIASTOLIC BLOOD PRESSURE: 75 MMHG | TEMPERATURE: 97.1 F

## 2021-01-04 DIAGNOSIS — M17.0 PRIMARY OSTEOARTHRITIS OF BOTH KNEES: Chronic | ICD-10-CM

## 2021-01-04 DIAGNOSIS — E78.5 HYPERLIPIDEMIA, UNSPECIFIED HYPERLIPIDEMIA TYPE: Primary | Chronic | ICD-10-CM
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
## 2021-01-04 DIAGNOSIS — M48.061 SPINAL STENOSIS OF LUMBAR REGION, UNSPECIFIED WHETHER NEUROGENIC CLAUDICATION PRESENT: Chronic | ICD-10-CM

## 2021-01-04 DIAGNOSIS — I10 ESSENTIAL HYPERTENSION: Chronic | ICD-10-CM

## 2021-01-04 PROCEDURE — G8427 DOCREV CUR MEDS BY ELIG CLIN: HCPCS | Performed by: FAMILY MEDICINE

## 2021-01-04 PROCEDURE — G8417 CALC BMI ABV UP PARAM F/U: HCPCS | Performed by: FAMILY MEDICINE

## 2021-01-04 PROCEDURE — 4040F PNEUMOC VAC/ADMIN/RCVD: CPT | Performed by: FAMILY MEDICINE

## 2021-01-04 PROCEDURE — G8482 FLU IMMUNIZE ORDER/ADMIN: HCPCS | Performed by: FAMILY MEDICINE

## 2021-01-04 PROCEDURE — 99214 OFFICE O/P EST MOD 30 MIN: CPT | Performed by: FAMILY MEDICINE

## 2021-01-04 PROCEDURE — G8399 PT W/DXA RESULTS DOCUMENT: HCPCS | Performed by: FAMILY MEDICINE

## 2021-01-04 PROCEDURE — 1036F TOBACCO NON-USER: CPT | Performed by: FAMILY MEDICINE

## 2021-01-04 PROCEDURE — 1090F PRES/ABSN URINE INCON ASSESS: CPT | Performed by: FAMILY MEDICINE

## 2021-01-04 PROCEDURE — 1123F ACP DISCUSS/DSCN MKR DOCD: CPT | Performed by: FAMILY MEDICINE

## 2021-01-04 PROCEDURE — 97113 AQUATIC THERAPY/EXERCISES: CPT

## 2021-01-04 RX ORDER — CARBOXYMETHYLCELLULOSE SODIUM 10 MG/ML
GEL OPHTHALMIC
COMMUNITY
Start: 2020-12-03 | End: 2021-01-04

## 2021-01-04 RX ORDER — AZELASTINE HYDROCHLORIDE 0.5 MG/ML
SOLUTION/ DROPS OPHTHALMIC
Qty: 6 ML | Refills: 2 | Status: SHIPPED
Start: 2021-01-04 | End: 2021-08-03 | Stop reason: SDUPTHER

## 2021-01-04 NOTE — PROGRESS NOTES
[facility]  Phone: [phone] Fax: [phone]        Physical Therapy Aquatic Flow Sheet   Date:  2021    Patient Name:  Robin Mays    :  1935  Restrictions/Precautions:    Diagnosis:  Back pain  Treatment Diagnosis:  BACK PAIN  Insurance/Certification information:  Medicare  Referring Physician:  Doug Coy of care signed (Y/N):    Visit# / total visits:  6/10  Pain level: 10/10     Electronically signed by:  Lucina David PTA [id number]  Time In:1315  Time HIP:6552    Subjective:  Pt reports \"falling last night; difficult getting up off the floor! \"    Key  B= Belt DB= Dumbells T= Theratube   H= Hydrotone N= Noodles W= Weights   P= Paddles S= Speedo equipment K= Kickboard     Exercises/Activities   Warm-up/Amb  Minutes  Exercises  Minutes    Slow forward  5  HR/TR  2    Slow sideways  5  Marches  2    Slow backwards  5  Mini-squats  2    Medium forward    4-way SLR  2    Medium sideways    Hip circles/fig 8  2    Small shuffle    Hamstring curls      Jog    Knee extension      Braiding    Pelvic tilts      Bicycling  5  Scap squeezes          Shoulder flex/ext      Functional    Shoulder abd/add      Step    Shoulder H. abd/add      Lifting    Shoulder IR/ER      Hand to opp knee    Rowing      Push down squat    Bilateral pull down      UE PNF    Push/pull      LE PNF    Push downs      Wall push ups    Arm circles      SLS    Elbow flex/ext          Chin tuck      Stretching    UT shrugs/rolls      Gastroc/Soleus    Rocking horse      Hamstring          SKTC    Other      Piriformis          Hip flexor          Ladder pull          Pec stretch          Post deltoid           Timed Code Treatment Minutes:  30    Total Treatment Minutes:  35  Treatment/Activity Tolerance:  [x] Patient tolerated treatment well [] Patient limited by fatique  [] Patient limited by pain [] Patient limited by other medical complications  [] Other: Prognosis: [] Good [x] Fair  [] Poor    Patient Requires Follow-up: [x] Yes  [] No    Plan: (x 5 min )Patient education on energy conservation. Instruct pt to alternate seated and standing activities-Pt receptive.   [x] Continue per plan of care [] Alter current plan (see comments)  [] Plan of care initiated [] Hold pending MD visit [] Discharge    Treatment Charges: Mins Units   Initial Evaluation     Re-Evaluation     Ther Exercise         TE     Aquatic Treatment 30 2   Ther Activities        TA     Gait Training          GT     Neuro Re-education NR     Modalities     Non-Billable Service Time 5 min 5  min   Other     Total Time/Units 30 2              Electronically signed by:  Andres Shukla  PTA [id number]

## 2021-01-04 NOTE — PROGRESS NOTES
[facility] RESIDENCY PROGRAM   OFFICE PROGRESS NOTE  DATE OF VISIT : 1/4/2021         Chief Complaint :   Chief Complaint   Patient presents with   Natanael Carrasco Fall     last night twice    Leg Pain     bilateral    Back Pain       HPI:   Nava Naidu comes to clinic today for     F/U of chronic problem(s) and new or recent complaint of recent falls     Chronic problems reviewed today include:     Hypertension, Chronic pain and Osteoarthritis    Current status of this/these condition(s):  stable    Tolerating meds: Yes    Additional history: Mrs. Kylah Walker presents today mainly because of some recent falls. She tells me that on New Year's Zuleika 4 days ago she was reaching to  laundry off the floor. She states that she felt top-heavy and fell forward. She denies feeling lightheaded or dizzy, and she denies loss of consciousness. She had no significant injuries with that fall, but comments that she had a great deal of difficulty getting back up. Once she was back on her feet she fell again. She thinks that her legs gave out on her but she also notes that she has some balance issues. She does have a cane at home but rarely uses it. The following day she did have increased back and leg pain, but this is returned to her normal state. She also complains of increased arthritic pains in her hands. She finds that she has weakness in her hands, and is unable to open jars and cans. She has been going to physical therapy as well as occupational therapy over the past month. She has never asked them for assistance with this particular problem. Objective:    VS:  Blood pressure 122/75, pulse 73, temperature 97.1 °F (36.2 °C), temperature source Temporal, height 5' 5\" (1.651 m), weight 199 lb (90.3 kg), SpO2 96 %, not currently breastfeeding.    General Appearance: Well developed, awake, alert, oriented, no acute distress Chest wall/Lung: Clear to auscultation bilaterally,  respirations unlabored. No rhonchi/wheezing/rales  Heart[de-identified]  Regular rate and rhythm, S1and S2 normal, no murmur, rub or gallop. Extremities:  Extremities normal, atraumatic, no cyanosis. no edema. Skin: Skin color, texture, turgor normal, no rashes or lesions  Musculoskeletal: She demonstrates some bony enlargement of the interphalangeal joints of both hands. Her strength is diminished in the hands but 4 out of 5 in other muscles. She is able to stand from the chair without using her hands walk 10 feet turnaround and returned to the chair. She walks with a slightly wide-based gait and short steps but does not appear unsteady. Psychiatric: has a normal mood and affect. Behavior is normal.     I independently reviewed the following information:  none    1. Hyperlipidemia, unspecified hyperlipidemia type  2. Spinal stenosis of lumbar region, unspecified whether neurogenic claudication present  3. Primary osteoarthritis of both knees  4. Essential hypertension  5.  Fall, initial encounter Additional Plan: She will continue going to physical therapy and Occupational Therapy. I spoke with her about potential treatments for her arthritis. Because she is on chronic pain medications from pain management, she is using only patches and Tylenol. She did have some topical ointment that seemed to help, but was unable to get this through her insurance. After some discussion, I prescribed Voltaren gel, which does appear to be covered through her insurance. We also talked about potential safety issues at home. She is going to try to keep her laundry at waist level so that she will not have to bend down to the floor to pick it up. She was also advised to wear a call button around her neck. She apparently has 1 of these at home, but does not always wear it. Finally, we talked about options for assistive devices. She has a cane which she rarely uses. She asked about a scooter, but I do not feel that she would qualify for this. I did offer her the use of a walker, which she also has available. She is going to let her therapist know about the difficulty she has with opening jars and cans, and obtain her recommendations. On this date 01/04/21 I have spent 38 minutes reviewing previous notes, test results and face to face with the patient discussing the diagnosis and importance of compliance with the treatment plan. RTO in in 3 month(s) or sooner for any persistent, new, or worsening symptoms. Please see Patient Instructions for further counseling and information given. Advised to be adherent to the treatment plans discussed today, and please call with any questions or concerns, letting the office know of any reasons that the plans may not be followed. The risks of untreated conditions include worsening illness, injury, disability, and possibly, death. Please call if symptoms change in any way, worsen, or fail to completely resolve, as this could necessitate a change to treatment plans. Patient and/or caregiver expressed understanding. Indications and proper use of medication(s) reviewed. Potential side-effects and risks of medication(s) also explained. Patient and/or caregiver was instructed to call if any new symptoms develop prior to next visit. Health risk factors discussed and addressed.     Signed by : Saúl Kyle M.D.

## 2021-01-05 ENCOUNTER — HOSPITAL ENCOUNTER (OUTPATIENT)
Dept: OCCUPATIONAL THERAPY | Age: 86
Setting detail: THERAPIES SERIES
End: 2021-01-05
Payer: MEDICARE

## 2021-01-07 ENCOUNTER — HOSPITAL ENCOUNTER (OUTPATIENT)
Dept: PHYSICAL THERAPY | Age: 86
Setting detail: THERAPIES SERIES
Discharge: HOME | End: 2021-01-07
Payer: MEDICARE

## 2021-01-07 ENCOUNTER — HOSPITAL ENCOUNTER (OUTPATIENT)
Dept: OCCUPATIONAL THERAPY | Age: 86
Setting detail: THERAPIES SERIES
Discharge: HOME | End: 2021-01-07
Payer: MEDICARE

## 2021-01-07 PROCEDURE — 97110 THERAPEUTIC EXERCISES: CPT | Performed by: OCCUPATIONAL THERAPIST

## 2021-01-07 PROCEDURE — 97140 MANUAL THERAPY 1/> REGIONS: CPT | Performed by: OCCUPATIONAL THERAPIST

## 2021-01-07 PROCEDURE — 97022 WHIRLPOOL THERAPY: CPT | Performed by: OCCUPATIONAL THERAPIST

## 2021-01-07 PROCEDURE — 97113 AQUATIC THERAPY/EXERCISES: CPT

## 2021-01-07 NOTE — PROGRESS NOTES
[facility]  Phone: [phone] Fax: [phone]        Physical Therapy Aquatic Flow Sheet   Date:  2021    Patient Name:  Lucita Fraire    :  1935  Restrictions/Precautions:    Diagnosis:  Back pain  Treatment Diagnosis:  BACK PAIN  Insurance/Certification information:  Medicare  Referring Physician:  Ana Hayes of care signed (Y/N):    Visit# / total visits:  7/10  Pain level: 8/10     Electronically signed by:  Raquel Singh PTA [id number]  Time In:1315  Time Out:1345    Pt attends 2 of 2 aquatic therapy sessions this week. She's finally getting pain relief from the pool. Pt rates subjective pain as 8/10; all previous visits rated 10/10.     Key  B= Belt DB= Dumbells T= Theratube   H= Hydrotone N= Noodles W= Weights   P= Paddles S= Speedo equipment K= Kickboard     Exercises/Activities   Warm-up/Amb  Minutes  Exercises  Minutes    Slow forward  5  HR/TR  2    Slow sideways  5  Marches  2    Slow backwards  5  Mini-squats  2    Medium forward    4-way SLR  2    Medium sideways    Hip circles/fig 8  2    Small shuffle    Hamstring curls      Jog    Knee extension      Braiding    Pelvic tilts      Bicycling  5  Scap squeezes          Shoulder flex/ext      Functional    Shoulder abd/add      Step    Shoulder H. abd/add      Lifting    Shoulder IR/ER      Hand to opp knee    Rowing      Push down squat    Bilateral pull down      UE PNF    Push/pull      LE PNF    Push downs      Wall push ups    Arm circles      SLS    Elbow flex/ext          Chin tuck      Stretching    UT shrugs/rolls      Gastroc/Soleus    Rocking horse      Hamstring          SKTC    Other      Piriformis          Hip flexor          Ladder pull          Pec stretch          Post deltoid           Timed Code Treatment Minutes:  30    Total Treatment Minutes:  35  Treatment/Activity Tolerance:  [] Patient tolerated treatment well [] Patient limited by aman [] Patient limited by pain [] Patient limited by other medical complications  [x] Other: Practiced proper seated and standing upright alignment-pt able to comply 50% of visit-Constant encouragement for pt to stand taller-straighter!!!  Prognosis: [] Good [x] Fair  [] Poor    Patient Requires Follow-up: [x] Yes  [] No    Plan: (x 5 min )Patient education on energy conservation. Instruct pt to alternate seated and standing activities-Pt receptive.   [x] Continue per plan of care [] Alter current plan (see comments)  [] Plan of care initiated [] Hold pending MD visit [] Discharge    Treatment Charges: Mins Units   Initial Evaluation     Re-Evaluation     Ther Exercise         TE     Aquatic Treatment 30 2   Ther Activities        TA     Gait Training          GT     Neuro Re-education NR     Modalities     Non-Billable Service Time-posture edu 5 min 5  min   Other     Total Time/Units 30 2              Electronically signed by:  Dave Alpers  PTA [id number]

## 2021-01-07 NOTE — PROGRESS NOTES
Occupational Therapy    OCCUPATIONAL THERAPY PROGRESS NOTE     Date:  2021 Initial Evaluation Date: 2020                          Evaluating Therapist: Tanisha Hernández     Patient Name:  Birgit Rubin                      :  1935     Restrictions/Precautions:  none, low fall risk  Diagnosis:  M15.0 ICD 10-CM primary osteoarthritis involving multiple joints                                                          Insurance/Certification information:  Medicare   Referring Practitioner:  Kip Alex DO  Date of Surgery/Injury: ongoing  Plan of care signed (Y/N):  N  Visit# / total visits:     Pain LevelPain Level: 8/10     Subjective: Pt reports \"I know my hands are doing better because I have been coming to therapy\"     Objective:  Updated POC to be completed     INTERVENTION: COMPLETED: SPECIFICS/COMMENTS:   Modality:     fluidotherapy- jeanie hands x Completed to decrease pain and increase tissue elasticity while intermittently moving her wrist and thumb- and for desensitization - start of session   Paraffin  10 min bilateral hands to loosen soft tissue and reduce inflammation in the joints. AROM:     Thumb, digits, wrist, forearm, all planes x After gentle PROM, small arc ROM; tightness R hand for hook, flat, and full . Patient instructed on tendon glides - Full AROM for flat/full achieved. Towel scrunches completed 5 minutes   Fine Motor -tip to tip prehension x Thumb opposition alternatly  for prehension small and medium pegs. Patient able to complete with IF, LF,  RF and to SF this date.   -Pinching mini pegs with tweezers x 15 reps each with bilateral hands for fine motor, precision, and pinch stability.     In-hand manipulation   Patient  completed palm to fingertip translation w/ medium objects; completed w/ small sized pegs to increase functional use R hand -Completed with pennies this date in bilateral hands. Picking up one ge at a time and collecting in hands. Then place in cup one by one without dropping. Wrist-O-Cizer x 5 reps bilateral hands to stretch tendons in wrists and hands, focus on maintaining grasp. In hand manipulation-chinese balls x 10x clockwise, 10x counterclockwise, BUE   In hand manipulation with little pegs with yellow putty  10 LUE, 10 RUE, in and out, one at a time while holding the others in a full composite fist   In hand manipulation with marbles  Pick one up at a time and hold as many as possible in hand, then place down one at a time, with B hands   AAROM:     Tendon gliding x Tendon glides completed - Initially AAROM > AROM. PROM/Stretching:     Gentle Thumb,digits, wrist, forearm, all planes x Tolerated well          Scar Mass/Edema Control:     Manual edema mobilization x R UE - increased visualization of bony landmarks   Soft tissue massage  x To the base of the MP joints of BUE, pt states that this is a sore area        Strengthening:     Yellow putty- reissued to pt today secondary to her reporting she lost hers  3 point pinch, lateral pinch and full composite fist  large , pull, and twist (focus on maintaining digit flexion), rolling to massage   Hands on resistive ex's jeanie wrist & digits, all planes x    Digi-Flex  Completed yellow x 15 reps, red x 15 reps, then green x 10 reps with bilateral hands. All digits together then alternating. Pt plans to purchase these for her HEP.    Yellow therabar x 1x15, bend up, bend down and twist   3 point pinch-clips x Blue using alternating B hands tp  30 pom poms    Putty tools  Bottle cap, large knob   Other: Assessment/Comments: Pt is making Good progress toward stated plan of care. Pt tolerated session well with increased ROM of all digits into a composite fist with minimal increase in pain. Pt completed all fine motor and strengthening activities this date with good tolerance. Pt demonstrated increased endurance with strengthening exercises this date with resistive pinch clips and pom poms. Will continue to increase as tolerated.                 9/22/2020  12/15/2020                                                                          L          R  L         R  FOREARM Pronation 80-90* WFL WFL  WFL WFL     Supination 80-90* 71 55 74 60                                                                              L          R  L  R  WRIST Flexion 0-70* 89 78 WFL  WFL     Extension 0-80* 42 40 45 45     Radial Deviation 0-20* 10 11 13 15     Ulnar Deviation 0-30* 29 15 unchanged 17      Hand ROM                                                                          L          R  L R  IF Digit MCP Flexion/Extension */ 0-45 H 65 80  75 84      PIP Flexion/Extension 100*/ 0 84/-25 65/-27  93  79     DIP Flexion/Extension 70-90*/0 48 44 55   53         MF Digit MCP Flexion/Extension */ 0-45 H 75 75  85  84     PIP Flexion/Extension 100*/ 0 90/-35 72/-34 93   85     DIP Flexion/Extension 70-90*/0 73 58 75  68       RF Digit MCP Flexion/Extension */ 0-45 H 80 81 80   89     PIP Flexion/Extension 100*/ 0 82/0 74/-21  87 90      DIP Flexion/Extension 70-90*/0 45 58  55 62       SF Digit MCP Flexion/Extension */ 0-45 H 85 79  90  85     PIP Flexion/Extension 100*/ 0 72 61  85  85     DIP Flexion/Extension 70-90*/0 51 30 55  40         Dynamometer (setting 2):                              Left: 30#, 35#                                               Right: 25# 40#                    Pinch Meter:              Lateral: Left= 11# 13# ,Right= 10# 10#              Palmar 3 point: Left= 5#,7# Right= 7#, 8# 9 Hole Peg Test:              Left: 27.5 seconds, 27 seconds              Right: 31.0 seconds, 29 seconds     QuickDASH Score: 86.4% disability, 86.4% disability-unchanged       -Rehab Potential: Good  -Requires OT Follow Up: Yes  Time In: 2:00 pm             Time Out: 3:00 pm          Visit #: 11    Treatment Charges: Mins Units   Modalities-fluido 20 1   Ther Exercise 25 2   Manual Therapy 15 1   Thera Activities     ADL/Home Mgt      Neuro Re-education     Gait Training     Group Therapy     Non-Billable Service Time          Total Time/UnitsOther 60 4       -Response to Treatment: Good. Pt was glad her hands felt better post tx    GOALS (Long term same as Short term):  1) Patient will demonstrate good understanding of home program (exercises/activities/diagnosis/prognosis/goals) with good accuracy. Goal progressing. 2) Patient will demonstrate increased active/passive range of motion of their BUE to [facility] for ADL/IADL completion. Goal met. 3) Patient will demonstrate increased /pinch strength of at least 10 / 5 pinch pounds of their B hand. Goal progressing. 4) Patient to report decreased pain in their affected B upper extremity from 8-10/10 to 6/10 or less with resistive functional use. Goal making slow gains. 5) Increase in fine motor function as evidenced by decreased time to complete 9-hole peg test and/or MRMT test by at least 5-10 seconds. Goal progressing. 6) Patient will report ADL functions as Mod I/I using BUE. Goal progressing. 7) Patient will demonstrate improved functional activity tolerance from fair - to good for ADL/IADL completion. Goal progressing. 8) Patient will decrease QuickDASH score by 25% for increased participation in daily functional activities. Goal making slow gains. 9) Patient/caregiver to demonstrate proper follow through of home modification/adaptive recommendations to increase safe functional ADLs. Goal progressing. 10) Patient will demonstrate/report proper assimilation of compensatory techniques to complete ADLs and IADLs. Goal progressing.      Plan:   [x]  Continues Plan of care: Treatment covered based on POC and graduated to patient's progress. Pt education continues at each visit to obtain maximum benefits from skilled OT intervention. []  Alter Plan of care:   []  Discharge:       [address] [facility], OTR/L #[id number]

## 2021-01-11 ENCOUNTER — HOSPITAL ENCOUNTER (OUTPATIENT)
Dept: PHYSICAL THERAPY | Age: 86
Setting detail: THERAPIES SERIES
Discharge: HOME | End: 2021-01-11
Payer: MEDICARE

## 2021-01-11 PROCEDURE — 97113 AQUATIC THERAPY/EXERCISES: CPT

## 2021-01-11 NOTE — PROGRESS NOTES
[facility]  Phone: [phone] Fax: [phone]        Physical Therapy Aquatic Flow Sheet   Date:  2021    Patient Name:  Jed Gimenez    :  1935  Restrictions/Precautions:    Diagnosis:  Back pain  Treatment Diagnosis:  BACK PAIN  Insurance/Certification information:  Medicare  Referring Physician:  Stephy Bookman of care signed (Y/N):    Visit# / total visits:  8/10  Pain level: 8/10     Electronically signed by:  Lawrence Collins   Time In:1315  Time Out:1400    Key  B= Belt DB= Dumbells T= Theratube   H= Hydrotone N= Noodles W= Weights   P= Paddles S= Speedo equipment K= Kickboard     Exercises/Activities   Warm-up/Amb  Minutes  Exercises  Minutes    Slow forward  5  HR/TR  2    Slow sideways  5  Marches  2    Slow backwards  5  Mini-squats  2    Medium forward    4-way SLR  2    Medium sideways    Hip circles/fig 8  2    Small shuffle    Hamstring curls      Jog    Knee extension      Braiding    Pelvic tilts      Bicycling  5  Scap squeezes          Shoulder flex/ext      Functional    Shoulder abd/add      Step    Shoulder H. abd/add      Lifting    Shoulder IR/ER      Hand to opp knee    Rowing      Push down squat    Bilateral pull down      UE PNF    Push/pull      LE PNF    Push downs      Wall push ups    Arm circles      SLS    Elbow flex/ext          Chin tuck      Stretching    UT shrugs/rolls  2 min    Gastroc/Soleus    scap retract  2 min    Hamstring    Thoracic stretch  2 min    SKTC    Other      Piriformis   Postural  Standing with reaching   5 min    Hip flexor   Sitting reaching with correct alignment   5 min    Ladder pull          Pec stretch          Post deltoid           Timed Code Treatment Minutes:  46    Total Treatment Minutes:  46  Treatment/Activity Tolerance:  [] Patient tolerated treatment well [] Patient limited by fatique  [] Patient limited by pain [] Patient limited by other medical complications [x] Other: continue to push pt for better posture! practiced proper seated and standing upright alignment-pt able to comply 60% of visit-Constant encouragement for pt to stand taller-straighter!!!  Prognosis: [] Good [x] Fair  [] Poor    Patient Requires Follow-up: [x] Yes  [] No    Plan: (x 5 min )-Continue-Patient education on energy conservation. Instruct pt to alternate seated and standing activities-Pt receptive.   [x] Continue per plan of care [] Alter current plan (see comments)  [] Plan of care initiated [] Hold pending MD visit [] Discharge    Treatment Charges: Mins Units   Initial Evaluation     Re-Evaluation     Ther Exercise         TE     Aquatic Treatment 30 2   Ther Activities        TA     Gait Training          GT     Neuro Re-education NR     Modalities     Non-Billable Service Time-posture edu 5 min 5  min   Other     Total Time/Units 30 2              Electronically signed by:  Myranda Arcos  PTA [id number]

## 2021-01-11 NOTE — PROGRESS NOTES
Occupational Therapy      Phone: [phone] Fax: [phone]     Occupational Therapy   Cancellation/No-show Note     Patient Name:  Mark Khalil  : 1935  Date:  2021  MRN: [id number]    For today's appointment patient:   []  Cancelled   [x]  Rescheduled appointment   []  No-show     Reason given by patient:   []  Patient ill   []  Conflicting appointment   []  No transportation   []  Conflict with work   []  No reason given   [x]  Other:    Comments: Pt was in aquatic therapy today when she realized she missed her OT appt. Pt rescheduled for tomorrow.     Electronically signed by: Laura Olsen [id number], [id number]

## 2021-01-12 ENCOUNTER — HOSPITAL ENCOUNTER (OUTPATIENT)
Dept: PHYSICAL THERAPY | Age: 86
Setting detail: THERAPIES SERIES
Discharge: HOME | End: 2021-01-12
Payer: MEDICARE

## 2021-01-12 ENCOUNTER — HOSPITAL ENCOUNTER (OUTPATIENT)
Dept: OCCUPATIONAL THERAPY | Age: 86
Setting detail: THERAPIES SERIES
Discharge: HOME | End: 2021-01-12
Payer: MEDICARE

## 2021-01-12 PROCEDURE — 97140 MANUAL THERAPY 1/> REGIONS: CPT

## 2021-01-12 PROCEDURE — 97022 WHIRLPOOL THERAPY: CPT

## 2021-01-12 PROCEDURE — 97110 THERAPEUTIC EXERCISES: CPT

## 2021-01-12 PROCEDURE — 97113 AQUATIC THERAPY/EXERCISES: CPT

## 2021-01-12 NOTE — PROGRESS NOTES
[facility]  Phone: [phone] Fax: [phone]        Physical Therapy Aquatic Flow Sheet   Date:  2021    Patient Name:  Ghazala Medina    :  1935  Restrictions/Precautions:    Diagnosis:  Back pain  Treatment Diagnosis:  BACK PAIN  Insurance/Certification information:  Medicare  Referring Physician:  Joe Bussing of care signed (Y/N):    Visit# / total visits:  9/10  Pain level: 8/10     Electronically signed by:  Tristen Cloud   Time In:1315  Time Out:1400    Key  B= Belt DB= Dumbells T= Theratube   H= Hydrotone N= Noodles W= Weights   P= Paddles S= Speedo equipment K= Kickboard     Exercises/Activities   Warm-up/Amb  Minutes  Exercises  Minutes    Slow forward  5  HR/TR  2    Slow sideways  5  Marches  2    Slow backwards  5  Mini-squats  2    Medium forward    4-way SLR  2    Medium sideways    Hip circles/fig 8  2    Small shuffle    Hamstring curls      Jog    Knee extension      Braiding    Pelvic tilts      Bicycling  5  Scap squeezes          Shoulder flex/ext      Functional    Shoulder abd/add      Step    Shoulder H. abd/add      Lifting    Shoulder IR/ER      Hand to opp knee    Rowing      Push down squat    Bilateral pull down      UE PNF    Push/pull      LE PNF    Push downs      Wall push ups    Arm circles      SLS    Elbow flex/ext          Chin tuck      Stretching    UT shrugs/rolls  2 min    Gastroc/Soleus    scap retract  2 min    Hamstring    Thoracic stretch  2 min    SKTC    Other      Piriformis   Postural  Standing with reaching   5 min    Hip flexor   Sitting reaching with correct alignment   5 min    Ladder pull          Pec stretch          Post deltoid           Timed Code Treatment Minutes:  46    Total Treatment Minutes:  46  Treatment/Activity Tolerance:  [] Patient tolerated treatment well [] Patient limited by fatique  [] Patient limited by pain [] Patient limited by other medical complications [x] Other: continue to push pt for better posture! practiced proper seated and standing upright alignment-pt able to comply 60% of visit-Constant encouragement for pt to stand taller-straighter!!!  Prognosis: [] Good [x] Fair  [] Poor    Patient Requires Follow-up: [x] Yes  [] No    Plan: (x 5 min )-Continue-Patient education on energy conservation. Instruct pt to alternate seated and standing activities-Pt receptive.   [x] Continue per plan of care [] Alter current plan (see comments)  [] Plan of care initiated [] Hold pending MD visit [] Discharge    Treatment Charges: Mins Units   Initial Evaluation     Re-Evaluation     Ther Exercise         TE     Aquatic Treatment 46 3   Ther Activities        TA     Gait Training          GT     Neuro Re-education NR     Modalities     Non-Billable Service Time-posture edu 5 min 5  min   Other     Total Time/Units 46 3              Electronically signed by:  Mireille Seo  PTA [id number]

## 2021-01-12 NOTE — PROGRESS NOTES
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 They are sore & swollen in the joints today. \"     Objective:  Updated POC to be completed     INTERVENTION: COMPLETED: SPECIFICS/COMMENTS:   Modality:     fluidotherapy- jeanie hands x Completed to decrease pain and increase tissue elasticity while intermittently moving her wrist and thumb- and for desensitization - start of session   Paraffin  10 min bilateral hands to loosen soft tissue and reduce inflammation in the joints. AROM:     Thumb, digits, wrist, forearm, all planes x After gentle PROM, small arc ROM; tightness R hand for hook, flat, and full . Patient instructed on tendon glides - Full AROM for flat/full achieved. Towel scrunches completed 5 minutes   Fine Motor -tip to tip prehension x Thumb opposition alternatly  for prehension small and medium pegs. Patient able to complete with IF, LF,  RF and to SF this date.   -Pinching mini pegs with tweezers x 15 reps each with bilateral hands for fine motor, precision, and pinch stability.     In-hand manipulation   Patient  completed palm to fingertip translation w/ medium objects; completed w/ small sized pegs to increase functional use R hand -Completed with pennies this date in bilateral hands. Picking up one ge at a time and collecting in hands. Then place in cup one by one without dropping. Wrist-O-Cizer x 5 reps bilateral hands to stretch tendons in wrists and hands, focus on maintaining grasp. In hand manipulation-chinese balls x 10x clockwise, 10x counterclockwise, BUE   In hand manipulation with little pegs with yellow putty  10 LUE, 10 RUE, in and out, one at a time while holding the others in a full composite fist   In hand manipulation with marbles  Pick one up at a time and hold as many as possible in hand, then place down one at a time, with B hands   AAROM:     Tendon gliding x Tendon glides completed - Initially AAROM > AROM. PROM/Stretching:     Gentle Thumb,digits, wrist, forearm, all planes x Tolerated well          Scar Mass/Edema Control:     Manual edema mobilization x R UE - increased visualization of bony landmarks   Soft tissue massage  x To the base of the MP joints of BUE, pt states that this is a sore area        Strengthening:     Yellow putty ex's x 3 point pinch, lateral pinch and full composite fist  large , pull, and twist (focus on maintaining digit flexion), rolling to massage   Hands on resistive ex's jeanie wrist & digits, all planes x    Digi-Flex  Completed yellow x 15 reps, red x 15 reps, then green x 10 reps with bilateral hands. All digits together then alternating. Pt plans to purchase these for her HEP.    Yellow therabar x 1x15, bend up, bend down and twist   3 point pinch-clips x Blue using alternating B hands to  30 pom poms    Putty tools  Bottle cap, large knob   Other: 3) Patient will demonstrate increased /pinch strength of at least 10 / 5 pinch pounds of their B hand. Goal progressing. 4) Patient to report decreased pain in their affected B upper extremity from 8-10/10 to 6/10 or less with resistive functional use. Goal making slow gains. 5) Increase in fine motor function as evidenced by decreased time to complete 9-hole peg test and/or MRMT test by at least 5-10 seconds. Goal progressing. 6) Patient will report ADL functions as Mod I/I using BUE. Goal progressing. 7) Patient will demonstrate improved functional activity tolerance from fair - to good for ADL/IADL completion. Goal progressing. 8) Patient will decrease QuickDASH score by 25% for increased participation in daily functional activities. Goal making slow gains. 9) Patient/caregiver to demonstrate proper follow through of home modification/adaptive recommendations to increase safe functional ADLs. Goal progressing. 10) Patient will demonstrate/report proper assimilation of compensatory techniques to complete ADLs and IADLs. Goal progressing.      Plan:   [x]  Continues Plan of care: Treatment covered based on POC and graduated to patient's progress. Pt education continues at each visit to obtain maximum benefits from skilled OT intervention.   []  Alter Plan of care:   []  Discharge:      Annika DAMIAN/GRIS, [id number]

## 2021-01-18 ENCOUNTER — HOSPITAL ENCOUNTER (OUTPATIENT)
Dept: PHYSICAL THERAPY | Age: 86
Setting detail: THERAPIES SERIES
Discharge: HOME | End: 2021-01-18
Payer: MEDICARE

## 2021-01-18 PROCEDURE — 97113 AQUATIC THERAPY/EXERCISES: CPT

## 2021-01-18 NOTE — PROGRESS NOTES
[x] Other: continue to push pt for better posture! practiced proper seated and standing upright alignment-pt able to comply 60% of visit-Constant encouragement for pt to stand taller-straighter!!!  Prognosis: [] Good [x] Fair  [] Poor    Patient Requires Follow-up: [x] Yes  [] No    Plan: (x 5 min )-Continue-Patient education on energy conservation. Instruct pt to alternate seated and standing activities-Pt receptive.   [x] Continue per plan of care [] Alter current plan (see comments)  [] Plan of care initiated [] Hold pending MD visit [] Discharge    Treatment Charges: Mins Units   Initial Evaluation     Re-Evaluation     Ther Exercise         TE     Aquatic Treatment 46 3   Ther Activities        TA     Gait Training          GT     Neuro Re-education NR     Modalities     Non-Billable Service Time-posture edu 5 min 5  min   Other     Total Time/Units 46 3              Electronically signed by:  Kelsea Painter  PTA [id number]

## 2021-01-19 ENCOUNTER — HOSPITAL ENCOUNTER (OUTPATIENT)
Dept: OCCUPATIONAL THERAPY | Age: 86
Setting detail: THERAPIES SERIES
Discharge: HOME | End: 2021-01-19
Payer: MEDICARE

## 2021-01-19 ENCOUNTER — HOSPITAL ENCOUNTER (OUTPATIENT)
Dept: PHYSICAL THERAPY | Age: 86
Setting detail: THERAPIES SERIES
End: 2021-01-19
Payer: MEDICARE

## 2021-01-19 NOTE — PROGRESS NOTES
Occupational Therapy      Phone: [phone] Fax: [phone]     Occupational Therapy   Cancellation    Patient Name:  Golden Mccormack  : 1935  Date:  2021  MRN: [id number]    For today's appointment patient:   [x]  Cancelled   []  Rescheduled appointment   []  No-show     Reason given by patient:   [x]  Patient ill - cold/cough  []  Conflicting appointment   []  No transportation   []  Conflict with work   []  No reason given   []  Other:    Comments:     Electronically signed by: Jl Matt OTR/L; [id number]

## 2021-02-08 ENCOUNTER — HOSPITAL ENCOUNTER (OUTPATIENT)
Dept: OCCUPATIONAL THERAPY | Age: 86
Setting detail: THERAPIES SERIES
Discharge: HOME | End: 2021-02-08
Payer: MEDICARE

## 2021-02-08 NOTE — DISCHARGE SUMMARY
[]?  Functional transfers  []? IADLs [x]? Safety Awareness []? Endurance [x]? Fine Motor Coordination []? Balance []? Vision/perception []? Sensation []? Gross Motor Coordination []? ROM [x]? FINAL STATUS:  1) Patient will demonstrate good understanding of home program (exercises/activities/diagnosis/prognosis/goals) with good accuracy. Goal progressing. 2) Patient will demonstrate increased active/passive range of motion of their BUE to WFLs for ADL/IADL completion. Goal met. 3) Patient will demonstrate increased /pinch strength of at least 10 / 5 pinch pounds of their B hand. Goal progressing. 4) Patient to report decreased pain in their affected B upper extremity from 8-10/10 to 6/10 or less with resistive functional use. Goal making slow gains. 5) Increase in fine motor function as evidenced by decreased time to complete 9-hole peg test and/or MRMT test by at least 5-10 seconds. Goal progressing. 6) Patient will report ADL functions as Mod I/I using BUE. Goal progressing. 7) Patient will demonstrate improved functional activity tolerance from fair - to good for ADL/IADL completion. Goal progressing. 8) Patient will decrease QuickDASH score by 25% for increased participation in daily functional activities. Goal making slow gains. 9) Patient/caregiver to demonstrate proper follow through of home modification/adaptive recommendations to increase safe functional ADLs. Goal progressing. 10) Patient will demonstrate/report proper assimilation of compensatory techniques to complete ADLs and IADLs. Goal progressing. GOALS:  1 of the 10 Goals were obtained/or reached maximum potential at this time. REASON FOR DISCHARGE: Pt felt that she was ready to take a break from therapy and work on her HEP. PATIENT EDUCATION/INSTRUCTIONS: Pt instructed to continue HEP provided.      RECOMMENDATIONS: Pt instructed to continue with HEP and to call this facility with any questions and to follow up with referring physician. Thank you for the opportunity to work with your patient. If you have questions or comments, please feel free to contact us by phone or fax. Electronically Signed by:  [address] [place] [address], OTR/L #[id number]  2/8/2021

## 2021-03-10 ENCOUNTER — NURSE TRIAGE (OUTPATIENT)
Dept: OTHER | Facility: CLINIC | Age: 86
End: 2021-03-10

## 2021-03-10 ENCOUNTER — TELEPHONE (OUTPATIENT)
Dept: ADMINISTRATIVE | Age: 86
End: 2021-03-10

## 2021-03-10 NOTE — TELEPHONE ENCOUNTER
Patient called [facility])  with red flag complaint. Brief description of triage: chest pain x 2 weeks that lasts a few minutes and goes away    Triage indicates for patient to see in office today    Care advice provided, patient verbalizes understanding; denies any other questions or concerns; instructed to call back for any new or worsening symptoms. Writer provided warm transfer to  at [facility] for appointment scheduling. Attention Provider: Thank you for allowing me to participate in the care of your patient. The patient was connected to triage in response to information provided to the [facility]. Please do not respond through this encounter as the response is not directed to a shared pool. Reason for Disposition   All other patients with chest pain (Exception: fleeting chest pain lasting a few seconds)    Answer Assessment - Initial Assessment Questions  1. LOCATION: \"Where does it hurt? \"        Left chest wall below heart    2. RADIATION: \"Does the pain go anywhere else? \" (e.g., into neck, jaw, arms, back)      No    3. ONSET: \"When did the chest pain begin? \" (Minutes, hours or days)       2 weeks ago    4. PATTERN \"Does the pain come and go, or has it been constant since it started? \"  \"Does it get worse with exertion? \"       Comes and goes lasting a few minutes at a time    5. DURATION: \"How long does it last\" (e.g., seconds, minutes, hours)      Less than 5 minutes    6. SEVERITY: \"How bad is the pain? \"  (e.g., Scale 1-10; mild, moderate, or severe)     - MILD (1-3): doesn't interfere with normal activities      - MODERATE (4-7): interferes with normal activities or awakens from sleep     - SEVERE (8-10): excruciating pain, unable to do any normal activities        5/10    7. CARDIAC RISK FACTORS: \"Do you have any history of heart problems or risk factors for heart disease? \" (e.g., angina, prior heart attack; diabetes, high blood pressure, high cholesterol, smoker, or strong family history of heart disease)      None    8. PULMONARY RISK FACTORS: \"Do you have any history of lung disease? \"  (e.g., blood clots in lung, asthma, emphysema, birth control pills)      None    9. CAUSE: \"What do you think is causing the chest pain? \"      Unsure    10. OTHER SYMPTOMS: \"Do you have any other symptoms? \" (e.g., dizziness, nausea, vomiting, sweating, fever, difficulty breathing, cough)       None - denies    11. PREGNANCY: \"Is there any chance you are pregnant? \" \"When was your last menstrual period? \"        NA    Protocols used: CHEST PAIN-ADULT-[place]

## 2021-03-18 ENCOUNTER — OFFICE VISIT (OUTPATIENT)
Dept: FAMILY MEDICINE CLINIC | Age: 86
End: 2021-03-18
Payer: MEDICARE

## 2021-03-18 VITALS
WEIGHT: 210 LBS | HEART RATE: 79 BPM | BODY MASS INDEX: 34.99 KG/M2 | OXYGEN SATURATION: 97 % | TEMPERATURE: 97.2 F | SYSTOLIC BLOOD PRESSURE: 161 MMHG | DIASTOLIC BLOOD PRESSURE: 81 MMHG | HEIGHT: 65 IN

## 2021-03-18 DIAGNOSIS — R14.0 ABDOMINAL BLOATING: ICD-10-CM

## 2021-03-18 DIAGNOSIS — M48.061 SPINAL STENOSIS OF LUMBAR REGION, UNSPECIFIED WHETHER NEUROGENIC CLAUDICATION PRESENT: Chronic | ICD-10-CM

## 2021-03-18 DIAGNOSIS — E78.5 HYPERLIPIDEMIA, UNSPECIFIED HYPERLIPIDEMIA TYPE: Chronic | ICD-10-CM

## 2021-03-18 DIAGNOSIS — H10.45 OTHER CHRONIC ALLERGIC CONJUNCTIVITIS OF BOTH EYES: ICD-10-CM

## 2021-03-18 DIAGNOSIS — R07.89 OTHER CHEST PAIN: ICD-10-CM

## 2021-03-18 DIAGNOSIS — I10 ESSENTIAL HYPERTENSION: Primary | Chronic | ICD-10-CM

## 2021-03-18 DIAGNOSIS — I10 ESSENTIAL HYPERTENSION: Chronic | ICD-10-CM

## 2021-03-18 DIAGNOSIS — F33.41 RECURRENT MAJOR DEPRESSIVE DISORDER, IN PARTIAL REMISSION (HCC): ICD-10-CM

## 2021-03-18 DIAGNOSIS — K21.9 GASTROESOPHAGEAL REFLUX DISEASE WITHOUT ESOPHAGITIS: ICD-10-CM

## 2021-03-18 DIAGNOSIS — M17.0 PRIMARY OSTEOARTHRITIS OF BOTH KNEES: Chronic | ICD-10-CM

## 2021-03-18 LAB
ALBUMIN SERPL-MCNC: 4 G/DL (ref 3.5–5.2)
ALP BLD-CCNC: 83 U/L (ref 35–104)
ALT SERPL-CCNC: 13 U/L (ref 0–32)
ANION GAP SERPL CALCULATED.3IONS-SCNC: 7 MMOL/L (ref 7–16)
AST SERPL-CCNC: 21 U/L (ref 0–31)
BASOPHILS ABSOLUTE: 0.05 E9/L (ref 0–0.2)
BASOPHILS RELATIVE PERCENT: 1.2 % (ref 0–2)
BILIRUB SERPL-MCNC: 0.2 MG/DL (ref 0–1.2)
BUN BLDV-MCNC: 10 MG/DL (ref 8–23)
BURR CELLS: ABNORMAL
CALCIUM SERPL-MCNC: 9.1 MG/DL (ref 8.6–10.2)
CHLORIDE BLD-SCNC: 98 MMOL/L (ref 98–107)
CHOLESTEROL, TOTAL: 145 MG/DL (ref 0–199)
CO2: 27 MMOL/L (ref 22–29)
CREAT SERPL-MCNC: 0.9 MG/DL (ref 0.5–1)
EOSINOPHILS ABSOLUTE: 0.22 E9/L (ref 0.05–0.5)
EOSINOPHILS RELATIVE PERCENT: 5.3 % (ref 0–6)
GFR AFRICAN AMERICAN: >60
GFR NON-AFRICAN AMERICAN: >60 ML/MIN/1.73
GLUCOSE BLD-MCNC: 97 MG/DL (ref 74–99)
HCT VFR BLD CALC: 40.1 % (ref 34–48)
HDLC SERPL-MCNC: 51 MG/DL
HEMOGLOBIN: 13.2 G/DL (ref 11.5–15.5)
IMMATURE GRANULOCYTES #: 0.01 E9/L
IMMATURE GRANULOCYTES %: 0.2 % (ref 0–5)
LDL CHOLESTEROL CALCULATED: 76 MG/DL (ref 0–99)
LYMPHOCYTES ABSOLUTE: 2.15 E9/L (ref 1.5–4)
LYMPHOCYTES RELATIVE PERCENT: 51.7 % (ref 20–42)
MCH RBC QN AUTO: 31.1 PG (ref 26–35)
MCHC RBC AUTO-ENTMCNC: 32.9 % (ref 32–34.5)
MCV RBC AUTO: 94.6 FL (ref 80–99.9)
MONOCYTES ABSOLUTE: 0.41 E9/L (ref 0.1–0.95)
MONOCYTES RELATIVE PERCENT: 9.9 % (ref 2–12)
NEUTROPHILS ABSOLUTE: 1.32 E9/L (ref 1.8–7.3)
NEUTROPHILS RELATIVE PERCENT: 31.7 % (ref 43–80)
PDW BLD-RTO: 13.6 FL (ref 11.5–15)
PLATELET # BLD: 260 E9/L (ref 130–450)
PMV BLD AUTO: 9.1 FL (ref 7–12)
POIKILOCYTES: ABNORMAL
POTASSIUM SERPL-SCNC: 3.9 MMOL/L (ref 3.5–5)
RBC # BLD: 4.24 E12/L (ref 3.5–5.5)
SODIUM BLD-SCNC: 132 MMOL/L (ref 132–146)
TOTAL PROTEIN: 7.1 G/DL (ref 6.4–8.3)
TRIGL SERPL-MCNC: 92 MG/DL (ref 0–149)
VLDLC SERPL CALC-MCNC: 18 MG/DL
WBC # BLD: 4.2 E9/L (ref 4.5–11.5)

## 2021-03-18 PROCEDURE — 36415 COLL VENOUS BLD VENIPUNCTURE: CPT | Performed by: FAMILY MEDICINE

## 2021-03-18 PROCEDURE — 1090F PRES/ABSN URINE INCON ASSESS: CPT | Performed by: FAMILY MEDICINE

## 2021-03-18 PROCEDURE — G8399 PT W/DXA RESULTS DOCUMENT: HCPCS | Performed by: FAMILY MEDICINE

## 2021-03-18 PROCEDURE — G8417 CALC BMI ABV UP PARAM F/U: HCPCS | Performed by: FAMILY MEDICINE

## 2021-03-18 PROCEDURE — G8482 FLU IMMUNIZE ORDER/ADMIN: HCPCS | Performed by: FAMILY MEDICINE

## 2021-03-18 PROCEDURE — G8427 DOCREV CUR MEDS BY ELIG CLIN: HCPCS | Performed by: FAMILY MEDICINE

## 2021-03-18 PROCEDURE — 1036F TOBACCO NON-USER: CPT | Performed by: FAMILY MEDICINE

## 2021-03-18 PROCEDURE — 99214 OFFICE O/P EST MOD 30 MIN: CPT | Performed by: FAMILY MEDICINE

## 2021-03-18 PROCEDURE — 4040F PNEUMOC VAC/ADMIN/RCVD: CPT | Performed by: FAMILY MEDICINE

## 2021-03-18 PROCEDURE — 99212 OFFICE O/P EST SF 10 MIN: CPT | Performed by: FAMILY MEDICINE

## 2021-03-18 PROCEDURE — 1123F ACP DISCUSS/DSCN MKR DOCD: CPT | Performed by: FAMILY MEDICINE

## 2021-03-18 RX ORDER — GREEN TEA/HOODIA GORDONII 315-12.5MG
1 CAPSULE ORAL DAILY
Qty: 90 TABLET | Refills: 3 | Status: SHIPPED
Start: 2021-03-18 | End: 2021-06-14

## 2021-03-18 RX ORDER — AMLODIPINE BESYLATE AND BENAZEPRIL HYDROCHLORIDE 5; 20 MG/1; MG/1
1 CAPSULE ORAL DAILY
Qty: 90 CAPSULE | Refills: 3 | Status: SHIPPED
Start: 2021-03-18 | End: 2021-08-05 | Stop reason: SDUPTHER

## 2021-03-18 RX ORDER — AZELASTINE HYDROCHLORIDE 0.5 MG/ML
SOLUTION/ DROPS OPHTHALMIC
Qty: 6 ML | Refills: 2 | Status: CANCELLED | OUTPATIENT
Start: 2021-03-18

## 2021-03-18 RX ORDER — DOCUSATE SODIUM 100 MG/1
100 CAPSULE, LIQUID FILLED ORAL 2 TIMES DAILY
Qty: 60 CAPSULE | Refills: 2 | Status: SHIPPED
Start: 2021-03-18 | End: 2021-08-05 | Stop reason: SDUPTHER

## 2021-03-18 RX ORDER — OMEPRAZOLE 20 MG/1
20 CAPSULE, DELAYED RELEASE ORAL DAILY
Qty: 90 CAPSULE | Refills: 3 | Status: SHIPPED
Start: 2021-03-18 | End: 2021-08-05 | Stop reason: SDUPTHER

## 2021-03-18 RX ORDER — FLUTICASONE PROPIONATE 50 MCG
2 SPRAY, SUSPENSION (ML) NASAL DAILY
Qty: 1 BOTTLE | Refills: 3 | Status: SHIPPED
Start: 2021-03-18 | End: 2021-08-03

## 2021-03-18 RX ORDER — PRAVASTATIN SODIUM 40 MG
TABLET ORAL
Qty: 90 TABLET | Refills: 3 | Status: SHIPPED
Start: 2021-03-18 | End: 2021-08-05 | Stop reason: SDUPTHER

## 2021-03-18 RX ORDER — OLOPATADINE HYDROCHLORIDE 1 MG/ML
1 SOLUTION/ DROPS OPHTHALMIC 2 TIMES DAILY
Qty: 5 ML | Refills: 2 | Status: SHIPPED | OUTPATIENT
Start: 2021-03-18 | End: 2021-04-17

## 2021-03-18 NOTE — PROGRESS NOTES
[facility]  FAMILY MEDICINE RESIDENCY PROGRAM   OFFICE PROGRESS NOTE  DATE OF VISIT : 3/18/2021         Chief Complaint :   Chief Complaint   Patient presents with    Arm Pain     left    Dizziness    Fall     recent fall       HPI:   Monique Jay comes to clinic today for     F/U of chronic problem(s) and new or recent complaint of sharp left-sided chest pain     Chronic problems reviewed today include:     Hypertension, Hyperlipidemia, Osteoarthritis and Depression    Current status of this/these condition(s):  stable    Tolerating meds: Yes and complains of increase in price with eye drops. Asking for change. Also wants change in pharmacy    Additional history: Patient states that she has had left-sided chest pain for the past month or so. She denies any injury and describes the pain as being sharp and very brief lasting only for a second or 2. The pain is felt under her left breast and sometimes left side she intermittently will get some in her substernal area as well. She denies any back pain she denies any palpitations or cough. She does not believe that she fell and hurt herself in any way. She denies any association with position activity or movement. There is no soreness to palpation in these areas. She does have chronic GI distress. This mostly involves constipation and bloating sensations in her abdomen, but does occasionally include some GERD symptoms or upper abdominal distress. She is concerned about her blood pressure but did not take her medication yet today. She believes that this medicine was changed on her at the pharmacy. She insists that it does not say the same names on the bottle when I suggested that it might just be a switch to a different generic. Her other problems are stable with no significant change recently. She continues to have a great deal of stiffness in her hands and fingers and generalized aches and pains in many joints.   She did attend some physical and occupational therapy sessions, but did cancel a number of others. She would really like to try a different therapist.     Objective:    VS:  Blood pressure (!) 161/81, pulse 79, temperature 97.2 °F (36.2 °C), temperature source Temporal, height 5' 5\" (1.651 m), weight 210 lb (95.3 kg), SpO2 97 %, not currently breastfeeding. General Appearance: Well developed, awake, alert, oriented, no acute distress  Neck: Supple, symmetrical, trachea midline. No JVD. Thyroid smooth, not enlarged. Chest wall/Lung: Clear to auscultation bilaterally,  respirations unlabored. No rhonchi/wheezing/rales  Heart[de-identified]  Regular rate and rhythm, S1and S2 normal, no murmur, rub or gallop. Abdomen: Soft, nontender. Normal BS, no hepatosplenomegaly or mass  Extremities:  Extremities normal, atraumatic, no cyanosis. no edema. Skin: Skin color, texture, turgor normal, no rashes or lesions  Musculoskeletal: She has no tenderness with palpation over the sternoclavicular joints the costochondral joints or the rib cage itself. She has no real tenderness over the thoracic spine         Psychiatric: has a normal mood and affect. Behavior is normal.     I independently reviewed the following information:  Notes from physical and Occupational Therapy. 1. Essential hypertension  -     Comprehensive Metabolic Panel; Future  2. Primary osteoarthritis of both knees  3. Hyperlipidemia, unspecified hyperlipidemia type  -     Lipid Panel; Future  4. Recurrent major depressive disorder, in partial remission ([facility] [address]  5. Spinal stenosis of lumbar region, unspecified whether neurogenic claudication present  6. Gastroesophageal reflux disease without esophagitis  -     omeprazole (PRILOSEC) 20 MG delayed release capsule; Take 1 capsule by mouth daily, Disp-90 capsule, R-3Normal  -     CBC Auto Differential; Future  7.  Abdominal bloating  -     Probiotic Acidophilus (FLORANEX) TABS; Take 1 tablet by mouth daily, Disp-90 tablet, R-3Normal 8. Other chest pain  9. Other chronic allergic conjunctivitis of both eyes      Additional Plan:  Refilled all meds and sent to new pharmacy for her. I tried generic patanol drops for her eye and she can see if this is less expensive but still gives her some relief. She is requesting new PT/OT referrals because she does not like current ones. She is to call with the name of the one that she prefers. I reassured her that I do not think that her chest pain is related to her heart or lungs. It likely is her stomach or GERD causing these symptoms as she related that they may be related to her foods that cause her chronic GI upset and bowel problems. She will notify me if these symptoms change or worsen. I did obtain labs on her today, and she says that she did get both COVID and shingles shots. Her BP is elevated today, but she states that her pill was changed by the pharmacy. I did reorder this to a different pharmacy, and she will obtain several more BP readings before I would consider increasing her dose. I will see her back in several months after she has had an opportunity to get further therapy. I have asked that she have her blood pressure checked on at least several more occasions before making any decision about changing her medicine. However she will  new prescriptions at a different pharmacy and see if that makes a difference. On this date 3/18/2021 I have spent 38 minutes reviewing previous notes, test results and face to face with the patient discussing the diagnosis and importance of compliance with the treatment plan as well as documenting on the day of the visit. RTO in 3 months or sooner for any persistent, new, or worsening symptoms. Please see Patient Instructions for further counseling and information given.        Advised to be adherent to the treatment plans discussed today, and please call with any questions or concerns, letting the office know of any reasons that the plans may not be followed. The risks of untreated conditions include worsening illness, injury, disability, and possibly, death. Please call if symptoms change in any way, worsen, or fail to completely resolve, as this could necessitate a change to treatment plans. Patient and/or caregiver expressed understanding. Indications and proper use of medication(s) reviewed. Potential side-effects and risks of medication(s) also explained. Patient and/or caregiver was instructed to call if any new symptoms develop prior to next visit. Health risk factors discussed and addressed.     Signed by : Parisa Dumont M.D.

## 2021-06-14 ENCOUNTER — OFFICE VISIT (OUTPATIENT)
Dept: FAMILY MEDICINE CLINIC | Age: 86
End: 2021-06-14
Payer: MEDICARE

## 2021-06-14 VITALS
OXYGEN SATURATION: 97 % | DIASTOLIC BLOOD PRESSURE: 66 MMHG | TEMPERATURE: 97.3 F | WEIGHT: 198 LBS | RESPIRATION RATE: 20 BRPM | BODY MASS INDEX: 32.99 KG/M2 | HEIGHT: 65 IN | HEART RATE: 69 BPM | SYSTOLIC BLOOD PRESSURE: 118 MMHG

## 2021-06-14 DIAGNOSIS — I10 ESSENTIAL HYPERTENSION: Primary | Chronic | ICD-10-CM

## 2021-06-14 DIAGNOSIS — E78.5 HYPERLIPIDEMIA, UNSPECIFIED HYPERLIPIDEMIA TYPE: Chronic | ICD-10-CM

## 2021-06-14 DIAGNOSIS — M48.061 SPINAL STENOSIS OF LUMBAR REGION, UNSPECIFIED WHETHER NEUROGENIC CLAUDICATION PRESENT: Chronic | ICD-10-CM

## 2021-06-14 DIAGNOSIS — L29.9 PRURITUS: ICD-10-CM

## 2021-06-14 DIAGNOSIS — F33.41 RECURRENT MAJOR DEPRESSIVE DISORDER, IN PARTIAL REMISSION (HCC): ICD-10-CM

## 2021-06-14 DIAGNOSIS — M17.0 PRIMARY OSTEOARTHRITIS OF BOTH KNEES: Chronic | ICD-10-CM

## 2021-06-14 DIAGNOSIS — K21.9 GASTROESOPHAGEAL REFLUX DISEASE, UNSPECIFIED WHETHER ESOPHAGITIS PRESENT: Chronic | ICD-10-CM

## 2021-06-14 PROCEDURE — 99213 OFFICE O/P EST LOW 20 MIN: CPT | Performed by: FAMILY MEDICINE

## 2021-06-14 PROCEDURE — 99212 OFFICE O/P EST SF 10 MIN: CPT | Performed by: FAMILY MEDICINE

## 2021-06-14 PROCEDURE — 1123F ACP DISCUSS/DSCN MKR DOCD: CPT | Performed by: FAMILY MEDICINE

## 2021-06-14 PROCEDURE — G8427 DOCREV CUR MEDS BY ELIG CLIN: HCPCS | Performed by: FAMILY MEDICINE

## 2021-06-14 PROCEDURE — 1036F TOBACCO NON-USER: CPT | Performed by: FAMILY MEDICINE

## 2021-06-14 PROCEDURE — 1090F PRES/ABSN URINE INCON ASSESS: CPT | Performed by: FAMILY MEDICINE

## 2021-06-14 PROCEDURE — 4040F PNEUMOC VAC/ADMIN/RCVD: CPT | Performed by: FAMILY MEDICINE

## 2021-06-14 PROCEDURE — G8417 CALC BMI ABV UP PARAM F/U: HCPCS | Performed by: FAMILY MEDICINE

## 2021-06-14 PROCEDURE — G8399 PT W/DXA RESULTS DOCUMENT: HCPCS | Performed by: FAMILY MEDICINE

## 2021-06-14 RX ORDER — OLOPATADINE HYDROCHLORIDE 1 MG/ML
SOLUTION/ DROPS OPHTHALMIC
COMMUNITY
Start: 2021-04-23 | End: 2021-06-14

## 2021-08-03 ENCOUNTER — OFFICE VISIT (OUTPATIENT)
Dept: FAMILY MEDICINE CLINIC | Age: 86
End: 2021-08-03
Payer: MEDICARE

## 2021-08-03 VITALS
DIASTOLIC BLOOD PRESSURE: 77 MMHG | SYSTOLIC BLOOD PRESSURE: 137 MMHG | TEMPERATURE: 96.9 F | RESPIRATION RATE: 16 BRPM | BODY MASS INDEX: 32.99 KG/M2 | HEART RATE: 77 BPM | HEIGHT: 65 IN | OXYGEN SATURATION: 96 % | WEIGHT: 198 LBS

## 2021-08-03 DIAGNOSIS — K59.00 CONSTIPATION, UNSPECIFIED CONSTIPATION TYPE: Primary | ICD-10-CM

## 2021-08-03 DIAGNOSIS — Z76.0 ENCOUNTER FOR MEDICATION REFILL: ICD-10-CM

## 2021-08-03 DIAGNOSIS — K21.9 GASTROESOPHAGEAL REFLUX DISEASE WITHOUT ESOPHAGITIS: ICD-10-CM

## 2021-08-03 DIAGNOSIS — J30.2 SEASONAL ALLERGIC RHINITIS, UNSPECIFIED TRIGGER: ICD-10-CM

## 2021-08-03 PROCEDURE — 1090F PRES/ABSN URINE INCON ASSESS: CPT | Performed by: FAMILY MEDICINE

## 2021-08-03 PROCEDURE — G8427 DOCREV CUR MEDS BY ELIG CLIN: HCPCS | Performed by: FAMILY MEDICINE

## 2021-08-03 PROCEDURE — 99212 OFFICE O/P EST SF 10 MIN: CPT | Performed by: FAMILY MEDICINE

## 2021-08-03 PROCEDURE — 4040F PNEUMOC VAC/ADMIN/RCVD: CPT | Performed by: FAMILY MEDICINE

## 2021-08-03 PROCEDURE — G8399 PT W/DXA RESULTS DOCUMENT: HCPCS | Performed by: FAMILY MEDICINE

## 2021-08-03 PROCEDURE — 1123F ACP DISCUSS/DSCN MKR DOCD: CPT | Performed by: FAMILY MEDICINE

## 2021-08-03 PROCEDURE — 99213 OFFICE O/P EST LOW 20 MIN: CPT | Performed by: FAMILY MEDICINE

## 2021-08-03 PROCEDURE — 1036F TOBACCO NON-USER: CPT | Performed by: FAMILY MEDICINE

## 2021-08-03 PROCEDURE — G8417 CALC BMI ABV UP PARAM F/U: HCPCS | Performed by: FAMILY MEDICINE

## 2021-08-03 RX ORDER — AZELASTINE HYDROCHLORIDE 0.5 MG/ML
SOLUTION/ DROPS OPHTHALMIC
Qty: 6 ML | Refills: 2 | Status: CANCELLED | OUTPATIENT
Start: 2021-08-03

## 2021-08-03 RX ORDER — OMEPRAZOLE 20 MG/1
20 CAPSULE, DELAYED RELEASE ORAL DAILY
Qty: 90 CAPSULE | Refills: 3 | Status: CANCELLED | OUTPATIENT
Start: 2021-08-03

## 2021-08-03 RX ORDER — DOCUSATE SODIUM 100 MG/1
100 CAPSULE, LIQUID FILLED ORAL 2 TIMES DAILY
Qty: 60 CAPSULE | Refills: 2 | Status: CANCELLED | OUTPATIENT
Start: 2021-08-03

## 2021-08-03 RX ORDER — CHLORHEXIDINE GLUCONATE 0.12 MG/ML
15 RINSE ORAL 2 TIMES DAILY
Qty: 473 ML | Refills: 5 | Status: SHIPPED
Start: 2021-08-03 | End: 2021-10-25 | Stop reason: SDUPTHER

## 2021-08-03 RX ORDER — ERYTHROMYCIN 5 MG/G
OINTMENT OPHTHALMIC
Status: CANCELLED | OUTPATIENT
Start: 2021-08-03

## 2021-08-03 RX ORDER — CHLORHEXIDINE GLUCONATE 0.12 MG/ML
15 RINSE ORAL 2 TIMES DAILY
Qty: 473 ML | Refills: 5 | Status: CANCELLED | OUTPATIENT
Start: 2021-08-03

## 2021-08-03 RX ORDER — NALOXEGOL OXALATE 12.5 MG/1
12.5 TABLET, FILM COATED ORAL EVERY MORNING
Qty: 90 TABLET | Refills: 3 | Status: CANCELLED | OUTPATIENT
Start: 2021-08-03

## 2021-08-03 RX ORDER — AMLODIPINE BESYLATE AND BENAZEPRIL HYDROCHLORIDE 5; 20 MG/1; MG/1
1 CAPSULE ORAL DAILY
Qty: 90 CAPSULE | Refills: 3 | Status: CANCELLED | OUTPATIENT
Start: 2021-08-03

## 2021-08-03 RX ORDER — AZELASTINE HYDROCHLORIDE 0.5 MG/ML
SOLUTION/ DROPS OPHTHALMIC
Qty: 6 ML | Refills: 2 | Status: SHIPPED
Start: 2021-08-03 | End: 2021-08-05 | Stop reason: SDUPTHER

## 2021-08-03 RX ORDER — FLUTICASONE PROPIONATE 50 MCG
1 SPRAY, SUSPENSION (ML) NASAL DAILY
Qty: 2 BOTTLE | Refills: 1 | Status: SHIPPED
Start: 2021-08-03 | End: 2021-08-05 | Stop reason: SDUPTHER

## 2021-08-03 NOTE — PROGRESS NOTES
[facility] MEDICINE RESIDENCY PROGRAM  DATE OF VISIT : 8/3/2021    Patient : Romana Moritz   Age : 81 y.o.  : 1935   MRN : [id number]   ______________________________________________________________________    Chief Complaint :   Chief Complaint   Patient presents with    GI Problem     upset        HPI : Romana Moritz is 81 y.o. female who presented to the clinic today for above cc same day visit. Abdominal pain:  Patient is here today with complaints of abdominal pain. Pain is located LLQ and not radiating. This is a/an acute problem. This has been going on for 7  days . Exacerbating factors include none. Alleviating factors include BMs. Pain is \"nervous\" in nature. 10/10 at its worst, 8/10 currently. Pain is not radiating. Associated signs and symptoms include nonbloody. Patient does not have a change in appetite. Patient is  drinking well. Recent travel includes none. No recent antibiotic usage. Patient does not have blood in stool. Patient has had a colonoscopy about 2 years ago per pt. .  Previous abdominal surgeries include none. Patient does not have genital complaints. Patient does not urinary complaints. Has been out of PPI and colace about 1 month. Ear fullness:  Patient reporting right ear fullness over the last 2 weeks. Associated symptoms also include rhinorrhea, nasal congestion, sinus pressure. No fevers. Patient has not been taking any Flonase or other OTC allergy relief medications.     Past Medical History :  Past Medical History:   Diagnosis Date    Anxiety 10/11/2010    Breast cancer ([address] approx 2000    right, treated lumpectomy, radiation, chemo    Depression 10/11/2010    no issues    Diverticular disease 10/11/2010    GERD (gastroesophageal reflux disease)     HTN (hypertension) 10/11/2010    Hyperlipidemia 10/11/2010    Osteoarthritis 10/11/2010    fot left knee arthroplasty 10/21/2016    Osteoporosis 10/11/2010    Spinal stenosis 10/11/2010    Use of cane as ambulatory aid      Past Surgical History:   Procedure Laterality Date    BACK SURGERY      BONE GRAFT      with back surgery    BREAST LUMPECTOMY  2000    left, with chemo and rad, [facility]    CARPAL TUNNEL RELEASE  1980s    left    CARPAL TUNNEL RELEASE  1980s    repeat left    CATARACT REMOVAL  2010    [facility]    COLONOSCOPY  2/3/2011    extensive diverticulosis, Dr. Juana Pope, [facility] COLONOSCOPY  2013    \"could not get around colon due to diverticulitis, [address] COLONOSCOPY  2/24/2015    severe diverticulosis transverse and left colon without diverticulitis, rectal polyp bx, Dr. Lelo Stover, [address] [place] [address]    HYSTERECTOMY  1980s    still has ovaries, no cancer, [facility]    SPINAL FUSION  2009    [facility], pins and rods in place    TOTAL KNEE ARTHROPLASTY Right 10/21/2016    TOTAL KNEE ARTHROPLASTY Left 10/2017    replacement, dr Jasmin Carranza  2/3/2011    gastritis, Dr. Juana Pope, [address] ENDOSCOPY  2013    gastritis, [facility]    UPPER GASTROINTESTINAL ENDOSCOPY  2/24/2015    mild to moderate gastritis and duodenitis, gastric bx for H pylori, Dr. Lelo Stover         Review of Systems :    ROS - Per HPI   ______________________________________________________________________    Physical Exam :    Wt Readings from Last 3 Encounters:   08/03/21 198 lb (89.8 kg)   06/14/21 198 lb (89.8 kg)   03/18/21 210 lb (95.3 kg)       BMI Readings from Last 3 Encounters:   08/03/21 32.95 kg/m²   06/14/21 32.95 kg/m²   03/18/21 34.95 kg/m²   ]      Vitals: /77 (Site: Right Upper Arm, Position: Sitting, Cuff Size: Medium Adult)   Pulse 77   Temp 96.9 °F (36.1 °C) (Cerebral)   Resp 16   Ht 5' 5\" (1.651 m)   Wt 198 lb (89.8 kg)   SpO2 96%   BMI 32.95 kg/m²   GENERAL: Alert, cooperative, no acute distress.   HEENT: Fluid noted behind right TM, nasal turbinates boggy/gray, throat clear  CHEST: No tenderness or deformity, full & symmetric excursion  LUNG: Clear to auscultation bilaterally,  respirations unlabored. No rales/wheezing/rubs  HEART: RRR, S1 and S2 normal, no murmur, rub or gallop. DP pulses 2/4  ABDOMEN: Soft, mildly tender to palpation left lower quadrant, no masses, no organomegaly, no guarding, rebound or rigidity. EXTREMITIES:  Extremities normal, atraumatic, no cyanosis or edema. Distal pulses equal bilaterally    ______________________________________________________________________    Assessment & Plan :     Diagnosis Orders   1. Constipation, unspecified constipation type     2. Gastroesophageal reflux disease without esophagitis     3. Seasonal allergic rhinitis, unspecified trigger  fluticasone (FLONASE) 50 MCG/ACT nasal spray   4. Encounter for medication refill  chlorhexidine (PERIDEX) 0.12 % solution    azelastine (OPTIVAR) 0.05 % ophthalmic solution       Abdominal pain likely secondary to constipation versus viral gastroenteritis. Instructed patient to  refills on GI medications from pharmacy and start taking. Monitor symptoms closely once resuming medications, follow-up in clinic in 2 weeks if symptoms not improved despite being back on medications. Right ear fullness likely secondary to allergic symptoms. Send Flonase to pharmacy instructed patient to start taking monitor symptoms closely. Follow up:  Return if symptoms worsen or fail to improve, for follow up with PCP.       Sergio Decker MD PGY-3    Discussed with: Dr. Annelise Hammer

## 2021-08-03 NOTE — PROGRESS NOTES
Attending Physician Statement    S:   Chief Complaint   Patient presents with    GI Problem     upset       Abdomen pain (LLQ) for weeks. Intermittent BM's, which are diarrheal.  No blood, better after BM. Out of her meds for one month  O: Blood pressure 137/77, pulse 77, temperature 96.9 °F (36.1 °C), temperature source Cerebral, resp. rate 16, height 5' 5\" (1.651 m), weight 198 lb (89.8 kg), SpO2 96 %, not currently breastfeeding. Exam:   Heart - RRR   Lungs - clear   ENT- boggy nasal turbinates, fluid behind right TM   Abdomen- soft, mild LLQ tenderness  A: Abd pain, likely constipation. JOANNA  P:  RF meds (she didn't realize they were at the pharmacy)   Flonase   Follow-up as ordered    I have discussed the case, including pertinent history and exam findings with the resident. I agree with the documented assessment and plan.

## 2021-08-05 ENCOUNTER — TELEPHONE (OUTPATIENT)
Dept: FAMILY MEDICINE CLINIC | Age: 86
End: 2021-08-05

## 2021-08-05 DIAGNOSIS — K21.9 GASTROESOPHAGEAL REFLUX DISEASE WITHOUT ESOPHAGITIS: ICD-10-CM

## 2021-08-05 DIAGNOSIS — J30.2 SEASONAL ALLERGIC RHINITIS, UNSPECIFIED TRIGGER: ICD-10-CM

## 2021-08-05 DIAGNOSIS — Z76.0 ENCOUNTER FOR MEDICATION REFILL: ICD-10-CM

## 2021-08-05 RX ORDER — PRAVASTATIN SODIUM 40 MG
TABLET ORAL
Qty: 90 TABLET | Refills: 3 | Status: SHIPPED
Start: 2021-08-05 | End: 2022-04-18 | Stop reason: SDUPTHER

## 2021-08-05 RX ORDER — AMLODIPINE BESYLATE AND BENAZEPRIL HYDROCHLORIDE 5; 20 MG/1; MG/1
1 CAPSULE ORAL DAILY
Qty: 90 CAPSULE | Refills: 3 | Status: SHIPPED
Start: 2021-08-05 | End: 2021-10-25 | Stop reason: SDUPTHER

## 2021-08-05 RX ORDER — FLUTICASONE PROPIONATE 50 MCG
1 SPRAY, SUSPENSION (ML) NASAL DAILY
Qty: 2 BOTTLE | Refills: 1 | Status: SHIPPED
Start: 2021-08-05 | End: 2021-08-30 | Stop reason: SDUPTHER

## 2021-08-05 RX ORDER — DOCUSATE SODIUM 100 MG/1
100 CAPSULE, LIQUID FILLED ORAL 2 TIMES DAILY
Qty: 60 CAPSULE | Refills: 2 | Status: SHIPPED
Start: 2021-08-05 | End: 2021-12-13 | Stop reason: SDUPTHER

## 2021-08-05 RX ORDER — AZELASTINE HYDROCHLORIDE 0.5 MG/ML
SOLUTION/ DROPS OPHTHALMIC
Qty: 6 ML | Refills: 2 | Status: SHIPPED
Start: 2021-08-05 | End: 2021-10-11 | Stop reason: SDUPTHER

## 2021-08-05 RX ORDER — OMEPRAZOLE 20 MG/1
20 CAPSULE, DELAYED RELEASE ORAL DAILY
Qty: 90 CAPSULE | Refills: 3 | Status: SHIPPED
Start: 2021-08-05 | End: 2022-03-03 | Stop reason: SDUPTHER

## 2021-08-05 NOTE — TELEPHONE ENCOUNTER
----- Message from Mariah Ruiz sent at 8/4/2021  4:54 PM EDT -----  Subject: Message to Provider    QUESTIONS  Information for Provider? Patient recently requested to have all of her   medications refilled and sent to [facility], but [facility] is not in her   insurance network, and would now like all medication sent to the [facility] on [address]   ---------------------------------------------------------------------------  --------------  [address]  What is the best way for the office to contact you? OK to leave message on   voicemail  Preferred Call Back Phone Number? [phone]  ---------------------------------------------------------------------------  --------------  SCRIPT ANSWERS  Relationship to Patient?  Self

## 2021-08-05 NOTE — TELEPHONE ENCOUNTER
Re-ordered those meds that were recently sent to [facility]. They were reordered to [facility]. If there are others that I normally prescribe, she can call.

## 2021-08-20 ENCOUNTER — TELEPHONE (OUTPATIENT)
Dept: FAMILY MEDICINE CLINIC | Age: 86
End: 2021-08-20

## 2021-08-20 NOTE — LETTER
[facility]  [address]  Phone: [phone]  Fax: [phone]    Juliocesar Ibarra MD         August 20, 2021     Patient: Latha Luna   YOB: 1935   Date of Visit: 8/20/2021       To Whom It May Concern: It is my medical opinion that Monique Gibbs requires a disability parking placard for the following reasons:  She cannot walk 200 feet without stopping to rest.  Duration of need: permanent    If you have any questions or concerns, please don't hesitate to call.     Sincerely,        Juliocesar Ibarra MD

## 2021-08-20 NOTE — TELEPHONE ENCOUNTER
----- Message from Bill Barak sent at 8/20/2021  3:44 PM EDT -----  Subject: Message to Provider    QUESTIONS  Information for Provider? Patient requesting prescription for Handicap   Placard. Patient would like to have it mailed to her if possible to   address on file.   ---------------------------------------------------------------------------  --------------  [address]  What is the best way for the office to contact you? OK to leave message on   voicemail  Preferred Call Back Phone Number? [phone]  ---------------------------------------------------------------------------  --------------  SCRIPT ANSWERS  Relationship to Patient?  Self

## 2021-08-23 NOTE — TELEPHONE ENCOUNTER
I called pt to let her know to come  her letter for her placard but pt requested for the letter to be mailed to her so I verified address and sent letter today.

## 2021-08-30 ENCOUNTER — OFFICE VISIT (OUTPATIENT)
Dept: FAMILY MEDICINE CLINIC | Age: 86
End: 2021-08-30
Payer: MEDICARE

## 2021-08-30 VITALS
WEIGHT: 201 LBS | HEART RATE: 68 BPM | DIASTOLIC BLOOD PRESSURE: 59 MMHG | TEMPERATURE: 97.3 F | HEIGHT: 65 IN | OXYGEN SATURATION: 98 % | SYSTOLIC BLOOD PRESSURE: 99 MMHG | BODY MASS INDEX: 33.49 KG/M2

## 2021-08-30 DIAGNOSIS — J30.2 SEASONAL ALLERGIC RHINITIS, UNSPECIFIED TRIGGER: ICD-10-CM

## 2021-08-30 DIAGNOSIS — Z00.00 ROUTINE GENERAL MEDICAL EXAMINATION AT A HEALTH CARE FACILITY: ICD-10-CM

## 2021-08-30 DIAGNOSIS — G89.29 CHRONIC BILATERAL LOW BACK PAIN WITHOUT SCIATICA: ICD-10-CM

## 2021-08-30 DIAGNOSIS — M54.50 CHRONIC BILATERAL LOW BACK PAIN WITHOUT SCIATICA: ICD-10-CM

## 2021-08-30 DIAGNOSIS — E78.5 HYPERLIPIDEMIA, UNSPECIFIED HYPERLIPIDEMIA TYPE: Primary | Chronic | ICD-10-CM

## 2021-08-30 DIAGNOSIS — I10 ESSENTIAL HYPERTENSION: Chronic | ICD-10-CM

## 2021-08-30 DIAGNOSIS — F33.41 RECURRENT MAJOR DEPRESSIVE DISORDER, IN PARTIAL REMISSION (HCC): ICD-10-CM

## 2021-08-30 DIAGNOSIS — K21.9 GASTROESOPHAGEAL REFLUX DISEASE, UNSPECIFIED WHETHER ESOPHAGITIS PRESENT: Chronic | ICD-10-CM

## 2021-08-30 PROCEDURE — G0438 PPPS, INITIAL VISIT: HCPCS | Performed by: FAMILY MEDICINE

## 2021-08-30 PROCEDURE — 1123F ACP DISCUSS/DSCN MKR DOCD: CPT | Performed by: FAMILY MEDICINE

## 2021-08-30 PROCEDURE — 4040F PNEUMOC VAC/ADMIN/RCVD: CPT | Performed by: FAMILY MEDICINE

## 2021-08-30 PROCEDURE — 99212 OFFICE O/P EST SF 10 MIN: CPT | Performed by: FAMILY MEDICINE

## 2021-08-30 RX ORDER — FLUTICASONE PROPIONATE 50 MCG
1 SPRAY, SUSPENSION (ML) NASAL DAILY
Qty: 2 BOTTLE | Refills: 1 | Status: SHIPPED
Start: 2021-08-30 | End: 2021-12-13 | Stop reason: SDUPTHER

## 2021-08-30 RX ORDER — SUCRALFATE 1 G/1
TABLET ORAL
Qty: 360 TABLET | Refills: 2 | Status: SHIPPED
Start: 2021-08-30 | End: 2021-12-13 | Stop reason: SDUPTHER

## 2021-08-30 SDOH — ECONOMIC STABILITY: FOOD INSECURITY: WITHIN THE PAST 12 MONTHS, THE FOOD YOU BOUGHT JUST DIDN'T LAST AND YOU DIDN'T HAVE MONEY TO GET MORE.: SOMETIMES TRUE

## 2021-08-30 SDOH — ECONOMIC STABILITY: FOOD INSECURITY: WITHIN THE PAST 12 MONTHS, YOU WORRIED THAT YOUR FOOD WOULD RUN OUT BEFORE YOU GOT MONEY TO BUY MORE.: SOMETIMES TRUE

## 2021-08-30 ASSESSMENT — PATIENT HEALTH QUESTIONNAIRE - PHQ9
SUM OF ALL RESPONSES TO PHQ9 QUESTIONS 1 & 2: 2
1. LITTLE INTEREST OR PLEASURE IN DOING THINGS: 1
SUM OF ALL RESPONSES TO PHQ QUESTIONS 1-9: 2
2. FEELING DOWN, DEPRESSED OR HOPELESS: 1

## 2021-08-30 ASSESSMENT — LIFESTYLE VARIABLES: HOW OFTEN DO YOU HAVE A DRINK CONTAINING ALCOHOL: 0

## 2021-08-30 ASSESSMENT — SOCIAL DETERMINANTS OF HEALTH (SDOH): HOW HARD IS IT FOR YOU TO PAY FOR THE VERY BASICS LIKE FOOD, HOUSING, MEDICAL CARE, AND HEATING?: HARD

## 2021-08-30 NOTE — PROGRESS NOTES
Medicare Annual Wellness Visit  Name: Kayla Hung Date: 2021   MRN: [id number] Sex: Female   Age: 81 y.o. Ethnicity: Non- / Non    : 1935 Race: Louise Adkins / Bianca Curiel American      Rebekah Ledesma is here for Medicare AWV    Screenings for behavioral, psychosocial and functional/safety risks, and cognitive dysfunction are all negative except as indicated below. These results, as well as other patient data from the [address] form, are documented in Flowsheets linked to this Encounter. No Known Allergies    Prior to Visit Medications    Medication Sig Taking?  Authorizing Provider   Polyvinyl Alcohol-Povidone PF 1.4-0.6 % SOLN One-two drops in affected eye every 8 hours prn Yes Jovani Larsen MD   pravastatin (PRAVACHOL) 40 MG tablet TAKE 1 TABLET DAILY Yes Jovani Larsen MD   omeprazole (PRILOSEC) 20 MG delayed release capsule Take 1 capsule by mouth daily Yes Jovani Larsen MD   fluticasone (FLONASE) 50 MCG/ACT nasal spray 1 spray by Each Nostril route daily Yes Jovani Larsen MD   docusate sodium (COLACE) 100 MG capsule Take 1 capsule by mouth 2 times daily Yes Jovani Larsen MD   azelastine (OPTIVAR) 0.05 % ophthalmic solution INSTILL ONE DROP INTO EACH EYE TWICE DAILY Yes Jovani Larsen MD   amLODIPine-benazepril (LOTREL) 5-20 MG per capsule Take 1 capsule by mouth daily Yes Jovani Larsen MD   chlorhexidine (PERIDEX) 0.12 % solution Take 15 mLs by mouth 2 times daily Yes Jamin Carver MD   erythromycin (ROMYCIN) 5 MG/GM ophthalmic ointment APPLY A SMALL AMOUNT INTO BOTH EYES AT BEDTIME FOR 14 DAYS Yes Historical Provider, MD   oxyCODONE-acetaminophen (PERCOCET) 5-325 MG per tablet TK 1 T PO Q 8 H PRN P Yes Historical Provider, MD   sucralfate (CARAFATE) 1 GM tablet TAKE ONE TABLET BY MOUTH FOUR TIMES A DAY Yes Owen Og MD   NARCAN 4 MG/0.1ML LIQD nasal spray 0.1 mLs by Nasal route once Yes Historical Provider, MD   VILAZODONE HCL 10 MG Tablet TK 1 T PO D Yes Historical Provider, MD   MOVANTIK 12.5 MG TABS tablet Take 1 tablet by mouth every morning Yes Juliocesar Iabrra MD   vitamin B-12 (CYANOCOBALAMIN) 1000 MCG tablet Take 1,000 mcg by mouth daily  Yes Historical Provider, MD   Pyridoxine HCl (VITAMIN B-6) 100 MG tablet Take 100 mg by mouth daily Yes Historical Provider, MD   Multiple Vitamin TABS Take 1 capsule by mouth daily Hair ,skin and nails Yes Historical Provider, MD       Past Medical History:   Diagnosis Date    Anxiety 10/11/2010    Breast cancer ([address] approx 2000    right, treated lumpectomy, radiation, chemo    Depression 10/11/2010    no issues    Diverticular disease 10/11/2010    GERD (gastroesophageal reflux disease)     HTN (hypertension) 10/11/2010    Hyperlipidemia 10/11/2010    Osteoarthritis 10/11/2010    fot left knee arthroplasty 10/21/2016    Osteoporosis 10/11/2010    Spinal stenosis 10/11/2010    Use of cane as ambulatory aid        Past Surgical History:   Procedure Laterality Date    BACK SURGERY      BONE GRAFT      with back surgery    BREAST LUMPECTOMY  2000    left, with chemo and rad, [facility]    CARPAL TUNNEL RELEASE  1980s    left    CARPAL TUNNEL RELEASE  1980s    repeat left    CATARACT REMOVAL  2010    b[facility]    COLONOSCOPY  2/3/2011    extensive diverticulosis, Dr. Brian Gregory, [facility] COLONOSCOPY  2013    \"could not get around colon due to diverticulitis, [address] COLONOSCOPY  2/24/2015    severe diverticulosis transverse and left colon without diverticulitis, rectal polyp bx, Dr. Sarita Lee, [address] [place] [address]    HYSTERECTOMY  1980s    still has ovaries, no cancer, [facility]    SPINAL FUSION  2009    [facility], pins and rods in place    TOTAL KNEE ARTHROPLASTY Right 10/21/2016    TOTAL KNEE ARTHROPLASTY Left 10/2017    replacement, dr Angel Ochoa  2/3/2011    gastritis, Dr. Brian Gregory, [address] GASTROINTESTINAL ENDOSCOPY  2013    gastritis, [facility]    UPPER GASTROINTESTINAL ENDOSCOPY  2/24/2015    mild to moderate gastritis and duodenitis, gastric bx for H pylori, Dr. Ana Paula Kelly       Family History   Problem Relation Age of Onset    Cancer Mother         throat    High Blood Pressure Mother     Mental Illness Father     Colon Cancer Father     Cancer Brother     Heart Disease Brother     Mental Illness Daughter     Mental Illness Son     Arthritis Sister     Cancer Brother     Pacemaker Brother     No Known Problems Brother        CareTeam (Including outside providers/suppliers regularly involved in providing care):   Patient Care Team:  Kareem Stuart MD as PCP - General (Family Medicine)  Kareem Stuart MD as PCP - [facility]  Luis Antonio Diggs MD as Consulting Physician (Cardiology)    Wt Readings from Last 3 Encounters:   08/30/21 201 lb (91.2 kg)   08/03/21 198 lb (89.8 kg)   06/14/21 198 lb (89.8 kg)     Vitals:    08/30/21 1552   BP: (!) 99/59   Site: Right Upper Arm   Position: Sitting   Cuff Size: Medium Adult   Pulse: 68   Temp: 97.3 °F (36.3 °C)   TempSrc: Temporal   SpO2: 98%   Weight: 201 lb (91.2 kg)   Height: 5' 5\" (1.651 m)     Body mass index is 33.45 kg/m². Based upon direct observation of the patient, evaluation of cognition reveals Clock ok, remembered 2/3      Patient's complete Health Risk Assessment and screening values have been reviewed and are found in Flowsheets. The following problems were reviewed today and where indicated follow up appointments were made and/or referrals ordered. Positive Risk Factor Screenings with Interventions:          General Health and ACP:  General  In general, how would you say your health is?: Fair  In the past 7 days, have you experienced any of the following?  New or Increased Pain, New or Increased Fatigue, Loneliness, Social Isolation, Stress or Anger?: (!) New or Increased Pain, New or Increased Fatigue, Loneliness, Social Isolation, Stress  Do you get the social and emotional support that you need?: (!) No  Do you have a Living Will?: (!) No  Advance Directives     Power of [address] Will ACP-Advance Directive ACP-Power of     Not on File Filed on 03/05/12 Filed Not on File      General Health Risk Interventions:  · Pain issues: takes pain medicine for back which helps   · At home alone - doesn't want to be friendly.   Recently daughter has re-initiated contact    Health Habits/Nutrition:  Health Habits/Nutrition  Do you exercise for at least 20 minutes 2-3 times per week?: (!) No  Have you lost any weight without trying in the past 3 months?: No  Do you eat only one meal per day?: (!) Yes  Have you seen the dentist within the past year?: (!) No  Body mass index: (!) 33.44  Health Habits/Nutrition Interventions:  · Inadequate physical activity:  will try therapy   · One balanced meal daily - occasional snack or sandwich    She will schedule appointment with dentist    Hearing/Vision:  No exam data present  Hearing/Vision  Do you or your family notice any trouble with your hearing that hasn't been managed with hearing aids?: (!) Yes  Do you have difficulty driving, watching TV, or doing any of your daily activities because of your eyesight?: (!) Yes  Have you had an eye exam within the past year?: Yes  Hearing/Vision Interventions:  · Hearing concerns:  needs f/u appointment with ENT (October)  · Vision concerns:  patient encouraged to make appointment with his/her eye specialist    Safety:  Safety  Do you have working smoke detectors?: Yes  Have all throw rugs been removed or fastened?: Yes  Do you have non-slip mats or surfaces in all bathtubs/showers?: (!) No  Do all of your stairways have a railing or banister?: Yes  Are your doorways, halls and stairs free of clutter?: (!) No  Do you always fasten your seatbelt when you are in a car?: Yes  Safety Interventions:  · Patient declines any further evaluation/treatment for this issue    ADL:  ADLs  In the past 7 days, did you need help from others to perform any of the following everyday activities? Eating, dressing, grooming, bathing, toileting, or walking/balance?: None  In the past 7 days, did you need help from others to take care of any of the following?  Laundry, housekeeping, banking/finances, shopping, telephone use, food preparation, transportation, or taking medications?: (!) Laundry, Housekeeping, Food Preparation  ADL Interventions:  · Patient declines any further evaluation/treatment for this issue  · Already has [facility]    Personalized Preventive Plan   Current Health Maintenance Status  Immunization History   Administered Date(s) Administered    COVID-19, Malave Peter, PF, 30mcg/0.3mL 01/14/2021, 02/04/2021    Influenza 10/18/2012    Influenza Vaccine, unspecified formulation 10/18/2012, 12/04/2013, 09/26/2016    Influenza Virus Vaccine 10/15/2011, 12/04/2013, 11/05/2014, 11/15/2015    Influenza, High Dose (Fluzone 65 yrs and older) 09/26/2016, 10/22/2019, 09/09/2020    Influenza, High-dose, Quadv, 65 yrs +, IM (Fluzone) 09/09/2020    Influenza, MDCK, Preservative free 09/19/2017    PPD Test 09/19/2011    Pneumococcal Conjugate 13-valent (Woxilja79) 11/16/2015    Pneumococcal Conjugate 7-valent (Prevnar7) 10/15/2011    Pneumococcal Polysaccharide ([id number]) 01/30/2017    Tdap (Boostrix, Adacel) 04/10/2012    Zoster Recombinant (Shingrix) 12/10/2020, 06/16/2021        Health Maintenance   Topic Date Due    Annual Wellness Visit (AWV)  Never done    Flu vaccine (1) 09/01/2021    Lipid screen  03/18/2022    Potassium monitoring  03/18/2022    Creatinine monitoring  03/18/2022    DTaP/Tdap/Td vaccine (2 - Td or Tdap) 04/10/2022    DEXA (modify frequency per FRAX score)  Completed    Shingles Vaccine  Completed    Pneumococcal 65+ years Vaccine  Completed    COVID-19 Vaccine  Completed    Hepatitis A vaccine  Aged Out    Hepatitis B vaccine  Aged Out    Hib vaccine  Aged Out    Meningococcal (ACWY) vaccine  Aged Out     Recommendations for Ti Knight Due: see orders and patient instructions/AVS.  . Recommended screening schedule for the next 5-10 years is provided to the patient in written form: see Patient Instructions/AVS.    Omar Mcfarland was seen today for medicare aw. Diagnoses and all orders for this visit:    Hyperlipidemia, unspecified hyperlipidemia type    Gastroesophageal reflux disease, unspecified whether esophagitis present    Essential hypertension    Recurrent major depressive disorder, in partial remission ([facility]    Seasonal allergic rhinitis, unspecified trigger    Zain Barclay has many chronic problems which are essentially stable. Her main problem at the current time is a mild depression associated with loneliness. However, she has little interest in establishing friendships or involving herself with outside activities. Recently, her estranged daughter made contact and her visit with her went well. Perhaps this will give her some melissa in the future. While she did have some concerns on the annual well visit questionnaire, she actually has arrangements for assistance with most of these issues already. She is not currently due for any further testing, but I will see her on a regular basis to follow her chronic health issues.

## 2021-08-30 NOTE — PATIENT INSTRUCTIONS
Personalized Preventive Plan for Deloris December - 8/30/2021  Medicare offers a range of preventive health benefits. Some of the tests and screenings are paid in full while other may be subject to a deductible, co-insurance, and/or copay. Some of these benefits include a comprehensive review of your medical history including lifestyle, illnesses that may run in your family, and various assessments and screenings as appropriate. After reviewing your medical record and screening and assessments performed today your provider may have ordered immunizations, labs, imaging, and/or referrals for you. A list of these orders (if applicable) as well as your Preventive Care list are included within your After Visit Summary for your review. Other Preventive Recommendations:    · A preventive eye exam performed by an eye specialist is recommended every 1-2 years to screen for glaucoma; cataracts, macular degeneration, and other eye disorders. · A preventive dental visit is recommended every 6 months. · Try to get at least 150 minutes of exercise per week or 10,000 steps per day on a pedometer . · Order or download the FREE \"Exercise & Physical Activity: Your Everyday Guide\" from [employer]. Call 5-214.132.9349 or search [employer] online. · You need 9545-5428 mg of calcium and 7580-3901 IU of vitamin D per day. It is possible to meet your calcium requirement with diet alone, but a vitamin D supplement is usually necessary to meet this goal.  · When exposed to the sun, use a sunscreen that protects against both UVA and UVB radiation with an SPF of 30 or greater. Reapply every 2 to 3 hours or after sweating, drying off with a towel, or swimming. · Always wear a seat belt when traveling in a car. Always wear a helmet when riding a bicycle or motorcycle.

## 2021-09-09 ENCOUNTER — HOSPITAL ENCOUNTER (OUTPATIENT)
Dept: PHYSICAL THERAPY | Age: 86
Setting detail: THERAPIES SERIES
Discharge: HOME | End: 2021-09-09
Payer: MEDICARE

## 2021-09-09 PROCEDURE — 97161 PT EVAL LOW COMPLEX 20 MIN: CPT

## 2021-09-09 NOTE — PROGRESS NOTES
3+/5  Strength LLE  Comment: 3+/5  Strength Other  Other: trunk/core 3 to 3+/5  Tone RLE  RLE Tone: Normotonic  Tone LLE  LLE Tone: Normotonic  Motor Control  Gross Motor?: WFL        Bed mobility  Bridging: Modified independent   Supine to Sit: Minimal assistance  Sit to Supine: Contact guard assistance  Transfers  Sit to Stand: Modified independent  Stand to sit: Independent       Ambulation  Ambulation?: Yes  Ambulation 1  Surface: level tile  Device: No Device  Assistance: Modified Independent  Gait Deviations: Slow Debbie;Decreased step length;Decreased step height;Decreased arm swing;Decreased head and trunk rotation  Stairs/Curb  Stairs?: No                            Assessment   Conditions Requiring Skilled Therapeutic Intervention  Body structures, Functions, Activity limitations: Decreased functional mobility ; Increased pain;Decreased balance;Decreased posture;Decreased ROM; Decreased endurance;Decreased coordination  Prognosis: Fair  Decision Making: Low Complexity  REQUIRES PT FOLLOW UP: Yes         Plan   Plan  Times per week: 1-2  Plan weeks: 5  Current Treatment Recommendations: Strengthening, ROM, Endurance Training, Functional Mobility Training, Aquatics    G-Code       OutComes Score                                                  AM-PAC Score             Goals          Therapy Time   Individual Concurrent Group Co-treatment   Time In 1300         Time Out 1335         Minutes 35         Timed Code Treatment Minutes: 27 Minutes       Preet Hollis PT

## 2021-09-21 ENCOUNTER — HOSPITAL ENCOUNTER (OUTPATIENT)
Dept: PHYSICAL THERAPY | Age: 86
Setting detail: THERAPIES SERIES
Discharge: HOME | End: 2021-09-21
Payer: MEDICARE

## 2021-09-21 PROCEDURE — 97113 AQUATIC THERAPY/EXERCISES: CPT

## 2021-09-22 NOTE — PROGRESS NOTES
[facility]  Phone: [phone] Fax: [phone]        Physical Therapy Aquatic Flow Sheet   Date:  2021    Patient Name:  Cinda Fleming    :  1935  Restrictions/Precautions:    Diagnosis:  Back and leg pain ICD 10 Code M54.5  Treatment Diagnosis:  Back and leg pain ICD 10 Code S85.6  Insurance/Certification information:  2021 to 2021  Referring Physician:   Dr Que Lou of care signed (Y/N):    Visit# / total visits:  /1-2 times per week 5 weeks   Pain level: 8/10     Electronically signed by:  Porfirio Oneil PTA  Time In:1000  Time Out:1100  Subjective:Patient presents to pT to assess and treat issues of persistent chronic pain affecting the lower back hips and LE's Patient has a long standing hx of persistent pain issues as well as various orthopedic issues      Key  B= Belt DB= Dumbells T= Theratube   H= Hydrotone N= Noodles W= Weights   P= Paddles S= Speedo equipment K= Kickboard     Exercises/Activities   Warm-up/Amb  Minutes  Exercises  Minutes    Slow forward   5  HR/TR  5    Slow sideways  5  Marches  5    Slow backwards  5  Mini-squats  5    Medium forward    4-way SLR  5    Medium sideways    Hip circles/fig 8  5    Small shuffle    Hamstring curls  5    Jog    Knee extension  5    Braiding    Pelvic tilts  5    Bicycling  5  Scap squeezes          Shoulder flex/ext      Functional    Shoulder abd/add      Step    Shoulder H. abd/add      Lifting    Shoulder IR/ER      Hand to opp knee    Rowing      Push down squat    Bilateral pull down      UE PNF    Push/pull      LE PNF    Push downs      Wall push ups    Arm circles      SLS    Elbow flex/ext          Chin tuck      Stretching    UT shrugs/rolls      Gastroc/Soleus    Rocking horse      Hamstring          SKTC    Other  Post-RX    Piriformis      5    Hip flexor          Ladder pull          Pec stretch          Post deltoid           Timed Code Treatment Minutes:  60    Total Treatment Minutes:  60    Treatment/Activity Tolerance:  [] Patient tolerated treatment well [x] Patient limited by fatique  [x] Patient limited by pain [] Patient limited by other medical complications  [] Other:     Prognosis: [] Good [x] Fair  [] Poor    Patient Requires Follow-up: [x] Yes  [] No    Plan:   [x] Continue per plan of care [] Alter current plan (see comments)  [] Plan of care initiated [] Hold pending MD visit [] Discharge    Treatment Charges: Mins Units   Initial Evaluation     Re-Evaluation     Ther Exercise         TE     Aquatic Treatment 60 4   Ther Activities        TA     Gait Training          GT     Neuro Re-education NR     Modalities     Non-Billable Service Time     Other     Total Time/Units 60 4       Electronically signed by:  Domonique Vergara PTA [id number]

## 2021-09-23 ENCOUNTER — HOSPITAL ENCOUNTER (OUTPATIENT)
Dept: PHYSICAL THERAPY | Age: 86
Setting detail: THERAPIES SERIES
Discharge: HOME | End: 2021-09-23
Payer: MEDICARE

## 2021-09-23 PROCEDURE — 97113 AQUATIC THERAPY/EXERCISES: CPT

## 2021-09-23 NOTE — PROGRESS NOTES
[facility]  Phone: [phone] Fax: [phone]        Physical Therapy Aquatic Flow Sheet   Date:  2021    Patient Name:  Elina Dye    :  1935  Restrictions/Precautions:    Diagnosis:  Back and leg pain ICD 10 Code M54.5  Treatment Diagnosis:  Back and leg pain ICD 10 Code T94.6  Insurance/Certification information:    Referring Physician:  Merly Rojo of care signed (Y/N):    Visit# / total visits:  pool  Pain level: 1010     Electronically signed by:  Liban Gu PTA  Time In:1000  Time Out:1100    Subjective:Patient presents to pT to assess and treat issues of persistent chronic pain affecting the lower back hips and LE's Patient has a long standing hx of persistent pain issues as well as various orthopedic issues      Key  B= Belt DB= Dumbells T= Theratube   H= Hydrotone N= Noodles W= Weights   P= Paddles S= Speedo equipment K= Kickboard     Exercises/Activities   Warm-up/Amb  Minutes  Exercises  Minutes    Slow forward   5  HR/TR  5    Slow sideways  5  Marches  5    Slow backwards  5  Mini-squats  5    Medium forward    4-way SLR  5    Medium sideways    Hip circles/fig 8  5    Small shuffle    Hamstring curls  5    Jog    Knee extension  5    Braiding    Pelvic tilts  5    Bicycling  5  Scap squeezes          Shoulder flex/ext      Functional    Shoulder abd/add      Step    Shoulder H. abd/add      Lifting    Shoulder IR/ER      Hand to opp knee    Rowing      Push down squat    Bilateral pull down      UE PNF    Push/pull      LE PNF    Push downs      Wall push ups    Arm circles      SLS    Elbow flex/ext          Chin tuck      Stretching    UT shrugs/rolls      Gastroc/Soleus    Rocking horse      Hamstring          SKTC    Other      Piriformis          Hip flexor          Ladder pull          Pec stretch          Post deltoid           Timed Code Treatment Minutes:  60    Total Treatment Minutes:  60  Treatment/Activity Tolerance:  [] Patient

## 2021-09-27 ENCOUNTER — PROCEDURE VISIT (OUTPATIENT)
Dept: AUDIOLOGY | Age: 86
End: 2021-09-27
Payer: MEDICARE

## 2021-09-27 ENCOUNTER — OFFICE VISIT (OUTPATIENT)
Dept: ENT CLINIC | Age: 86
End: 2021-09-27
Payer: MEDICARE

## 2021-09-27 VITALS
HEART RATE: 72 BPM | BODY MASS INDEX: 32.99 KG/M2 | TEMPERATURE: 97 F | OXYGEN SATURATION: 97 % | DIASTOLIC BLOOD PRESSURE: 83 MMHG | HEIGHT: 65 IN | WEIGHT: 198 LBS | SYSTOLIC BLOOD PRESSURE: 131 MMHG

## 2021-09-27 DIAGNOSIS — H69.83 DYSFUNCTION OF BOTH EUSTACHIAN TUBES: ICD-10-CM

## 2021-09-27 DIAGNOSIS — H91.8X9 ASYMMETRICAL HEARING LOSS: Primary | ICD-10-CM

## 2021-09-27 DIAGNOSIS — H90.A31 MIXED CONDUCTIVE AND SENSORINEURAL HEARING LOSS OF RIGHT EAR WITH RESTRICTED HEARING OF LEFT EAR: Primary | ICD-10-CM

## 2021-09-27 DIAGNOSIS — Z01.818 PREOP TESTING: ICD-10-CM

## 2021-09-27 PROCEDURE — 4040F PNEUMOC VAC/ADMIN/RCVD: CPT | Performed by: NURSE PRACTITIONER

## 2021-09-27 PROCEDURE — 1036F TOBACCO NON-USER: CPT | Performed by: NURSE PRACTITIONER

## 2021-09-27 PROCEDURE — G8427 DOCREV CUR MEDS BY ELIG CLIN: HCPCS | Performed by: NURSE PRACTITIONER

## 2021-09-27 PROCEDURE — 92567 TYMPANOMETRY: CPT | Performed by: AUDIOLOGIST

## 2021-09-27 PROCEDURE — 92557 COMPREHENSIVE HEARING TEST: CPT | Performed by: AUDIOLOGIST

## 2021-09-27 PROCEDURE — G8417 CALC BMI ABV UP PARAM F/U: HCPCS | Performed by: NURSE PRACTITIONER

## 2021-09-27 PROCEDURE — G8399 PT W/DXA RESULTS DOCUMENT: HCPCS | Performed by: NURSE PRACTITIONER

## 2021-09-27 PROCEDURE — 1123F ACP DISCUSS/DSCN MKR DOCD: CPT | Performed by: NURSE PRACTITIONER

## 2021-09-27 PROCEDURE — 1090F PRES/ABSN URINE INCON ASSESS: CPT | Performed by: NURSE PRACTITIONER

## 2021-09-27 PROCEDURE — 99214 OFFICE O/P EST MOD 30 MIN: CPT | Performed by: NURSE PRACTITIONER

## 2021-09-27 NOTE — PROGRESS NOTES
[facility]  Dr. Amy Chin. CHARLIE Garay Ms.Ed. Patient Name:  Paula Rosales  :  1935     CHIEF C/O:    Chief Complaint   Patient presents with    Ear Problem     fluid and hearing loss in right ear        HISTORY OBTAINED FROM:  patient    HISTORY OF PRESENT ILLNESS:       Wynelle Bumpers is a 81y.o. year old female, here today for fluid in right ear and hearing loss. Symptoms for 6-8 months  Seen by PCP and told she had fluid in her right ear. Was started on flonase by PCP, 1 spray each nostril daily  Has sound of wind in the right ear, with intermittent ringing bilaterally, single high frequency, non-pulsatile. No pain or drainage from either ear  No hx of recurrent ear infections or prior ear surgeries  Does notice hearing loss in both ears, right worse than left  Denies dizziness  Family hx of hearing loss - mother  Denies significant noise exposure. Has been seen in the past by Dr. Geoff Rivera for same but feels her symptoms continue to worsen.       Past Medical History:   Diagnosis Date    Anxiety 10/11/2010    Breast cancer ([address] approx     right, treated lumpectomy, radiation, chemo    Depression 10/11/2010    no issues    Diverticular disease 10/11/2010    GERD (gastroesophageal reflux disease)     HTN (hypertension) 10/11/2010    Hyperlipidemia 10/11/2010    Osteoarthritis 10/11/2010    fot left knee arthroplasty 10/21/2016    Osteoporosis 10/11/2010    Spinal stenosis 10/11/2010    Use of cane as ambulatory aid      Past Surgical History:   Procedure Laterality Date    BACK SURGERY      BONE GRAFT      with back surgery    BREAST LUMPECTOMY      left, [facility] and rad, [facility]    CARPAL TUNNEL RELEASE      left    CARPAL TUNNEL RELEASE      repeat left    CATARACT REMOVAL      [facility]    COLONOSCOPY  2/3/2011    extensive diverticulosis, Dr. Olive Acharya, [address]      \"could not get around colon due to per tablet, TK 1 T PO Q 8 H PRN P, Disp: , Rfl:     NARCAN 4 MG/0.1ML LIQD nasal spray, 0.1 mLs by Nasal route once, Disp: , Rfl: 1    VILAZODONE HCL 10 MG Tablet, TK 1 T PO D, Disp: , Rfl: 1    MOVANTIK 12.5 MG TABS tablet, Take 1 tablet by mouth every morning, Disp: 90 tablet, Rfl: 3    vitamin B-12 (CYANOCOBALAMIN) 1000 MCG tablet, Take 1,000 mcg by mouth daily , Disp: , Rfl:     Pyridoxine HCl (VITAMIN B-6) 100 MG tablet, Take 100 mg by mouth daily, Disp: , Rfl:     Multiple Vitamin TABS, Take 1 capsule by mouth daily Hair ,skin and nails, Disp: , Rfl:   Patient has no known allergies. Social History     Tobacco Use    Smoking status: Never Smoker    Smokeless tobacco: Never Used   Substance Use Topics    Alcohol use: No     Alcohol/week: 0.0 standard drinks    Drug use: No     Family History   Problem Relation Age of Onset    Cancer Mother         throat    High Blood Pressure Mother     Mental Illness Father     Colon Cancer Father     Cancer Brother     Heart Disease Brother     Mental Illness Daughter     Mental Illness Son     Arthritis Sister     Cancer Brother     Pacemaker Brother     No Known Problems Brother        Review of Systems   Constitutional: Negative. Negative for activity change and appetite change. HENT: Positive for hearing loss and tinnitus. Negative for congestion, postnasal drip and rhinorrhea. Eyes: Negative. Respiratory: Negative. Negative for shortness of breath and stridor. Cardiovascular: Negative. Negative for chest pain and palpitations. Endocrine: Negative. Musculoskeletal: Negative. Skin: Negative. Neurological: Negative. Negative for dizziness. Hematological: Negative. Psychiatric/Behavioral: Negative.         /83 (Site: Right Upper Arm, Position: Sitting, Cuff Size: Large Adult)   Pulse 72   Temp 97 °F (36.1 °C)   Ht 5' 5\" (1.651 m)   Wt 198 lb (89.8 kg)   SpO2 97%   BMI 32.95 kg/m²   Physical nostril once daily as well as starting nasal saline, 2 sprays each nostril 3-4 times daily. Patient is instructed to call with any new or worsening symptoms prior to her appointment with Dr. Katherin Pickett.         DELMY Cho, FNP-C  [address] [place] [address], Nose and Throat    The information contained in this note has been dictated using drug and medical speech recognition software and may contain errors

## 2021-09-27 NOTE — PROGRESS NOTES
This patient was referred for audiometric/tympanometric testing by MARCIN Gamez due to bilateral hearing loss, worse on the right. Patient reported recent worsening of hearing loss. She reported having hearing aids, which she did not wear today. Audiometry revealed a moderately severe rising to moderate and sloping to severe mixed hearing loss, in the right ear and a moderate rising to mild and sloping to moderately severe sensorineural hearing loss, in the left ear. Reliability was good. Speech reception thresholds were in good agreement with the pure tone averages, bilaterally. Speech discrimination scores were poor (40%), in the right ear and excellent, in the left ear. Asymmetrical pure tone results and speech discrimination noted, worse on the right. Air-bone gaps noted in the right ear at 1000 and 4000 Hz. Tympanometry revealed normal middle ear peak pressure and compliance, in the right ear and hypercompliance, in the left ear. The results were reviewed with the patient. Recommendations for follow up will be made pending physician consult.       Gunner Fernando [facility]  [address] [facility] [id number]  Electronically signed by Gunner Fernando on 9/27/2021 at 12:34 PM

## 2021-09-28 ENCOUNTER — HOSPITAL ENCOUNTER (OUTPATIENT)
Dept: PHYSICAL THERAPY | Age: 86
Setting detail: THERAPIES SERIES
Discharge: HOME | End: 2021-09-28
Payer: MEDICARE

## 2021-09-28 ENCOUNTER — TELEPHONE (OUTPATIENT)
Dept: ENT CLINIC | Age: 86
End: 2021-09-28

## 2021-09-28 PROCEDURE — 97113 AQUATIC THERAPY/EXERCISES: CPT

## 2021-09-28 NOTE — TELEPHONE ENCOUNTER
[facility] to authorize order with patient insurance. Patient is scheduled for MRI IAC with radiology on 10/10/21 @ 12:45pm. Patient has been notified of date and time and that they need to arrive at 12:00pm. Patient was informed to have her bun/creatinine labs drawn prior to procedure. Patient instructed to park in [facility] parking lot and report to registration. Patient verbalized understanding. Patient's appointment with Dr. John Lynch has been rescheduled to 10/14/21 so MRI results are back.      Electronically signed by Melonie Hansen on 9/28/21 at 2:12 PM EDT

## 2021-09-30 ENCOUNTER — HOSPITAL ENCOUNTER (OUTPATIENT)
Dept: PHYSICAL THERAPY | Age: 86
Setting detail: THERAPIES SERIES
Discharge: HOME | End: 2021-09-30
Payer: MEDICARE

## 2021-09-30 PROCEDURE — 97113 AQUATIC THERAPY/EXERCISES: CPT

## 2021-09-30 NOTE — PROGRESS NOTES
[facility]  Phone: [phone] Fax: [phone]        Physical Therapy Aquatic Flow Sheet   Date:  2021    Patient Name:  Nicolas Melo    :  1935  Restrictions/Precautions:    DiagnosisBack and Leg Pain   ICD 10 Code: M54.5 :    Treatment Diagnosis:  Back and Leg Pain   ICD 10 Code: F22.0   Insurance/Certification information:    Referring Physician:  Dr Paige Hernandez of care signed (Y/N):    Visit# / total visits:  pool  Pain level: 10/10     Electronically signed by:  Mary Tidwell PTA  Time In:1000  Time Out:1100    Subjective:Patient presents to pT to assess and treat issues of persistent chronic pain affecting the lower back hips and LE's Patient has a long standing hx of persistent pain issues as well as various orthopedic issues      Key  B= Belt DB= Dumbells T= Theratube   H= Hydrotone N= Noodles W= Weights   P= Paddles S= Speedo equipment K= Kickboard     Exercises/Activities   Warm-up/Amb  Minutes  Exercises  Minutes    Slow forward   5  HR/TR  5    Slow sideways  5  Marches  5    Slow backwards  5  Mini-squats  5    Medium forward    4-way SLR  5    Medium sideways    Hip circles/fig 8  5    Small shuffle    Hamstring curls  5    Jog    Knee extension  5    Braiding    Pelvic tilts      Bicycling  5  Scap squeezes          Shoulder flex/ext      Functional    Shoulder abd/add      Step    Shoulder H. abd/add      Lifting    Shoulder IR/ER      Hand to opp knee    Rowing      Push down squat    Bilateral pull down      UE PNF    Push/pull      LE PNF    Push downs      Wall push ups    Arm circles      SLS    Elbow flex/ext          Chin tuck      Stretching    UT shrugs/rolls      Gastroc/Soleus    Rocking horse  Post-RX pain control    Hamstring    BLE/trunk hang   5 min    SKTC    Other      Piriformis          Hip flexor          Ladder pull          Pec stretch          Post deltoid           Timed Code Treatment Minutes:  60    Total Treatment Minutes:

## 2021-10-04 ENCOUNTER — HOSPITAL ENCOUNTER (OUTPATIENT)
Age: 86
Discharge: HOME | End: 2021-10-04
Payer: MEDICARE

## 2021-10-04 LAB
BUN BLDV-MCNC: 10 MG/DL (ref 6–23)
CREAT SERPL-MCNC: 0.9 MG/DL (ref 0.5–1)
GFR AFRICAN AMERICAN: >60
GFR NON-AFRICAN AMERICAN: >60 ML/MIN/1.73

## 2021-10-04 PROCEDURE — 82565 ASSAY OF CREATININE: CPT

## 2021-10-04 PROCEDURE — 36415 COLL VENOUS BLD VENIPUNCTURE: CPT

## 2021-10-04 PROCEDURE — 84520 ASSAY OF UREA NITROGEN: CPT

## 2021-10-05 ENCOUNTER — HOSPITAL ENCOUNTER (OUTPATIENT)
Dept: PHYSICAL THERAPY | Age: 86
Setting detail: THERAPIES SERIES
Discharge: HOME | End: 2021-10-05
Payer: MEDICARE

## 2021-10-05 PROCEDURE — 97113 AQUATIC THERAPY/EXERCISES: CPT

## 2021-10-05 NOTE — PROGRESS NOTES
[facility]  Phone: [phone] Fax: [phone]        Physical Therapy Aquatic Flow Sheet   Date:  10/5/2021    Patient Name:  Raza Baker    :  1935  Restrictions/Precautions:    Diagnosis:  Back and leg pain ICD 10 Code M54.5  Treatment Diagnosis:  Back and leg pain M82.8  Insurance/Certification information:  [facility]  Referring Physician:  Brittney Lebron of care signed (Y/N):    Visit# / total visits: 6 visits   Pain level: 10/10     Electronically signed by:  Parish Diamond PTA  Time In: 1000  Time Out:1100    Subjective:Pt describes back and leg pain that limit her ability to walk and stand. SHe explains \"unable to go to the store; cant walk or stand that long! \"      Key  B= Belt DB= Dumbells T= Theratube   H= Hydrotone N= Noodles W= Weights   P= Paddles S= Speedo equipment K= Kickboard     Exercises/Activities   Warm-up/Amb  Minutes  Exercises  Minutes    Slow forward  5  HR/TR  5    Slow sideways  5  Marches  5    Slow backwards  5  Mini-squats  5    Medium forward    4-way SLR  5    Medium sideways    Hip circles/fig 8  5    Small shuffle    Hamstring curls  5    Jog    Knee extension      Braiding    Pelvic tilts  5    Bicycling  5  Scap squeezes          Shoulder flex/ext      Functional    Shoulder abd/add      Step    Shoulder H. abd/add      Lifting    Shoulder IR/ER      Hand to opp knee    Rowing      Push down squat    Bilateral pull down      UE PNF    Push/pull      LE PNF    Push downs      Wall push ups    Arm circles      SLS    Elbow flex/ext          Chin tuck      Stretching    UT shrugs/rolls      Gastroc/Soleus    Rocking horse      Hamstring          SKTC    Other  POst-RX pain cpntrol    Piriformis    BLE trunk stretch   x 5     Hip flexor          Ladder pull          Pec stretch          Post deltoid           Timed Code Treatment Minutes:  60    Total Treatment Minutes:  60    Treatment/Activity Tolerance:  [x] Patient tolerated treatment well [] Patient limited by fatique  [] Patient limited by pain [] Patient limited by other medical complications  [x] Other: 1. Pt able to enter, exit and use pool safely. 2.  Pt able to self initiate exercises and complete using G form. 3.  Pt receptive that HEP is to be done; as not to lose progress gained. 4.  Better effort with upright postural alignment. Prognosis: [] Good [x] Fair  [] Poor      Plan: Pt is active [employer]; plans to continue [employer]. Recommend pt advance to aquatic aerobics to maintain progress made thus far. Physical therapy exercise booklet issued with Therapy Pool schedule open times.    [x] Discharge    Treatment Charges: Mins Units   Initial Evaluation     Re-Evaluation     Ther Exercise         TE     Aquatic Treatment 60 4   Ther Activities        TA     Gait Training          GT     Neuro Re-education NR     Modalities     Non-Billable Service Time     Other     Total Time/Units 60 4         Electronically signed by:  Ana Maria Miles PTA [id number]

## 2021-10-07 ENCOUNTER — HOSPITAL ENCOUNTER (OUTPATIENT)
Dept: PHYSICAL THERAPY | Age: 86
Setting detail: THERAPIES SERIES
End: 2021-10-07
Payer: MEDICARE

## 2021-10-07 ASSESSMENT — ENCOUNTER SYMPTOMS
RESPIRATORY NEGATIVE: 1
EYES NEGATIVE: 1
RHINORRHEA: 0
STRIDOR: 0
SHORTNESS OF BREATH: 0

## 2021-10-10 ENCOUNTER — HOSPITAL ENCOUNTER (OUTPATIENT)
Dept: MRI IMAGING | Age: 86
Discharge: HOME | End: 2021-10-12
Payer: MEDICARE

## 2021-10-10 DIAGNOSIS — H91.8X9 ASYMMETRICAL HEARING LOSS: ICD-10-CM

## 2021-10-10 PROCEDURE — 70551 MRI BRAIN STEM W/O DYE: CPT

## 2021-10-11 ENCOUNTER — NURSE TRIAGE (OUTPATIENT)
Dept: OTHER | Facility: CLINIC | Age: 86
End: 2021-10-11

## 2021-10-11 ENCOUNTER — OFFICE VISIT (OUTPATIENT)
Dept: FAMILY MEDICINE CLINIC | Age: 86
End: 2021-10-11
Payer: MEDICARE

## 2021-10-11 ENCOUNTER — HOSPITAL ENCOUNTER (OUTPATIENT)
Age: 86
Discharge: HOME | End: 2021-10-11
Payer: MEDICARE

## 2021-10-11 VITALS
WEIGHT: 203 LBS | DIASTOLIC BLOOD PRESSURE: 82 MMHG | TEMPERATURE: 97.9 F | HEIGHT: 65 IN | SYSTOLIC BLOOD PRESSURE: 139 MMHG | OXYGEN SATURATION: 98 % | HEART RATE: 71 BPM | BODY MASS INDEX: 33.82 KG/M2

## 2021-10-11 DIAGNOSIS — Z20.2 EXPOSURE TO STD: ICD-10-CM

## 2021-10-11 DIAGNOSIS — Z20.2 POTENTIAL EXPOSURE TO STD: ICD-10-CM

## 2021-10-11 DIAGNOSIS — N76.0 ACUTE VAGINITIS: Primary | ICD-10-CM

## 2021-10-11 DIAGNOSIS — Z76.0 ENCOUNTER FOR MEDICATION REFILL: ICD-10-CM

## 2021-10-11 DIAGNOSIS — A59.9 TRICHOMONAS INFECTION: ICD-10-CM

## 2021-10-11 LAB — SOURCE: NORMAL

## 2021-10-11 PROCEDURE — 1090F PRES/ABSN URINE INCON ASSESS: CPT | Performed by: FAMILY MEDICINE

## 2021-10-11 PROCEDURE — 1123F ACP DISCUSS/DSCN MKR DOCD: CPT | Performed by: FAMILY MEDICINE

## 2021-10-11 PROCEDURE — 1036F TOBACCO NON-USER: CPT | Performed by: FAMILY MEDICINE

## 2021-10-11 PROCEDURE — 87210 SMEAR WET MOUNT SALINE/INK: CPT | Performed by: FAMILY MEDICINE

## 2021-10-11 PROCEDURE — G8417 CALC BMI ABV UP PARAM F/U: HCPCS | Performed by: FAMILY MEDICINE

## 2021-10-11 PROCEDURE — 86592 SYPHILIS TEST NON-TREP QUAL: CPT

## 2021-10-11 PROCEDURE — 4040F PNEUMOC VAC/ADMIN/RCVD: CPT | Performed by: FAMILY MEDICINE

## 2021-10-11 PROCEDURE — 99212 OFFICE O/P EST SF 10 MIN: CPT | Performed by: FAMILY MEDICINE

## 2021-10-11 PROCEDURE — G8482 FLU IMMUNIZE ORDER/ADMIN: HCPCS | Performed by: FAMILY MEDICINE

## 2021-10-11 PROCEDURE — 36415 COLL VENOUS BLD VENIPUNCTURE: CPT

## 2021-10-11 PROCEDURE — 86703 HIV-1/HIV-2 1 RESULT ANTBDY: CPT

## 2021-10-11 PROCEDURE — G8427 DOCREV CUR MEDS BY ELIG CLIN: HCPCS | Performed by: FAMILY MEDICINE

## 2021-10-11 PROCEDURE — 80074 ACUTE HEPATITIS PANEL: CPT

## 2021-10-11 PROCEDURE — G8399 PT W/DXA RESULTS DOCUMENT: HCPCS | Performed by: FAMILY MEDICINE

## 2021-10-11 PROCEDURE — 99213 OFFICE O/P EST LOW 20 MIN: CPT | Performed by: FAMILY MEDICINE

## 2021-10-11 RX ORDER — AZELASTINE HYDROCHLORIDE 0.5 MG/ML
SOLUTION/ DROPS OPHTHALMIC
Qty: 6 ML | Refills: 2 | Status: SHIPPED
Start: 2021-10-11 | End: 2021-12-13 | Stop reason: SDUPTHER

## 2021-10-11 RX ORDER — METRONIDAZOLE 500 MG/1
500 TABLET ORAL 2 TIMES DAILY
Qty: 14 TABLET | Refills: 0 | Status: SHIPPED | OUTPATIENT
Start: 2021-10-11 | End: 2021-10-18

## 2021-10-11 ASSESSMENT — ENCOUNTER SYMPTOMS
SHORTNESS OF BREATH: 0
DIARRHEA: 0
WHEEZING: 0
VOMITING: 0
COUGH: 0
NAUSEA: 0
ABDOMINAL PAIN: 0
CONSTIPATION: 0

## 2021-10-11 NOTE — PROGRESS NOTES
S: 81 y.o. female presents today for Other (burning when peeing, very sore inside) and Rash (vaginal area)    New sexual partner and subsequent vaginal discomfort and dysuria  She has intercourse 5 days straight  Used peroxide and neosporin in her vagina  After developed bumps in the labia    O: VS: /82   Pulse 71   Temp 97.9 °F (36.6 °C) (Temporal)   Ht 5' 5\" (1.651 m)   Wt 203 lb (92.1 kg)   SpO2 98%   BMI 33.78 kg/m²   AAO/NAD, appropriate affect for mood  CV:  RRR, no murmur  Resp: CTAB  Abdomen: SNTND  Ext: no edema  : areas of hyperpigmentation / keratosis; no vesicular lesions; no abnormal discharge; no cervical motion tenderness but discomfort with speculum insertion  Wet mount with flagellated organisms    Assessment/Plan:   1) new sex partner - labs as ordered  2) vaginal burning - stop peroxide and neosporin; stop intercourse until resolution of symptoms  3) Trich - treat with flagyl; will need to treat partner; no sexual activity until treatment is completed  RTO: 2-3 weeks f/u     Attending Physician Statement  I have discussed the case, including pertinent history and exam findings with the resident. I agree with the documented assessment and plan.       Electronically signed by Isa Heard MD on 10/12/2021 at 5:35 PM

## 2021-10-11 NOTE — TELEPHONE ENCOUNTER
[facility]. Brief description of triage: 82 y/o with vaginal pain  Onset  7 days  Some itching with  Bumps on the labia, pt reports in ability to sit     Triage indicates for patient to  Seen in  Next  24 hours      Care advice provided, patient verbalizes understanding; denies any other questions or concerns; instructed to call back for any new or worsening symptoms. Writer provided warm transfer to  Shaina  at [facility] for appointment scheduling. Attention Provider: Thank you for allowing me to participate in the care of your patient. The patient was connected to triage in response to information provided to the [facility]. Please do not respond through this encounter as the response is not directed to a shared pool. Reason for Disposition   [1] MILD-MODERATE pain AND [2] present > 24 hours    Answer Assessment - Initial Assessment Questions  1. SYMPTOM: \"What's the main symptom you're concerned about? \" (e.g., pain, itching, dryness)      Pain , itching and small bumps     2. LOCATION: \"Where is the  *No Answer* located? \" (e.g., inside/outside, left/right)      Outside    3. ONSET: \"When did the  *No Answer*  start?\"      1 wk    4. PAIN: \"Is there any pain? \" If so, ask: \"How bad is it? \" (Scale: 1-10; mild, moderate, severe)      Pain with sitting     5. ITCHING: \"Is there any itching? \" If so, ask: \"How bad is it? \" (Scale: 1-10; mild, moderate, severe)       Mild  Itching     6. CAUSE: \"What do you think is causing the discharge? \" \"Have you had the same problem before? What happened then? \"        Denies      7. OTHER SYMPTOMS: \"Do you have any other symptoms? \" (e.g., fever, itching, vaginal bleeding, pain with urination, injury to genital area, vaginal foreign body)      Itching , slight burning     8. PREGNANCY: \"Is there any chance you are pregnant? \" \"When was your last menstrual period? \"      n/a    Protocols used: VAGINAL

## 2021-10-11 NOTE — PROGRESS NOTES
[facility] MEDICINE RESIDENCY PROGRAM  DATE OF VISIT : 10/11/2021    Patient : Xavier Cisneros   Age : 81 y.o.  : 1935   MRN : [id number]   ______________________________________________________________________    Chief Complaint :   Chief Complaint   Patient presents with    Other     burning when peeing, very sore inside    Rash     vaginal area       HPI : Xavier Cisneros is 81 y.o. female who presented to the clinic today for vaginal itching. Started about 7-8 days ago. Also associated with lesions on her genital region, has tried neosporin on it and peroxide with no relief. Also notes her last sexual encounter about 1 week ago with a new partner, no barrier contraception. Denies any vaginal discharge or bleeding, no suprapubic pressure/pain. Denies fevers, chills, nausea or vomiting. I reviewed the patient's past medications, allergies and past medical history during this visit.     Past Medical History :        Past Medical History:   Diagnosis Date    Anxiety 10/11/2010    Breast cancer ([address] approx     right, treated lumpectomy, radiation, chemo    Depression 10/11/2010    no issues    Diverticular disease 10/11/2010    GERD (gastroesophageal reflux disease)     HTN (hypertension) 10/11/2010    Hyperlipidemia 10/11/2010    Osteoarthritis 10/11/2010    fot left knee arthroplasty 10/21/2016    Osteoporosis 10/11/2010    Spinal stenosis 10/11/2010    Use of cane as ambulatory aid      Past Surgical History:   Procedure Laterality Date    BACK SURGERY      BONE GRAFT      with back surgery    BREAST LUMPECTOMY      left, with [facility], [facility]    CARPAL TUNNEL RELEASE      left    CARPAL TUNNEL RELEASE      repeat left    [facility]ARACT [facility]AL      [facility]    COLONOSCOPY  2/3/2011    extensive diverticulosis, Dr. Romario Horn, [address]      \"could not get around colon due to diverticulitis, [address] COLONOSCOPY  2/24/2015    severe diverticulosis transverse and left colon without diverticulitis, rectal polyp bx, Dr. Khadijah Tang, [address] [place] [address]    HYSTERECTOMY  1980s    still has ovaries, no cancer, [facility]  2009    [facility], pins and rods in place    TOTAL KNEE ARTHROPLASTY Right 10/21/2016    TOTAL KNEE ARTHROPLASTY Left 10/2017    replacement, dr Claudeen Southern  2/3/2011    gastritis, Dr. Godl Eaton, [address] GASTROINTESTINAL ENDOSCOPY  2013    gastritis, [facility]    UPPER GASTROINTESTINAL ENDOSCOPY  2/24/2015    mild to moderate gastritis and duodenitis, gastric bx for H pylori, Dr. Nima Velásquez History :  Social History     Tobacco History     Smoking Status  Never Smoker    Smokeless Tobacco Use  Never Used          Alcohol History     Alcohol Use Status  No          Drug Use     Drug Use Status  No          Sexual Activity     Sexually Active  Never                 Allergies :   No Known Allergies    Medication List :    Current Outpatient Medications   Medication Sig Dispense Refill    azelastine (OPTIVAR) 0.05 % ophthalmic solution INSTILL ONE DROP INTO EACH EYE TWICE DAILY 6 mL 2    metroNIDAZOLE (FLAGYL) 500 MG tablet Take 1 tablet by mouth 2 times daily for 7 days 14 tablet 0    fluticasone (FLONASE) 50 MCG/ACT nasal spray 1 spray by Each Nostril route daily 2 Bottle 1    sucralfate (CARAFATE) 1 GM tablet TAKE ONE TABLET BY MOUTH FOUR TIMES A  tablet 2    Polyvinyl Alcohol-Povidone PF 1.4-0.6 % SOLN One-two drops in affected eye every 8 hours prn 1 Bottle 2    pravastatin (PRAVACHOL) 40 MG tablet TAKE 1 TABLET DAILY 90 tablet 3    omeprazole (PRILOSEC) 20 MG delayed release capsule Take 1 capsule by mouth daily 90 capsule 3    docusate sodium (COLACE) 100 MG capsule Take 1 capsule by mouth 2 times daily 60 capsule 2    amLODIPine-benazepril (LOTREL) 5-20 MG per capsule Take 1 capsule by mouth daily 90 capsule 3    chlorhexidine (PERIDEX) 0.12 % solution Take 15 mLs by mouth 2 times daily 473 mL 5    erythromycin (ROMYCIN) 5 MG/GM ophthalmic ointment APPLY A SMALL AMOUNT INTO BOTH EYES AT BEDTIME FOR 14 DAYS      oxyCODONE-acetaminophen (PERCOCET) 5-325 MG per tablet TK 1 T PO Q 8 H PRN P      NARCAN 4 MG/0.1ML LIQD nasal spray 0.1 mLs by Nasal route once  1    VILAZODONE HCL 10 MG Tablet TK 1 T PO D  1    MOVANTIK 12.5 MG TABS tablet Take 1 tablet by mouth every morning 90 tablet 3    vitamin B-12 (CYANOCOBALAMIN) 1000 MCG tablet Take 1,000 mcg by mouth daily       Pyridoxine HCl (VITAMIN B-6) 100 MG tablet Take 100 mg by mouth daily      Multiple Vitamin TABS Take 1 capsule by mouth daily Hair ,skin and nails       No current facility-administered medications for this visit. Review of Systems :  Review of Systems   Constitutional: Negative for chills, fatigue and fever. Respiratory: Negative for cough, shortness of breath and wheezing. Cardiovascular: Negative for chest pain, palpitations and leg swelling. Gastrointestinal: Negative for abdominal pain, constipation, diarrhea, nausea and vomiting. Genitourinary: Positive for dysuria, frequency, genital sores, pelvic pain and urgency. Negative for dyspareunia, vaginal bleeding, vaginal discharge and vaginal pain. Neurological: Negative for dizziness, seizures, weakness, light-headedness, numbness and headaches.     ______________________________________________________________________    Physical Exam :    Vitals: /82   Pulse 71   Temp 97.9 °F (36.6 °C) (Temporal)   Ht 5' 5\" (1.651 m)   Wt 203 lb (92.1 kg)   SpO2 98%   BMI 33.78 kg/m²   Physical Exam  Vitals reviewed. Constitutional:       General: She is not in acute distress. Appearance: Normal appearance. Cardiovascular:      Rate and Rhythm: Normal rate and regular rhythm. Pulses: Normal pulses. Heart sounds: Normal heart sounds. No murmur heard. Pulmonary:      Effort: Pulmonary effort is normal. No respiratory distress. Breath sounds: Normal breath sounds. No wheezing. Abdominal:      General: Bowel sounds are normal. There is no distension. Palpations: Abdomen is soft. Tenderness: There is no abdominal tenderness. Musculoskeletal:      Right lower leg: No edema. Left lower leg: No edema. Comments: : areas of hyperpigmentation / keratosis; no vesicular lesions; no abnormal discharge; no cervical motion tenderness but discomfort with speculum insertion  Wet mount with flagellated organisms   Neurological:      Mental Status: She is alert.     ___________________    Assessment & Plan :    1. Potential exposure to STD  2. Trichomonas infection  3. Exposure to STD  - Flagellated organisms noted on microscopic exam  - Discussed with patient the need for partner to be treated  - HIV Screen; Future  - HEPATITIS PANEL, ACUTE; Future  - HERPES SIMPLEX 1 & 2 MOLECULAR; Future  - POCT Wet Prep  - RPR Reflex to Titer and TPPA; Future  - C.TRACHOMATIS [facility]; Future  - metroNIDAZOLE (FLAGYL) 500 MG tablet; Take 1 tablet by mouth 2 times daily for 7 days  Dispense: 14 tablet; Refill: 0    Additional plan and future considerations:   RTO in 1mos with PCP    Return to Office: No follow-ups on file.     Manav Devlin MD   Case discussed with Dr. Sukumar Carreon

## 2021-10-12 ENCOUNTER — APPOINTMENT (OUTPATIENT)
Dept: PHYSICAL THERAPY | Age: 86
End: 2021-10-12
Payer: MEDICARE

## 2021-10-12 LAB
HAV IGM SER IA-ACNC: NORMAL
HEPATITIS B CORE IGM ANTIBODY: NORMAL
HEPATITIS B SURFACE ANTIGEN INTERPRETATION: NORMAL
HEPATITIS C ANTIBODY INTERPRETATION: NORMAL
HIV-1 AND HIV-2 ANTIBODIES: NORMAL
REASON FOR REJECTION: NORMAL
REJECTED TEST: NORMAL
RPR: NORMAL

## 2021-10-14 ENCOUNTER — OFFICE VISIT (OUTPATIENT)
Dept: ENT CLINIC | Age: 86
End: 2021-10-14
Payer: MEDICARE

## 2021-10-14 ENCOUNTER — PROCEDURE VISIT (OUTPATIENT)
Dept: AUDIOLOGY | Age: 86
End: 2021-10-14
Payer: MEDICARE

## 2021-10-14 ENCOUNTER — APPOINTMENT (OUTPATIENT)
Dept: PHYSICAL THERAPY | Age: 86
End: 2021-10-14
Payer: MEDICARE

## 2021-10-14 VITALS
SYSTOLIC BLOOD PRESSURE: 161 MMHG | OXYGEN SATURATION: 97 % | RESPIRATION RATE: 14 BRPM | WEIGHT: 200 LBS | DIASTOLIC BLOOD PRESSURE: 70 MMHG | HEART RATE: 73 BPM | BODY MASS INDEX: 33.32 KG/M2 | HEIGHT: 65 IN

## 2021-10-14 DIAGNOSIS — H90.3 SENSORINEURAL HEARING LOSS, BILATERAL: ICD-10-CM

## 2021-10-14 DIAGNOSIS — H90.A31 MIXED CONDUCTIVE AND SENSORINEURAL HEARING LOSS OF RIGHT EAR WITH RESTRICTED HEARING OF LEFT EAR: Primary | ICD-10-CM

## 2021-10-14 DIAGNOSIS — H91.8X9 ASYMMETRICAL HEARING LOSS: Primary | ICD-10-CM

## 2021-10-14 PROBLEM — H91.8X3 ASYMMETRICAL HEARING LOSS: Status: ACTIVE | Noted: 2021-10-14

## 2021-10-14 LAB
C. TRACHOMATIS DNA ,URINE: NEGATIVE
N. GONORRHOEAE DNA, URINE: NEGATIVE
SOURCE: NORMAL

## 2021-10-14 PROCEDURE — 69210 REMOVE IMPACTED EAR WAX UNI: CPT | Performed by: OTOLARYNGOLOGY

## 2021-10-14 PROCEDURE — 92626 EVAL AUD FUNCJ 1ST HOUR: CPT | Performed by: AUDIOLOGIST

## 2021-10-14 PROCEDURE — 99215 OFFICE O/P EST HI 40 MIN: CPT | Performed by: OTOLARYNGOLOGY

## 2021-10-14 NOTE — PROGRESS NOTES
This patient was referred for audiometric/tympanometric testing by Dr. Katherin Pickett due to longstanding history of hearing loss. Patient reported wearing one hearing aid, on the right. Audiometry revealed a mild sloping to severe sensorineural hearing loss, in the right ear and an essentially mild sensorineural hearing loss, in the left ear. Reliability was good. Speech reception thresholds were in good agreement with the pure tone averages, bilaterally. Speech discrimination scores were poor (30%), in the right ear and excellent, in the left ear. Patient was fit with programmed [employer]IO hearing aids and aided speech testing was performed in the sound field. [employer] scores in quiet were 86%, binaurally. In +10 dB SNR, scores were 76%, binaurally. Tympanometry revealed normal middle ear peak pressure and compliance, bilaterally. The results were reviewed with the patient. Recommendations for follow up will be made pending physician consult. Patient is interested in getting a hearing aid for her left ear. She did not bring her current hearing aid in today and was not sure of the kind. She will contact her insurance for information ([employer]) on benefits and will follow up with us. Leandro Ricketts, [address] [place] [address] [id number]  Electronically signed by Gunner Ashby on 10/14/2021 at 4:13 PM       Note: At least 35 minutes spent face to face with patient collecting case history, performing tests, and reviewing results.

## 2021-10-14 NOTE — PROGRESS NOTES
NEW PATIENT VISIT  Chief Complaint   Patient presents with    Hearing Loss     MR pt. B/L hearing loss, HA in right. Was told has fluid     Results     MRI      History of Present Illness:     Burak Green is a 81 y.o. female presenting with hearing loss that has been present since she turned about [de-identified]. She has no prior issues with her ears: no OM, no ear tubes, no infections. She states her hearing in her right ear is worse. She denies any drainage or ear pain.  She states she has a hearing aid for her right ear which she paid for out of pocket    MRI (non-contrast) done due to lack of IV access              TOBACCO  Social History     Tobacco Use   Smoking Status Never Smoker   Smokeless Tobacco Never Used        ALCOHOL   Social History     Substance and Sexual Activity   Alcohol Use No    Alcohol/week: 0.0 standard drinks        [address]   Social History     Substance and Sexual Activity   Drug Use No        CURRENT OUTPATIENT MEDICATIONS:   Outpatient Medications Marked as Taking for the 10/14/21 encounter (Office Visit) with Rosa Demarco MD   Medication Sig Dispense Refill    azelastine (OPTIVAR) 0.05 % ophthalmic solution INSTILL ONE DROP INTO EACH EYE TWICE DAILY 6 mL 2    metroNIDAZOLE (FLAGYL) 500 MG tablet Take 1 tablet by mouth 2 times daily for 7 days 14 tablet 0    fluticasone (FLONASE) 50 MCG/ACT nasal spray 1 spray by Each Nostril route daily 2 Bottle 1    sucralfate (CARAFATE) 1 GM tablet TAKE ONE TABLET BY MOUTH FOUR TIMES A  tablet 2    Polyvinyl Alcohol-Povidone PF 1.4-0.6 % SOLN One-two drops in affected eye every 8 hours prn 1 Bottle 2    pravastatin (PRAVACHOL) 40 MG tablet TAKE 1 TABLET DAILY 90 tablet 3    omeprazole (PRILOSEC) 20 MG delayed release capsule Take 1 capsule by mouth daily 90 capsule 3    docusate sodium (COLACE) 100 MG capsule Take 1 capsule by mouth 2 times daily 60 capsule 2    amLODIPine-benazepril (LOTREL) 5-20 MG per capsule Take 1 capsule by mouth daily 90 capsule 3    chlorhexidine (PERIDEX) 0.12 % solution Take 15 mLs by mouth 2 times daily 473 mL 5    erythromycin (ROMYCIN) 5 MG/GM ophthalmic ointment APPLY A SMALL AMOUNT INTO BOTH EYES AT BEDTIME FOR 14 DAYS      oxyCODONE-acetaminophen (PERCOCET) 5-325 MG per tablet TK 1 T PO Q 8 H PRN P      NARCAN 4 MG/0.1ML LIQD nasal spray 0.1 mLs by Nasal route once  1    VILAZODONE HCL 10 MG Tablet TK 1 T PO D  1    MOVANTIK 12.5 MG TABS tablet Take 1 tablet by mouth every morning 90 tablet 3    vitamin B-12 (CYANOCOBALAMIN) 1000 MCG tablet Take 1,000 mcg by mouth daily       Pyridoxine HCl (VITAMIN B-6) 100 MG tablet Take 100 mg by mouth daily      Multiple Vitamin TABS Take 1 capsule by mouth daily Hair ,skin and nails          ALLERGIES:   No Known Allergies    Timing Age of Onset 80y/o   Duration Increasing in Severity Yes   Days of work missed in last year n/a      Modifying Factors Seasonal variation No        Associated Symptoms Mouth breathing No    Communication concerns Yes    Halitosis No     Family History Family members with similar conditions No   Family history of bleeding concerns No   Family history of anesthia concerns No        Review of Symptoms:  I have reviewed the patient's medical history in detail; there are no changes to the history as noted in the electronic medical record.        BP (!) 161/70 (Site: Right Upper Arm, Position: Sitting, Cuff Size: Medium Adult)   Pulse 73   Resp 14   Ht 5' 5\" (1.651 m)   Wt 200 lb (90.7 kg)   SpO2 97%   BMI 33.28 kg/m²     Physical Exam    Allergies No Known Allergies   Constitutional Retractions/cyanosis {No     Head and Face   Lesions or masses No  facies symmetrical Yes   Eyes Ocular motion with gaze alignment Yes   Ears Inspection: Scars, lesions or masses No   Otoscopy  negative findings: external ears normal to inspection and palpation, TM's clear, normal light reflex, no mastoid process tenderness, positive findings: cerumen which was cleaned      Nasal Inspection    No external Scars, lesions or masses    Pyriform apertures widely patent    Nasal musosa healthy   Septum Midline, no Septal Perforation, no septal hematoma   Turbinates Intact, healthy    Oral Cavity Lips no lesions    Teeth healthy without cavities    Gums no lesions    Oral mucosa healthy    Hard and Soft Palate no lesions    Uvula single fid     Tongue no lesions    Tonsils normal size Symmetric without exudate   Neck . Neck supple without tenderness or crepitus   Lymph  Cranial Nerve exam No palpable adenopathy  Grossly intact. CN VII symmetrical   Respiration Symmetric without Increased work of breathing    Cardiovacular No Cyanosis    Skin healthy   Diagnostics    Test ordered No orders of the defined types were placed in this encounter. Review of existing tests Lab Results   Component Value Date/Time    WBC 4.2 (L) 03/18/2021 12:00 PM    HGB 13.2 03/18/2021 12:00 PM    HCT 40.1 03/18/2021 12:00 PM     03/18/2021 12:00 PM    MCV 94.6 03/18/2021 12:00 PM    MCH 31.1 03/18/2021 12:00 PM    MCHC 32.9 03/18/2021 12:00 PM    RDW 13.6 03/18/2021 12:00 PM    NEUTOPHILPCT 31.7 (L) 03/18/2021 12:00 PM    LYMPHOPCT 51.7 (H) 03/18/2021 12:00 PM    MONOPCT 9.9 03/18/2021 12:00 PM    EOSRELPCT 5.3 03/18/2021 12:00 PM    BASOPCT 1.2 03/18/2021 12:00 PM    NEUTROABS 1.32 (L) 03/18/2021 12:00 PM    LYMPHSABS 2.15 03/18/2021 12:00 PM    EOSABS 0.22 03/18/2021 12:00 PM        Old records  Reviewed   Discussion with other providers    Done     On this date 10/14/2021 I have spent 15 minutes reviewing previous notes, test results and 45 minutes face to face with the patient discussing the diagnosis and importance of compliance with the treatment plan as well as documenting on the day of the visit. Procedure: Bilateral ear cleaning    Procedure: The right ear was visualized with the ear microscope, and excessive cerumen was removed.  The tympanic membrane was healthy, pneumatized and

## 2021-10-19 ENCOUNTER — APPOINTMENT (OUTPATIENT)
Dept: PHYSICAL THERAPY | Age: 86
End: 2021-10-19
Payer: MEDICARE

## 2021-10-21 ENCOUNTER — APPOINTMENT (OUTPATIENT)
Dept: PHYSICAL THERAPY | Age: 86
End: 2021-10-21
Payer: MEDICARE

## 2021-10-25 ENCOUNTER — OFFICE VISIT (OUTPATIENT)
Dept: FAMILY MEDICINE CLINIC | Age: 86
End: 2021-10-25
Payer: MEDICARE

## 2021-10-25 VITALS
RESPIRATION RATE: 18 BRPM | HEIGHT: 65 IN | DIASTOLIC BLOOD PRESSURE: 84 MMHG | SYSTOLIC BLOOD PRESSURE: 123 MMHG | BODY MASS INDEX: 33.82 KG/M2 | TEMPERATURE: 97.2 F | HEART RATE: 73 BPM | OXYGEN SATURATION: 96 % | WEIGHT: 203 LBS

## 2021-10-25 DIAGNOSIS — I10 ESSENTIAL HYPERTENSION: Chronic | ICD-10-CM

## 2021-10-25 DIAGNOSIS — F33.41 RECURRENT MAJOR DEPRESSIVE DISORDER, IN PARTIAL REMISSION (HCC): ICD-10-CM

## 2021-10-25 DIAGNOSIS — N76.1 SUBACUTE VAGINITIS: ICD-10-CM

## 2021-10-25 DIAGNOSIS — M48.061 SPINAL STENOSIS OF LUMBAR REGION, UNSPECIFIED WHETHER NEUROGENIC CLAUDICATION PRESENT: Chronic | ICD-10-CM

## 2021-10-25 DIAGNOSIS — M17.0 PRIMARY OSTEOARTHRITIS OF BOTH KNEES: Chronic | ICD-10-CM

## 2021-10-25 DIAGNOSIS — M47.812 CERVICAL ARTHRITIS: ICD-10-CM

## 2021-10-25 DIAGNOSIS — Z76.0 ENCOUNTER FOR MEDICATION REFILL: ICD-10-CM

## 2021-10-25 DIAGNOSIS — E78.5 HYPERLIPIDEMIA, UNSPECIFIED HYPERLIPIDEMIA TYPE: Primary | Chronic | ICD-10-CM

## 2021-10-25 PROBLEM — Z96.1 PSEUDOPHAKIA: Status: ACTIVE | Noted: 2021-07-13

## 2021-10-25 PROBLEM — H35.349 MACULAR PSEUDOHOLE: Status: ACTIVE | Noted: 2021-07-13

## 2021-10-25 PROCEDURE — G8427 DOCREV CUR MEDS BY ELIG CLIN: HCPCS | Performed by: FAMILY MEDICINE

## 2021-10-25 PROCEDURE — 1090F PRES/ABSN URINE INCON ASSESS: CPT | Performed by: FAMILY MEDICINE

## 2021-10-25 PROCEDURE — 1123F ACP DISCUSS/DSCN MKR DOCD: CPT | Performed by: FAMILY MEDICINE

## 2021-10-25 PROCEDURE — 99213 OFFICE O/P EST LOW 20 MIN: CPT | Performed by: FAMILY MEDICINE

## 2021-10-25 PROCEDURE — 4040F PNEUMOC VAC/ADMIN/RCVD: CPT | Performed by: FAMILY MEDICINE

## 2021-10-25 PROCEDURE — G8417 CALC BMI ABV UP PARAM F/U: HCPCS | Performed by: FAMILY MEDICINE

## 2021-10-25 PROCEDURE — G8399 PT W/DXA RESULTS DOCUMENT: HCPCS | Performed by: FAMILY MEDICINE

## 2021-10-25 PROCEDURE — 99214 OFFICE O/P EST MOD 30 MIN: CPT | Performed by: FAMILY MEDICINE

## 2021-10-25 PROCEDURE — 1036F TOBACCO NON-USER: CPT | Performed by: FAMILY MEDICINE

## 2021-10-25 PROCEDURE — G8482 FLU IMMUNIZE ORDER/ADMIN: HCPCS | Performed by: FAMILY MEDICINE

## 2021-10-25 RX ORDER — CHLORHEXIDINE GLUCONATE 0.12 MG/ML
15 RINSE ORAL 2 TIMES DAILY
Qty: 473 ML | Refills: 5 | Status: SHIPPED
Start: 2021-10-25 | End: 2022-02-25 | Stop reason: SDUPTHER

## 2021-10-25 RX ORDER — AMLODIPINE BESYLATE AND BENAZEPRIL HYDROCHLORIDE 5; 20 MG/1; MG/1
1 CAPSULE ORAL DAILY
Qty: 90 CAPSULE | Refills: 3 | Status: SHIPPED
Start: 2021-10-25 | End: 2021-12-13 | Stop reason: DRUGHIGH

## 2021-10-25 ASSESSMENT — LIFESTYLE VARIABLES: HOW OFTEN DO YOU HAVE A DRINK CONTAINING ALCOHOL: NEVER

## 2021-10-25 NOTE — PROGRESS NOTES
[facility] [place] [id number]  FAMILY MEDICINE RESIDENCY PROGRAM   OFFICE PROGRESS NOTE  DATE OF VISIT : 10/25/2021         Chief Complaint :   Chief Complaint   Patient presents with    Check-Up     head and neck pain,vaginal pain, hand pain       HPI:   Flakita Johnston comes to clinic today for     F/U of chronic problem(s) and new or recent complaint of vaginal soreness     Chronic problems reviewed today include:     Hypertension, Hyperlipidemia, Osteoarthritis, Depression and GERD    Current status of this/these condition(s):  stable    Tolerating meds: Yes    Additional history: Stanley Myers is here for f/u on chronic diseases, all of which she believes are stable. However, she has been having some neck pain and stiffness since a fall earlier this year and says that her pain management doctor told her to get an MRI. She describes some pain with movement and at night in posterior neck. She denies radiation into the arms, weakness, paresthesias or other symptoms. Her main concern is persistent vaginal soreness. She is quite concerned about the trichomonas which was found at her last visit. She has completed the medication without side-effects, and notes no discharge or odor. However,, she continues to have some irritation of the left labial area, as well as some groin pain. She also is concerned because she has had decreased libido since her recent visit. She says that she has not been able to have an orgasm since then. Her partner is currently in the hospital for the past week, so she has had no sex over the past week. Objective:    Vitals: /84 (Site: Right Upper Arm, Position: Sitting, Cuff Size: Medium Adult)   Pulse 73   Temp 97.2 °F (36.2 °C) (Temporal)   Resp 18   Ht 5' 5\" (1.651 m)   Wt 203 lb (92.1 kg)   SpO2 96%   BMI 33.78 kg/m²   General Appearance: Well developed, awake, alert, oriented, and in NAD  Neck:  Mild tenderness posterior cervical muscles. Normal ROM without pain. DTR's, strength and sensation in arms normal  Chest wall/Lung: Clear to auscultation bilaterally,  respirations unlabored. No rhonchi/wheezing/rales  Heart: Regular rate and rhythm, S1and S2 normal, no murmur, rub or gallop. Neurologic: Alert, oriented. Moves all 4 limbs. Sensation grossly intact. Psychiatric: has a normal mood and affect. Behavior is normal.   Gen:  Normal introitus and vagina, with some laxity of vaginal walls. No discharge present. Cervix absent. Wet prep - no trichomonas, yeast or clue cells seen    I independently reviewed the following information:  historical medical records and lab reports    1. Hyperlipidemia, unspecified hyperlipidemia type  2. Primary osteoarthritis of both knees  3. Spinal stenosis of lumbar region, unspecified whether neurogenic claudication present  4. Essential hypertension  5. Recurrent major depressive disorder, in partial remission ([facility] [address]  6. Encounter for medication refill  -     chlorhexidine (PERIDEX) 0.12 % solution; Take 15 mLs by mouth 2 times daily, Disp-473 mL, R-5Normal  7. Cervical arthritis  8. Subacute vaginitis      Additional Plan:    Her chronic conditions are stable and treatment should remain the same. I suspect at least some of her discomfort is related to her arthritis in back and hips. I find no abnormalities on her GYN exam or wet prep, and believe that she is still having symptoms from frequent sexual intercourse after a long period of abstinence. The changes that she notes in her libido may be a result of finding out that she developed an infection. I reassured her that all other findings were also normal.  She may use heat or ice and ROM exercises for her neck. I do not believe that an MRI is indicated (she requested one)      RTO in in 2 month(s) or sooner for any persistent, new, or worsening symptoms. Please see Patient Instructions for further counseling and information given.        Advised to be adherent to the treatment plans discussed today, and please call with any questions or concerns, letting the office know of any reasons that the plans may not be followed. The risks of untreated conditions include worsening illness, injury, disability, and possibly, death. Please call if symptoms change in any way, worsen, or fail to completely resolve, as this could necessitate a change to treatment plans. Patient and/or caregiver expressed understanding. Indications and proper use of medication(s) reviewed. Potential side-effects and risks of medication(s) also explained. Patient and/or caregiver was instructed to call if any new symptoms develop prior to next visit. Health risk factors discussed and addressed.     Signed by : Mai Gregory MD, M.D.

## 2021-10-26 ENCOUNTER — APPOINTMENT (OUTPATIENT)
Dept: PHYSICAL THERAPY | Age: 86
End: 2021-10-26
Payer: MEDICARE

## 2021-11-22 ENCOUNTER — IMMUNIZATION (OUTPATIENT)
Dept: PRIMARY CARE CLINIC | Age: 86
End: 2021-11-22
Payer: MEDICARE

## 2021-11-22 PROCEDURE — 0004A COVID-19, PFIZER VACCINE 30MCG/0.3ML DOSE: CPT | Performed by: NURSE PRACTITIONER

## 2021-11-22 PROCEDURE — 91300 COVID-19, PFIZER VACCINE 30MCG/0.3ML DOSE: CPT | Performed by: NURSE PRACTITIONER

## 2021-12-13 ENCOUNTER — OFFICE VISIT (OUTPATIENT)
Dept: FAMILY MEDICINE CLINIC | Age: 86
End: 2021-12-13
Payer: MEDICARE

## 2021-12-13 VITALS
OXYGEN SATURATION: 98 % | WEIGHT: 204 LBS | HEIGHT: 65 IN | SYSTOLIC BLOOD PRESSURE: 158 MMHG | DIASTOLIC BLOOD PRESSURE: 81 MMHG | TEMPERATURE: 96.6 F | BODY MASS INDEX: 33.99 KG/M2 | HEART RATE: 75 BPM

## 2021-12-13 DIAGNOSIS — N89.8 VAGINAL DISCHARGE: ICD-10-CM

## 2021-12-13 DIAGNOSIS — J30.2 SEASONAL ALLERGIC RHINITIS, UNSPECIFIED TRIGGER: ICD-10-CM

## 2021-12-13 DIAGNOSIS — R82.90 FOUL SMELLING URINE: ICD-10-CM

## 2021-12-13 DIAGNOSIS — R82.90 FOUL SMELLING URINE: Primary | ICD-10-CM

## 2021-12-13 DIAGNOSIS — M54.50 CHRONIC MIDLINE LOW BACK PAIN WITHOUT SCIATICA: ICD-10-CM

## 2021-12-13 DIAGNOSIS — I10 ESSENTIAL HYPERTENSION: ICD-10-CM

## 2021-12-13 DIAGNOSIS — G89.29 CHRONIC MIDLINE LOW BACK PAIN WITHOUT SCIATICA: ICD-10-CM

## 2021-12-13 DIAGNOSIS — Z76.0 ENCOUNTER FOR MEDICATION REFILL: ICD-10-CM

## 2021-12-13 LAB
BACTERIA: ABNORMAL /HPF
BILIRUBIN URINE: NEGATIVE
BILIRUBIN, POC: NORMAL
BLOOD URINE, POC: NORMAL
BLOOD, URINE: NEGATIVE
CLARITY, POC: CLEAR
CLARITY: CLEAR
COLOR, POC: YELLOW
COLOR: YELLOW
EPITHELIAL CELLS, UA: ABNORMAL /HPF
GLUCOSE URINE, POC: NORMAL
GLUCOSE URINE: NEGATIVE MG/DL
KETONES, POC: NORMAL
KETONES, URINE: NEGATIVE MG/DL
LEUKOCYTE EST, POC: NORMAL
LEUKOCYTE ESTERASE, URINE: ABNORMAL
NITRITE, POC: NORMAL
NITRITE, URINE: NEGATIVE
PH UA: 6 (ref 5–9)
PH, POC: 5.5
PROTEIN UA: NEGATIVE MG/DL
PROTEIN, POC: NORMAL
RBC UA: ABNORMAL /HPF (ref 0–2)
SPECIFIC GRAVITY UA: <=1.005 (ref 1–1.03)
SPECIFIC GRAVITY, POC: 1.01
UROBILINOGEN, POC: 0.2
UROBILINOGEN, URINE: 0.2 E.U./DL
WBC UA: ABNORMAL /HPF (ref 0–5)

## 2021-12-13 PROCEDURE — G8482 FLU IMMUNIZE ORDER/ADMIN: HCPCS | Performed by: FAMILY MEDICINE

## 2021-12-13 PROCEDURE — 99213 OFFICE O/P EST LOW 20 MIN: CPT | Performed by: STUDENT IN AN ORGANIZED HEALTH CARE EDUCATION/TRAINING PROGRAM

## 2021-12-13 PROCEDURE — 4040F PNEUMOC VAC/ADMIN/RCVD: CPT | Performed by: FAMILY MEDICINE

## 2021-12-13 PROCEDURE — G8417 CALC BMI ABV UP PARAM F/U: HCPCS | Performed by: FAMILY MEDICINE

## 2021-12-13 PROCEDURE — 99212 OFFICE O/P EST SF 10 MIN: CPT | Performed by: STUDENT IN AN ORGANIZED HEALTH CARE EDUCATION/TRAINING PROGRAM

## 2021-12-13 PROCEDURE — G8399 PT W/DXA RESULTS DOCUMENT: HCPCS | Performed by: FAMILY MEDICINE

## 2021-12-13 PROCEDURE — 1036F TOBACCO NON-USER: CPT | Performed by: FAMILY MEDICINE

## 2021-12-13 PROCEDURE — 1123F ACP DISCUSS/DSCN MKR DOCD: CPT | Performed by: FAMILY MEDICINE

## 2021-12-13 PROCEDURE — 1090F PRES/ABSN URINE INCON ASSESS: CPT | Performed by: FAMILY MEDICINE

## 2021-12-13 PROCEDURE — 81002 URINALYSIS NONAUTO W/O SCOPE: CPT | Performed by: STUDENT IN AN ORGANIZED HEALTH CARE EDUCATION/TRAINING PROGRAM

## 2021-12-13 PROCEDURE — G8427 DOCREV CUR MEDS BY ELIG CLIN: HCPCS | Performed by: FAMILY MEDICINE

## 2021-12-13 RX ORDER — SUCRALFATE 1 G/1
TABLET ORAL
Qty: 360 TABLET | Refills: 2 | Status: SHIPPED
Start: 2021-12-13 | End: 2022-04-18 | Stop reason: SDUPTHER

## 2021-12-13 RX ORDER — DOCUSATE SODIUM 100 MG/1
100 CAPSULE, LIQUID FILLED ORAL 2 TIMES DAILY
Qty: 60 CAPSULE | Refills: 2 | Status: SHIPPED
Start: 2021-12-13 | End: 2022-04-18 | Stop reason: SDUPTHER

## 2021-12-13 RX ORDER — AZELASTINE HYDROCHLORIDE 0.5 MG/ML
SOLUTION/ DROPS OPHTHALMIC
Qty: 6 ML | Refills: 2 | Status: SHIPPED
Start: 2021-12-13 | End: 2022-02-10 | Stop reason: SDUPTHER

## 2021-12-13 RX ORDER — METRONIDAZOLE 500 MG/1
500 TABLET ORAL 2 TIMES DAILY
Qty: 14 TABLET | Refills: 0 | Status: SHIPPED | OUTPATIENT
Start: 2021-12-13 | End: 2021-12-20

## 2021-12-13 RX ORDER — AMLODIPINE BESYLATE AND BENAZEPRIL HYDROCHLORIDE 10; 20 MG/1; MG/1
1 CAPSULE ORAL DAILY
Qty: 30 CAPSULE | Refills: 3 | Status: SHIPPED
Start: 2021-12-13 | End: 2022-03-03 | Stop reason: SDUPTHER

## 2021-12-13 RX ORDER — FLUTICASONE PROPIONATE 50 MCG
1 SPRAY, SUSPENSION (ML) NASAL DAILY
Qty: 1 EACH | Refills: 3 | Status: SHIPPED
Start: 2021-12-13 | End: 2022-04-18 | Stop reason: SDUPTHER

## 2021-12-13 NOTE — PROGRESS NOTES
S: 81 y.o. female here with vaginal odor x 1 week. No discharge. She was treated for trichomonas in the past 2 months. Had sex yesterday with the same partner. He was treated too. Chronic low back pain. Follows with pain mgmt. Requesting PT. No paresthesias or lobb or saddle anesthesia    O: VS: BP (!) 158/81 (Site: Right Upper Arm, Position: Sitting, Cuff Size: Large Adult)   Pulse 75   Temp 96.6 °F (35.9 °C) (Temporal)   Ht 5' 5\" (1.651 m)   Wt 204 lb (92.5 kg)   SpO2 98%   BMI 33.95 kg/m²    General: NAD   CV:  RRR, no gallops, rubs, or murmurs   Resp: CTAB no R/R/W   Abd:  Soft, nontender, no masses    Ext:  no C/C/E   Back: midline paraspinal ttp, neg slr, decreased rom in flexion, nl sensation and ms 5/5 and dtrs 2+ symmetric   : no foreign body, some milky discharge, no cmt or adnexal or uterine ttp  Wet mount-neg whiff, positive clue cells, neg hyphae  Impression/Plan:   1. HTN- increase lotrel  2. Vaginal odor with white discharge on exam-g/c, urine trich, wet mount suggestive of bg-will treat  3. Degerative back disease-PT, if persists, order mri    rto 4 weeks      Attending Physician Statement  I have discussed the case, including pertinent history and exam findings with the resident. I agree with the documented assessment and plan.         Raphael Noel MD

## 2021-12-13 NOTE — PROGRESS NOTES
[facility] MEDICINE RESIDENCY PROGRAM  DATE OF VISIT : 2021    Patient : Margarita Best   Age : 81 y.o.  : 1935   MRN : [id number]     Chief Complaint :   Chief Complaint   Patient presents with    Back Pain    Other     Vaginal oder    Headache       HPI : Margarita Best is 81 y.o. female with past medical history of hypertension, hyperlipidemia, GERD who presented to the clinic today for foul-smelling vaginal odor which started 1 week ago. Also reports vaginal soreness. Denies any vaginal discharge, itchiness, redness, dysuria, pelvic pain, urinary difficulty or vaginal discharge. Recent STDs work-up are unremarkable. Patient reports he is sexually active, only 1 partner. She does have history of [place] and treated before. Chronic low back pain:  Patient report chronic low back pain, not improving with NSAID. Pain located in midline radiating to upper Thoracics region plan get worse with movement. Follow-up with pain management clinic. Denies any bowel bladder incontinence, saddle anesthesia, lower extremity weakness. Denies any extremity tingling or numbness. CT lumbar spine in 2016 showed osteopenia, laminectomy L2 L5 and posterior fusion L3 S1. No recent x-ray since . Patient had physical therapy approximately a year ago and she felt improvement at that time. Requesting for physical therapy referral again.       Past Medical History :  Past Medical History:   Diagnosis Date    Anxiety 10/11/2010    Breast cancer ([facility] [address] approx 2000    right, treated lumpectomy, radiation, chemo    Depression 10/11/2010    no issues    Diverticular disease 10/11/2010    GERD (gastroesophageal reflux disease)     HTN (hypertension) 10/11/2010    Hyperlipidemia 10/11/2010    Osteoarthritis 10/11/2010    fot left knee arthroplasty 10/21/2016    Osteoporosis 10/11/2010    Spinal stenosis 10/11/2010    Use of cane as ambulatory aid        Medication List :    Current Outpatient Medications   Medication Sig Dispense Refill    azelastine (OPTIVAR) 0.05 % ophthalmic solution INSTILL ONE DROP INTO EACH EYE TWICE DAILY 6 mL 2    fluticasone (FLONASE) 50 MCG/ACT nasal spray 1 spray by Each Nostril route daily 1 each 3    sucralfate (CARAFATE) 1 GM tablet TAKE ONE TABLET BY MOUTH FOUR TIMES A  tablet 2    docusate sodium (COLACE) 100 MG capsule Take 1 capsule by mouth 2 times daily 60 capsule 2    amLODIPine-benazepril (LOTREL) 10-20 MG per capsule Take 1 capsule by mouth daily 30 capsule 3    metroNIDAZOLE (FLAGYL) 500 MG tablet Take 1 tablet by mouth 2 times daily for 7 days 14 tablet 0    chlorhexidine (PERIDEX) 0.12 % solution Take 15 mLs by mouth 2 times daily 473 mL 5    pravastatin (PRAVACHOL) 40 MG tablet TAKE 1 TABLET DAILY 90 tablet 3    omeprazole (PRILOSEC) 20 MG delayed release capsule Take 1 capsule by mouth daily 90 capsule 3    erythromycin (ROMYCIN) 5 MG/GM ophthalmic ointment APPLY A SMALL AMOUNT INTO BOTH EYES AT BEDTIME FOR 14 DAYS      oxyCODONE-acetaminophen (PERCOCET) 5-325 MG per tablet TK 1 T PO Q 8 H PRN P      NARCAN 4 MG/0.1ML LIQD nasal spray 0.1 mLs by Nasal route once  1    VILAZODONE HCL 10 MG Tablet TK 1 T PO D  1    MOVANTIK 12.5 MG TABS tablet Take 1 tablet by mouth every morning 90 tablet 3    vitamin B-12 (CYANOCOBALAMIN) 1000 MCG tablet Take 1,000 mcg by mouth daily       Pyridoxine HCl (VITAMIN B-6) 100 MG tablet Take 100 mg by mouth daily      Multiple Vitamin TABS Take 1 capsule by mouth daily Hair ,skin and nails       No current facility-administered medications for this visit. Review of Systems :  Review of Systems   Constitutional: Negative for activity change, fatigue and fever. HENT: Negative for congestion. Eyes: Negative for visual disturbance. Respiratory: Negative for shortness of breath. Cardiovascular: Negative for chest pain, palpitations and leg swelling.    Gastrointestinal: Negative for abdominal pain, nausea and vomiting. Genitourinary: Negative for difficulty urinating, dysuria, flank pain, genital sores, pelvic pain, vaginal bleeding and vaginal discharge. Musculoskeletal: Positive for back pain. Negative for joint swelling. Neurological: Negative for syncope, speech difficulty, weakness, numbness and headaches. Hematological: Does not bruise/bleed easily. Psychiatric/Behavioral: Negative for agitation. Physical Exam :    Vitals: BP (!) 158/81 (Site: Right Upper Arm, Position: Sitting, Cuff Size: Large Adult)   Pulse 75   Temp 96.6 °F (35.9 °C) (Temporal)   Ht 5' 5\" (1.651 m)   Wt 204 lb (92.5 kg)   SpO2 98%   BMI 33.95 kg/m²     General Appearance: Cooperative, well developed and not in acute distress   Neck: Supple, symmetrical  Chest wall/Lung: CTAB, respirations unlabored. No ronchi/wheezing/rales   Heart[de-identified] RRR, normal S1 and S2  Abdomen: Soft, nontender, nondistended  Extremities: Extremities normal, atraumatic, no cyanosis, clubbing or edema. Skin: Skin color, texture, turgor normal, no rashes or lesions  Musculokeletal: Moderate restricted range of motion of lower lumbar spine, straight leg raising test negative, normal reflexes and sensation, 5 x 5 strength in all extremities, midline tenderness to palpation  Neurologic: Alert&Oriented x3.  5 x 5 strength in all extremities and normal sensation. Psychiatric: has a normal mood and affect. Behavior is normal.     Genitourinary: Inspection and pelvic exam done under speculum. No foreign body, obvious polyp or vaginal lesions, noticed trace milky discharge, negative adnexal or uterine tenderness on bimanual exam    Perform wet mount: Negative whiff, positive clue cells, negative hyphae    POCT urine in clinic showed trace leukocytes without evidence of nitrite. Assessment & Plan :    1. Foul smelling vaginal odor  2.  Vaginal discharge  Possible vaginitis  - POCT Urinalysis showed trace leukocyte esterase  - URINALYSIS; Future  - C.TRACHOMATIS N.GONORRHOEAE DNA,THIN PREP; Future  -Recent STDs test are unremarkable  -Wet mount showed negative whiff, no evidence of hyphae but positive for clue cell  -We will start on antibiotic for bacterial vaginosis  - metroNIDAZOLE (FLAGYL) 500 MG tablet; Take 1 tablet by mouth 2 times daily for 7 days  Dispense: 14 tablet; Refill: 0  - TRICHOMONAS SCREEN; Future    3. Essential hypertension  -Blood pressure is elevated  -We will increase Lotrel to 10-20 mg from 5-20 combination pill  - amLODIPine-benazepril (LOTREL) 10-20 MG per capsule; Take 1 capsule by mouth daily  Dispense: 30 capsule; Refill: 3    4. Chronic midline low back pain without sciatica  Likely secondary to degenerative joint disease  -Continue follow with pain management  -No red flag signs  -We will refer to physical  - [facility] [place]  -If pain persists despite of physical therapy might benefit from MRI or x-ray lumbar spine    5. Seasonal allergic rhinitis, unspecified trigger  - - azelastine (OPTIVAR) 0.05 % ophthalmic solution; INSTILL ONE DROP INTO EACH EYE TWICE DAILY  Dispense: 6 mL; Refill: 2  - fluticasone (FLONASE) 50 MCG/ACT nasal spray; 1 spray by Each Nostril route daily  Dispense: 1 each; Refill: 3      Follow-up in 4 to 6 weeks for hypertension and back pain.     Ronaldo Brooks MD

## 2021-12-14 ENCOUNTER — TELEPHONE (OUTPATIENT)
Dept: FAMILY MEDICINE CLINIC | Age: 86
End: 2021-12-14

## 2021-12-14 ASSESSMENT — ENCOUNTER SYMPTOMS
NAUSEA: 0
ABDOMINAL PAIN: 0
SHORTNESS OF BREATH: 0
BACK PAIN: 1
VOMITING: 0

## 2021-12-14 NOTE — TELEPHONE ENCOUNTER
The lab called and they are stating that there is a duplicate orders. Can you please cancel this order:    Beatriz Roman DNA,THIN PREP [[id number]]     (Order #: [id number]). There is no thin prep sent but there is another order for a urine sample and they are running that one.     Thank you

## 2021-12-16 LAB
C. TRACHOMATIS DNA ,URINE: NEGATIVE
N. GONORRHOEAE DNA, URINE: NEGATIVE
SOURCE: NORMAL

## 2021-12-27 ENCOUNTER — HOSPITAL ENCOUNTER (OUTPATIENT)
Dept: PHYSICAL THERAPY | Age: 86
Setting detail: THERAPIES SERIES
Discharge: HOME | End: 2021-12-27
Payer: MEDICARE

## 2021-12-27 PROCEDURE — 97161 PT EVAL LOW COMPLEX 20 MIN: CPT | Performed by: PHYSICAL THERAPIST

## 2021-12-27 ASSESSMENT — PAIN DESCRIPTION - ORIENTATION: ORIENTATION: RIGHT;LEFT

## 2021-12-27 ASSESSMENT — PAIN DESCRIPTION - DESCRIPTORS: DESCRIPTORS: CONSTANT

## 2021-12-27 ASSESSMENT — PAIN - FUNCTIONAL ASSESSMENT: PAIN_FUNCTIONAL_ASSESSMENT: PREVENTS OR INTERFERES WITH MANY ACTIVE NOT PASSIVE ACTIVITIES

## 2021-12-27 ASSESSMENT — PAIN DESCRIPTION - PAIN TYPE: TYPE: CHRONIC PAIN

## 2021-12-27 ASSESSMENT — PAIN DESCRIPTION - LOCATION: LOCATION: BACK

## 2021-12-27 ASSESSMENT — PAIN SCALES - GENERAL: PAINLEVEL_OUTOF10: 9

## 2021-12-27 NOTE — PROGRESS NOTES
[facility]                Phone: [phone]   Fax: [phone]    Physical Therapy Daily Treatment Note  Date:  2021    Patient Name:  Rosy Morales    :  1935  MRN: [id number]    Evaluating therapist:  EMA Sol                    (21   Restrictions/Precautions:    Diagnosis:  chronic LBP  Treatment Diagnosis:    Insurance/Certification information:  Medicare                cert dates:    to  3/27/22              ICD-10:  M54.9, R29.90  Referring Physician:  Lizzie Cameron of care signed (Y4-6/N):  Y  Visit# / total visits:    Pain level: 9/10   Time In:  Time Out:    Subjective:      Exercises:  Exercise/Equipment Resistance/Repetitions Other comments   StepOne             tball flex/rot            rot st     SKTC     piriformis st            pelvic tilts               bridges             seated hip add                       abd                      flex                                           Other Therapeutic Activities:      Home Exercise Program:  provided 21    Manual Treatments:      Modalities:  IFC/MH to LB    Timed Code Treatment Minutes: Total Treatment Minutes:      Treatment/Activity Tolerance:  [] Patient tolerated treatment well [] Patient limited by fatique  [] Patient limited by pain  [] Patient limited by other medical complications  [] Other:     Prognosis: [] Good [] Fair  [] Poor    Patient Requires Follow-up: [] Yes  [] No    Plan:   [] Continue per plan of care [] Alter current plan (see comments)  [] Plan of care initiated [] Hold pending MD visit [] Discharge  Plan for Next Session:      See Weekly Progress Note: []  Yes  []  No  Next due:        Electronically signed by:   Feliberto Cason PT

## 2021-12-27 NOTE — PROGRESS NOTES
Physical Therapy  Initial Assessment  Date: 2021  Patient Name: Marguerite Hall  MRN: [id number]  : 1935     Subjective   General  Chart Reviewed: Yes  Referring Practitioner: Simba Claudio   Referral Date : 21  Diagnosis: chronic LBP  Insurance information:  Medicare                cert dates:    to  3/27/22              ICD-10:  M54.9, R29.90  PT Visit Information  Onset Date: 21  Total # of Visits Approved: 4  Total # of Visits to Date: 1  Subjective  Subjective: pt presents to therapy with c/o LBP for last 30+ yrs of insidious onset; PMH for multiple LB sx for laminectomy/fusion at multiple levels ~ 10 yrs ago, B TKA; MEDS help with pain; no neuro/pain management consults over last six months; no new testing on file over last six months; no c/o N/T or buckling LE's; sleep is hampered at times; RTD for follow-up 22  Pain Screening  Patient Currently in Pain: Yes  Pain Assessment  Pain Assessment: 0-10  Pain Level: 9  Pain Type: Chronic pain  Pain Location: Back  Pain Orientation: Right;Left  Pain Descriptors: Constant  Functional Pain Assessment: Prevents or interferes with many active not passive activities  Vital Signs  Patient Currently in Pain: Yes    Objective     Observation/Palpation  Palpation: discomfort noted across B LB paraspinals L1-5 into B SI lines  Observation: posture:  level ASIS/PSIS/iliacs; pt presents with slight forward stood in standing    AROM RLE (degrees)  RLE AROM: WNL  AROM LLE (degrees)  LLE AROM : WNL  Spine  Lumbar: grossly limited 75% WNL for all ranges with no c/o radiculopathy noted  Special Tests: hyperflex/rot  +/+; slump  -    Strength Other  Other: grossly 5/5 for all ranges B LE's     Additional Measures  Other: endurance for all prolonged activities is FAIR+/GOOD-  Sensation  Overall Sensation Status: [facility]     Ambulation  Ambulation?: Yes  Ambulation 1  Surface: level tile  Device: No Device  Assistance: Independent  Quality of Gait: slow/guarded barney with equal stride/stance noted B LE's/trunk  Balance  Comments: static/dynamic balance is GOOD     Assessment   Conditions Requiring Skilled Therapeutic Intervention  Body structures, Functions, Activity limitations: Decreased ROM; Decreased endurance  Assessment: pain noted across B sides LB with all prlonged activities, 9/10  Prognosis: Fair  Decision Making: Low Complexity  Activity Tolerance  Activity Tolerance: Patient Tolerated treatment well       Plan   Plan  Times per week: pt to be seen 2x/week/2-3 weeks  Current Treatment Recommendations: ROM,Strengthening,Endurance Training,Modalities,Home Exercise Program    Goals  Time Frame for goals: 2-3 weeks  goal 1: decrease pain across B sides LB with all prolonged activities, 0-5/10  goal 2: restore LB AROM to grossly 75% WNL for all ranges  goal 3: improve endurance for all prolonged activities to GOOD  goal 4: assure I with HEP for home management of condition     Patient goals : to get rid of the pain across her back with all activities/prolonged activities          Juan José Siddiqui, PT     Jani Louise  T: [phone]   F: [phone]     If you have any questions or concerns, please don't hesitate to call. Thank you for your referral.    Physician Signature:________________________________Date:__________________  By signing above, therapists plan is approved by physician. All patients under [employer]   must be signed by physician.

## 2021-12-29 ENCOUNTER — HOSPITAL ENCOUNTER (OUTPATIENT)
Dept: PHYSICAL THERAPY | Age: 86
Setting detail: THERAPIES SERIES
Discharge: HOME | End: 2021-12-29
Payer: MEDICARE

## 2021-12-29 NOTE — PROGRESS NOTES
[address] [facility]                Phone: [phone]  Fax: [phone]    Physical Therapy  Cancellation/No-show Note  Patient Name:  Halima Abraham  :  1935   Date:  2021    For today's appointment patient:  [x]  Cancelled  []  Rescheduled appointment  []  No-show     Reason given by patient:  [x]  Patient ill  []  Conflicting appointment  []  No transportation    []  Conflict with work  []  No reason given  []  Other:     Comments:      Electronically signed by:   Trae Jaquez PT

## 2021-12-30 ENCOUNTER — TELEPHONE (OUTPATIENT)
Dept: FAMILY MEDICINE CLINIC | Age: 86
End: 2021-12-30

## 2021-12-30 NOTE — TELEPHONE ENCOUNTER
----- Message from Mireille Epperson sent at 12/30/2021  1:34 PM EST -----  Subject: Message to Provider    QUESTIONS  Information for Provider? PT would like to see if she can get a   prescription for a shower chair and a raised toilet seat for her bathroom. It will need to be mailed to [facility].   ---------------------------------------------------------------------------  --------------  [address]  What is the best way for the office to contact you? OK to leave message on   voicemail  Preferred Call Back Phone Number? [phone]  ---------------------------------------------------------------------------  --------------  SCRIPT ANSWERS  Relationship to Patient?  Self

## 2022-01-03 ENCOUNTER — HOSPITAL ENCOUNTER (OUTPATIENT)
Dept: PHYSICAL THERAPY | Age: 87
Setting detail: THERAPIES SERIES
Discharge: HOME | End: 2022-01-03
Payer: MEDICARE

## 2022-01-03 PROCEDURE — 97110 THERAPEUTIC EXERCISES: CPT | Performed by: PHYSICAL THERAPIST

## 2022-01-03 PROCEDURE — G0283 ELEC STIM OTHER THAN WOUND: HCPCS | Performed by: PHYSICAL THERAPIST

## 2022-01-03 NOTE — PROGRESS NOTES
[address] [facility]                Phone: [phone]   Fax: [phone]    Physical Therapy Daily Treatment Note  Date:  1/3/2022    Patient Name:  Jannet Kemp    :  1935  MRN: [id number]    Evaluating therapist:  EMA Kearns                    (21   Restrictions/Precautions:    Diagnosis:  chronic LBP  Treatment Diagnosis:    Insurance/Certification information:  Medicare                cert dates:    to  3/27/22              ICD-10:  M54.9, R29.90  Referring Physician:  Kemp Buerger of care signed :  Y  Visit# / total visits:    Pain level: 9/10   Time In:    Time Out:  1502    Subjective:      Exercises:  Exercise/Equipment Resistance/Repetitions Other comments   StepOne  10 min            tball flex/rot 10x10s           rot st 3x20s    SKTC 3x20s    piriformis st 3x20s           pelvic tilts 15x3s              bridges 15x3s            seated hip add [id number]                      abd [id number]                     flex  [id number]                                         Other Therapeutic Activities:      Home Exercise Program:  provided 21    Manual Treatments:      Modalities:  IFC/MH to LB x 15 min     Timed Code Treatment Minutes: Total Treatment Minutes:      Treatment/Activity Tolerance:  [] Patient tolerated treatment well [] Patient limited by fatique  [] Patient limited by pain  [] Patient limited by other medical complications  [] Other:     Prognosis: [] Good [] Fair  [] Poor    Patient Requires Follow-up: [] Yes  [] No    Plan:   [] Continue per plan of care [] Alter current plan (see comments)  [] Plan of care initiated [] Hold pending MD visit [] Discharge  Plan for Next Session:      See Weekly Progress Note: []  Yes  []  No  Next due:        Electronically signed by:   Josafat Lowe PT

## 2022-01-10 ENCOUNTER — HOSPITAL ENCOUNTER (OUTPATIENT)
Dept: PHYSICAL THERAPY | Age: 87
Setting detail: THERAPIES SERIES
Discharge: HOME | End: 2022-01-10
Payer: MEDICARE

## 2022-01-10 PROCEDURE — 97110 THERAPEUTIC EXERCISES: CPT | Performed by: PHYSICAL THERAPIST

## 2022-01-10 PROCEDURE — G0283 ELEC STIM OTHER THAN WOUND: HCPCS | Performed by: PHYSICAL THERAPIST

## 2022-01-10 NOTE — PROGRESS NOTES
[address] [facility]                Phone: [phone]   Fax: [phone]    Physical Therapy Daily Treatment Note  Date:  1/10/2022    Patient Name:  Jannet Kemp    :  1935  MRN: [id number]    Evaluating therapist:  EMA Kearns                    (21   Restrictions/Precautions:    Diagnosis:  chronic LBP  Treatment Diagnosis:    Insurance/Certification information:  Medicare                cert dates:    to  3/27/22              ICD-10:  M54.9, R29.90  Referring Physician:  Kemp Buerger of care signed :  Y  Visit# / total visits:  3/6  Pain level: 9/10   Time In:  1258  Time Out:  1355    Subjective:      Exercises:  Exercise/Equipment Resistance/Repetitions Other comments   StepOne  10 min            tball flex/rot 10x10s           rot st 3x20s    SKTC 3x20s    piriformis st 3x20s           pelvic tilts 15x3s              bridges 15x3s            seated hip add [id number]                      abd [id number]                     flex  [id number]                                         Other Therapeutic Activities:      Home Exercise Program:  provided 21    Manual Treatments:      Modalities:  IFC/MH to LB x 15 min     Timed Code Treatment Minutes: Total Treatment Minutes:      Treatment/Activity Tolerance:  [] Patient tolerated treatment well [] Patient limited by fatique  [] Patient limited by pain  [] Patient limited by other medical complications  [] Other:     Prognosis: [] Good [] Fair  [] Poor    Patient Requires Follow-up: [] Yes  [] No    Plan:   [] Continue per plan of care [] Alter current plan (see comments)  [] Plan of care initiated [] Hold pending MD visit [] Discharge  Plan for Next Session:      See Weekly Progress Note: []  Yes  []  No  Next due:        Electronically signed by:   Josafat Lowe PT

## 2022-01-10 NOTE — PROGRESS NOTES
S:  pt presents to therapy for only scheduled visit this week; at week's end she reports that her back continues to give her issue; pain level given as 9/10 and seems constant in nature; no c/o buckling or LOB over last week's time; HEP going well per pt    O:  performed the exercises/treatments as written in the flowsheet for the week ending 1/14/22; initiated HEP for home management of condition; LB AROM grossly 30% WNL for all ranges;  B LE strength grossly 5/5 for all ranges     A: donna tx well; pt able to perform all requested tasks with good form and pacing noted; LB AROM/B LE strength grossly stable since eval; movement across trunk remains slow/guarded due to stiffness and aching in the LB; gait is stable with slow/guarded barney and equal stride/stance noted B LE's; endurance for all prolonged activities is FAIR+/GOOD-    P:  cont with POC of stretching/strengthening for LB/LE's with modalities as needed

## 2022-01-24 ENCOUNTER — HOSPITAL ENCOUNTER (OUTPATIENT)
Dept: PHYSICAL THERAPY | Age: 87
Setting detail: THERAPIES SERIES
Discharge: HOME | End: 2022-01-24
Payer: MEDICARE

## 2022-01-24 PROCEDURE — G0283 ELEC STIM OTHER THAN WOUND: HCPCS | Performed by: PHYSICAL THERAPIST

## 2022-01-24 PROCEDURE — 97110 THERAPEUTIC EXERCISES: CPT | Performed by: PHYSICAL THERAPIST

## 2022-01-24 NOTE — PROGRESS NOTES
S:  pt presents to therapy for only scheduled visit this week; at week's end she reports that her back continues to give her issue but does feel a little better;  pain level down to 7/10 and seems less hampering to her;  no c/o buckling or LOB over last week's time; HEP going well per pt    O:  performed the exercises/treatments as written in the flowsheet for the week ending 1/28/22;  LB AROM grossly 30% WNL for all ranges;  B LE strength grossly 5/5 for all ranges     A: donna tx well; pt able to perform all requested tasks with good form and pacing noted; LB AROM/B LE strength grossly stable since eval; movement across trunk remains slow/guarded due to stiffness and aching in the LB; gait is stable with slow/guarded barney and equal stride/stance noted B LE's; endurance for all prolonged activities is FAIR+/GOOD-    P:  cont with POC of stretching/strengthening for LB/LE's with modalities as needed

## 2022-01-24 NOTE — PROGRESS NOTES
[address] [facility]                Phone: [phone]   Fax: [phone]    Physical Therapy Daily Treatment Note  Date:  2022    Patient Name:  Jake Giordano    :  1935  MRN: [id number]    Evaluating therapist:  EMA De Souza                    (21   Restrictions/Precautions:    Diagnosis:  chronic LBP  Treatment Diagnosis:    Insurance/Certification information:  Medicare                cert dates:    to  3/27/22              ICD-10:  M54.9, R29.90  Referring Physician:  Bhumi Lopez signed :  Y  Visit# / total visits:    Pain level: 9/10   Time In:  1322  Time Out:  1423    Subjective:      Exercises:  Exercise/Equipment Resistance/Repetitions Other comments   StepOne  10 min            tball flex/rot 10x10s           rot st 3x20s    SKTC 3x20s    piriformis st 3x20s           pelvic tilts 15x3s              bridges 15x3s            seated hip add [id number]                      abd [id number]                     flex  [id number]                                         Other Therapeutic Activities:      Home Exercise Program:  provided 21    Manual Treatments:      Modalities:  IFC/MH to LB x 15 min     Timed Code Treatment Minutes: Total Treatment Minutes:      Treatment/Activity Tolerance:  [] Patient tolerated treatment well [] Patient limited by fatique  [] Patient limited by pain  [] Patient limited by other medical complications  [] Other:     Prognosis: [] Good [] Fair  [] Poor    Patient Requires Follow-up: [] Yes  [] No    Plan:   [] Continue per plan of care [] Alter current plan (see comments)  [] Plan of care initiated [] Hold pending MD visit [] Discharge  Plan for Next Session:      See Weekly Progress Note: []  Yes  []  No  Next due:        Electronically signed by:   Cleve Davidson PT

## 2022-01-31 ENCOUNTER — HOSPITAL ENCOUNTER (OUTPATIENT)
Dept: PHYSICAL THERAPY | Age: 87
Setting detail: THERAPIES SERIES
Discharge: HOME | End: 2022-01-31
Payer: MEDICARE

## 2022-01-31 PROCEDURE — G0283 ELEC STIM OTHER THAN WOUND: HCPCS | Performed by: PHYSICAL THERAPIST

## 2022-01-31 PROCEDURE — 97110 THERAPEUTIC EXERCISES: CPT | Performed by: PHYSICAL THERAPIST

## 2022-01-31 NOTE — PROGRESS NOTES
[address] [facility]                Phone: [phone]   Fax: [phone]    Physical Therapy Daily Treatment Note  Date:  2022    Patient Name:  Fadia De La Rosa    :  1935  MRN: [id number]    Evaluating therapist:  EMA Pena                    (21   Restrictions/Precautions:    Diagnosis:  chronic LBP  Treatment Diagnosis:    Insurance/Certification information:  Medicare                cert dates:    to  3/27/22              ICD-10:  M54.9, R29.90  Referring Physician:  Jaye Carbone of care signed :  Y  Visit# / total visits:    Pain level: 5/10   Time In:  1318  Time Out:  1410    Subjective:      Exercises:  Exercise/Equipment Resistance/Repetitions Other comments   StepOne  10 min            tball flex/rot 10x10s           rot st 3x20s    SKTC 3x20s    piriformis st 3x20s           pelvic tilts 15x3s              bridges 15x3s            seated hip add [id number]                      abd [id number]                     flex  [id number]                                         Other Therapeutic Activities:      Home Exercise Program:  provided 21    Manual Treatments:      Modalities:  IFC/MH to LB x 15 min     Timed Code Treatment Minutes: Total Treatment Minutes:      Treatment/Activity Tolerance:  [] Patient tolerated treatment well [] Patient limited by fatique  [] Patient limited by pain  [] Patient limited by other medical complications  [] Other:     Prognosis: [] Good [] Fair  [] Poor    Patient Requires Follow-up: [] Yes  [] No    Plan:   [] Continue per plan of care [] Alter current plan (see comments)  [] Plan of care initiated [] Hold pending MD visit [] Discharge  Plan for Next Session:      See Weekly Progress Note: []  Yes  []  No  Next due:        Electronically signed by:   Brandi Aceves, PT

## 2022-01-31 NOTE — PROGRESS NOTES
S:  pt presents to therapy for only scheduled visit this week; at week's end she reports that her back continues to ache but seems to be improviing;  pain level down to 5/10 and seems less hampering to her;  no c/o buckling or LOB over last week's time; HEP going well per pt    O:  performed the exercises/treatments as written in the flowsheet for the week ending 2/4/22;  LB AROM grossly 60% WNL for all ranges;  B LE strength grossly 5/5 for all ranges     A: donna tx well; pt able to perform all requested tasks with good form and pacing noted; LB AROM shows overall improvement while B LE strength grossly stable since eval; movement across trunk remains slow/guarded due to stiffness and aching in the LB; gait is stable with slow/guarded barney and equal stride/stance noted B LE's; endurance for all prolonged activities is FAIR+/GOOD-    P:  cont with POC of stretching/strengthening for LB/LE's with modalities as needed

## 2022-02-10 ENCOUNTER — OFFICE VISIT (OUTPATIENT)
Dept: FAMILY MEDICINE CLINIC | Age: 87
End: 2022-02-10
Payer: MEDICARE

## 2022-02-10 VITALS
WEIGHT: 211 LBS | HEART RATE: 70 BPM | BODY MASS INDEX: 35.16 KG/M2 | TEMPERATURE: 96.6 F | OXYGEN SATURATION: 95 % | SYSTOLIC BLOOD PRESSURE: 117 MMHG | DIASTOLIC BLOOD PRESSURE: 60 MMHG | HEIGHT: 65 IN

## 2022-02-10 DIAGNOSIS — Z20.2 POSSIBLE EXPOSURE TO STD: ICD-10-CM

## 2022-02-10 DIAGNOSIS — R30.0 DYSURIA: Primary | ICD-10-CM

## 2022-02-10 DIAGNOSIS — Z76.0 ENCOUNTER FOR MEDICATION REFILL: ICD-10-CM

## 2022-02-10 DIAGNOSIS — N89.8 ITCHING IN THE VAGINAL AREA: Primary | ICD-10-CM

## 2022-02-10 PROCEDURE — 99213 OFFICE O/P EST LOW 20 MIN: CPT | Performed by: FAMILY MEDICINE

## 2022-02-10 PROCEDURE — 99212 OFFICE O/P EST SF 10 MIN: CPT | Performed by: FAMILY MEDICINE

## 2022-02-10 PROCEDURE — 1090F PRES/ABSN URINE INCON ASSESS: CPT | Performed by: FAMILY MEDICINE

## 2022-02-10 PROCEDURE — 4040F PNEUMOC VAC/ADMIN/RCVD: CPT | Performed by: FAMILY MEDICINE

## 2022-02-10 PROCEDURE — G8399 PT W/DXA RESULTS DOCUMENT: HCPCS | Performed by: FAMILY MEDICINE

## 2022-02-10 PROCEDURE — G8427 DOCREV CUR MEDS BY ELIG CLIN: HCPCS | Performed by: FAMILY MEDICINE

## 2022-02-10 PROCEDURE — G8417 CALC BMI ABV UP PARAM F/U: HCPCS | Performed by: FAMILY MEDICINE

## 2022-02-10 PROCEDURE — G8482 FLU IMMUNIZE ORDER/ADMIN: HCPCS | Performed by: FAMILY MEDICINE

## 2022-02-10 PROCEDURE — 1123F ACP DISCUSS/DSCN MKR DOCD: CPT | Performed by: FAMILY MEDICINE

## 2022-02-10 PROCEDURE — 1036F TOBACCO NON-USER: CPT | Performed by: FAMILY MEDICINE

## 2022-02-10 RX ORDER — AZELASTINE HYDROCHLORIDE 0.5 MG/ML
SOLUTION/ DROPS OPHTHALMIC
Qty: 6 ML | Refills: 2 | Status: SHIPPED
Start: 2022-02-10 | End: 2022-02-25 | Stop reason: SDUPTHER

## 2022-02-10 RX ORDER — ERYTHROMYCIN 5 MG/G
OINTMENT OPHTHALMIC
Qty: 1 EACH | Refills: 0 | Status: SHIPPED
Start: 2022-02-10 | End: 2022-02-25

## 2022-02-10 NOTE — PROGRESS NOTES
This is a 49 patient is an 81-year-old female with past medical history of trichomonas who presents for a same-day visit for itching and burning of the vulvar region. Itching and burning of vulvar region  Patient states that she has noted that for the past 1 month she has had increased itching, burning of the exterior vulvar skin, and the intertrigo folds  Notes that she sees \"little white spots\"  Feels that aside from her vulvar region, the spots go all the way to her anal region  Notes that it feels/looks scaly, and has a foul odor. Denies that it smells like a fishy odor. Notes that the lesions are not vesicular, the lesions themselves do not burn, but due to the intense itching she feels, she has been scratching and causing excoriations. Denies any greenish/yellow vaginal discharge. States that vaginal discharge is scant and white in color. Denies any burning on urination, and denies any hematuria    States she was trying to get into see PCP earlier, but due to snowstorm was unable to make the appointments. Hence scheduled for same day today. Of note patient has not had sexual intercourse for the past month and states that this is when the symptoms previously started. Blood pressure 117/60, pulse 70, temperature 96.6 °F (35.9 °C), temperature source Temporal, height 5' 5\" (1.651 m), weight 211 lb (95.7 kg), SpO2 95 %, not currently breastfeeding. Heart regular    Lungs clear    genitourinary examination: External ; atrophy of the vulvar wall, with excoriations. Vaginal examination: Scant whitish vaginal discharge noted, no cheese curd-like discharge, no erythema noted. no signs of lichen sclerosis.     Assessment/plan:    Concern for STD  Wet prep performed; negative for any motile organisms or fungi  We will send extra labs to rule out herpes, Trichomonas, gonorrhea, send urinalysis with microscopy and urine culture to rule out any urine infections  This is likely vulvovaginal atrophy  Patient may benefit from Premarin vaginal cream, will prescribe external moisturization    Attending Physician Statement  I have discussed the case, including pertinent history and exam findings with the resident. I agree with the documented assessment and plan.

## 2022-02-10 NOTE — PROGRESS NOTES
CC:  Dysuria    HPI:  81 y.o. female presents with dysuria    Itching and burning of vulvar region  Patient states that she has noted that for the past 1 month she has had increased itching, burning of the exterior vulvar skin, and the intertrigo folds  Notes that she sees \"little white spots\"  Feels that aside from her vulvar region, the spots go all the way to her anal region  Notes that it feels/looks scaly, and has a foul odor. Denies that it smells like a fishy odor. Notes that the lesions are not vesicular, the lesions themselves do not burn, but due to the intense itching she feels, she has been scratching and causing excoriations. Denies any greenish/yellow vaginal discharge. States that vaginal discharge is scant and white in color. Denies any burning on urination, and denies any hematuria     States she was trying to get into see PCP earlier, but due to snowstorm was unable to make the appointments. Hence scheduled for same day today.     Of note patient has not had sexual intercourse for the past month and states that this is when the symptoms previously started. No other concerns at this time. ROS:    Pertinent as above    Objective:    VS:  Blood pressure 117/60, pulse 70, temperature 96.6 °F (35.9 °C), temperature source Temporal, height 5' 5\" (1.651 m), weight 211 lb (95.7 kg), SpO2 95 %, not currently breastfeeding. Heart regular    Lungs clear    genitourinary examination: External ; atrophy of the vulvar wall, with excoriations. Vaginal examination: Scant whitish vaginal discharge noted, no cheese curd-like discharge, no erythema noted. no signs of lichen sclerosis. Assessment/Plan    1. Dysuria  - URINALYSIS; Future  - Culture, Urine; Future    2. Possible exposure to STD  - URINALYSIS; Future  - C.TRACHOMATIS Theo Anand;  Future  - Culture, Genital; Future  Wet prep performed; negative for any motile organisms or fungi  We will send extra labs to rule out herpes, Trichomonas, gonorrhea, send urinalysis with microscopy and urine culture to rule out any urine infections  This is likely vulvovaginal atrophy  Patient may benefit from Premarin vaginal cream, will prescribe external moisturization    3. Encounter for medication refill  - azelastine (OPTIVAR) 0.05 % ophthalmic solution; INSTILL ONE DROP INTO EACH EYE TWICE DAILY  Dispense: 6 mL; Refill: 2  - erythromycin (ROMYCIN) 5 MG/GM ophthalmic ointment; APPLY A SMALL AMOUNT INTO BOTH EYES AT BEDTIME FOR 14 DAYS  Dispense: 1 each; Refill: 0     RTO 1 month or sooner prn for any persistent, new, or worsening symptoms. Please see Patient Instructions for further counseling and information given. Advised to please be adherent to the treatment plans discussed today, and please call with any questions or concerns, letting the office know of any reasons that the plans may not be followed. The risks of untreated conditions include worsening illness, injury, disability, and possibly, death. Please call if symptoms change in any way, worsen, or fail to completely resolve, as this could necessitate a change to treatment plans. Patient and/or caregiver expressed understanding. Indications and proper use of medication(s) reviewed. Potential side-effects and risks of medication(s) also explained. Patient and/or caregiver was instructed to call if any new symptoms develop prior to next visit. Health risk factors discussed and addressed.      Electronically signed by Kaylee Jimenez MD PGY-2 on 2/10/2022 at 1:21 PM  This case was discussed with attending physician: Dr. David Somers

## 2022-02-14 LAB
GENITAL CULTURE, ROUTINE: ABNORMAL
GENITAL CULTURE, ROUTINE: ABNORMAL
ORGANISM: ABNORMAL

## 2022-02-17 DIAGNOSIS — B37.9 YEAST INFECTION: Primary | ICD-10-CM

## 2022-02-17 RX ORDER — GLYCERIN/MIN OIL/POLYCARBOPHIL
GEL WITH APPLICATOR (GRAM) VAGINAL
Qty: 35 G | Refills: 1 | Status: SHIPPED
Start: 2022-02-17 | End: 2022-02-25 | Stop reason: SDUPTHER

## 2022-02-17 RX ORDER — FLUCONAZOLE 150 MG/1
150 TABLET ORAL ONCE
Qty: 1 TABLET | Refills: 0 | Status: SHIPPED | OUTPATIENT
Start: 2022-02-17 | End: 2022-02-17

## 2022-02-17 NOTE — PROGRESS NOTES
Patient genital culture positive for candida  Though rare growth, with patient history, age and sexual activity, will treat at this time. Plan:  - one dose diflucan    Follow up to clinic as appropriate.

## 2022-02-21 ENCOUNTER — HOSPITAL ENCOUNTER (OUTPATIENT)
Dept: PHYSICAL THERAPY | Age: 87
Setting detail: THERAPIES SERIES
Discharge: HOME | End: 2022-02-21

## 2022-02-21 NOTE — PROGRESS NOTES
[address] [facility]                Phone: [phone]  Fax: [phone]    Physical Therapy  Cancellation/No-show Note  Patient Name:  Kareem Prather  :  1935   Date:  2022    For today's appointment patient:  []  Cancelled  []  Rescheduled appointment  [x]  No-show     Reason given by patient:  []  Patient ill  []  Conflicting appointment  []  No transportation    []  Conflict with work  [x]  No reason given  []  Other:     Comments:      Electronically signed by:   Torsten Hutchinson PT

## 2022-02-25 ENCOUNTER — HOSPITAL ENCOUNTER (OUTPATIENT)
Dept: GENERAL RADIOLOGY | Age: 87
Discharge: HOME | End: 2022-02-27
Payer: MEDICARE

## 2022-02-25 ENCOUNTER — HOSPITAL ENCOUNTER (OUTPATIENT)
Age: 87
Discharge: HOME | End: 2022-02-25
Payer: MEDICARE

## 2022-02-25 ENCOUNTER — OFFICE VISIT (OUTPATIENT)
Dept: FAMILY MEDICINE CLINIC | Age: 87
End: 2022-02-25
Payer: MEDICARE

## 2022-02-25 VITALS
TEMPERATURE: 97.2 F | SYSTOLIC BLOOD PRESSURE: 123 MMHG | WEIGHT: 209 LBS | HEART RATE: 70 BPM | DIASTOLIC BLOOD PRESSURE: 59 MMHG | RESPIRATION RATE: 18 BRPM | OXYGEN SATURATION: 96 % | BODY MASS INDEX: 34.82 KG/M2 | HEIGHT: 65 IN

## 2022-02-25 DIAGNOSIS — M25.512 ACUTE PAIN OF LEFT SHOULDER: ICD-10-CM

## 2022-02-25 DIAGNOSIS — M54.2 NECK PAIN ON LEFT SIDE: ICD-10-CM

## 2022-02-25 DIAGNOSIS — N89.8 ITCHING IN THE VAGINAL AREA: ICD-10-CM
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
## 2022-02-25 DIAGNOSIS — Z76.0 ENCOUNTER FOR MEDICATION REFILL: ICD-10-CM

## 2022-02-25 PROCEDURE — 73030 X-RAY EXAM OF SHOULDER: CPT

## 2022-02-25 PROCEDURE — 1036F TOBACCO NON-USER: CPT | Performed by: STUDENT IN AN ORGANIZED HEALTH CARE EDUCATION/TRAINING PROGRAM

## 2022-02-25 PROCEDURE — 99212 OFFICE O/P EST SF 10 MIN: CPT | Performed by: STUDENT IN AN ORGANIZED HEALTH CARE EDUCATION/TRAINING PROGRAM

## 2022-02-25 PROCEDURE — 1090F PRES/ABSN URINE INCON ASSESS: CPT | Performed by: STUDENT IN AN ORGANIZED HEALTH CARE EDUCATION/TRAINING PROGRAM

## 2022-02-25 PROCEDURE — 1123F ACP DISCUSS/DSCN MKR DOCD: CPT | Performed by: STUDENT IN AN ORGANIZED HEALTH CARE EDUCATION/TRAINING PROGRAM

## 2022-02-25 PROCEDURE — G8399 PT W/DXA RESULTS DOCUMENT: HCPCS | Performed by: STUDENT IN AN ORGANIZED HEALTH CARE EDUCATION/TRAINING PROGRAM

## 2022-02-25 PROCEDURE — 4040F PNEUMOC VAC/ADMIN/RCVD: CPT | Performed by: STUDENT IN AN ORGANIZED HEALTH CARE EDUCATION/TRAINING PROGRAM

## 2022-02-25 PROCEDURE — G8417 CALC BMI ABV UP PARAM F/U: HCPCS | Performed by: STUDENT IN AN ORGANIZED HEALTH CARE EDUCATION/TRAINING PROGRAM

## 2022-02-25 PROCEDURE — 72040 X-RAY EXAM NECK SPINE 2-3 VW: CPT

## 2022-02-25 PROCEDURE — 99213 OFFICE O/P EST LOW 20 MIN: CPT | Performed by: STUDENT IN AN ORGANIZED HEALTH CARE EDUCATION/TRAINING PROGRAM

## 2022-02-25 PROCEDURE — G8482 FLU IMMUNIZE ORDER/ADMIN: HCPCS | Performed by: STUDENT IN AN ORGANIZED HEALTH CARE EDUCATION/TRAINING PROGRAM

## 2022-02-25 PROCEDURE — G8427 DOCREV CUR MEDS BY ELIG CLIN: HCPCS | Performed by: STUDENT IN AN ORGANIZED HEALTH CARE EDUCATION/TRAINING PROGRAM

## 2022-02-25 RX ORDER — ERYTHROMYCIN 5 MG/G
OINTMENT OPHTHALMIC
Qty: 1 EACH | Refills: 0 | Status: CANCELLED | OUTPATIENT
Start: 2022-02-25

## 2022-02-25 RX ORDER — GLYCERIN/MIN OIL/POLYCARBOPHIL
GEL WITH APPLICATOR (GRAM) VAGINAL
Qty: 35 G | Refills: 1 | Status: SHIPPED | OUTPATIENT
Start: 2022-02-25

## 2022-02-25 RX ORDER — AZELASTINE HYDROCHLORIDE 0.5 MG/ML
SOLUTION/ DROPS OPHTHALMIC
Qty: 6 ML | Refills: 2 | Status: SHIPPED
Start: 2022-02-25 | End: 2022-04-18 | Stop reason: SDUPTHER

## 2022-02-25 RX ORDER — CHLORHEXIDINE GLUCONATE 0.12 MG/ML
15 RINSE ORAL 2 TIMES DAILY
Qty: 473 ML | Refills: 5 | Status: SHIPPED
Start: 2022-02-25 | End: 2022-04-18 | Stop reason: SDUPTHER

## 2022-02-25 NOTE — PROGRESS NOTES
[facility] [address] [facility] RESIDENCY PROGRAM  DATE OF VISIT : 2022    Patient : Irena Patrick   Age : 81 y.o.  : 1935   MRN : [id number]   ______________________________________________________________________    Chief Complaint :   Chief Complaint   Patient presents with    Neck Pain     patient fell at the store on Thursday     Shoulder Pain     left    Ankle Pain     left        HPI : Irena Patrick is 81 y.o. female who presented to the clinic today for ground fall    Mechanical fall  - fell 1 week ago at the store. Witnessed by daughter  - ground level fall and had head trauma  - denies feeling dizziness or lightheaded upon falling  - no LOC  - next day patient began experience pain in left shoulder , hip, ankle and neck.    - pain upon turning of neck towards the left side.   - had oxycodone dose incraesed to 7.5  Back in Dec.       Past Medical History :  Past Medical History:   Diagnosis Date    Anxiety 10/11/2010    Breast cancer ([facility] [address] approx     right, treated lumpectomy, radiation, chemo    Depression 10/11/2010    no issues    Diverticular disease 10/11/2010    GERD (gastroesophageal reflux disease)     HTN (hypertension) 10/11/2010    Hyperlipidemia 10/11/2010    Osteoarthritis 10/11/2010    fot left knee arthroplasty 10/21/2016    Osteoporosis 10/11/2010    Spinal stenosis 10/11/2010    Use of cane as ambulatory aid      Past Surgical History:   Procedure Laterality Date    BACK SURGERY      BONE GRAFT      with back surgery    BREAST LUMPECTOMY      left, with chemo and rad, [facility]    CARPAL TUNNEL RELEASE      left    CARPAL TUNNEL RELEASE      repeat left    CATARACT REMOVAL  2010    [facility]    COLONOSCOPY  2/3/2011    extensive diverticulosis, Dr. Sandra Head, [address]  2013    \"could not get around colon due to diverticulitis, [address] COLONOSCOPY  2015    severe diverticulosis transverse and left colon without diverticulitis, rectal polyp bx, Dr. Magan Szymanski, [address] [place] [address]    HYSTERECTOMY  1980s    still has ovaries, no cancer, [facility]    SPINAL FUSION  2009    [facility], pins and rods in place    TOTAL KNEE ARTHROPLASTY Right 10/21/2016    TOTAL KNEE ARTHROPLASTY Left 10/2017    replacement, dr Ara Cummins  2/3/2011    [facility]astritis, [facility]vas[facility]  [facility]    [facility], [facility]    UPPER GASTROINTESTINAL ENDOSCOPY  2/24/2015    mild to moderate gastritis and duodenitis, gastric bx for H pylori, Dr. Magan Szymanski       Allergies :    Allergies   Allergen Reactions    No Known Allergies        Medication List :    Current Outpatient Medications   Medication Sig Dispense Refill    Vaginal Lubricant (REPLENS) GEL Use once-twice daily 35 g 1    azelastine (OPTIVAR) 0.05 % ophthalmic solution INSTILL ONE DROP INTO EACH EYE TWICE DAILY 6 mL 2    chlorhexidine (PERIDEX) 0.12 % solution Take 15 mLs by mouth 2 times daily 473 mL 5    fluticasone (FLONASE) 50 MCG/ACT nasal spray 1 spray by Each Nostril route daily 1 each 3    sucralfate (CARAFATE) 1 GM tablet TAKE ONE TABLET BY MOUTH FOUR TIMES A  tablet 2    docusate sodium (COLACE) 100 MG capsule Take 1 capsule by mouth 2 times daily 60 capsule 2    amLODIPine-benazepril (LOTREL) 10-20 MG per capsule Take 1 capsule by mouth daily 30 capsule 3    pravastatin (PRAVACHOL) 40 MG tablet TAKE 1 TABLET DAILY 90 tablet 3    omeprazole (PRILOSEC) 20 MG delayed release capsule Take 1 capsule by mouth daily 90 capsule 3    oxyCODONE-acetaminophen (PERCOCET) 5-325 MG per tablet TK 1 T PO Q 8 H PRN P      NARCAN 4 MG/0.1ML LIQD nasal spray 0.1 mLs by Nasal route once  1    VILAZODONE HCL 10 MG Tablet TK 1 T PO D  1    vitamin B-12 (CYANOCOBALAMIN) 1000 MCG tablet Take 1,000 mcg by mouth daily       Pyridoxine HCl (VITAMIN B-6) 100 MG tablet Take 100 mg by mouth daily      Multiple Vitamin TABS Take 1 capsule by mouth daily Hair ,skin and nails      MOVANTIK 12.5 MG TABS tablet Take 1 tablet by mouth every morning 90 tablet 3     No current facility-administered medications for this visit.         Family History :    Family History   Problem Relation Age of Onset    Cancer Mother         throat    High Blood Pressure Mother     Mental Illness Father     Colon Cancer Father     Cancer Brother     Heart Disease Brother     Mental Illness Daughter     Mental Illness Son     Arthritis Sister     Cancer Brother     Pacemaker Brother     No Known Problems Brother        Surgical History :   Past Surgical History:   Procedure Laterality Date    BACK SURGERY      BONE GRAFT      with back surgery    BREAST LUMPECTOMY  2000    left, with [facility] and rad, [facility]    CARPAL TUNNEL RELEASE  1980s    left    CARPAL TUNNEL RELEASE  1980s    repeat left    CATARACT REMOVAL  2010    [facility]    COLONOSCOPY  2/3/2011    extensive diverticulosis, Dr. Nelson Manley, [address] [facility] COLONOSCOPY  2013    \"could not get around colon due to diverticulitis, [address] COLONOSCOPY  2/24/2015    severe diverticulosis transverse and left colon without diverticulitis, rectal polyp bx, Dr. Gibson Camargo, [address] [place] [address]    HYSTERECTOMY  1980s    still has ovaries, no cancer, [facility] SPINAL FUSION  2009    [facility], pins and rods in place    TOTAL KNEE ARTHROPLASTY Right 10/21/2016    TOTAL KNEE ARTHROPLASTY Left 10/2017    replacement, dr Arlyne Halsted  2/3/2011    gastritis, Dr. Nelson Manley, [address] [facility] [address] GASTROINTESTINAL ENDOSCOPY  2013    gastritis, [facility]    UPPER GASTROINTESTINAL ENDOSCOPY  2/24/2015    mild to moderate gastritis and duodenitis, gastric bx for H pylori, Dr. Gibson Camargo       Social History : Social History     Tobacco Use    Smoking status: Never Smoker    Smokeless tobacco: Never Used   Substance Use Topics    Alcohol use: No     Alcohol/week: 0.0 standard drinks    Drug use: No       Review of Systems :  Review of Systems   Respiratory: Negative for shortness of breath. Cardiovascular: Negative for chest pain. Musculoskeletal: Positive for back pain, neck pain and neck stiffness. Left shoulder pain   Neurological: Negative for dizziness, light-headedness and headaches.     ______________________________________________________________________    Physical Exam :    Vitals: BP (!) 123/59 (Site: Right Upper Arm, Position: Sitting, Cuff Size: Medium Adult)   Pulse 70   Temp 97.2 °F (36.2 °C) (Temporal)   Resp 18   Ht 5' 5\" (1.651 m)   Wt 209 lb (94.8 kg)   SpO2 96%   BMI 34.78 kg/m²   General Appearance: Well developed, awake, alert, oriented, and in NAD  HEENT: NCAT, MMM, no pallor or icterus. Pain with rotation to left side. Neck: Supple, symmetrical, trachea midline. No JVD or carotid bruits. Chest wall/Lung: CTAB, respirations unlabored. No ronchi/wheezing/rales   Heart[de-identified] RRR, normal S1 and S2, no murmurs, rubs or gallops. Extremities: Extremities normal, ankle nontender, no cyanosis, clubbing or edema.  strength 4/5. Pain with abduction of shoulder. Musculokeletal: ROM grossly normal in all joints of extremities, no obvious joint swelling.  ___________________________________________________________________    Assessment & Plan :    1. Fall from ground level  - mostly likely suffering from shoulder sprain and cervical strain  - will order XR of shoulder and cervical spine, encourage PT and continue to take oxycodone for pain management. - XR CERVICAL SPINE (2-3 VIEWS); Future  - XR SHOULDER LEFT (MIN 2 VIEWS); Future  - [facility] - [facility], [place], [place]    2.  Itching in the vaginal area  - Vaginal Lubricant (REPLENS) GEL; Use once-twice daily Dispense: 35 g; Refill: 1    3. Encounter for medication refill  - azelastine (OPTIVAR) 0.05 % ophthalmic solution; INSTILL ONE DROP INTO EACH EYE TWICE DAILY  Dispense: 6 mL; Refill: 2  - chlorhexidine (PERIDEX) 0.12 % solution; Take 15 mLs by mouth 2 times daily  Dispense: 473 mL; Refill: 5    4. Acute pain of left shoulder  - plan as above on problem 1  - XR SHOULDER LEFT (MIN 2 VIEWS); Future  - [facility] - Physical Therapy, [place]    5. Neck pain on left side  - plan as above on problem 1  - XR SHOULDER LEFT (MIN 2 VIEWS); Future  [facility]    - FU in 1 week to reassess pain.        Carmen Henry MD

## 2022-02-25 NOTE — PROGRESS NOTES
Attending Physician Statement    S:   Chief Complaint   Patient presents with    Neck Pain     patient fell at the store on Thursday     Shoulder Pain     left    Ankle Pain     left       Molly Pope at supermarket (tripped over shopping cart). Hit head and shoulder. No LOC. C/O neck and shoulder pain. Takes oxycodone chronically  O: Blood pressure (!) 123/59, pulse 70, temperature 97.2 °F (36.2 °C), temperature source Temporal, resp. rate 18, height 5' 5\" (1.651 m), weight 209 lb (94.8 kg), SpO2 96 %, not currently breastfeeding. Exam:   Heart - RRR   Lungs - clear   Some  weakness bilaterally. Good strength elsewhere    Shoulder ROM decreased in abduction due to pain    Neck ROM limited due to pain (rotation to left especially)    Left ankle non tender, normal gait  A: Cervical strain, shoulder sprain, possible ankle sprain  P:  PT   X-ray shoulder, c-spine   No muscle relaxants at this time. Follow-up as ordered    I have discussed the case, including pertinent history and exam findings with the resident. I agree with the documented assessment and plan.

## 2022-02-26 ASSESSMENT — ENCOUNTER SYMPTOMS
BACK PAIN: 1
SHORTNESS OF BREATH: 0

## 2022-03-01 ENCOUNTER — HOSPITAL ENCOUNTER (OUTPATIENT)
Dept: PHYSICAL THERAPY | Age: 87
Setting detail: THERAPIES SERIES
Discharge: HOME | End: 2022-03-01
Payer: MEDICARE

## 2022-03-01 PROCEDURE — G0283 ELEC STIM OTHER THAN WOUND: HCPCS | Performed by: PHYSICAL THERAPIST

## 2022-03-01 PROCEDURE — 97110 THERAPEUTIC EXERCISES: CPT | Performed by: PHYSICAL THERAPIST

## 2022-03-01 NOTE — PROGRESS NOTES
[address] [facility]                Phone: [phone]   Fax: [phone]    Physical Therapy Daily Treatment Note  Date:  3/1/2022    Patient Name:  Patricia Viera    :  1935  MRN: [id number]    Evaluating therapist:  EMA Shah                    (21   Restrictions/Precautions:    Diagnosis:  chronic LBP  Treatment Diagnosis:    Insurance/Certification information:  Medicare                cert dates:    to  3/27/22              ICD-10:  M54.9, R29.90  Referring Physician:  Brett Roche of care signed :  Y  Visit# / total visits:    Pain level: 5/10   Time In:  1256  Time Out:  912    Subjective:      Exercises:  Exercise/Equipment Resistance/Repetitions Other comments   StepOne  10 min            tball flex/rot 10x10s           rot st 3x20s    SKTC 3x20s    piriformis st 3x20s           pelvic tilts 15x3s              bridges 15x3s            seated hip add [id number]                      abd [id number]                     flex  [id number]                                         Other Therapeutic Activities:      Home Exercise Program:  provided 21    Manual Treatments:      Modalities:  IFC/MH to LB x 15 min     Timed Code Treatment Minutes: Total Treatment Minutes:      Treatment/Activity Tolerance:  [] Patient tolerated treatment well [] Patient limited by fatique  [] Patient limited by pain  [] Patient limited by other medical complications  [] Other:     Prognosis: [] Good [] Fair  [] Poor    Patient Requires Follow-up: [] Yes  [] No    Plan:   [] Continue per plan of care [] Alter current plan (see comments)  [] Plan of care initiated [] Hold pending MD visit [] Discharge  Plan for Next Session:      See Weekly Progress Note: []  Yes  []  No  Next due:        Electronically signed by:   Sondra Zelaya PT

## 2022-03-02 RX ORDER — OXYCODONE AND ACETAMINOPHEN 7.5; 325 MG/1; MG/1
TABLET ORAL
COMMUNITY
Start: 2022-02-11

## 2022-03-03 ENCOUNTER — OFFICE VISIT (OUTPATIENT)
Dept: FAMILY MEDICINE CLINIC | Age: 87
End: 2022-03-03
Payer: MEDICARE

## 2022-03-03 VITALS
TEMPERATURE: 96.8 F | BODY MASS INDEX: 34.49 KG/M2 | HEIGHT: 65 IN | HEART RATE: 73 BPM | SYSTOLIC BLOOD PRESSURE: 127 MMHG | OXYGEN SATURATION: 95 % | WEIGHT: 207 LBS | DIASTOLIC BLOOD PRESSURE: 60 MMHG

## 2022-03-03 DIAGNOSIS — M47.812 CERVICAL SPONDYLOSIS: ICD-10-CM

## 2022-03-03 DIAGNOSIS — I10 ESSENTIAL HYPERTENSION: ICD-10-CM

## 2022-03-03 DIAGNOSIS — F33.41 RECURRENT MAJOR DEPRESSIVE DISORDER, IN PARTIAL REMISSION (HCC): ICD-10-CM

## 2022-03-03 DIAGNOSIS — K21.9 GASTROESOPHAGEAL REFLUX DISEASE WITHOUT ESOPHAGITIS: ICD-10-CM

## 2022-03-03 DIAGNOSIS — M19.012 OSTEOARTHRITIS OF GLENOHUMERAL JOINT, LEFT: Primary | ICD-10-CM

## 2022-03-03 PROBLEM — I70.203 UNSPECIFIED ATHEROSCLEROSIS OF NATIVE ARTERIES OF EXTREMITIES, BILATERAL LEGS (HCC): Status: ACTIVE | Noted: 2022-03-03

## 2022-03-03 PROBLEM — M19.072 ARTHRITIS OF LEFT ANKLE: Status: ACTIVE | Noted: 2022-03-03

## 2022-03-03 PROBLEM — I87.2 PERIPHERAL VENOUS INSUFFICIENCY: Status: ACTIVE | Noted: 2022-03-03

## 2022-03-03 PROCEDURE — 4040F PNEUMOC VAC/ADMIN/RCVD: CPT | Performed by: STUDENT IN AN ORGANIZED HEALTH CARE EDUCATION/TRAINING PROGRAM

## 2022-03-03 PROCEDURE — G8399 PT W/DXA RESULTS DOCUMENT: HCPCS | Performed by: STUDENT IN AN ORGANIZED HEALTH CARE EDUCATION/TRAINING PROGRAM

## 2022-03-03 PROCEDURE — 1123F ACP DISCUSS/DSCN MKR DOCD: CPT | Performed by: STUDENT IN AN ORGANIZED HEALTH CARE EDUCATION/TRAINING PROGRAM

## 2022-03-03 PROCEDURE — 1036F TOBACCO NON-USER: CPT | Performed by: STUDENT IN AN ORGANIZED HEALTH CARE EDUCATION/TRAINING PROGRAM

## 2022-03-03 PROCEDURE — G8482 FLU IMMUNIZE ORDER/ADMIN: HCPCS | Performed by: STUDENT IN AN ORGANIZED HEALTH CARE EDUCATION/TRAINING PROGRAM

## 2022-03-03 PROCEDURE — G8427 DOCREV CUR MEDS BY ELIG CLIN: HCPCS | Performed by: STUDENT IN AN ORGANIZED HEALTH CARE EDUCATION/TRAINING PROGRAM

## 2022-03-03 PROCEDURE — 99212 OFFICE O/P EST SF 10 MIN: CPT | Performed by: STUDENT IN AN ORGANIZED HEALTH CARE EDUCATION/TRAINING PROGRAM

## 2022-03-03 PROCEDURE — G8417 CALC BMI ABV UP PARAM F/U: HCPCS | Performed by: STUDENT IN AN ORGANIZED HEALTH CARE EDUCATION/TRAINING PROGRAM

## 2022-03-03 PROCEDURE — 1090F PRES/ABSN URINE INCON ASSESS: CPT | Performed by: STUDENT IN AN ORGANIZED HEALTH CARE EDUCATION/TRAINING PROGRAM

## 2022-03-03 PROCEDURE — 99213 OFFICE O/P EST LOW 20 MIN: CPT | Performed by: STUDENT IN AN ORGANIZED HEALTH CARE EDUCATION/TRAINING PROGRAM

## 2022-03-03 RX ORDER — AMLODIPINE BESYLATE AND BENAZEPRIL HYDROCHLORIDE 10; 20 MG/1; MG/1
1 CAPSULE ORAL DAILY
Qty: 30 CAPSULE | Refills: 3 | Status: SHIPPED
Start: 2022-03-03 | End: 2022-10-21 | Stop reason: SDUPTHER

## 2022-03-03 RX ORDER — OMEPRAZOLE 20 MG/1
20 CAPSULE, DELAYED RELEASE ORAL DAILY
Qty: 90 CAPSULE | Refills: 3 | Status: SHIPPED
Start: 2022-03-03 | End: 2022-04-18 | Stop reason: SDUPTHER

## 2022-03-03 NOTE — PROGRESS NOTES
S: 81 y.o. female here for follow up after a fall and subsequent ER visit. She has left shoulder pain. She had an xray. The arm range of motion is better. Doing PT.   O: VS: /60 (Site: Right Upper Arm, Position: Sitting, Cuff Size: Medium Adult)   Pulse 73   Temp 96.8 °F (36 °C) (Temporal)   Ht 5' 5\" (1.651 m)   Wt 207 lb (93.9 kg)   SpO2 95%   BMI 34.45 kg/m²    General: NAD   CV:  RRR, no gallops, rubs, or murmurs   Resp: CTAB no R/R/W   Abd:  Soft, nontender, no masses    Ext:  no C/C/E   Left Shoulder -less pain with resisted neers, empty can   cspine-restricted rom to the left, neg spurlings or spinal ttp  Impression/Plan:   1. Severe cervical degenerative spondylosis-tylenol  2. 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 rto in 4-6 weeks    Attending Physician Statement  I have discussed the case, including pertinent history and exam findings with the resident. I agree with the documented assessment and plan.         Rekha Hollins MD

## 2022-03-03 NOTE — PROGRESS NOTES
[address] [facility]  FAMILY MEDICINE RESIDENCY PROGRAM  DATE OF VISIT : 3/3/2022    Patient : Kristen Abrams   Age : 81 y.o.  : 1935   MRN : [id number]   ______________________________________________________________________    Chief Complaint :   Chief Complaint   Patient presents with    Results    Neck Pain    Back Pain    Shoulder Pain    Chronic Pain       HPI : Kristen Abrams is 81 y.o. female who presented to the clinic today for ground-level fall    Denies dizziness headaches, lightheadedness,     Recent fall   - x ray of shoulder reviewed. No acute fracture  -Appears to have chronic rotator cuff disease and osteoarthritis. - overall improving pain of the left shoulder and cervical spine. , reports better range of motion.   -Continues to attend PT    Depression  - reports well controlled  Medication works well  - denies any SI.        Past Medical History :  Past Medical History:   Diagnosis Date    Anxiety 10/11/2010    Breast cancer ([facility] [address] approx     right, treated lumpectomy, radiation, chemo    Depression 10/11/2010    no issues    Diverticular disease 10/11/2010    GERD (gastroesophageal reflux disease)     HTN (hypertension) 10/11/2010    Hyperlipidemia 10/11/2010    Osteoarthritis 10/11/2010    fot left knee arthroplasty 10/21/2016    Osteoporosis 10/11/2010    Spinal stenosis 10/11/2010    Use of cane as ambulatory aid      Past Surgical History:   Procedure Laterality Date    BACK SURGERY      BONE GRAFT      with back surgery    BREAST LUMPECTOMY      left, with [facility] and rad, [facility]    CARPAL TUNNEL RELEASE      left    CARPAL TUNNEL RELEASE      repeat left    CATARACT REMOVAL      b[facility]    COLONOSCOPY  2/3/2011    extensive diverticulosis, Dr. Damian Child, [address] [place]  2013    \"could not get around colon due to diverticulitis, [address] COLONOSCOPY  2015    severe diverticulosis transverse and left colon without diverticulitis, rectal polyp bx, Dr. Jessi Cuevas, [address] [place] [address]    HYSTERECTOMY  1980s    still has ovaries, no cancer, [facility]    SPINAL F[facility]ION  2009    [facility], pins and rods in place    TOTAL KNEE ARTHROPLASTY Right 10/21/2016    TOTAL KNEE ARTHROPLASTY Left 10/2017    replacement, dr Ike Bey  2/3/2011    gastritis, [facility] Aniyah Coy, [facility]  [facility]    [facility]    UPPER GASTROINTESTINAL ENDOSCOPY  2/24/2015    mild to moderate gastritis and duodenitis, gastric bx for H pylori, Dr. Jessi Cuevas       Allergies :    Allergies   Allergen Reactions    No Known Allergies        Medication List :    Current Outpatient Medications   Medication Sig Dispense Refill    amLODIPine-benazepril (LOTREL) 10-20 MG per capsule Take 1 capsule by mouth daily 30 capsule 3    omeprazole (PRILOSEC) 20 MG delayed release capsule Take 1 capsule by mouth daily 90 capsule 3    oxyCODONE-acetaminophen (PERCOCET) 7.5-325 MG per tablet TAKE 1 TABLET BY MOUTH EVERY EIGHT HOURS AS NEEDED      Vaginal Lubricant (REPLENS) GEL Use once-twice daily 35 g 1    azelastine (OPTIVAR) 0.05 % ophthalmic solution INSTILL ONE DROP INTO EACH EYE TWICE DAILY 6 mL 2    chlorhexidine (PERIDEX) 0.12 % solution Take 15 mLs by mouth 2 times daily 473 mL 5    fluticasone (FLONASE) 50 MCG/ACT nasal spray 1 spray by Each Nostril route daily 1 each 3    sucralfate (CARAFATE) 1 GM tablet TAKE ONE TABLET BY MOUTH FOUR TIMES A  tablet 2    docusate sodium (COLACE) 100 MG capsule Take 1 capsule by mouth 2 times daily 60 capsule 2    pravastatin (PRAVACHOL) 40 MG tablet TAKE 1 TABLET DAILY 90 tablet 3    oxyCODONE-acetaminophen (PERCOCET) 5-325 MG per tablet TK 1 T PO Q 8 H PRN P      NARCAN 4 MG/0.1ML LIQD nasal spray 0.1 mLs by Nasal route once  1    VILAZODONE HCL 10 MG Tablet TK 1 T PO D 1    MOVANTIK 12.5 MG TABS tablet Take 1 tablet by mouth every morning 90 tablet 3    vitamin B-12 (CYANOCOBALAMIN) 1000 MCG tablet Take 1,000 mcg by mouth daily       Pyridoxine HCl (VITAMIN B-6) 100 MG tablet Take 100 mg by mouth daily      Multiple Vitamin TABS Take 1 capsule by mouth daily Hair ,skin and nails       No current facility-administered medications for this visit.         Family History :    Family History   Problem Relation Age of Onset    Cancer Mother         throat    High Blood Pressure Mother     Mental Illness Father     Colon Cancer Father     Cancer Brother     Heart Disease Brother     Mental Illness Daughter     Mental Illness Son     Arthritis Sister     Cancer Brother     Pacemaker Brother     No Known Problems Brother        Surgical History :   Past Surgical History:   Procedure Laterality Date    BACK SURGERY      BONE GRAFT      with back surgery    BREAST LUMPECTOMY  2000    left, with chemo and rad, [facility]    CARPAL TUNNEL RELEASE  1980s    left    CARPAL TUNNEL RELEASE  1980s    repeat left    CATARACT REMOVAL  2010    [facility]    COLONOSCOPY  2/3/2011    extensive diverticulosis, Dr. Allan Gomez, [facility] COLONOSCOPY  2013    \"could not get around colon due to diverticulitis, [address] COLONOSCOPY  2/24/2015    severe diverticulosis transverse and left colon without diverticulitis, rectal polyp bx, Dr. Romero Drivers, [address] [place] [address]    HYSTERECTOMY  1980s    still has ovaries, no cancer, Rem[facility] Sar[facility]ia SPINAL FUSION  2009    [facility], pins and rods in place    TOTAL KNEE ARTHROPLASTY Right 10/21/2016    TOTAL KNEE ARTHROPLASTY Left 10/2017    replacement, dr Krupa Nice  2/3/2011    gastritis, Dr. Allan Gomez, [address]STRO[address]AL [address]OSCOPY  2013    [facility]ast[facility], [facility]    UPPER GASTROINTESTINAL ENDOSCOPY  2/24/2015    mild to moderate gastritis and duodenitis, gastric bx for H pylori, Dr. Alessandra Correa History :   Social History     Tobacco Use    Smoking status: Never Smoker    Smokeless tobacco: Never Used   Substance Use Topics    Alcohol use: No     Alcohol/week: 0.0 standard drinks    Drug use: No       Review of Systems :  Review of Systems   Constitutional: Negative. Musculoskeletal: Positive for neck pain and neck stiffness. Left shoulder pain     ______________________________________________________________________    Physical Exam :    Vitals: /60 (Site: Right Upper Arm, Position: Sitting, Cuff Size: Medium Adult)   Pulse 73   Temp 96.8 °F (36 °C) (Temporal)   Ht 5' 5\" (1.651 m)   Wt 207 lb (93.9 kg)   SpO2 95%   BMI 34.45 kg/m²   General Appearance: Well developed, awake, alert, oriented, and in NAD  Chest wall/Lung: CTAB, respirations unlabored. No ronchi/wheezing/rales  Neck: Limited cervical rotation towards left side due to pain. Heart[de-identified] RRR, normal S1 and S2, no murmurs, rubs or gallops. Back: No tenderness of cervical spine. Extremities: Extremities normal, atraumatic, no cyanosis, clubbing or edema. Pain reproducible with neer and empty can test  ___________________________________________________________________    Assessment & Plan :    1. Osteoarthritis of glenohumeral joint, left  -Overall improvement in left shoulder function and pain.  -Continue oxycodone, PT, topical diclofenac  -Patient encouraged to consider cortisone injections if PT does not improve overall shoulder pain    2. Cervical spondylosis  -Continue oxycodone and PT    3. Recurrent major depressive disorder, in partial remission ([address]  -Well-controlled continue present management    4. Essential hypertension  -Well-controlled continue present management  - amLODIPine-benazepril (LOTREL) 10-20 MG per capsule; Take 1 capsule by mouth daily  Dispense: 30 capsule; Refill: 3    5.  Gastroesophageal reflux disease without esophagitis  -Continue present management  - omeprazole (PRILOSEC) 20 MG delayed release capsule; Take 1 capsule by mouth daily  Dispense: 90 capsule; Refill: 3      Follow-up in 4 to 6 weeks.     Noe Montoya MD

## 2022-03-10 ENCOUNTER — HOSPITAL ENCOUNTER (OUTPATIENT)
Dept: PHYSICAL THERAPY | Age: 87
Setting detail: THERAPIES SERIES
Discharge: HOME | End: 2022-03-10
Payer: MEDICARE

## 2022-03-10 PROCEDURE — 97161 PT EVAL LOW COMPLEX 20 MIN: CPT | Performed by: PHYSICAL THERAPIST

## 2022-03-10 ASSESSMENT — PAIN DESCRIPTION - ORIENTATION: ORIENTATION: LEFT

## 2022-03-10 ASSESSMENT — PAIN DESCRIPTION - FREQUENCY: FREQUENCY: INTERMITTENT

## 2022-03-10 ASSESSMENT — PAIN DESCRIPTION - LOCATION: LOCATION: NECK;SHOULDER

## 2022-03-10 ASSESSMENT — PAIN SCALES - GENERAL: PAINLEVEL_OUTOF10: 7

## 2022-03-10 ASSESSMENT — PAIN DESCRIPTION - PAIN TYPE: TYPE: ACUTE PAIN

## 2022-03-10 ASSESSMENT — PAIN DESCRIPTION - DESCRIPTORS: DESCRIPTORS: ACHING

## 2022-03-10 NOTE — PROGRESS NOTES
Physical Therapy  Initial Assessment  Date: 3/10/2022  Patient Name: hSanta Cai  MRN: [id number]  : 1935          Restrictions       Subjective   General  Chart Reviewed: Yes  Referring Practitioner: Trell Malik MD  Diagnosis: T29.26FK (ICD-10-CM) - Fall from ground jpjewH68.512 (ICD-10-CM) - Acute pain of left gbwsfpphS33.2 (ICD-10-CM) - Neck pain on left side  PT Visit Information  PT Insurance Information: Medicare   cert dates: 8/67/15 to 6/10/22  Total # of Visits to Date: 1  Subjective  Subjective: Patient presents to PT with c/o L shoulder and neck pain. Patient reports having fall approx 2 weeks ago. She tripped on clothes rack when walking out of store. She had xray at dr office per pt which was negative for fracture. She reports having ROM across both cerivcal and shoulder region however admits to pain with movement. She ambulates with/without AD. Pt takes percocet for pain relief. She also uses heating pad for symptom relief. Pain Screening  Patient Currently in Pain: Yes  Pain Assessment  Pain Assessment: 0-10  Pain Level: 7  Pain Type: Acute pain  Pain Location: Neck; Shoulder  Pain Orientation: Left  Pain Descriptors: Aching  Pain Frequency: Intermittent  Vital Signs  Patient Currently in Pain: Yes    Objective     Observation/Palpation  Posture: Fair (fwd head/rounded shoulder posturing)  Palpation: pain noted across L shoulder/cervical region  Edema: no obvious edema    AROM RUE (degrees)  RUE General AROM: RUE- WFL  AROM LUE (degrees)  LUE General AROM: L shoulder flex/abd- 120* flex/abd; IR reach to lower lumbar spine; ER reach to cervical/thoracic junction  Spine  Cervical: limited 25% all planes    Strength RUE  Comment: R shoulder- grossly 4-/5 WARs; R elbow- WFL; limtied grasp  Strength LUE  Comment: L shoulder- grossly 4-/5 WARs; L elbow- WFL; limtied grasp  Strength Other  Other: Cervical/uppper back- grossly 4-/5        Sensation  Overall Sensation Status: [facility] Ambulation  Ambulation?: Yes  Ambulation 1  Surface: level tile  Device: No Device  Assistance: Independent  Quality of Gait: pt ambulates without AD; fwd head/fwd trunk flexion with increased med/lat trunk deviation  Balance  Sitting - Static: Good  Sitting - Dynamic: Good  Standing - Static: Good;-  Standing - Dynamic: Good;-     Assessment   Conditions Requiring Skilled Therapeutic Intervention  Body structures, Functions, Activity limitations: Decreased ROM; Decreased strength;Decreased posture; Increased pain  Assessment: pt presents to PT with c/o L shoulder/cervical pain due to recent fall; limited AROM/strength across both cervical and L shoulder region; hindered fwd head/rounded shoulder posturing  Prognosis: Fair;Good  Decision Making: Low Complexity  REQUIRES PT FOLLOW UP: Yes         Plan   Plan  Times per week: 2x/week x 5 weeks  Current Treatment Recommendations: Strengthening,ROM,Manual Therapy - Soft Tissue Mobilization,Pain Management,Home Exercise Program,Safety Education & Training,Patient/Caregiver Education & Training,Modalities    Goals  Short term goals  Time Frame for Short term goals: 3 weeks  Short term goal 1: increase AROM cervical region to within 90% functional limits  Short term goal 2: increase AROM L shoulder flex/abd to > 140* to improve functional reach  Short term goal 3: increase strength of cervical region to grossly 4/5 improving upright posture to [place] term goal 4: increase strength of L shoulder to grossly 4/5 to improve ADL functions  Long term goals  Time Frame for Long term goals : 5 weeks  Long term goal 1: increase AROM cervical region to [facility]  Long term goal 2: increase AROM L shoulder flex/abd to > 150* to improve functional reach  Long term goal 3: increase strength of cervical region to grossly 4+/5 improving upright posture to FAIR+/GOOD-  Long term goal 4: increase strength of L shoulder to grossly 4+/5 to improve ADL functions  Long term goal 5: pt demonstrates independence with HEP  Patient Goals   Patient goals : to reduce pain       Therapy Time   Individual Concurrent Group Co-treatment   Time In 1330         Time Out 1400         Minutes [address]   T: [phone]   F: [phone]     If you have any questions or concerns, please don't hesitate to call. Thank you for your referral.    Physician Signature:________________________________Date:__________________  By signing above, therapists plan is approved by physician. All patients under [employer]   must be signed by physician.

## 2022-03-15 ENCOUNTER — TELEPHONE (OUTPATIENT)
Dept: FAMILY MEDICINE CLINIC | Age: 87
End: 2022-03-15

## 2022-03-15 ENCOUNTER — HOSPITAL ENCOUNTER (OUTPATIENT)
Dept: PHYSICAL THERAPY | Age: 87
Setting detail: THERAPIES SERIES
Discharge: HOME | End: 2022-03-15
Payer: MEDICARE

## 2022-03-15 DIAGNOSIS — M81.0 AGE-RELATED OSTEOPOROSIS WITHOUT CURRENT PATHOLOGICAL FRACTURE: ICD-10-CM

## 2022-03-15 DIAGNOSIS — M17.0 PRIMARY OSTEOARTHRITIS OF BOTH KNEES: Primary | ICD-10-CM

## 2022-03-15 DIAGNOSIS — R53.81 PHYSICAL DECONDITIONING: ICD-10-CM

## 2022-03-15 PROCEDURE — 97110 THERAPEUTIC EXERCISES: CPT | Performed by: PHYSICAL THERAPIST

## 2022-03-15 NOTE — PROGRESS NOTES
[address] [facility]                Phone: [phone]   Fax: [phone]    To: Dr. Paul Pittman      From: Minta Burkitt, PT,MPT      Patient: Elliott Rosen       : 1935  Diagnosis:       Date: 3/15/2022  Treatment Diagnosis:  chronic LBP    Physical Therapy Discharge Note    Total Visits to date:   6 Cancels/No-shows to date:      Plan of Care/Treatment to date:  [x] Therapeutic Exercise    [x] Modalities:  [x] Therapeutic Activity     [] Ultrasound  [x] Electrical Stimulation  [] Gait Training      [] Cervical Traction   [] Lumbar Traction  [] Neuromuscular Re-education  [x] Cold/hotpack [] Iontophoresis  [x] Instruction in HEP      Other:  [] Manual Therapy       []    [] Aquatic Therapy      Progress towards goals:  pt reached all stated functional LTG's for therapy while LBP persisted; I with [facility] for home management of condition     Goal Status:  [] Achieved [x] Partially Achieved  [] Not Achieved     Patient Status: [] Continue per initial plan of Care     [x] Patient now discharged to [facility] for home management of condition      [] Additional visits requested, Please re-certify for additional visits:          Electronically signed by: Minta Burkitt, PT ,MPT     If you have any questions or concerns, please don't hesitate to call.   Thank you for your referral.

## 2022-03-15 NOTE — PROGRESS NOTES
[facility]  Phone: [phone] Fax: [phone]       Physical Therapy Daily Treatment Note  Date:  3/15/2022    Patient Name:  Jose Hernandez    :  1935  MRN: [id number]    Restrictions/Precautions:    Diagnosis:  C86.11NF (ICD-10-CM) - Fall from ground petqsO56.512 (ICD-10-CM) - Acute pain of left aqvpckemP63.2 (ICD-10-CM) - Neck pain on left side  Insurance/Certification information:  Medicare   cert dates: 5/15/52 to 6/10/22  Referring Physician:  Callie Parada MD   Plan of care signed (Y/N):    Visit# / total visits:  2/10  Pain level: /10  Time In:     1300   Time Out:    1350      Subjective:  Patient presents to PT with reports of remaining active with HEP. Feels somewhat better. Exercises:  Exercise/Equipment Resistance/Repetitions Other comments   UBE   x6nubs 3f/3b           Pulley flex/abd   x2mins ea            Cervical rot/SB   5x10s ea           Shrugs    x15    scap retractions    x15           Upper trap str      5x10s                                                                       Other Therapeutic Activities:      Home Exercise Program:      Manual Treatments:      Modalities:  MH [id number] cervical region- supine    Timed Code Treatment Minutes: Total Treatment Minutes:      Treatment/Activity Tolerance:  [x] Patient tolerated treatment well [] Patient limited by fatigue  [] Patient limited by pain  [] Patient limited by other medical complications  [x] Other: good tolerance to there ex. Increased ROM across L shoulder with active assistive ex. Reduction in pain with increased ROM to both cervical and shoulder region.  Placed MH across region following for symptom reduction    Plan:   [x] Continue per plan of care [] Alter current plan (see comments)  [] Plan of care initiated [] Hold pending MD visit [] Discharge  Plan for Next Session:         Treatment Charges: Mins Units   Initial Evaluation     Re-Evaluation     Ther Exercise         TE 30 2   Manual Therapy     MT     Ther Activities        TA     Gait Training          GT     Neuro Re-education NR     Modalities 15 NC   Non-Billable Service Time 5 -   Other     Total Time/Units 50 2     Electronically signed by:  Shelby Medrano PT

## 2022-03-15 NOTE — TELEPHONE ENCOUNTER
Myla Fink called in and is asking if you could please write her up a renewal for her handicap placard. Hers will run out in May.     Please advise    Thank you

## 2022-03-17 ENCOUNTER — HOSPITAL ENCOUNTER (OUTPATIENT)
Dept: PHYSICAL THERAPY | Age: 87
Setting detail: THERAPIES SERIES
Discharge: HOME | End: 2022-03-17
Payer: MEDICARE

## 2022-03-17 PROCEDURE — 97110 THERAPEUTIC EXERCISES: CPT | Performed by: PHYSICAL THERAPIST

## 2022-03-17 PROCEDURE — G0283 ELEC STIM OTHER THAN WOUND: HCPCS | Performed by: PHYSICAL THERAPIST

## 2022-03-17 NOTE — PROGRESS NOTES
[facility]  Phone: [phone] Fax: [phone]       Physical Therapy Daily Treatment Note  Date:  3/17/2022    Patient Name:  Pati Arenas    :  1935  MRN: [id number]    Restrictions/Precautions:    Diagnosis:  R39.13CZ (ICD-10-CM) - Fall from ground lcmjwW00.512 (ICD-10-CM) - Acute pain of left ibwdteodH81.2 (ICD-10-CM) - Neck pain on left side  Insurance/Certification information:  Medicare   cert dates: 3/74/52 to 6/10/22  Referring Physician:  Sarah Humphrey MD   Plan of care signed (Y/N):    Visit# / total visits:  3/10  Pain level: /10  Time In:     1455   Time Out:    1600      Subjective:  Patient presents to PT with reports of some mild soreness. Exercises:  Exercise/Equipment Resistance/Repetitions Other comments   UBE   x6nubs 3f/3b           Pulley flex/abd   x2mins ea            Cervical rot/SB   5x10s ea           Shrugs    x20    scap retractions    x20           Upper trap str      5x10s             Shoulder flexion wand    2x10                                                       Other Therapeutic Activities:      Home Exercise Program:      Manual Treatments:      Modalities:  MH/IFC [id number] cervical region- supine    Timed Code Treatment Minutes: Total Treatment Minutes:      Treatment/Activity Tolerance:  [x] Patient tolerated treatment well [] Patient limited by fatigue  [] Patient limited by pain  [] Patient limited by other medical complications  [x] Other: good tolerance to there ex. Cont exercises to promote ROM/strength of shoulder/cervical regions. Placed modalities on following for symptom relief.      Plan:   [x] Continue per plan of care [] Alter current plan (see comments)  [] Plan of care initiated [] Hold pending MD visit [] Discharge  Plan for Next Session:         Treatment Charges: Mins Units   Initial Evaluation     Re-Evaluation     Ther Exercise         TE 35 2   Manual Therapy     MT     Ther Activities        TA     Gait Training          GT     Neuro Re-education NR     Modalities 20 1   Non-Billable Service Time 10 -   Other     Total Time/Units 65 3     Electronically signed by:  Tres Clark PT

## 2022-03-22 ENCOUNTER — HOSPITAL ENCOUNTER (OUTPATIENT)
Dept: PHYSICAL THERAPY | Age: 87
Setting detail: THERAPIES SERIES
Discharge: HOME | End: 2022-03-22
Payer: MEDICARE

## 2022-03-22 PROCEDURE — 97110 THERAPEUTIC EXERCISES: CPT | Performed by: PHYSICAL THERAPIST

## 2022-03-22 PROCEDURE — G0283 ELEC STIM OTHER THAN WOUND: HCPCS | Performed by: PHYSICAL THERAPIST

## 2022-03-23 NOTE — PROGRESS NOTES
[facility]  Phone: [phone] Fax: [phone]       Physical Therapy Daily Treatment Note  Date:  3/23/2022    Patient Name:  Glen Hobbs    :  1935  MRN: [id number]    Restrictions/Precautions:    Diagnosis:  J99.85TU (ICD-10-CM) - Fall from ground sodlmC02.512 (ICD-10-CM) - Acute pain of left cwkgfhsdP71.2 (ICD-10-CM) - Neck pain on left side  Insurance/Certification information:  Medicare   cert dates: 90 to 6/10/22  Referring Physician:  Delmy Sparrow MD   Plan of care signed (Y/N):    Visit# / total visits:  4/10  Pain level: /10  Time In:     1300   Time Out:    1400     Subjective:  Patient presents to PT with reports of shoulder pain with certain motions     Exercises:  Exercise/Equipment Resistance/Repetitions Other comments   UBE   x6nubs 3f/3b           Pulley flex/abd   x2mins ea            Cervical rot/SB   5x10s ea           Shrugs    x20    scap retractions    x20           Upper trap str      5x10s             Shoulder flexion wand    2x10                                                       Other Therapeutic Activities:      Home Exercise Program:      Manual Treatments:      Modalities:  MH/IFC [id number] cervical region- supine    Timed Code Treatment Minutes: Total Treatment Minutes:      Treatment/Activity Tolerance:  [x] Patient tolerated treatment well [] Patient limited by fatigue  [] Patient limited by pain  [] Patient limited by other medical complications  [x] Other: good tolerance to there ex. Cont exercises to promote ROM/strength of shoulder/cervical regions. Placed modalities on following for symptom relief.      Plan:   [x] Continue per plan of care [] Alter current plan (see comments)  [] Plan of care initiated [] Hold pending MD visit [] Discharge  Plan for Next Session:         Treatment Charges: Mins Units   Initial Evaluation     Re-Evaluation     Ther Exercise         TE 35 2   Manual Therapy     MT     Ther Activities TA     Gait Training          GT     Neuro Re-education NR     Modalities 20 1   Non-Billable Service Time 5 -   Other     Total Time/Units 60 3     Electronically signed by:  Jaquelin Nava PT

## 2022-03-24 ENCOUNTER — HOSPITAL ENCOUNTER (OUTPATIENT)
Dept: PHYSICAL THERAPY | Age: 87
Setting detail: THERAPIES SERIES
Discharge: HOME | End: 2022-03-24
Payer: MEDICARE

## 2022-03-24 PROCEDURE — G0283 ELEC STIM OTHER THAN WOUND: HCPCS | Performed by: PHYSICAL THERAPIST

## 2022-03-24 PROCEDURE — 97110 THERAPEUTIC EXERCISES: CPT | Performed by: PHYSICAL THERAPIST

## 2022-03-24 NOTE — PROGRESS NOTES
[facility]  Phone: [phone] Fax: [phone]       Physical Therapy Daily Treatment Note  Date:  3/24/2022    Patient Name:  Doyle Galicia    :  1935  MRN: [id number]    Restrictions/Precautions:    Diagnosis:  V44.09OP (ICD-10-CM) - Fall from ground wgjgdE19.512 (ICD-10-CM) - Acute pain of left sdpkzzpbV90.2 (ICD-10-CM) - Neck pain on left side  Insurance/Certification information:  Medicare   cert dates: 08 to 6/10/22  Referring Physician:  Kelsi Mijares MD   Plan of care signed (Y/N):    Visit# / total visits:  4/10  Pain level: /10  Time In:     1400   Time Out:    1450     Subjective:  Patient reports feeling better. States today will be her last session    Exercises:  Exercise/Equipment Resistance/Repetitions Other comments   UBE   x6nubs 3f/3b           Pulley flex/abd   x2mins ea            Cervical rot/SB   5x10s ea           Shrugs    x20    scap retractions    x20           Upper trap str      5x10s             Shoulder flexion wand    2x10                                                       Other Therapeutic Activities:      Home Exercise Program:      Manual Treatments:      Modalities:  MH/IFC [id number] cervical region- supine    Timed Code Treatment Minutes: Total Treatment Minutes:      Treatment/Activity Tolerance:  [x] Patient tolerated treatment well [] Patient limited by fatigue  [] Patient limited by pain  [] Patient limited by other medical complications  [x] Other: good tolerance to there ex. Cont exercises to promote ROM/strength of shoulder/cervical regions. Placed modalities on following for symptom relief.  AAROM of B shoulders is WFL     Plan:   [x] Continue per plan of care [] Alter current plan (see comments)  [] Plan of care initiated [] Hold pending MD visit [] Discharge  Plan for Next Session:         Treatment Charges: Mins Units   Initial Evaluation     Re-Evaluation     Ther Exercise         TE 30 2   Manual Therapy     MT Ther Activities        TA     Gait Training          GT     Neuro Re-education NR     Modalities 20 1   Non-Billable Service Time 5 -   Other     Total Time/Units 55 3     Electronically signed by:  Nya Marte, PT

## 2022-03-28 ENCOUNTER — TELEPHONE (OUTPATIENT)
Dept: FAMILY MEDICINE CLINIC | Age: 87
End: 2022-03-28

## 2022-03-29 ENCOUNTER — APPOINTMENT (OUTPATIENT)
Dept: PHYSICAL THERAPY | Age: 87
End: 2022-03-29
Payer: MEDICARE

## 2022-03-30 DIAGNOSIS — Z76.0 ENCOUNTER FOR MEDICATION REFILL: ICD-10-CM

## 2022-03-31 RX ORDER — ERYTHROMYCIN 5 MG/G
OINTMENT OPHTHALMIC
Qty: 3.5 G | Refills: 0 | OUTPATIENT
Start: 2022-03-31

## 2022-04-18 ENCOUNTER — OFFICE VISIT (OUTPATIENT)
Dept: FAMILY MEDICINE CLINIC | Age: 87
End: 2022-04-18
Payer: MEDICARE

## 2022-04-18 VITALS
OXYGEN SATURATION: 98 % | DIASTOLIC BLOOD PRESSURE: 79 MMHG | HEART RATE: 61 BPM | BODY MASS INDEX: 34.82 KG/M2 | SYSTOLIC BLOOD PRESSURE: 126 MMHG | WEIGHT: 209 LBS | TEMPERATURE: 97.1 F | HEIGHT: 65 IN | RESPIRATION RATE: 24 BRPM

## 2022-04-18 DIAGNOSIS — J30.2 SEASONAL ALLERGIC RHINITIS, UNSPECIFIED TRIGGER: ICD-10-CM

## 2022-04-18 DIAGNOSIS — N89.8 VAGINAL ODOR: Primary | ICD-10-CM

## 2022-04-18 DIAGNOSIS — Z76.0 ENCOUNTER FOR MEDICATION REFILL: ICD-10-CM

## 2022-04-18 DIAGNOSIS — K21.9 GASTROESOPHAGEAL REFLUX DISEASE WITHOUT ESOPHAGITIS: ICD-10-CM

## 2022-04-18 PROCEDURE — 99213 OFFICE O/P EST LOW 20 MIN: CPT | Performed by: FAMILY MEDICINE

## 2022-04-18 PROCEDURE — 1123F ACP DISCUSS/DSCN MKR DOCD: CPT | Performed by: FAMILY MEDICINE

## 2022-04-18 PROCEDURE — G8417 CALC BMI ABV UP PARAM F/U: HCPCS | Performed by: FAMILY MEDICINE

## 2022-04-18 PROCEDURE — G8399 PT W/DXA RESULTS DOCUMENT: HCPCS | Performed by: FAMILY MEDICINE

## 2022-04-18 PROCEDURE — 4040F PNEUMOC VAC/ADMIN/RCVD: CPT | Performed by: FAMILY MEDICINE

## 2022-04-18 PROCEDURE — 1036F TOBACCO NON-USER: CPT | Performed by: FAMILY MEDICINE

## 2022-04-18 PROCEDURE — 1090F PRES/ABSN URINE INCON ASSESS: CPT | Performed by: FAMILY MEDICINE

## 2022-04-18 PROCEDURE — G8427 DOCREV CUR MEDS BY ELIG CLIN: HCPCS | Performed by: FAMILY MEDICINE

## 2022-04-18 RX ORDER — NALOXEGOL OXALATE 12.5 MG/1
12.5 TABLET, FILM COATED ORAL EVERY MORNING
Qty: 90 TABLET | Refills: 3 | Status: SHIPPED | OUTPATIENT
Start: 2022-04-18

## 2022-04-18 RX ORDER — FLUTICASONE PROPIONATE 50 MCG
1 SPRAY, SUSPENSION (ML) NASAL DAILY
Qty: 1 EACH | Refills: 3 | Status: SHIPPED | OUTPATIENT
Start: 2022-04-18

## 2022-04-18 RX ORDER — OMEPRAZOLE 20 MG/1
20 CAPSULE, DELAYED RELEASE ORAL DAILY
Qty: 90 CAPSULE | Refills: 3 | Status: SHIPPED | OUTPATIENT
Start: 2022-04-18

## 2022-04-18 RX ORDER — AZELASTINE HYDROCHLORIDE 0.5 MG/ML
SOLUTION/ DROPS OPHTHALMIC
Qty: 6 ML | Refills: 2 | Status: SHIPPED
Start: 2022-04-18 | End: 2022-10-21 | Stop reason: SDUPTHER

## 2022-04-18 RX ORDER — DOCUSATE SODIUM 100 MG/1
100 CAPSULE, LIQUID FILLED ORAL 2 TIMES DAILY
Qty: 60 CAPSULE | Refills: 2 | Status: SHIPPED | OUTPATIENT
Start: 2022-04-18

## 2022-04-18 RX ORDER — PRAVASTATIN SODIUM 40 MG
TABLET ORAL
Qty: 90 TABLET | Refills: 3 | Status: SHIPPED | OUTPATIENT
Start: 2022-04-18

## 2022-04-18 RX ORDER — CHLORHEXIDINE GLUCONATE 0.12 MG/ML
15 RINSE ORAL 2 TIMES DAILY
Qty: 473 ML | Refills: 5 | Status: SHIPPED | OUTPATIENT
Start: 2022-04-18

## 2022-04-18 RX ORDER — SUCRALFATE 1 G/1
TABLET ORAL
Qty: 360 TABLET | Refills: 2 | Status: SHIPPED
Start: 2022-04-18 | End: 2022-10-21 | Stop reason: SDUPTHER

## 2022-04-18 SDOH — ECONOMIC STABILITY: TRANSPORTATION INSECURITY
IN THE PAST 12 MONTHS, HAS THE LACK OF TRANSPORTATION KEPT YOU FROM MEDICAL APPOINTMENTS OR FROM GETTING MEDICATIONS?: NO

## 2022-04-18 SDOH — ECONOMIC STABILITY: TRANSPORTATION INSECURITY
IN THE PAST 12 MONTHS, HAS LACK OF TRANSPORTATION KEPT YOU FROM MEETINGS, WORK, OR FROM GETTING THINGS NEEDED FOR DAILY LIVING?: NO

## 2022-04-18 NOTE — PROGRESS NOTES
S: 81 y.o. 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  She states empiric treatment of diflucan did not treat her symptoms  Itching has resolved / white specks resolved  New sex partner and odor persists     O: VS: /79 (Site: Right Upper Arm, Position: Sitting, Cuff Size: Medium Adult)   Pulse 61   Temp 97.1 °F (36.2 °C) (Temporal)   Resp 24   Ht 5' 5\" (1.651 m)   Wt 209 lb (94.8 kg)   SpO2 98%   BMI 34.78 kg/m²   AAO/NAD, appropriate affect for mood  CV:  RRR, no murmur  Resp: CTAB  Abdomen: SNTND  Pelvic: External genitalia without rashes or lesions; vaginal vault normal; cervix smooth and pink, normal cervical discharge, no lesions; no CMT or adnexal /uterine masses on bimanual exam.    Wet mount - results: negative for pathogens, normal epithelial cells. Assessment/Plan:   1) vaginal odor: reassurance, see red note; UA and gc testing; discussed change in vaginal joseph after new sex partners; offer sti testing; mild detergents  RTO: 1-2 months routine f/u    Attending Physician Statement  I have discussed the case, including pertinent history and exam findings with the resident. I agree with the documented assessment and plan.       Electronically signed by Jocy Velez MD on 4/19/2022 at 11:08 AM

## 2022-04-18 NOTE — PROGRESS NOTES
CC: Follow vaginal odor    HPI:  81 y.o. female with PMH anxiety, GERD who presents with concern for foul vaginal odor    Foul vaginal odor  Patient was seen for a similar concern approximately 2 months ago; test at that time revealed rare colonization of Candida  She was treated with a one-time dose of Diflucan, patient now states that the odor never disappeared. Feels that because of this, she \" just stinks\"  Does not think that this is her normal feminine odor  Denies any pruritus, erythema, vaginal discharge  Showers daily, denies any douching    No other complaints at this time    ROS:    Reviewed, negative    Objective:    VS:  Blood pressure 126/79, pulse 61, temperature 97.1 °F (36.2 °C), temperature source Temporal, resp. rate 24, height 5' 5\" (1.651 m), weight 209 lb (94.8 kg), SpO2 98 %, not currently breastfeeding. Physical Exam  Constitutional:       Appearance: She is not ill-appearing or diaphoretic. Cardiovascular:      Rate and Rhythm: Normal rate and regular rhythm. Pulses: Normal pulses. Heart sounds: Normal heart sounds. No murmur heard. No gallop. Pulmonary:      Effort: Pulmonary effort is normal.      Breath sounds: Normal breath sounds. Abdominal:      General: Bowel sounds are normal.      Palpations: Abdomen is soft. Genitourinary:     General: Normal vulva. Exam position: Lithotomy position. Pubic Area: No rash. Labia:         Right: No rash, tenderness, lesion or injury. Left: No rash, tenderness, lesion or injury. Urethra: No prolapse or urethral pain. Vagina: Normal.      Comments: No lesions , no CMT, no adnexal/uterine masses on bimanual exam  Minimal discharge noted  Neurological:      Mental Status: She is alert. Assessment/Plan:    1.  Vaginal odor  Negative wet mount  And for further testing  Advised patient of changes in normal feminine vaginal oral  Advised use of mild detergents, mildly scented soaps  Advise change in vaginal joseph change in sexual partners  - Urinalysis; Future  - C.TRACHOMATIS Aishwarya Lyles; Future  - Culture, Genital; Future    2. Seasonal allergic rhinitis, unspecified trigger  Refill flonase  - fluticasone (FLONASE) 50 MCG/ACT nasal spray; 1 spray by Each Nostril route daily  Dispense: 1 each; Refill: 3    3. Gastroesophageal reflux disease without esophagitis  Refill prilosec  - omeprazole (PRILOSEC) 20 MG delayed release capsule; Take 1 capsule by mouth daily  Dispense: 90 capsule; Refill: 3    4. Encounter for medication refill  Refill home meds  - azelastine (OPTIVAR) 0.05 % ophthalmic solution; INSTILL ONE DROP INTO EACH EYE TWICE DAILY  Dispense: 6 mL; Refill: 2  - sucralfate (CARAFATE) 1 GM tablet; TAKE ONE TABLET BY MOUTH FOUR TIMES A DAY  Dispense: 360 tablet; Refill: 2  - docusate sodium (COLACE) 100 MG capsule; Take 1 capsule by mouth 2 times daily  Dispense: 60 capsule; Refill: 2  - pravastatin (PRAVACHOL) 40 MG tablet; TAKE 1 TABLET DAILY  Dispense: 90 tablet; Refill: 3  - MOVANTIK 12.5 MG TABS tablet; Take 1 tablet by mouth every morning  Dispense: 90 tablet; Refill: 3  - chlorhexidine (PERIDEX) 0.12 % solution; Take 15 mLs by mouth 2 times daily  Dispense: 473 mL; Refill: 5     RTO PRN or sooner prn for any persistent, new, or worsening symptoms. Please see Patient Instructions for further counseling and information given. Advised to please be adherent to the treatment plans discussed today, and please call with any questions or concerns, letting the office know of any reasons that the plans may not be followed. The risks of untreated conditions include worsening illness, injury, disability, and possibly, death. Please call if symptoms change in any way, worsen, or fail to completely resolve, as this could necessitate a change to treatment plans. Patient and/or caregiver expressed understanding. Indications and proper use of medication(s) reviewed. Potential side-effects and risks of medication(s) also explained. Patient and/or caregiver was instructed to call if any new symptoms develop prior to next visit. Health risk factors discussed and addressed.      Electronically signed by Delicia Hines MD PGY-2 on 4/18/2022 at 1:25 PM  This case was discussed with attending physician: Dr. Yifan Chawla

## 2022-04-22 LAB — GENITAL CULTURE, ROUTINE: NORMAL

## 2022-05-26 ENCOUNTER — TELEPHONE (OUTPATIENT)
Dept: FAMILY MEDICINE CLINIC | Age: 87
End: 2022-05-26

## 2022-05-26 NOTE — TELEPHONE ENCOUNTER
Nayla Durham called in and is asking if you could please refer her over to see someone for a colonoscopy. She states that she has been having problems with going to the bathroom and believes that she is do for one.     Please advise    Thank you

## 2022-06-01 NOTE — TELEPHONE ENCOUNTER
Per charting , patient with a colonoscopy not too long ago  She may make an appointment to go over this with me in clinic  I tried to call her but did not get a response  Thank you!     Augusto Alvarado MD   6/1/2022  1:17 PM

## 2022-07-22 ENCOUNTER — IMMUNIZATION (OUTPATIENT)
Dept: PRIMARY CARE CLINIC | Age: 87
End: 2022-07-22
Payer: MEDICARE

## 2022-07-22 PROCEDURE — 91305 COVID-19, PFIZER GRAY TOP, DO NOT DILUTE, (AGE 12 Y+), IM, 30MCG/0.3 ML: CPT | Performed by: FAMILY MEDICINE

## 2022-07-22 PROCEDURE — 0054A COVID-19, PFIZER GRAY TOP, DO NOT DILUTE, (AGE 12 Y+), IM, 30MCG/0.3 ML: CPT | Performed by: FAMILY MEDICINE

## 2022-10-21 ENCOUNTER — OFFICE VISIT (OUTPATIENT)
Dept: FAMILY MEDICINE CLINIC | Age: 87
End: 2022-10-21
Payer: MEDICARE

## 2022-10-21 VITALS
OXYGEN SATURATION: 96 % | BODY MASS INDEX: 35.16 KG/M2 | HEART RATE: 63 BPM | TEMPERATURE: 98.3 F | WEIGHT: 211 LBS | SYSTOLIC BLOOD PRESSURE: 120 MMHG | DIASTOLIC BLOOD PRESSURE: 75 MMHG | RESPIRATION RATE: 16 BRPM | HEIGHT: 65 IN

## 2022-10-21 DIAGNOSIS — Z13.9 ENCOUNTER FOR SCREENING INVOLVING SOCIAL DETERMINANTS OF HEALTH (SDOH): ICD-10-CM

## 2022-10-21 DIAGNOSIS — R10.9 ABDOMINAL PAIN, UNSPECIFIED ABDOMINAL LOCATION: Primary | ICD-10-CM

## 2022-10-21 DIAGNOSIS — I10 ESSENTIAL HYPERTENSION: ICD-10-CM

## 2022-10-21 DIAGNOSIS — R68.89 ITCHY EYES: ICD-10-CM

## 2022-10-21 DIAGNOSIS — Z87.19 HISTORY OF DIVERTICULOSIS: ICD-10-CM

## 2022-10-21 DIAGNOSIS — K92.1 BLACK TARRY STOOLS: ICD-10-CM

## 2022-10-21 PROCEDURE — 1090F PRES/ABSN URINE INCON ASSESS: CPT | Performed by: FAMILY MEDICINE

## 2022-10-21 PROCEDURE — 99212 OFFICE O/P EST SF 10 MIN: CPT | Performed by: FAMILY MEDICINE

## 2022-10-21 PROCEDURE — 1123F ACP DISCUSS/DSCN MKR DOCD: CPT | Performed by: FAMILY MEDICINE

## 2022-10-21 PROCEDURE — G8427 DOCREV CUR MEDS BY ELIG CLIN: HCPCS | Performed by: FAMILY MEDICINE

## 2022-10-21 PROCEDURE — G8417 CALC BMI ABV UP PARAM F/U: HCPCS | Performed by: FAMILY MEDICINE

## 2022-10-21 PROCEDURE — G8484 FLU IMMUNIZE NO ADMIN: HCPCS | Performed by: FAMILY MEDICINE

## 2022-10-21 PROCEDURE — 99213 OFFICE O/P EST LOW 20 MIN: CPT | Performed by: FAMILY MEDICINE

## 2022-10-21 PROCEDURE — 1036F TOBACCO NON-USER: CPT | Performed by: FAMILY MEDICINE

## 2022-10-21 RX ORDER — SUCRALFATE 1 G/1
TABLET ORAL
Qty: 360 TABLET | Refills: 2 | Status: SHIPPED | OUTPATIENT
Start: 2022-10-21

## 2022-10-21 RX ORDER — AZELASTINE HYDROCHLORIDE 0.5 MG/ML
SOLUTION/ DROPS OPHTHALMIC
Qty: 6 ML | Refills: 2 | Status: SHIPPED | OUTPATIENT
Start: 2022-10-21

## 2022-10-21 RX ORDER — AMLODIPINE BESYLATE AND BENAZEPRIL HYDROCHLORIDE 10; 20 MG/1; MG/1
1 CAPSULE ORAL DAILY
Qty: 30 CAPSULE | Refills: 3 | Status: SHIPPED | OUTPATIENT
Start: 2022-10-21

## 2022-10-21 SDOH — ECONOMIC STABILITY: FOOD INSECURITY: WITHIN THE PAST 12 MONTHS, YOU WORRIED THAT YOUR FOOD WOULD RUN OUT BEFORE YOU GOT MONEY TO BUY MORE.: SOMETIMES TRUE

## 2022-10-21 SDOH — ECONOMIC STABILITY: FOOD INSECURITY: WITHIN THE PAST 12 MONTHS, THE FOOD YOU BOUGHT JUST DIDN'T LAST AND YOU DIDN'T HAVE MONEY TO GET MORE.: NEVER TRUE

## 2022-10-21 ASSESSMENT — PATIENT HEALTH QUESTIONNAIRE - PHQ9
SUM OF ALL RESPONSES TO PHQ9 QUESTIONS 1 & 2: 3
8. MOVING OR SPEAKING SO SLOWLY THAT OTHER PEOPLE COULD HAVE NOTICED. OR THE OPPOSITE, BEING SO FIGETY OR RESTLESS THAT YOU HAVE BEEN MOVING AROUND A LOT MORE THAN USUAL: 2
10. IF YOU CHECKED OFF ANY PROBLEMS, HOW DIFFICULT HAVE THESE PROBLEMS MADE IT FOR YOU TO DO YOUR WORK, TAKE CARE OF THINGS AT HOME, OR GET ALONG WITH OTHER PEOPLE: 2
7. TROUBLE CONCENTRATING ON THINGS, SUCH AS READING THE NEWSPAPER OR WATCHING TELEVISION: 2
6. FEELING BAD ABOUT YOURSELF - OR THAT YOU ARE A FAILURE OR HAVE LET YOURSELF OR YOUR FAMILY DOWN: 0
5. POOR APPETITE OR OVEREATING: 1
SUM OF ALL RESPONSES TO PHQ QUESTIONS 1-9: 10
4. FEELING TIRED OR HAVING LITTLE ENERGY: 1
9. THOUGHTS THAT YOU WOULD BE BETTER OFF DEAD, OR OF HURTING YOURSELF: 0
SUM OF ALL RESPONSES TO PHQ QUESTIONS 1-9: 10
2. FEELING DOWN, DEPRESSED OR HOPELESS: 1
SUM OF ALL RESPONSES TO PHQ QUESTIONS 1-9: 10
SUM OF ALL RESPONSES TO PHQ QUESTIONS 1-9: 10
1. LITTLE INTEREST OR PLEASURE IN DOING THINGS: 2
3. TROUBLE FALLING OR STAYING ASLEEP: 1

## 2022-10-21 ASSESSMENT — LIFESTYLE VARIABLES
HOW MANY STANDARD DRINKS CONTAINING ALCOHOL DO YOU HAVE ON A TYPICAL DAY: PATIENT DOES NOT DRINK
HOW OFTEN DO YOU HAVE A DRINK CONTAINING ALCOHOL: NEVER

## 2022-10-21 ASSESSMENT — COLUMBIA-SUICIDE SEVERITY RATING SCALE - C-SSRS
2. HAVE YOU ACTUALLY HAD ANY THOUGHTS OF KILLING YOURSELF?: NO
6. HAVE YOU EVER DONE ANYTHING, STARTED TO DO ANYTHING, OR PREPARED TO DO ANYTHING TO END YOUR LIFE?: NO
1. WITHIN THE PAST MONTH, HAVE YOU WISHED YOU WERE DEAD OR WISHED YOU COULD GO TO SLEEP AND NOT WAKE UP?: NO

## 2022-10-21 ASSESSMENT — SOCIAL DETERMINANTS OF HEALTH (SDOH): HOW HARD IS IT FOR YOU TO PAY FOR THE VERY BASICS LIKE FOOD, HOUSING, MEDICAL CARE, AND HEATING?: VERY HARD
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
## 2022-10-25 ENCOUNTER — CARE COORDINATION (OUTPATIENT)
Dept: CARE COORDINATION | Age: 87
End: 2022-10-25
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
## 2022-11-10 ENCOUNTER — CARE COORDINATION (OUTPATIENT)
Dept: CARE COORDINATION | Age: 87
End: 2022-11-10

## 2022-11-10 NOTE — CARE COORDINATION
[facility] called and spoke with Curt Gonzalez today. She states she is doing well now. Excited her granddaughter in [place] had a baby this morning. States she is getting ready to head up there today. No other [facility] needs at this time.   [facility] to sign off today

## 2022-11-15 ENCOUNTER — HOSPITAL ENCOUNTER (OUTPATIENT)
Age: 87
Discharge: HOME | End: 2022-11-15
Payer: MEDICARE

## 2022-11-15 LAB
ATYPICAL LYMPHOCYTE RELATIVE PERCENT: 4.3 % (ref 0–4)
BASOPHILS ABSOLUTE: 0.04 E9/L (ref 0–0.2)
BASOPHILS RELATIVE PERCENT: 0.9 % (ref 0–2)
EOSINOPHILS ABSOLUTE: 0.26 E9/L (ref 0.05–0.5)
EOSINOPHILS RELATIVE PERCENT: 6.1 % (ref 0–6)
HCT VFR BLD CALC: 42.3 % (ref 34–48)
HEMOGLOBIN: 14.6 G/DL (ref 11.5–15.5)
LYMPHOCYTES ABSOLUTE: 2.27 E9/L (ref 1.5–4)
LYMPHOCYTES RELATIVE PERCENT: 49.6 % (ref 20–42)
MCH RBC QN AUTO: 33.2 PG (ref 26–35)
MCHC RBC AUTO-ENTMCNC: 34.5 % (ref 32–34.5)
MCV RBC AUTO: 96.1 FL (ref 80–99.9)
MONOCYTES ABSOLUTE: 0.71 E9/L (ref 0.1–0.95)
MONOCYTES RELATIVE PERCENT: 17.4 % (ref 2–12)
NEUTROPHILS ABSOLUTE: 0.92 E9/L (ref 1.8–7.3)
NEUTROPHILS RELATIVE PERCENT: 21.7 % (ref 43–80)
OVALOCYTES: ABNORMAL
PDW BLD-RTO: 13.5 FL (ref 11.5–15)
PLATELET # BLD: 270 E9/L (ref 130–450)
PMV BLD AUTO: 8.9 FL (ref 7–12)
POIKILOCYTES: ABNORMAL
RBC # BLD: 4.4 E12/L (ref 3.5–5.5)
WBC # BLD: 4.2 E9/L (ref 4.5–11.5)

## 2022-11-15 PROCEDURE — 85025 COMPLETE CBC W/AUTO DIFF WBC: CPT

## 2022-11-15 PROCEDURE — 36415 COLL VENOUS BLD VENIPUNCTURE: CPT

## 2022-11-21 RX ORDER — AMLODIPINE BESYLATE AND BENAZEPRIL HYDROCHLORIDE 5; 20 MG/1; MG/1
CAPSULE ORAL
Qty: 90 CAPSULE | Refills: 0 | OUTPATIENT
Start: 2022-11-21

## 2022-11-21 NOTE — TELEPHONE ENCOUNTER
Last Appointment:  10/25/2021  Future Appointments  12/5/2022  1:30 PM    Magaly Cornell MD           [facility] Surgical        [facility]  12/21/2022 1:20 PM    MD [facility]

## 2022-11-30 ENCOUNTER — TELEPHONE (OUTPATIENT)
Dept: FAMILY MEDICINE CLINIC | Age: 87
End: 2022-11-30

## 2022-11-30 NOTE — TELEPHONE ENCOUNTER
Last Appointment:  10/21/2022  Future Appointments  12/5/2022  1:30 PM    Severiano Sox, MD           [facility] Surgical        [facility]  12/9/2022  2:00 PM    MD Susanne Bowser [facility]  12/21/2022 1:20 PM    MD Susanne Bowser [facility]

## 2022-12-05 ENCOUNTER — OFFICE VISIT (OUTPATIENT)
Dept: SURGERY | Age: 87
End: 2022-12-05
Payer: MEDICARE

## 2022-12-05 VITALS
HEIGHT: 65 IN | RESPIRATION RATE: 16 BRPM | DIASTOLIC BLOOD PRESSURE: 79 MMHG | HEART RATE: 66 BPM | WEIGHT: 215 LBS | TEMPERATURE: 97.8 F | SYSTOLIC BLOOD PRESSURE: 138 MMHG | BODY MASS INDEX: 35.82 KG/M2 | OXYGEN SATURATION: 96 %

## 2022-12-05 DIAGNOSIS — R10.9 LEFT SIDED ABDOMINAL PAIN: ICD-10-CM

## 2022-12-05 DIAGNOSIS — K92.1 BLACK STOOLS: Primary | ICD-10-CM

## 2022-12-05 DIAGNOSIS — R12 HEARTBURN: ICD-10-CM

## 2022-12-05 DIAGNOSIS — R10.32 LLQ PAIN: ICD-10-CM

## 2022-12-05 DIAGNOSIS — K30 INDIGESTION: ICD-10-CM

## 2022-12-05 PROCEDURE — 1090F PRES/ABSN URINE INCON ASSESS: CPT | Performed by: SURGERY

## 2022-12-05 PROCEDURE — G8427 DOCREV CUR MEDS BY ELIG CLIN: HCPCS | Performed by: SURGERY

## 2022-12-05 PROCEDURE — G8417 CALC BMI ABV UP PARAM F/U: HCPCS | Performed by: SURGERY

## 2022-12-05 PROCEDURE — 99203 OFFICE O/P NEW LOW 30 MIN: CPT | Performed by: SURGERY

## 2022-12-05 PROCEDURE — 99202 OFFICE O/P NEW SF 15 MIN: CPT | Performed by: SURGERY

## 2022-12-05 PROCEDURE — G8484 FLU IMMUNIZE NO ADMIN: HCPCS | Performed by: SURGERY

## 2022-12-05 RX ORDER — DORZOLAMIDE HYDROCHLORIDE AND TIMOLOL MALEATE 20; 5 MG/ML; MG/ML
SOLUTION/ DROPS OPHTHALMIC
COMMUNITY
Start: 2022-11-18

## 2022-12-05 ASSESSMENT — ENCOUNTER SYMPTOMS
NAUSEA: 0
EYE ITCHING: 0
EYE REDNESS: 0
EYE PAIN: 0
BLOOD IN STOOL: 0
CHOKING: 0
EYE DISCHARGE: 0
SHORTNESS OF BREATH: 0
COLOR CHANGE: 0
CHEST TIGHTNESS: 0
DIARRHEA: 0
WHEEZING: 0
CONSTIPATION: 1
ANAL BLEEDING: 0
BACK PAIN: 1
ABDOMINAL PAIN: 1
ABDOMINAL DISTENTION: 0
VOMITING: 0
COUGH: 0

## 2022-12-05 NOTE — PATIENT INSTRUCTIONS
Call [phone] for any questions/concerns. Patient Information and Instructions for  Upper GI Endoscopy or Esophagogastroduodenoscopy [EGD])         Definition Upper GI Endoscopy or Esophagogastroduodenoscopy [EGD])  This is a test that uses a fiberoptic scope to examine the esophagus (throat), stomach, and upper part of the small intestines. Upper GI endoscopy may be recommended if you have:   Abdominal pain   Severe heartburn   Persistent nausea and vomiting   Difficulty swallowing   Blood in stool or vomit   Abnormal x-ray or other examinations of the gastrointestinal tract     Conditions that can be diagnosed with upper GI endoscopy include:   Ulcers   Tumors   Polyps   Abnormal narrowing   Inflammation     Possible Complications   Complications are rare, but no procedure is completely free of risk. If you are planning to have upper GI endoscopy, your doctor will review a list of possible complications, which may include:   Bleeding   Damage to the esophagus, stomach, or intestine   Infection   Respiratory depression (reduced breathing rate and/or depth)   Reaction to sedatives or anesthesia causing your blood pressure to drop    Some factors that may increase the risk of complications include:   Age: 61 or older   Pregnancy   Obesity   Smoking , alcoholism , or drug use   Malnutrition   Recent illness   Diabetes   Heart or lung problems   Bleeding disorders   Use of certain medicines     Be sure to discuss these risks with your doctor before the test.     What to Expect   Prior to test   Leading up to the test:   Arrange for a ride home after the test. Also, arrange for help at home. The night before, eat a light meal.  Do not eat or drink anything after midnight the night before the test.   Talk to your doctor about your medicines.  You may be asked to stop taking some medicines up to one week before the procedure, like:   Anti-inflammatory drugs (e.g., aspirin )   Blood thinners, like clopidogrel (Plavix) or warfarin (Coumadin)     Description of the Test   You will be asked to lie on your left side. You will have monitors tracking your breathing, heart rate, and blood oxygen levels. You will be given supplemental oxygen to breathe through your nose. A mouthpiece will be positioned to help keep your mouth open. Your throat may be sprayed with a numbing medicine. You will be given a sedative through an IV to help you relax during the test.  During the test, a small suction tube will be used to clear saliva and fluids from your mouth. The endoscope will be lubricated and placed in your mouth. You will be asked to try to swallow it. Then, it will be carefully and slowly advanced down your throat. It will be passed through your esophagus and into your stomach and intestine. While the endoscope is being advanced, your doctor will view the images on the screen. Air will be passed through the endoscope into your digestive tract. This will be done to smooth the normal folds in the tissues, allowing your doctor to view the tissue more easily. Tiny tools may be passed through the endoscope in order to take biopsies or do other tests. After Test   After the test, you will be observed for an hour. Then, you will be allowed to go home. When you return home after the test, do the following to help ensure a smooth recovery:   Rest when you get home. Ask your doctor if you can resume your normal diet. In most cases, you will be able to. Sedatives can slow your reaction time. Do not drive or use machinery for the rest of the day. Avoid alcohol for the rest of the day. Be sure to follow your doctor's instructions . How Long Will It Take? Usually about 10-15 minutes     Will It Hurt? Most people do not feel anything during the test and will not remember the test.  After the test, your throat may be sore and you may feel bloated.      Results   This test gives your doctor information about the health of your digestive system. The results can help to explain your symptoms. You and your doctor will talk about the results and your treatment plan. Call Your Doctor   After the test, call your doctor if any of the following occurs:   Signs of infection, including fever and chills   Severe abdominal pain   Hard, swollen abdomen   Difficulty swallowing or breathing   Any change or increase in your original symptoms   Bloody or black tarry colored stools   Nausea and/or vomiting   Cough, shortness of breath, or chest pain   Bleeding     In case of emergency, call 911.

## 2022-12-05 NOTE — PROGRESS NOTES
Subjective:      Patient ID: Michael Bates is a 81 y.o. female. HPI  81 yr old female referred by Dr. Vanesa Cowart for black stools. Pt states she has long hx of heartburn and reflux. Pt states has been taking omeprazole/sucralfate for at least 10 years. States she also takes Mylanta due to ongoing heartburn and burning reflux up into her throat. States she does have a little relief with the Mylanta sometimes has diarrhea after taking it. Pt report she also has a hx of diverticulosis. Pt states she has been having the black stools for the past 2 months. Pt complains of some left sided abd pain, especially in LLQ. Pt report she seems to be more tired than usual.  Pt reports last EGD/colonoscopy was 2015 by Dr. Rosemary Duncan to have gastritis/duodenitis/diverticulosis/hyperplastic rectal polyp.     Past Medical History:   Diagnosis Date    Anxiety 10/11/2010    Breast cancer ([address] approx 2000    right, treated lumpectomy, radiation, chemo    Depression 10/11/2010    no issues    Diverticular disease 10/11/2010    GERD (gastroesophageal reflux disease)     HTN (hypertension) 10/11/2010    Hyperlipidemia 10/11/2010    Osteoarthritis 10/11/2010    fot left knee arthroplasty 10/21/2016    Osteoporosis 10/11/2010    Spinal stenosis 10/11/2010    Use of cane as ambulatory aid        Past Surgical History:   Procedure Laterality Date    BACK SURGERY      BONE GRAFT      with back surgery    BREAST LUMPECTOMY  2000    left, with chemo and rad, [facility]    CARPAL TUNNEL RELEASE  1980s    left    CARPAL TUNNEL RELEASE  1980s    repeat left    CATARACT REMOVAL  2010    b[facility]    COLONOSCOPY  2/3/2011    extensive diverticulosis, Dr. Anitha Croft, [facility]    COLONOSCOPY  2013    \"could not get around colon due to diverticulitis, [facility]    COLONOSCOPY  2/24/2015    severe diverticulosis transverse and left colon without diverticulitis, rectal polyp bx, Dr. Isael Pollack, [facility]    HYSTERECTOMY (CERVIX STATUS UNKNOWN)  1980s    still has ovaries, no cancer, [facility]    SPINAL FUSION  2009    [facility], pins and rods in place    TOTAL KNEE ARTHROPLASTY Right 10/21/2016    TOTAL KNEE ARTHROPLASTY Left 10/2017    replacement, dr Miugelito Sprague  2/3/2011    gastritis, Dr. Kyle Pappas, [address] GASTROINTESTINAL ENDOSCOPY  2013    gastritis, [facility]    UPPER GASTROINTESTINAL ENDOSCOPY  2/24/2015    mild to moderate gastritis and duodenitis, gastric bx for H pylori, Dr. Shanna Rust       Current Outpatient Medications   Medication Sig Dispense Refill    Cholecalciferol (VITAMIN D3) 125 MCG (5000 UT) CAPS TAKE 1 CAPSULE BY MOUTH DAILY      dorzolamide-timolol (COSOPT) 22.3-6.8 MG/ML ophthalmic solution INSTILL ONE DROP INTO EACH EYE EVERY 12 HOURS      amLODIPine-benazepril (LOTREL) 10-20 MG per capsule Take 1 capsule by mouth daily 30 capsule 3    azelastine (OPTIVAR) 0.05 % ophthalmic solution INSTILL ONE DROP INTO EACH EYE TWICE DAILY 6 mL 2    sucralfate (CARAFATE) 1 GM tablet TAKE ONE TABLET BY MOUTH FOUR TIMES A  tablet 2    fluticasone (FLONASE) 50 MCG/ACT nasal spray 1 spray by Each Nostril route daily 1 each 3    docusate sodium (COLACE) 100 MG capsule Take 1 capsule by mouth 2 times daily 60 capsule 2    pravastatin (PRAVACHOL) 40 MG tablet TAKE 1 TABLET DAILY 90 tablet 3    MOVANTIK 12.5 MG TABS tablet Take 1 tablet by mouth every morning 90 tablet 3    omeprazole (PRILOSEC) 20 MG delayed release capsule Take 1 capsule by mouth daily 90 capsule 3    chlorhexidine (PERIDEX) 0.12 % solution Take 15 mLs by mouth 2 times daily 473 mL 5    Handicap Placard MISC by Does not apply route Patient requires handicap placard for physical deconditioning, age, osteoarthritis of both knees and osteoporosis. This handicap placard is valid from 4/5/2022 to 4/5/2027; a total of 5 years.  1 each 0    oxyCODONE-acetaminophen (PERCOCET) 7.5-325 MG per tablet TAKE 1 TABLET BY MOUTH EVERY EIGHT HOURS AS NEEDED      Vaginal Lubricant (REPLENS) GEL Use once-twice daily 35 g 1    NARCAN 4 MG/0.1ML LIQD nasal spray 0.1 mLs by Nasal route once  1    VILAZODONE HCL 10 MG Tablet TK 1 T PO D  1    vitamin B-12 (CYANOCOBALAMIN) 1000 MCG tablet Take 1,000 mcg by mouth daily       Pyridoxine HCl (VITAMIN B-6) 100 MG tablet Take 100 mg by mouth daily      Multiple Vitamin TABS Take 1 capsule by mouth daily Hair ,skin and nails       No current facility-administered medications for this visit. Allergies   Allergen Reactions    No Known Allergies        Family History   Problem Relation Age of Onset    Cancer Mother         throat    High Blood Pressure Mother     Mental Illness Father     Colon Cancer Father     Cancer Brother     Heart Disease Brother     Mental Illness Daughter     Mental Illness Son     Arthritis Sister     Cancer Brother     Pacemaker Brother     No Known Problems Brother        Social History     Socioeconomic History    Marital status:      Spouse name: Not on file    Number of children: Not on file    Years of education: Not on file    Highest education level: Not on file   Occupational History    Not on file   Tobacco Use    Smoking status: Never    Smokeless tobacco: Never   Substance and Sexual Activity    Alcohol use: No     Alcohol/week: 0.0 standard drinks    Drug use: No    Sexual activity: Never   Other Topics Concern    Not on file   Social History Narrative    Drinks 3 cups of tea daily     Social Determinants of Health     Financial Resource Strain: High Risk    Difficulty of Paying Living Expenses: Very hard   Food Insecurity: Food Insecurity Present    Worried About Running Out of Food in the Last Year: Sometimes true    Ran Out of Food in the Last Year: Never true   Transportation Needs: No Transportation Needs    Lack of Transportation (Medical): No    Lack of Transportation (Non-Medical):  No   Physical Activity: Not on file   Stress: Not on file   Social Connections: Not on file   Intimate Partner Violence: Not on file   Housing Stability: Not on file     Review of Systems   Constitutional:  Positive for fatigue. Negative for activity change, appetite change, chills, fever and unexpected weight change. HENT: Negative. Eyes:  Positive for visual disturbance. Negative for pain, discharge, redness and itching. Respiratory:  Negative for cough, choking, chest tightness, shortness of breath and wheezing. Cardiovascular:  Negative for chest pain, palpitations and leg swelling. Gastrointestinal:  Positive for abdominal pain and constipation. Negative for abdominal distention, anal bleeding, blood in stool, diarrhea, nausea and vomiting. Endocrine: Positive for cold intolerance. Negative for heat intolerance, polydipsia and polyuria. Genitourinary:  Negative for dysuria, frequency, hematuria, urgency, vaginal bleeding, vaginal discharge and vaginal pain. Musculoskeletal:  Positive for arthralgias, back pain, gait problem, neck pain and neck stiffness. Negative for joint swelling and myalgias. Skin:  Negative for color change, pallor, rash and wound. Allergic/Immunologic: Negative for environmental allergies and food allergies. Neurological:  Positive for headaches. Negative for dizziness, seizures, syncope, weakness, light-headedness and numbness. Hematological:  Negative for adenopathy. Does not bruise/bleed easily. Psychiatric/Behavioral:  Positive for agitation. Negative for confusion, decreased concentration, hallucinations, self-injury and suicidal ideas. The patient is nervous/anxious. The patient is not hyperactive. Objective:   Physical Exam  Constitutional:       General: She is not in acute distress. Appearance: Normal appearance. She is well-developed. She is obese. She is not ill-appearing, toxic-appearing or diaphoretic. HENT:      Head: Normocephalic and atraumatic. Nose: Nose normal.      Mouth/Throat:      Mouth: Mucous membranes are moist.      Pharynx: Oropharynx is clear. Eyes:      General: No scleral icterus. Right eye: No discharge. Left eye: No discharge. Extraocular Movements: Extraocular movements intact. Pupils: Pupils are equal, round, and reactive to light. Cardiovascular:      Rate and Rhythm: Normal rate and regular rhythm. Heart sounds: Normal heart sounds. No murmur heard. Pulmonary:      Effort: Pulmonary effort is normal. No respiratory distress. Breath sounds: Normal breath sounds. No stridor. No wheezing, rhonchi or rales. Abdominal:      General: Bowel sounds are normal. There is no distension. Palpations: Abdomen is soft. There is no mass. Tenderness: There is no abdominal tenderness. There is no guarding or rebound. Hernia: No hernia is present. Musculoskeletal:         General: Normal range of motion. Cervical back: Normal range of motion and neck supple. Skin:     General: Skin is warm and dry. Neurological:      General: No focal deficit present. Mental Status: She is alert and oriented to person, place, and time. Psychiatric:         Mood and Affect: Mood normal.         Behavior: Behavior normal.         Thought Content: Thought content normal.         Judgment: Judgment normal.     Previous notes personally reviewed  Labs personally reviewed  Assessment:      Dark black stools  Left sided abd pain  Heartburn/indigestion        Plan:      Recommend EGD. Recommend EGD with possible biopsy. The patient was explained the risks/benefits/alternatives/expected outcomes of the procedure. The patient was explained the risks of the procedure, including, but not limited to, the risk of reaction to the anesthesia medicine and the risk of perforation requiring further surgery. All questions were answered. The patient verbalized understanding and agrees to proceed.     Would not recommend repeat colonoscopy given age and known hx of diverticulosis.         Clarita Clark MD

## 2022-12-05 NOTE — LETTER
[facility] General Surgery  [address] [place] [address]  Phone: [phone]  Fax: [phone]           Teresa Rodriguez MD      December 5, 2022     Patient: Kristian Yap   MR Number: [id number]   YOB: 1935   Date of Visit: 12/5/2022       Dear Dr. Caleb Guillen:    Thank you for referring Aniyah Brown to me for evaluation/treatment. Below are the relevant portions of my assessment and plan of care. Dark black stools  Left sided abd pain  Heartburn/indigestion      Recommend EGD. Recommend EGD with possible biopsy. The patient was explained the risks/benefits/alternatives/expected outcomes of the procedure. The patient was explained the risks of the procedure, including, but not limited to, the risk of reaction to the anesthesia medicine and the risk of perforation requiring further surgery. All questions were answered. The patient verbalized understanding and agrees to proceed. Would not recommend repeat colonoscopy given age and known hx of diverticulosis. If you have questions, please do not hesitate to call me. I look forward to following Stanislaw Bergman along with you.     Sincerely,        Teresa Rodriguez MD    CC providers:  Abhay Cazares, [facility] [address]  Via In [facility] [address]

## 2022-12-06 ENCOUNTER — TELEPHONE (OUTPATIENT)
Dept: SURGERY | Age: 87
End: 2022-12-06

## 2022-12-06 ENCOUNTER — PREP FOR PROCEDURE (OUTPATIENT)
Dept: SURGERY | Age: 87
End: 2022-12-06

## 2022-12-06 PROBLEM — R10.9 ABDOMINAL PAIN: Status: ACTIVE | Noted: 2022-12-06

## 2022-12-06 PROBLEM — K92.1 BLACK STOOLS: Status: ACTIVE | Noted: 2022-12-06

## 2022-12-06 NOTE — TELEPHONE ENCOUNTER
Scheduled pt for EGD 23 at 7:45AM. Pt needs to arrive at [address] [place] [address] at 6:45AM. Patient confirmed date and time, address and directions given in office. Prep instructions given in office, patient understood. Prior Authorization Form:      DEMOGRAPHICS:                     Patient Name:  Frutoso Denver  Patient :  1935            Insurance:  Payor: MEDICARE / Plan: MEDICARE PART A AND B / Product Type: *No Product type* /   Insurance ID Number:    Payer/Plan Subscr  Sex Relation Sub. Ins. ID Effective Group Num   [facility] 1935 [facility] Self [id number]                                    [address] [address]   2.  [facility] 1935 Female Self [id number]                                   [address]         DIAGNOSIS & PROCEDURE:                       Procedure/Operation: EGD           CPT Code: 00172    Diagnosis:  BLACK STOOLS, LEFT SIDED ABDOMINAL PAIN, LLQ PAIN, HEARTBURN, INDIGESTION    ICD10 Code: K92.1, R10.9, R10.32, R12, K30    Location:  [facility]    Surgeon:  Angelo Josue    SCHEDULING INFORMATION:                          Date: 23    Time: 7:45AM              Anesthesia:  LMAC                                                       Status:  Outpatient        Special Comments:  N/A       Electronically signed by Ferdinand Ormond, MA on 2022 at 9:00 AM

## 2022-12-09 ENCOUNTER — OFFICE VISIT (OUTPATIENT)
Dept: FAMILY MEDICINE CLINIC | Age: 87
End: 2022-12-09
Payer: MEDICARE

## 2022-12-09 VITALS
DIASTOLIC BLOOD PRESSURE: 65 MMHG | RESPIRATION RATE: 16 BRPM | HEART RATE: 92 BPM | SYSTOLIC BLOOD PRESSURE: 109 MMHG | TEMPERATURE: 96.8 F | OXYGEN SATURATION: 94 % | BODY MASS INDEX: 35.16 KG/M2 | WEIGHT: 211 LBS | HEIGHT: 65 IN

## 2022-12-09 DIAGNOSIS — R22.0 LUMP OF SCALP: ICD-10-CM

## 2022-12-09 DIAGNOSIS — K92.1 BLOOD IN THE STOOL: Primary | ICD-10-CM

## 2022-12-09 PROCEDURE — 1090F PRES/ABSN URINE INCON ASSESS: CPT | Performed by: FAMILY MEDICINE

## 2022-12-09 PROCEDURE — G8427 DOCREV CUR MEDS BY ELIG CLIN: HCPCS | Performed by: FAMILY MEDICINE

## 2022-12-09 PROCEDURE — 1036F TOBACCO NON-USER: CPT | Performed by: FAMILY MEDICINE

## 2022-12-09 PROCEDURE — 99213 OFFICE O/P EST LOW 20 MIN: CPT | Performed by: FAMILY MEDICINE

## 2022-12-09 PROCEDURE — 1123F ACP DISCUSS/DSCN MKR DOCD: CPT | Performed by: FAMILY MEDICINE

## 2022-12-09 PROCEDURE — G8417 CALC BMI ABV UP PARAM F/U: HCPCS | Performed by: FAMILY MEDICINE

## 2022-12-09 PROCEDURE — G8484 FLU IMMUNIZE NO ADMIN: HCPCS | Performed by: FAMILY MEDICINE

## 2022-12-09 NOTE — PROGRESS NOTES
S: 81 y.o. female here for lumps on back of scalp and blood in stool. 4 months of lumps in back of scalp. Wears wig. No fever or tenderness. 1 mo ago black stools. H/o gastritis and diverticulosis. Referred to Gen Surg and advised to continue carafate. EGD planned. Not good candidate for cscope. Pt insisted on hemoccult today. O: VS: /65 (Site: Right Upper Arm, Position: Sitting, Cuff Size: Large Adult)   Pulse 92   Temp 96.8 °F (36 °C) (Temporal)   Resp 16   Ht 5' 5\" (1.651 m)   Wt 211 lb (95.7 kg)   SpO2 94%   BMI 35.11 kg/m²    General: NAD, alert and interacting appropriately. CV:  RRR, no gallops, rubs, or murmurs    Resp: CTAB   Abd:  Soft, nontender   Rectal: no blood on ALVINA   Ext:  No edema   Scalp: no lumps or other abnormalities noted    Impression: lumps on back of scalp and blood in stool. Plan:   CTM skin, no abnormality appreciated. F/u w/ Gen Surg. Recent cbc nl. Rtc for AWV    Attending Physician Statement  I have discussed the case, including pertinent history and exam findings with the resident. I agree with the documented assessment and plan.

## 2022-12-09 NOTE — PROGRESS NOTES
[facility]  Department of [facility]  Family Medicine Residency Program    Date of Visit: 12/9/2022     Chief Complaint     Chief Complaint   Patient presents with    Hair/Scalp Problem     Lumps on scalp that are itching    Abdominal Pain     Lower abdominal pain    Medication Refill     Wants all medication reordered and sent to [facility]. History of Present Illness     HPI:  81 y.o. female with Hx of GERD, diverticulosis, gastritis, duodenitis who presents for follow-up on \" black stools\"    Previous concern for melena/black tarry stools  Patient presented the clinic previously for concerns of black stools  On her current medication list includes omeprazole, sucralfate for at least the past 10 years  She states that she was taking Mylanta due to ongoing heartburn and burning reflux up into her throat  Does confirm that she does have some relief with Mylanta, at this time also confirmed that her history of black stools, which she notes at the time may have been because of iron containing vitamins. She states that she stopped taking those vitamins and found that her black stools improved  At this time still have chronic complaints of left lower quadrant pain, though states that seems to be at baseline. At this time patient insistent on Hemoccult to rule out any blood in her stool    Previously patient was referred to general surgery, examined on 12/5/2022  GEN surge chart reviewed, patient scheduled for EGD on 1/27/2023  Recommend against colonoscopy due to patient's history of diverticulosis    Of note patient states that she is having normal brown stools now    Lumps on the back of her head  Patient states that she started noticing bumps on the back of her head approximately 4 months ago  States that they do \" itch\". Denies any tenderness at this time, though states sometimes they can get tender. Feels that they are always there. Denies any drainage.   Patient does wear wakes at all times states this does not affect her scalp at all as she has been wearing wigs for many years. No other concerns at this time    Social History     Tobacco Use    Smoking status: Never    Smokeless tobacco: Never   Substance Use Topics    Alcohol use: No     Alcohol/week: 0.0 standard drinks    Drug use: No       ROS:    Reviewed, pertinent as above otherwise negative    Objective:    VS:  Blood pressure 109/65, pulse 92, temperature 96.8 °F (36 °C), temperature source Temporal, resp. rate 16, height 5' 5\" (1.651 m), weight 211 lb (95.7 kg), SpO2 94 %, not currently breastfeeding. Physical Exam  Constitutional:       Appearance: She is obese. HENT:      Head:      Comments: No lumps or abnormalities palpated on scalp  Cardiovascular:      Rate and Rhythm: Normal rate and regular rhythm. Pulses: Normal pulses. Heart sounds: Normal heart sounds. No murmur heard. No gallop. Pulmonary:      Effort: Pulmonary effort is normal. No respiratory distress. Breath sounds: Normal breath sounds. No wheezing. Abdominal:      General: Bowel sounds are normal.      Palpations: Abdomen is soft. Genitourinary:     Comments: ALVINA without any obvious bright red blood  Musculoskeletal:      Right lower leg: No edema. Left lower leg: No edema. Skin:     General: Skin is warm. Neurological:      Mental Status: She is alert. Assessment/Plan:    1. Blood in the stool  ALVINA overall negative for any obvious blood  Patient with history of diverticulosis, advised the patient that any Hemoccult performed would likely be positive  Need to follow-up with general surgery for endoscopy in January  Advise to avoid iron containing supplements if this is causing her worries at  Continue with omeprazole/Carafate and Mylanta as needed  Recent CBC reviewed, WNL  Patient denying any red flag symptoms including fatigue, weight loss, dizziness    2.  Lump of scalp  No abnormalities appreciated, continue to monitor skin    RTO 3 months or sooner prn for any persistent, new, or worsening symptoms. Please see Patient Instructions for further counseling and information given. Advised to please be adherent to the treatment plans discussed today, and please call with any questions or concerns, letting the office know of any reasons that the plans may not be followed. The risks of untreated conditions include worsening illness, injury, disability, and possibly, death. Please call if symptoms change in any way, worsen, or fail to completely resolve, as this could necessitate a change to treatment plans. Patient and/or caregiver expressed understanding. Indications and proper use of medication(s) reviewed. Potential side-effects and risks of medication(s) also explained. Patient and/or caregiver was instructed to call if any new symptoms develop prior to next visit. Health risk factors discussed and addressed.      Electronically signed by Reji Sharp MD PGY-3 on 12/9/2022 at 1:54 PM  This case was discussed with attending physician: Dr. Brett Evans

## 2023-01-12 ENCOUNTER — OFFICE VISIT (OUTPATIENT)
Dept: FAMILY MEDICINE CLINIC | Age: 88
End: 2023-01-12
Payer: MEDICARE

## 2023-01-12 ENCOUNTER — TELEPHONE (OUTPATIENT)
Dept: FAMILY MEDICINE CLINIC | Age: 88
End: 2023-01-12

## 2023-01-12 VITALS
TEMPERATURE: 97.1 F | BODY MASS INDEX: 34.61 KG/M2 | HEART RATE: 75 BPM | WEIGHT: 208 LBS | OXYGEN SATURATION: 97 % | SYSTOLIC BLOOD PRESSURE: 100 MMHG | DIASTOLIC BLOOD PRESSURE: 67 MMHG

## 2023-01-12 DIAGNOSIS — F33.41 RECURRENT MAJOR DEPRESSIVE DISORDER, IN PARTIAL REMISSION (HCC): ICD-10-CM

## 2023-01-12 DIAGNOSIS — I70.213 ATHEROSCLEROSIS OF NATIVE ARTERY OF BOTH LOWER EXTREMITIES WITH INTERMITTENT CLAUDICATION (HCC): ICD-10-CM

## 2023-01-12 DIAGNOSIS — M15.9 PRIMARY OSTEOARTHRITIS INVOLVING MULTIPLE JOINTS: Primary | ICD-10-CM

## 2023-01-12 DIAGNOSIS — R68.89 ITCHY EYES: ICD-10-CM

## 2023-01-12 DIAGNOSIS — I10 ESSENTIAL HYPERTENSION: ICD-10-CM

## 2023-01-12 DIAGNOSIS — K21.9 GASTROESOPHAGEAL REFLUX DISEASE WITHOUT ESOPHAGITIS: ICD-10-CM

## 2023-01-12 PROCEDURE — 1090F PRES/ABSN URINE INCON ASSESS: CPT | Performed by: FAMILY MEDICINE

## 2023-01-12 PROCEDURE — 1036F TOBACCO NON-USER: CPT | Performed by: FAMILY MEDICINE

## 2023-01-12 PROCEDURE — G8427 DOCREV CUR MEDS BY ELIG CLIN: HCPCS | Performed by: FAMILY MEDICINE

## 2023-01-12 PROCEDURE — G8484 FLU IMMUNIZE NO ADMIN: HCPCS | Performed by: FAMILY MEDICINE

## 2023-01-12 PROCEDURE — 99213 OFFICE O/P EST LOW 20 MIN: CPT | Performed by: FAMILY MEDICINE

## 2023-01-12 PROCEDURE — G8417 CALC BMI ABV UP PARAM F/U: HCPCS | Performed by: FAMILY MEDICINE

## 2023-01-12 PROCEDURE — 99212 OFFICE O/P EST SF 10 MIN: CPT | Performed by: FAMILY MEDICINE

## 2023-01-12 PROCEDURE — 1123F ACP DISCUSS/DSCN MKR DOCD: CPT | Performed by: FAMILY MEDICINE

## 2023-01-12 RX ORDER — AMLODIPINE BESYLATE AND BENAZEPRIL HYDROCHLORIDE 10; 20 MG/1; MG/1
1 CAPSULE ORAL DAILY
Qty: 30 CAPSULE | Refills: 3 | Status: SHIPPED
Start: 2023-01-12 | End: 2023-01-13 | Stop reason: SDUPTHER

## 2023-01-12 RX ORDER — AZELASTINE HYDROCHLORIDE 0.5 MG/ML
SOLUTION/ DROPS OPHTHALMIC
Qty: 6 ML | Refills: 2 | Status: SHIPPED
Start: 2023-01-12 | End: 2023-01-13 | Stop reason: SDUPTHER

## 2023-01-12 RX ORDER — SUCRALFATE 1 G/1
TABLET ORAL
Qty: 360 TABLET | Refills: 2 | Status: SHIPPED
Start: 2023-01-12 | End: 2023-01-13 | Stop reason: SDUPTHER

## 2023-01-12 RX ORDER — DORZOLAMIDE HYDROCHLORIDE AND TIMOLOL MALEATE 20; 5 MG/ML; MG/ML
SOLUTION/ DROPS OPHTHALMIC
Qty: 10 ML | Refills: 1 | Status: SHIPPED
Start: 2023-01-12 | End: 2023-01-13 | Stop reason: SDUPTHER

## 2023-01-12 RX ORDER — PRAVASTATIN SODIUM 40 MG
TABLET ORAL
Qty: 90 TABLET | Refills: 3 | Status: SHIPPED
Start: 2023-01-12 | End: 2023-01-13 | Stop reason: SDUPTHER

## 2023-01-12 RX ORDER — OMEPRAZOLE 20 MG/1
20 CAPSULE, DELAYED RELEASE ORAL DAILY
Qty: 90 CAPSULE | Refills: 3 | Status: SHIPPED
Start: 2023-01-12 | End: 2023-01-13 | Stop reason: SDUPTHER

## 2023-01-12 RX ORDER — ACETAMINOPHEN 500 MG
1000 TABLET ORAL EVERY 6 HOURS PRN
Qty: 120 TABLET | Refills: 3 | COMMUNITY
Start: 2023-01-12 | End: 2023-01-13 | Stop reason: SDUPTHER

## 2023-01-12 ASSESSMENT — PATIENT HEALTH QUESTIONNAIRE - PHQ9
7. TROUBLE CONCENTRATING ON THINGS, SUCH AS READING THE NEWSPAPER OR WATCHING TELEVISION: 0
SUM OF ALL RESPONSES TO PHQ9 QUESTIONS 1 & 2: 1
4. FEELING TIRED OR HAVING LITTLE ENERGY: 3
9. THOUGHTS THAT YOU WOULD BE BETTER OFF DEAD, OR OF HURTING YOURSELF: 0
5. POOR APPETITE OR OVEREATING: 0
6. FEELING BAD ABOUT YOURSELF - OR THAT YOU ARE A FAILURE OR HAVE LET YOURSELF OR YOUR FAMILY DOWN: 0
2. FEELING DOWN, DEPRESSED OR HOPELESS: 1
SUM OF ALL RESPONSES TO PHQ QUESTIONS 1-9: 7
SUM OF ALL RESPONSES TO PHQ QUESTIONS 1-9: 7
1. LITTLE INTEREST OR PLEASURE IN DOING THINGS: 0
SUM OF ALL RESPONSES TO PHQ QUESTIONS 1-9: 7
10. IF YOU CHECKED OFF ANY PROBLEMS, HOW DIFFICULT HAVE THESE PROBLEMS MADE IT FOR YOU TO DO YOUR WORK, TAKE CARE OF THINGS AT HOME, OR GET ALONG WITH OTHER PEOPLE: 0
8. MOVING OR SPEAKING SO SLOWLY THAT OTHER PEOPLE COULD HAVE NOTICED. OR THE OPPOSITE, BEING SO FIGETY OR RESTLESS THAT YOU HAVE BEEN MOVING AROUND A LOT MORE THAN USUAL: 0
3. TROUBLE FALLING OR STAYING ASLEEP: 3
SUM OF ALL RESPONSES TO PHQ QUESTIONS 1-9: 7

## 2023-01-12 NOTE — PROGRESS NOTES
[facility] [facility]  [facility]  Family Medicine Residency Program    Date of Visit: 1/12/2023     Chief Complaint     Chief Complaint   Patient presents with    Hand Pain     Pt c/o knuckles swollen and soar, for a week. History of Present Illness     HPI:  This is an 60-year-old female with past medical history of osteoporosis, spinal stenosis of the lumbar spine who presents for MD visit for bilateral hand pain     Bilateral hand pain  Patient with history of osteoporosis on last DEXA scan in 2014  She states that she may have arthritis as patient gets worsening low back pain, chronic bilateral hand pain as well as chronic stiffness in the a.m. when she tries to get up  States the stiffness last approximately 45 to 50 minutes before she is able to get through her day as normal  She states currently her hand pain has been worsening for the past week   Notes that this is never happened to this extent to her before  Currently states that her right hand is hurting much more than her left, cannot open a bottle or any doors at this time  Patient states she does not have any history of gout, has not trialed any medications at home for relief, has not trialed any hot soaks either  Nothing else seems to make it better, cold weather and trying to do daily tasks has been making it worse  She denies any numbness tingling of her bilateral upper extremities or hand/wrist, denies any hip pain at this time     No other concerns at this time      Social History     Tobacco Use    Smoking status: Never    Smokeless tobacco: Never   Substance Use Topics    Alcohol use: No     Alcohol/week: 0.0 standard drinks    Drug use: No       ROS:    Reviewed, pertinent as above otherwise negative    Objective:    VS:  Blood pressure 100/67, pulse 75, temperature 97.1 °F (36.2 °C), temperature source Temporal, weight 208 lb (94.3 kg), SpO2 97 %, not currently breastfeeding.      Physical Exam  Constitutional:       Appearance: She is obese. She is not ill-appearing or diaphoretic. Cardiovascular:      Rate and Rhythm: Normal rate and regular rhythm. Pulses: Normal pulses. Heart sounds: Normal heart sounds. No murmur heard. No gallop. Pulmonary:      Effort: Pulmonary effort is normal. No respiratory distress. Breath sounds: Normal breath sounds. No wheezing. Abdominal:      General: Bowel sounds are normal.      Palpations: Abdomen is soft. Musculoskeletal:         General: Swelling and deformity present. No tenderness. Comments: Bilateral upper extremities with 5 out of 5 strength, normal  strength bilateral upper extremities, normal fine motor movements. Discharge nodules noted bilateral hands/PIP joints   Neurological:      Mental Status: She is alert. Assessment/Plan:    1. Primary osteoarthritis involving multiple joints  No indication for x-rays or any further work-up at this time  Clinical presentation indicative of osteoarthritis  At this time we will treat with topical Voltaren gel, add Tylenol arthritis 500 mg every 4 to 6 hours as required  Advised patient to complete/utilize hot soaks as is possible  No need for repeat DEXA at this time, advised adequate vitamin C and D intake  - diclofenac sodium (VOLTAREN) 1 % GEL; Apply 2 g topically 4 times daily  Dispense: 150 g; Refill: 0  - acetaminophen (TYLENOL) 500 MG tablet; Take 2 tablets by mouth every 6 hours as needed for Pain Maximum dose- 8 tablets/24 hours. Dispense: 120 tablet; Refill: 3    2. Gastroesophageal reflux disease without esophagitis  Refill Prilosec and Carafate  Patient also receiving Movantik as appropriate  - omeprazole (PRILOSEC) 20 MG delayed release capsule; Take 1 capsule by mouth daily  Dispense: 90 capsule; Refill: 3    3. Essential hypertension  Refill Lotrel, BP under control  - amLODIPine-benazepril (LOTREL) 10-20 MG per capsule;  Take 1 capsule by mouth daily Dispense: 30 capsule; Refill: 3    4. Atherosclerosis of native artery of both lower extremities with intermittent claudication (HCC)  Refill pravastatin  - pravastatin (PRAVACHOL) 40 MG tablet; TAKE 1 TABLET DAILY  Dispense: 90 tablet; Refill: 3    5. Recurrent major depressive disorder, in partial remission (HCC)  Stable at this time, no SI/HI  Is only on the lurasidone 10 mg daily, follow-up in the next 3 months    6. Itchy eyes  Refill Cosopt and Optivar  - dorzolamide-timolol (COSOPT) 22.3-6.8 MG/ML ophthalmic solution; INSTILL ONE DROP INTO EACH EYE EVERY 12 HOURS  Dispense: 10 mL; Refill: 1  - azelastine (OPTIVAR) 0.05 % ophthalmic solution; INSTILL ONE DROP INTO EACH EYE TWICE DAILY  Dispense: 6 mL; Refill: 2     RTO 3 months or sooner prn for any persistent, new, or worsening symptoms. Please see Patient Instructions for further counseling and information given. Advised to please be adherent to the treatment plans discussed today, and please call with any questions or concerns, letting the office know of any reasons that the plans may not be followed. The risks of untreated conditions include worsening illness, injury, disability, and possibly, death. Please call if symptoms change in any way, worsen, or fail to completely resolve, as this could necessitate a change to treatment plans. Patient and/or caregiver expressed understanding. Indications and proper use of medication(s) reviewed. Potential side-effects and risks of medication(s) also explained. Patient and/or caregiver was instructed to call if any new symptoms develop prior to next visit. Health risk factors discussed and addressed.      Electronically signed by Radha Medrano MD PGY-3 on 1/12/2023 at 11:38 AM  This case was discussed with attending physician: Dr. Demetri Casas

## 2023-01-12 NOTE — TELEPHONE ENCOUNTER
----- Message from Aly De Leon sent at 1/12/2023  1:43 PM EST -----  Subject: Message to Provider    QUESTIONS  Information for Provider? Patient was in today 01/12/23 and needed to   pharmacy information? [facility] - [address] [place] [address] - [phone]   ---------------------------------------------------------------------------  --------------  Teddy HALL  [phone]; OK to leave message on voicemail  ---------------------------------------------------------------------------  --------------  SCRIPT ANSWERS  Relationship to Patient?  Self

## 2023-01-12 NOTE — PROGRESS NOTES
This is an 76-year-old female with past medical history of osteoporosis, spinal stenosis of the lumbar spine who presents for MD visit for bilateral hand pain    Bilateral hand pain  Patient with history of osteoporosis on last DEXA scan in 2014  She states that she may have arthritis as patient gets worsening low back pain, chronic bilateral hand pain as well as chronic stiffness in the a.m. when she tries to get up  States the stiffness last approximately 45 to 50 minutes before she is able to get through her day as normal  She states currently her hand pain has been worsening for the past week   Notes that this is never happened to this extent to her before  Currently states that her right hand is hurting much more than her left, cannot open a bottle or any doors at this time  Patient states she does not have any history of gout, has not trialed any medications at home for relief, has not trialed any hot soaks either  Nothing else seems to make it better, cold weather and trying to do daily tasks has been making it worse  She denies any numbness tingling of her bilateral upper extremities or hand/wrist, denies any hip pain at this time    No other concerns at this time    Blood pressure 100/67, pulse 75, temperature 97.1 °F (36.2 °C), temperature source Temporal, weight 208 lb (94.3 kg), SpO2 97 %, not currently breastfeeding. Heart regular    Lungs clear    abd non-tender      No edema    Pulses intact   MSK: Bilateral upper extremities with 5 out of 5 strength, normal  strength bilateral upper extremities, normal fine motor movements.   Discharge nodules noted bilateral hands/PIP joints        Assessment/plan:    Osteoarthritis of bilateral hands  No indication for x-rays or any further work-up at this time  Clinical presentation indicative of osteoarthritis  At this time we will treat with topical Voltaren gel, add Tylenol arthritis 500 mg every 4 to 6 hours as required  Advised patient to complete/utilize hot soaks as is possible  No need for repeat DEXA at this time, advised adequate vitamin C and D intake    Follow-up as needed    Attending Physician Statement  I have discussed the case, including pertinent history and exam findings with the resident. I agree with the documented assessment and plan.

## 2023-01-13 DIAGNOSIS — I10 ESSENTIAL HYPERTENSION: ICD-10-CM

## 2023-01-13 DIAGNOSIS — K21.9 GASTROESOPHAGEAL REFLUX DISEASE WITHOUT ESOPHAGITIS: ICD-10-CM

## 2023-01-13 DIAGNOSIS — R68.89 ITCHY EYES: ICD-10-CM

## 2023-01-13 DIAGNOSIS — I70.213 ATHEROSCLEROSIS OF NATIVE ARTERY OF BOTH LOWER EXTREMITIES WITH INTERMITTENT CLAUDICATION (HCC): ICD-10-CM

## 2023-01-13 DIAGNOSIS — M15.9 PRIMARY OSTEOARTHRITIS INVOLVING MULTIPLE JOINTS: ICD-10-CM

## 2023-01-13 RX ORDER — ACETAMINOPHEN 500 MG
1000 TABLET ORAL EVERY 6 HOURS PRN
Qty: 120 TABLET | Refills: 3 | Status: SHIPPED | OUTPATIENT
Start: 2023-01-13

## 2023-01-13 RX ORDER — OMEPRAZOLE 20 MG/1
20 CAPSULE, DELAYED RELEASE ORAL DAILY
Qty: 90 CAPSULE | Refills: 3 | Status: SHIPPED | OUTPATIENT
Start: 2023-01-13

## 2023-01-13 RX ORDER — PRAVASTATIN SODIUM 40 MG
TABLET ORAL
Qty: 90 TABLET | Refills: 3 | Status: SHIPPED | OUTPATIENT
Start: 2023-01-13

## 2023-01-13 RX ORDER — SUCRALFATE 1 G/1
TABLET ORAL
Qty: 360 TABLET | Refills: 2 | Status: SHIPPED | OUTPATIENT
Start: 2023-01-13

## 2023-01-13 RX ORDER — AZELASTINE HYDROCHLORIDE 0.5 MG/ML
SOLUTION/ DROPS OPHTHALMIC
Qty: 6 ML | Refills: 2 | Status: SHIPPED | OUTPATIENT
Start: 2023-01-13

## 2023-01-13 RX ORDER — DORZOLAMIDE HYDROCHLORIDE AND TIMOLOL MALEATE 20; 5 MG/ML; MG/ML
SOLUTION/ DROPS OPHTHALMIC
Qty: 10 ML | Refills: 1 | Status: SHIPPED | OUTPATIENT
Start: 2023-01-13

## 2023-01-13 RX ORDER — AMLODIPINE BESYLATE AND BENAZEPRIL HYDROCHLORIDE 10; 20 MG/1; MG/1
1 CAPSULE ORAL DAILY
Qty: 30 CAPSULE | Refills: 3 | Status: SHIPPED | OUTPATIENT
Start: 2023-01-13

## 2023-01-13 NOTE — TELEPHONE ENCOUNTER
Patient called in requesting her medications from yesterday appt [facility]   [facility]   Thanks

## 2023-01-23 NOTE — PROGRESS NOTES
[address] PRE-ADMISSION TESTING   ENDOSCOPY/ COLONSCOPY INSTRUCTIONS  PAT- Phone Number: [phone]    ENDOSCOPY/ COLONSCOPY INSTRUCTIONS:     [] Bowel Prep instructions reviewed. [] Colonoscopy- The day prior: No solid foods. Clear liquids only. [x] Nothing by mouth after midnight. Including no gum, candy, mints, or water. [x] You may brush your teeth, gargle, but do NOT swallow water. [x] Do not wear makeup, lotions, powders, deodorant. [] Urine Pregnancy test will be preformed the day of surgery. A specimen sample may be brought from home. [x] Arrange transportation with a responsible adult  to and from the hospital. If you do not have a responsible adult  to transport you, you will need to make arrangements with a medical transportation company. Arrange for someone to be with you for the remainder of the day and for 24 hours after your procedure due to having had anesthesia. -Who will be your  for transportation?__brother________________   -Who will be staying with you for 24 hrs after your procedure?___brother_______________    PARKING INSTRUCTIONS:     [x] ARRIVAL DATE & TIME: 1/27 @ 0630  [x] Enter into the [employer]. Two people may accompany you. Masks are not required but are recommended. [x] Parking Lot \"I\" is where you will park. It is located on the corner of [address] and [address]. The entrance is on [address]. Upon entering the parking lot, a voucher ticket will print. EDUCATION INSTRUCTIONS: your surgery time may be changed, we will call you the day before if that happens. Please check your phone. [x] Bring a complete list of your medications, please write the last time you took the medicine, give this list to the nurse in Pre-Op. [x] Take only the following medications the morning of surgery with 1-2 ounces of water: vilazodone, omeprazole, use eye drops.   [x] Stop all herbal supplements and vitamins 5 days before surgery. Stop NSAIDS 7 days before surgery. Hold voltaren gel.  [] DO NOT take any diabetic medicine the morning of surgery. Follow instructions for insulin the day before surgery. [] If you are diabetic and your blood sugar is low or you feel symptomatic, you may drink 1-2 ounces of apple juice or take a glucose tablet.            -The morning of your procedure, you may call the pre-op area if you have concerns about your blood sugar [phone]. [] Use your inhalers the morning of surgery. Bring your emergency inhaler with you day of surgery. [] Follow physician instructions regarding any blood thinners you may be taking. WHAT TO EXPECT:    [x] The day of your procedure you will be greeted and checked in by the [employer].  In addition, you will be registered in the [facility] by a Patient Access Representative. Please bring your photo ID and insurance card. A nurse will greet you in accordance to the time you are needed in the pre-op area to prepare you for surgery. Please do not be discouraged if you are not greeted in the order you arrive as there are many variables that are involved in patient preparation. Your patience is greatly appreciated as you wait for your nurse. Please bring in items such as: books, magazines, newspapers, electronics, or any other items  to occupy your time in the waiting area. [x]  Delays may occur. Staff will make a sincere effort to keep you informed of delays. If any delays occur with your procedure, we apologize ahead of time for your inconvenience as we recognize the value of your time.

## 2023-01-26 NOTE — PROGRESS NOTES
following information:  office notes    1. Essential hypertension  2. Gastroesophageal reflux disease, unspecified whether esophagitis present  3. Primary osteoarthritis of both knees  4. Pruritus  5. Hyperlipidemia, unspecified hyperlipidemia type  6. Spinal stenosis of lumbar region, unspecified whether neurogenic claudication present  7. Recurrent major depressive disorder, in partial remission [facility])      Additional Plan: Ms. Glory House is doing quite well today. She requires no real change in treatment. Her blood pressure is much better, and no changes will need to be made in that regard. She does have some foot pain for which she is seeing a podiatrist, but believes that it is doing better. On this date 6/14/2021 I have spent 24 minutes reviewing previous notes, test results and face to face with the patient discussing the diagnosis and importance of compliance with the treatment plan as well as documenting on the day of the visit. RTO in in 4 month(s) or sooner for any persistent, new, or worsening symptoms. Please see Patient Instructions for further counseling and information given. Advised to be adherent to the treatment plans discussed today, and please call with any questions or concerns, letting the office know of any reasons that the plans may not be followed. The risks of untreated conditions include worsening illness, injury, disability, and possibly, death. Please call if symptoms change in any way, worsen, or fail to completely resolve, as this could necessitate a change to treatment plans. Patient and/or caregiver expressed understanding. Indications and proper use of medication(s) reviewed. Potential side-effects and risks of medication(s) also explained. Patient and/or caregiver was instructed to call if any new symptoms develop prior to next visit. Health risk factors discussed and addressed.     Signed by : Deepak Kendrick MD, MJO-ANN.
2 person assist

## 2023-01-27 ENCOUNTER — HOSPITAL ENCOUNTER (OUTPATIENT)
Age: 88
Setting detail: OUTPATIENT SURGERY
Discharge: HOME | End: 2023-01-27
Attending: SURGERY | Admitting: SURGERY
Payer: MEDICARE

## 2023-01-27 ENCOUNTER — ANESTHESIA EVENT (OUTPATIENT)
Dept: ENDOSCOPY | Age: 88
End: 2023-01-27
Payer: MEDICARE

## 2023-01-27 ENCOUNTER — ANESTHESIA (OUTPATIENT)
Dept: ENDOSCOPY | Age: 88
End: 2023-01-27
Payer: MEDICARE

## 2023-01-27 VITALS
BODY MASS INDEX: 34.16 KG/M2 | HEART RATE: 68 BPM | RESPIRATION RATE: 17 BRPM | DIASTOLIC BLOOD PRESSURE: 78 MMHG | WEIGHT: 205 LBS | OXYGEN SATURATION: 96 % | TEMPERATURE: 97.2 F | SYSTOLIC BLOOD PRESSURE: 132 MMHG | HEIGHT: 65 IN

## 2023-01-27 DIAGNOSIS — K92.1 BLACK STOOLS: ICD-10-CM

## 2023-01-27 DIAGNOSIS — R10.9 ABDOMINAL PAIN: ICD-10-CM

## 2023-01-27 PROCEDURE — 2709999900 HC NON-CHARGEABLE SUPPLY: Performed by: SURGERY

## 2023-01-27 PROCEDURE — 2580000003 HC RX 258: Performed by: SURGERY

## 2023-01-27 PROCEDURE — 88342 IMHCHEM/IMCYTCHM 1ST ANTB: CPT

## 2023-01-27 PROCEDURE — 43239 EGD BIOPSY SINGLE/MULTIPLE: CPT | Performed by: SURGERY

## 2023-01-27 PROCEDURE — 88305 TISSUE EXAM BY PATHOLOGIST: CPT

## 2023-01-27 PROCEDURE — 3700000000 HC ANESTHESIA ATTENDED CARE: Performed by: SURGERY

## 2023-01-27 PROCEDURE — 6360000002 HC RX W HCPCS: Performed by: ANESTHESIOLOGIST ASSISTANT

## 2023-01-27 PROCEDURE — 2500000003 HC RX 250 WO HCPCS: Performed by: ANESTHESIOLOGIST ASSISTANT

## 2023-01-27 PROCEDURE — 7100000011 HC PHASE II RECOVERY - ADDTL 15 MIN: Performed by: SURGERY

## 2023-01-27 PROCEDURE — 7100000010 HC PHASE II RECOVERY - FIRST 15 MIN: Performed by: SURGERY

## 2023-01-27 PROCEDURE — 3609012400 HC EGD TRANSORAL BIOPSY SINGLE/MULTIPLE: Performed by: SURGERY

## 2023-01-27 PROCEDURE — 3700000001 HC ADD 15 MINUTES (ANESTHESIA): Performed by: SURGERY

## 2023-01-27 RX ORDER — SODIUM CHLORIDE 9 MG/ML
25 INJECTION, SOLUTION INTRAVENOUS PRN
Status: DISCONTINUED | OUTPATIENT
Start: 2023-01-27 | End: 2023-01-27 | Stop reason: HOSPADM

## 2023-01-27 RX ORDER — SODIUM CHLORIDE 0.9 % (FLUSH) 0.9 %
5-40 SYRINGE (ML) INJECTION PRN
Status: DISCONTINUED | OUTPATIENT
Start: 2023-01-27 | End: 2023-01-27 | Stop reason: HOSPADM

## 2023-01-27 RX ORDER — GLYCOPYRROLATE 1 MG/5 ML
SYRINGE (ML) INTRAVENOUS PRN
Status: DISCONTINUED | OUTPATIENT
Start: 2023-01-27 | End: 2023-01-27 | Stop reason: SDUPTHER

## 2023-01-27 RX ORDER — PROPOFOL 10 MG/ML
INJECTION, EMULSION INTRAVENOUS PRN
Status: DISCONTINUED | OUTPATIENT
Start: 2023-01-27 | End: 2023-01-27 | Stop reason: SDUPTHER

## 2023-01-27 RX ORDER — SODIUM CHLORIDE 9 MG/ML
INJECTION, SOLUTION INTRAVENOUS CONTINUOUS
Status: DISCONTINUED | OUTPATIENT
Start: 2023-01-27 | End: 2023-01-27 | Stop reason: HOSPADM

## 2023-01-27 RX ORDER — SODIUM CHLORIDE 0.9 % (FLUSH) 0.9 %
5-40 SYRINGE (ML) INJECTION EVERY 12 HOURS SCHEDULED
Status: DISCONTINUED | OUTPATIENT
Start: 2023-01-27 | End: 2023-01-27 | Stop reason: HOSPADM

## 2023-01-27 RX ORDER — LIDOCAINE HYDROCHLORIDE 20 MG/ML
INJECTION, SOLUTION INTRAVENOUS PRN
Status: DISCONTINUED | OUTPATIENT
Start: 2023-01-27 | End: 2023-01-27 | Stop reason: SDUPTHER

## 2023-01-27 RX ADMIN — LIDOCAINE HYDROCHLORIDE 40 MG: 20 INJECTION, SOLUTION INTRAVENOUS at 07:57

## 2023-01-27 RX ADMIN — PROPOFOL 10 MG: 10 INJECTION, EMULSION INTRAVENOUS at 08:04

## 2023-01-27 RX ADMIN — PROPOFOL 40 MG: 10 INJECTION, EMULSION INTRAVENOUS at 07:57

## 2023-01-27 RX ADMIN — Medication 0.1 MG: at 07:57

## 2023-01-27 RX ADMIN — SODIUM CHLORIDE: 9 INJECTION, SOLUTION INTRAVENOUS at 07:53

## 2023-01-27 RX ADMIN — PROPOFOL 30 MG: 10 INJECTION, EMULSION INTRAVENOUS at 08:01

## 2023-01-27 RX ADMIN — SODIUM CHLORIDE: 9 INJECTION, SOLUTION INTRAVENOUS at 06:59

## 2023-01-27 ASSESSMENT — PAIN - FUNCTIONAL ASSESSMENT: PAIN_FUNCTIONAL_ASSESSMENT: 0-10

## 2023-01-27 ASSESSMENT — PAIN SCALES - GENERAL: PAINLEVEL_OUTOF10: 0

## 2023-01-27 NOTE — DISCHARGE INSTRUCTIONS
Call [phone] for any questions/concerns. Irritation of stomach seen--biopsies taken--letter will be sent with results. Follow up with your Primary Care Physician for continued problems with shortness of breath. Continue to take your prilosec and carafate. Continue preop meds as prescribed by doctor       Upper GI Endoscopy: What to Expect at [address] had an upper GI endoscopy. Your doctor used a thin, lighted tube that bends to look at the inside of your esophagus, your stomach, and the first part of the small intestine, called the duodenum. After you have an endoscopy, you will stay at the hospital or clinic for 1 to 2 hours. This will allow the medicine to wear off. You will be able to go home after your doctor or nurse checks to make sure that you're not having any problems. You may have to stay overnight if you had treatment during the test. You may have a sore throat for a day or two after the test.  This care sheet gives you a general idea about what to expect after the test.  How can you care for yourself at home? Activity   Rest as much as you need to after you go home. You should be able to go back to your usual activities the day after the test.  Diet   Follow your doctor's directions for eating after the test.  Drink plenty of fluids (unless your doctor has told you not to). Medications   If you have a sore throat the day after the test, use an over-the-counter spray to numb your throat. Follow-up care is a key part of your treatment and safety. Be sure to make and go to all appointments, and call your doctor if you are having problems. It's also a good idea to know your test results and keep a list of the medicines you take. When should you call for help? Call [phone] anytime you think you may need emergency care. For example, call if:    You passed out (lost consciousness). You have trouble breathing. You pass maroon or bloody stools.    Call your doctor now or seek immediate medical care if:    You have pain that does not get better after your take pain medicine. You have new or worse belly pain. You have blood in your stools. You are sick to your stomach and cannot keep fluids down. You have a fever. You cannot pass stools or gas. Watch closely for changes in your health, and be sure to contact your doctor if:    Your throat still hurts after a day or two. You do not get better as expected. Where can you learn more? Go to http://www.woods.com/ and enter J454 to learn more about \"Upper GI Endoscopy: What to Expect at Home. \"  Current as of: June 6, 2022               Content Version: 13.5  © 2006-2022 [employer]. Care instructions adapted under license by [facility] ([facility]). If you have questions about a medical condition or this instruction, always ask your healthcare professional. [place] [address] any warranty or liability for your use of this information. Gastritis: Care Instructions  Your Care Instructions     Gastritis is a sore and upset stomach. It happens when something irritates the stomach lining. Many things can cause it. These include an infection such as the flu or something you ate or drank. Medicines or a sore on the lining of the stomach (ulcer) also can cause it. Your belly may bloat and ache. You may belch, vomit, and feel sick to your stomach. You should be able to relieve the problem by taking medicine. And it may help to change your diet. If gastritis lasts, your doctor may prescribe medicine. Follow-up care is a key part of your treatment and safety. Be sure to make and go to all appointments, and call your doctor if you are having problems. It's also a good idea to know your test results and keep a list of the medicines you take. How can you care for yourself at home? If your doctor prescribed antibiotics, take them as directed.  Do not stop taking them just because you feel better. You need to take the full course of antibiotics. Be safe with medicines. If your doctor prescribed medicine to decrease stomach acid, take it as directed. Call your doctor if you think you are having a problem with your medicine. Do not take any other medicine, including over-the-counter pain relievers, without talking to your doctor first.  If your doctor recommends over-the-counter medicine to reduce stomach acid, such as Pepcid AC (famotidine), Prilosec (omeprazole), or Tagamet HB (cimetidine) follow the directions on the label. Drink plenty of fluids to prevent dehydration. Choose water and other clear liquids. If you have kidney, heart, or liver disease and have to limit fluids, talk with your doctor before you increase the amount of fluids you drink. Avoid foods that make your symptoms worse. These may include chocolate, mint, alcohol, pepper, spicy foods, high-fat foods, or drinks with caffeine in them, such as tea, coffee, roberta, or energy drinks. If your symptoms are worse after you eat a certain food, you may want to stop eating it to see if your symptoms get better. When should you call for help? Call 911 anytime you think you may need emergency care. For example, call if:    You vomit blood or what looks like coffee grounds. You pass maroon or very bloody stools. Call your doctor now or seek immediate medical care if:    You start breathing fast and have not produced urine in the last 8 hours. You cannot keep fluids down. Watch closely for changes in your health, and be sure to contact your doctor if:    You do not get better as expected. Where can you learn more? Go to http://www.woods.com/ and enter Z536 to learn more about \"Gastritis: Care Instructions. \"  Current as of: June 6, 2022               Content Version: 13.5  © 4371-2974 [employer]. Care instructions adapted under license by [facility] ([facility]).  If you have questions about a medical condition or this instruction, always ask your healthcare professional. [place] [address] any warranty or liability for your use of this information.

## 2023-01-27 NOTE — ANESTHESIA PRE PROCEDURE
Department of Anesthesiology  Preprocedure Note       Name:  Karina Canseco   Age:  81 y.o.  :  1935                                          MRN:  [id number]         Date:  2023      Surgeon: Yaquelin Berrios):  Merly Lin MD    Procedure: Procedure(s):  EGD ESOPHAGOGASTRODUODENOSCOPY    Medications prior to admission:   Prior to Admission medications    Medication Sig Start Date End Date Taking? Authorizing Provider   dorzolamide-timolol (COSOPT) 22.3-6.8 MG/ML ophthalmic solution INSTILL ONE DROP INTO EACH EYE EVERY 12 HOURS 23   Yani Burks MD   azelastine (OPTIVAR) 0.05 % ophthalmic solution INSTILL ONE DROP INTO EACH EYE TWICE DAILY 23   Yani Burks MD   amLODIPine-benazepril (LOTREL) 10-20 MG per capsule Take 1 capsule by mouth daily 23   Yani Burks MD   pravastatin (PRAVACHOL) 40 MG tablet TAKE 1 TABLET DAILY 23   Yani Burks MD   sucralfate (CARAFATE) 1 GM tablet TAKE ONE TABLET BY MOUTH FOUR TIMES A DAY 23   Yani Burks MD   omeprazole (PRILOSEC) 20 MG delayed release capsule Take 1 capsule by mouth daily 23   Yani Burks MD   diclofenac sodium (VOLTAREN) 1 % GEL Apply 2 g topically 4 times daily 23   Yani Burks MD   acetaminophen (TYLENOL) 500 MG tablet Take 2 tablets by mouth every 6 hours as needed for Pain Maximum dose- 8 tablets/24 hours.  23   Yani Burks MD   Cholecalciferol (VITAMIN D3) 125 MCG (5000 UT) CAPS TAKE 1 CAPSULE BY MOUTH DAILY 23   Yani Burks MD   fluticasone (FLONASE) 50 MCG/ACT nasal spray 1 spray by Each Nostril route daily 22   Yani Burks MD   docusate sodium (COLACE) 100 MG capsule Take 1 capsule by mouth 2 times daily 22   Yani Burks MD   MOVANTIK 12.5 MG TABS tablet Take 1 tablet by mouth every morning 22   Yani Burks MD   chlorhexidine (PERIDEX) 0.12 % solution Take 15 mLs by mouth 2 times daily 22   MD Juju Varma MISC by Does not apply route Patient requires handicap placard for physical deconditioning, age, osteoarthritis of both knees and osteoporosis. This handicap placard is valid from 4/5/2022 to 4/5/2027; a total of 5 years. 4/5/22   Anastasiia Tee MD   oxyCODONE-acetaminophen (PERCOCET) 7.5-325 MG per tablet TAKE 1 TABLET BY MOUTH EVERY EIGHT HOURS AS NEEDED 2/11/22   Historical Provider, MD   Vaginal Lubricant (REPLENS) GEL Use once-twice daily 2/25/22   Keith Elkins MD   [facility] 4 MG/0.1ML LIQD nasal spray 0.1 mLs by Nasal route once 1/15/19   Historical Provider, MD   VILAZODONE HCL 10 MG Tablet Take 10 mg by mouth daily 5/22/18   Historical Provider, MD   vitamin B-12 (CYANOCOBALAMIN) 1000 MCG tablet Take 1,000 mcg by mouth daily     Historical Provider, MD   Pyridoxine HCl (VITAMIN B-6) 100 MG tablet Take 100 mg by mouth daily    Historical Provider, MD   Multiple Vitamin TABS Take 1 capsule by mouth daily Hair ,skin and nails    Historical Provider, MD       Current medications:    Current Facility-Administered Medications   Medication Dose Route Frequency Provider Last Rate Last Admin    0.9 % sodium chloride infusion   IntraVENous Continuous Kody Mejia MD 50 mL/hr at 01/27/23 0659 New Bag at 01/27/23 0659    sodium chloride flush 0.9 % injection 5-40 mL  5-40 mL IntraVENous 2 times per day Kody Mejia MD        sodium chloride flush 0.9 % injection 5-40 mL  5-40 mL IntraVENous PRN Kody Mejia MD        0.9 % sodium chloride infusion  25 mL IntraVENous PRN Kody Mejia MD           Allergies:     Allergies   Allergen Reactions    No Known Allergies        Problem List:    Patient Active Problem List   Diagnosis Code    Hyperlipidemia E78.5    Personal history of malignant neoplasm of breast Z85.3    Anxiety F41.9    Recurrent major depressive disorder, in partial remission ([facility] [address] F33.41    Osteoporosis M81.0    GERD (gastroesophageal reflux disease) K21.9    Acquired spondylolisthesis M43.10    Spinal stenosis of lumbar region M48.061   • Diverticulosis of colon K57.30   • Rectal polyp K62.1   • Gastritis and duodenitis K29.90   • Primary osteoarthritis of both knees M17.0   • Essential hypertension I10   • RBBB I45.10   • First degree AV block I44.0   • Scalp pruritus L29.9   • Pruritus L29.9   • Chronic rhinitis J31.0   • Asymmetrical hearing loss H91.8X9   • Sensorineural hearing loss, bilateral H90.3   • 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   • Pseudophakia Z96.1   • Neck pain on left side M54.2   • Peripheral venous insufficiency I87.2   • Unspecified atherosclerosis of native arteries of extremities, bilateral legs (HCC) I70.203   • Arthritis of left ankle M19.072   • Black stools K92.1   • Abdominal pain R10.9       Past Medical History:        Diagnosis Date   • Anxiety 10/11/2010   • Breast cancer (HCC) approx 2000    right, treated lumpectomy, radiation, chemo   • Depression 10/11/2010    no issues   • Diverticular disease 10/11/2010   • GERD (gastroesophageal reflux disease)    • HTN (hypertension) 10/11/2010   • Hyperlipidemia 10/11/2010   • Osteoarthritis 10/11/2010    fot left knee arthroplasty 10/21/2016   • Osteoporosis 10/11/2010   • Spinal stenosis 10/11/2010   • Use of cane as ambulatory aid        Past Surgical History:        Procedure Laterality Date   • BACK SURGERY     • BONE GRAFT      with back surgery   • BREAST LUMPECTOMY  2000    left, with chemo and rad, [facility]   • CARPAL TUNNEL RELEASE  1980s    left   • CARPAL TUNNEL RELEASE  1980s    repeat left   • CATARACT REMOVAL  2010    [facility]   • COLONOSCOPY  2/3/2011    extensive diverticulosis, Dr. Venegas, [facility]   • COLONOSCOPY  2013    \"could not get around colon due to diverticulitis, [facility]   • COLONOSCOPY  2/24/2015    severe diverticulosis transverse and left colon without diverticulitis, rectal polyp bx, Dr. Diaz, [facility]   • HEMORRHOID SURGERY  1980s    [facility]   • HYSTERECTOMY (CERVIX STATUS UNKNOWN)  1980s    still has ovaries,  no cancer, [facility]    SPINAL FUSION  2009    [facility], pins and rods in place    TOTAL KNEE ARTHROPLASTY Right 10/21/2016    TOTAL KNEE ARTHROPLASTY Left 10/2017    replacement, dr Paul Bashir  2/3/2011    gastritis, Dr. Bree Kahn, [facility] GASTROINTESTINAL ENDOSCOPY  2013    gastritis, [facility]    UPPER GASTROINTESTINAL ENDOSCOPY  2/24/2015    mild to moderate gastritis and duodenitis, gastric bx for H pylori, Dr. Rafat Stokes History:    Social History     Tobacco Use    Smoking status: Never    Smokeless tobacco: Never   Substance Use Topics    Alcohol use: No     Alcohol/week: 0.0 standard drinks                                Counseling given: Not Answered      Vital Signs (Current):   Vitals:    01/23/23 0955 01/27/23 0649   BP:  (!) 148/85   Pulse:  67   Resp:  18   Temp:  36.4 °C (97.6 °F)   TempSrc:  Temporal   SpO2:  97%   Weight: 205 lb (93 kg) 205 lb (93 kg)   Height: 5' 5\" (1.651 m) 5' 5\" (1.651 m)                                              BP Readings from Last 3 Encounters:   01/27/23 (!) 148/85   01/12/23 100/67   12/09/22 109/65       NPO Status: Time of last liquid consumption: 2215                        Time of last solid consumption: 2000                        Date of last liquid consumption: 01/26/23                        Date of last solid food consumption: 01/26/23    BMI:   Wt Readings from Last 3 Encounters:   01/27/23 205 lb (93 kg)   01/12/23 208 lb (94.3 kg)   12/09/22 211 lb (95.7 kg)     Body mass index is 34.11 kg/m².     CBC:   Lab Results   Component Value Date/Time    WBC 4.2 11/15/2022 12:16 PM    RBC 4.40 11/15/2022 12:16 PM    HGB 14.6 11/15/2022 12:16 PM    HCT 42.3 11/15/2022 12:16 PM    MCV 96.1 11/15/2022 12:16 PM    RDW 13.5 11/15/2022 12:16 PM     11/15/2022 12:16 PM       CMP:   Lab Results   Component Value Date/Time     03/18/2021 12:00 PM    K 3.9 03/18/2021 12:00 PM    K 4.0 09/22/2018 05:56 AM    CL 98 03/18/2021 12:00 PM    CO2 27 03/18/2021 12:00 PM    BUN 10 10/04/2021 01:40 PM    CREATININE 0.9 10/04/2021 01:40 PM    GFRAA >60 10/04/2021 01:40 PM    LABGLOM >60 10/04/2021 01:40 PM    GLUCOSE 97 03/18/2021 12:00 PM    GLUCOSE 108 07/29/2011 06:52 PM    PROT 7.1 03/18/2021 12:00 PM    CALCIUM 9.1 03/18/2021 12:00 PM    BILITOT 0.2 03/18/2021 12:00 PM    ALKPHOS 83 03/18/2021 12:00 PM    AST 21 03/18/2021 12:00 PM    ALT 13 03/18/2021 12:00 PM       POC Tests: No results for input(s): POCGLU, POCNA, POCK, POCCL, POCBUN, POCHEMO, POCHCT in the last 72 hours. Coags:   Lab Results   Component Value Date/Time    PROTIME 23.3 10/20/2017 04:54 AM    PROTIME 11.4 03/01/2011 12:25 AM    INR 2.0 10/20/2017 04:54 AM    APTT 26.4 03/01/2011 12:25 AM       HCG (If Applicable): No results found for: PREGTESTUR, PREGSERUM, HCG, HCGQUANT     ABGs: No results found for: PHART, PO2ART, CXY9ATZ, RGB4UGS, BEART, Q1CTUUGU     Type & Screen (If Applicable):  No results found for: LABABO, LABRH    Drug/Infectious Status (If Applicable):  No results found for: HIV, HEPCAB    COVID-19 Screening (If Applicable):   Lab Results   Component Value Date/Time    COVID19 NOT DETECTED 07/08/2020 09:10 AM     EKG: 10/3/18  Sinus  Rhythm   -Right bundle branch block. -  Nonspecific T-abnormality. ABNORMAL     EF = 64 (2018)    Anesthesia Evaluation  Patient summary reviewed  Airway: Mallampati: II  TM distance: >3 FB   Neck ROM: full  Mouth opening: > = 3 FB   Dental:    (+) upper dentures and lower dentures      Pulmonary:Negative Pulmonary ROS                              Cardiovascular:    (+) hypertension:, hyperlipidemia      ECG reviewed  Rhythm: regular  Rate: abnormal  Echocardiogram reviewed                  Neuro/Psych:   (+) psychiatric history:depression/anxiety             GI/Hepatic/Renal:   (+) GERD:,           Endo/Other:    (+) malignancy/cancer (malignant neoplasm of breast). Abdominal:             Vascular: negative vascular ROS. Other Findings:           Anesthesia Plan      MAC     ASA 3       Induction: intravenous. Anesthetic plan and risks discussed with patient. Plan discussed with attending.                     Juju Shrestha   1/27/2023

## 2023-01-27 NOTE — ANESTHESIA POSTPROCEDURE EVALUATION
Department of Anesthesiology  Postprocedure Note    Patient: Aleena Mckeon  MRN: [id number]  YOB: 1935  Date of evaluation: 1/27/2023      Procedure Summary     Date: 01/27/23 Room / Location: [address] [place] [address] / [facility]    Anesthesia Start: 0068 Anesthesia Stop: 0815    Procedure: EGD BIOPSY Diagnosis:       Black stools      Abdominal pain      (Black stools [K92.1])      (Abdominal pain [R10.9])    Surgeons: Jassi Connolly MD Responsible Provider: Sam Steele MD    Anesthesia Type: MAC ASA Status: 3          Anesthesia Type: MAC    Sawyer Phase I: Sawyer Score: 10    Sawyer Phase II: Sawyer Score: 10      Anesthesia Post Evaluation    Patient location during evaluation: PACU  Patient participation: complete - patient participated  Level of consciousness: awake and alert  Airway patency: patent  Nausea & Vomiting: no nausea and no vomiting  Complications: no  Cardiovascular status: blood pressure returned to baseline and hemodynamically stable  Respiratory status: acceptable and spontaneous ventilation  Hydration status: euvolemic  Multimodal analgesia pain management approach

## 2023-01-27 NOTE — OP NOTE
Operative Note      Patient: Cliff Gleason  YOB: 1935  MRN: [id number]    Date of Procedure: 1/27/2023    Pre-Op Diagnosis: Black stools [K92.1]  Abdominal pain [R10.9]    Post-Op Diagnosis: Same and minimal gastritis; mid-esophageal varix       Procedure(s):  EGD BIOPSY    Surgeon(s):  Gaudencio Bauman MD    Assistant:   Resident: Josephine Arnold DO    Anesthesia: Monitor Anesthesia Care    Estimated Blood Loss (mL): Minimal    Complications: None    Specimens:   ID Type Source Tests Collected by Time Destination   A : EGD STOMACH ANTRUM BIOPSY R/O [facility] Tissue Stomach SURGICAL PATHOLOGY Gaudencio Bauman MD 1/27/2023 0803        Implants:  * No implants in log *      Drains: * No LDAs found *    Findings: mid-esophageal varix; minimal gastritis    Detailed Description of Procedure:   ESOPHAGOGASTRODUODENOSCOPY PROCEDURE NOTE  PROCEDURE:  The patient was brought into the endoscopy suite and placed in the left lateral decubitus position. A bite block was placed in the patients mouth. After the initiation of LMAC anesthesia, a gastroscope was inserted into the patient's mouth and passed into the esophagus and into the stomach. Immediately upon entering the stomach the note of minimal gastritis was made. The scope was advanced through the pylorus into the first and second portion of the duodenum making the note of normal duodenum. The scope was then withdrawn back into the stomach where it was retroflexed. There was no hiatal hernia noted. The scope was then straightened and antral biopsies were taken. The scope was then withdrawn the entire length of the esophagus, making the note of a mid-esophageal non-bleeding varix. The scope was withdrawn entirely. The patient tolerated the procedure well and there were no complications.           Electronically signed by Gaudencio Bauman MD on 1/27/2023 at 8:08 AM

## 2023-01-27 NOTE — H&P
Patient ID: Tasia Gibbs is a 81 y.o. female. HPI  81 yr old female referred by Dr. Arnie Boswell for black stools. Pt states she has long hx of heartburn and reflux. Pt states has been taking omeprazole/sucralfate for at least 10 years. States she also takes Mylanta due to ongoing heartburn and burning reflux up into her throat. States she does have a little relief with the Mylanta sometimes has diarrhea after taking it. Pt report she also has a hx of diverticulosis. Pt states she has been having the black stools for the past 2 months. Pt complains of some left sided abd pain, especially in LLQ. Pt report she seems to be more tired than usual.  Pt reports last EGD/colonoscopy was 2015 by Dr. Flossie Brittle to have gastritis/duodenitis/diverticulosis/hyperplastic rectal polyp.      Past Medical History        Past Medical History:   Diagnosis Date    Anxiety 10/11/2010    Breast cancer ([address] approx 2000     right, treated lumpectomy, radiation, chemo    Depression 10/11/2010     no issues    Diverticular disease 10/11/2010    GERD (gastroesophageal reflux disease)      HTN (hypertension) 10/11/2010    Hyperlipidemia 10/11/2010    Osteoarthritis 10/11/2010     fot left knee arthroplasty 10/21/2016    Osteoporosis 10/11/2010    Spinal stenosis 10/11/2010    Use of cane as ambulatory aid              Past Surgical History         Past Surgical History:   Procedure Laterality Date    BACK SURGERY        BONE GRAFT         with back surgery    BREAST LUMPECTOMY   2000     left, with chemo and rad, [facility]    CARPAL TUNNEL RELEASE   1980s     left    CARPAL TUNNEL RELEASE   1980s     repeat left    CATARACT REMOVAL   2010     [facility]    COLONOSCOPY   2/3/2011     extensive diverticulosis, Dr. Nancy Jeffries, [facility]    COLONOSCOPY   2013     [facility]    COLONOSCOPY   2/24/2015     severe diverticulosis transverse and left colon without diverticulitis, rectal polyp bx, Dr. Betty Connell, [facility]    HYSTERECTOMY ([address])   1980s     still has ovaries, no cancer, [facility]    SPINAL FUSION   2009     [facility], pins and rods in place    TOTAL KNEE ARTHROPLASTY Right 10/21/2016    TOTAL KNEE ARTHROPLASTY Left 10/2017     replacement, dr Ge Betancur   2/3/2011     gastritis, Dr. Francisca Somers, [address] GASTROINTESTINAL ENDOSCOPY   2013     gastritis, [facility]    UPPER GASTROINTESTINAL ENDOSCOPY   2/24/2015     mild to moderate gastritis and duodenitis, gastric bx for H pylori, Dr. Betty Connell            Current Facility-Administered Medications          Current Outpatient Medications   Medication Sig Dispense Refill    Cholecalciferol (VITAMIN D3) 125 MCG (5000 UT) CAPS TAKE 1 CAPSULE BY MOUTH DAILY        dorzolamide-timolol (COSOPT) 22.3-6.8 MG/ML ophthalmic solution INSTILL ONE DROP INTO EACH EYE EVERY 12 HOURS        amLODIPine-benazepril (LOTREL) 10-20 MG per capsule Take 1 capsule by mouth daily 30 capsule 3    azelastine (OPTIVAR) 0.05 % ophthalmic solution INSTILL ONE DROP INTO EACH EYE TWICE DAILY 6 mL 2    sucralfate (CARAFATE) 1 GM tablet TAKE ONE TABLET BY MOUTH FOUR TIMES A  tablet 2    fluticasone (FLONASE) 50 MCG/ACT nasal spray 1 spray by Each Nostril route daily 1 each 3    docusate sodium (COLACE) 100 MG capsule Take 1 capsule by mouth 2 times daily 60 capsule 2    pravastatin (PRAVACHOL) 40 MG tablet TAKE 1 TABLET DAILY 90 tablet 3    MOVANTIK 12.5 MG TABS tablet Take 1 tablet by mouth every morning 90 tablet 3    omeprazole (PRILOSEC) 20 MG delayed release capsule Take 1 capsule by mouth daily 90 capsule 3    chlorhexidine (PERIDEX) 0.12 % solution Take 15 mLs by mouth 2 times daily 473 mL 5    Handicap Placard MISC by Does not apply route Patient requires handicap placard for physical deconditioning, age, osteoarthritis of both knees and osteoporosis. This handicap placard is valid from 4/5/2022 to 4/5/2027; a total of 5 years. 1 each 0    oxyCODONE-acetaminophen (PERCOCET) 7.5-325 MG per tablet TAKE 1 TABLET BY MOUTH EVERY EIGHT HOURS AS NEEDED        Vaginal Lubricant (REPLENS) GEL Use once-twice daily 35 g 1    NARCAN 4 MG/0.1ML LIQD nasal spray 0.1 mLs by Nasal route once   1    VILAZODONE HCL 10 MG Tablet TK 1 T PO D   1    vitamin B-12 (CYANOCOBALAMIN) 1000 MCG tablet Take 1,000 mcg by mouth daily         Pyridoxine HCl (VITAMIN B-6) 100 MG tablet Take 100 mg by mouth daily        Multiple Vitamin TABS Take 1 capsule by mouth daily Hair ,skin and nails          No current facility-administered medications for this visit. Allergies   Allergen Reactions    No Known Allergies           Family History         Family History   Problem Relation Age of Onset    Cancer Mother           throat    High Blood Pressure Mother      Mental Illness Father      Colon Cancer Father      Cancer Brother      Heart Disease Brother      Mental Illness Daughter      Mental Illness Son      Arthritis Sister      Cancer Brother      Pacemaker Brother      No Known Problems Brother              Social History               Socioeconomic History    Marital status:         Spouse name: Not on file    Number of children: Not on file    Years of education: Not on file    Highest education level: Not on file   Occupational History    Not on file   Tobacco Use    Smoking status: Never    Smokeless tobacco: Never   Substance and Sexual Activity    Alcohol use: No       Alcohol/week: 0.0 standard drinks    Drug use: No    Sexual activity: Never   Other Topics Concern    Not on file   Social History Narrative     Drinks 3 cups of tea daily      Social Determinants of Health          Financial Resource Strain: High Risk    Difficulty of Paying Living Expenses: Very hard   Food Insecurity: Food Insecurity Present    Worried About Running Out of Food in the Last Year: Sometimes true    Ran Out of Food in the Last Year: Never true   Transportation Needs: No Transportation Needs    Lack of Transportation (Medical): No    Lack of Transportation (Non-Medical): No   Physical Activity: Not on file   Stress: Not on file   Social Connections: Not on file   Intimate Partner Violence: Not on file   Housing Stability: Not on file         Review of Systems   Constitutional:  Positive for fatigue. Negative for activity change, appetite change, chills, fever and unexpected weight change. HENT: Negative. Eyes:  Positive for visual disturbance. Negative for pain, discharge, redness and itching. Respiratory:  Negative for cough, choking, chest tightness, shortness of breath and wheezing. Cardiovascular:  Negative for chest pain, palpitations and leg swelling. Gastrointestinal:  Positive for abdominal pain and constipation. Negative for abdominal distention, anal bleeding, blood in stool, diarrhea, nausea and vomiting. Endocrine: Positive for cold intolerance. Negative for heat intolerance, polydipsia and polyuria. Genitourinary:  Negative for dysuria, frequency, hematuria, urgency, vaginal bleeding, vaginal discharge and vaginal pain. Musculoskeletal:  Positive for arthralgias, back pain, gait problem, neck pain and neck stiffness. Negative for joint swelling and myalgias. Skin:  Negative for color change, pallor, rash and wound. Allergic/Immunologic: Negative for environmental allergies and food allergies. Neurological:  Positive for headaches. Negative for dizziness, seizures, syncope, weakness, light-headedness and numbness. Hematological:  Negative for adenopathy. Does not bruise/bleed easily. Psychiatric/Behavioral:  Positive for agitation. Negative for confusion, decreased concentration, hallucinations, self-injury and suicidal ideas. The patient is nervous/anxious. The patient is not hyperactive.        Objective:   Physical Exam  Constitutional:       General: She is not in acute distress. Appearance: Normal appearance. She is well-developed. She is obese. She is not ill-appearing, toxic-appearing or diaphoretic. HENT:      Head: Normocephalic and atraumatic. Nose: Nose normal.      Mouth/Throat:      Mouth: Mucous membranes are moist.      Pharynx: Oropharynx is clear. Eyes:      General: No scleral icterus. Right eye: No discharge. Left eye: No discharge. Extraocular Movements: Extraocular movements intact. Pupils: Pupils are equal, round, and reactive to light. Cardiovascular:      Rate and Rhythm: Normal rate and regular rhythm. Heart sounds: Normal heart sounds. No murmur heard. Pulmonary:      Effort: Pulmonary effort is normal. No respiratory distress. Breath sounds: Normal breath sounds. No stridor. No wheezing, rhonchi or rales. Abdominal:      General: Bowel sounds are normal. There is no distension. Palpations: Abdomen is soft. There is no mass. Tenderness: There is no abdominal tenderness. There is no guarding or rebound. Hernia: No hernia is present. Musculoskeletal:         General: Normal range of motion. Cervical back: Normal range of motion and neck supple. Skin:     General: Skin is warm and dry. Neurological:      General: No focal deficit present. Mental Status: She is alert and oriented to person, place, and time. Psychiatric:         Mood and Affect: Mood normal.         Behavior: Behavior normal.         Thought Content: Thought content normal.         Judgment: Judgment normal.      Previous notes personally reviewed  Labs personally reviewed  Assessment:   Dark black stools  Left sided abd pain  Heartburn/indigestion                   Plan:   Recommend EGD. Recommend EGD with possible biopsy. The patient was explained the risks/benefits/alternatives/expected outcomes of the procedure.   The patient was explained the risks of the procedure, including, but not limited to, the risk of reaction to the anesthesia medicine and the risk of perforation requiring further surgery. All questions were answered. The patient verbalized understanding and agrees to proceed. Would not recommend repeat colonoscopy given age and known hx of diverticulosis.                    Mackenzie Adames MD        UPDATED 1/27/23  History and physical unchanged  For EGD    Mackenzie Adames MD, FACS  1/27/2023  7:05 AM

## 2023-01-31 ENCOUNTER — TELEPHONE (OUTPATIENT)
Dept: FAMILY MEDICINE CLINIC | Age: 88
End: 2023-01-31

## 2023-01-31 NOTE — TELEPHONE ENCOUNTER
----- Message from Parris Garcia sent at 1/31/2023 10:41 AM EST -----  Subject: Message to Provider    QUESTIONS  Information for Provider? Patient calling to [facility] that she is already   taking oxyCODONE-acetaminophen (PERCOCET) 7.5-325 MG and cannot take the   tylenol 500 that was prescribed. Patient would also like to know the   results of her last tests. ---------------------------------------------------------------------------  --------------  Dale JOHN  [phone]; OK to leave message on voicemail  ---------------------------------------------------------------------------  --------------  SCRIPT ANSWERS  Relationship to Patient?  Self

## 2023-02-04 DIAGNOSIS — R68.89 ITCHY EYES: ICD-10-CM

## 2023-02-06 ENCOUNTER — HOSPITAL ENCOUNTER (OUTPATIENT)
Age: 88
Discharge: HOME | End: 2023-02-06
Payer: MEDICARE

## 2023-02-06 ENCOUNTER — OFFICE VISIT (OUTPATIENT)
Dept: FAMILY MEDICINE CLINIC | Age: 88
End: 2023-02-06
Payer: MEDICARE

## 2023-02-06 VITALS
TEMPERATURE: 98.6 F | HEART RATE: 66 BPM | RESPIRATION RATE: 16 BRPM | DIASTOLIC BLOOD PRESSURE: 65 MMHG | OXYGEN SATURATION: 96 % | BODY MASS INDEX: 34.99 KG/M2 | WEIGHT: 210 LBS | SYSTOLIC BLOOD PRESSURE: 101 MMHG | HEIGHT: 65 IN

## 2023-02-06 DIAGNOSIS — Z59.9 FINANCIAL DIFFICULTIES: ICD-10-CM

## 2023-02-06 DIAGNOSIS — Z72.51 HIGH RISK HETEROSEXUAL BEHAVIOR: ICD-10-CM

## 2023-02-06 DIAGNOSIS — K21.9 GASTROESOPHAGEAL REFLUX DISEASE WITHOUT ESOPHAGITIS: ICD-10-CM

## 2023-02-06 DIAGNOSIS — Z13.9 ENCOUNTER FOR SCREENING INVOLVING SOCIAL DETERMINANTS OF HEALTH (SDOH): ICD-10-CM

## 2023-02-06 DIAGNOSIS — N89.8 VAGINAL ODOR: Primary | ICD-10-CM

## 2023-02-06 PROCEDURE — 99212 OFFICE O/P EST SF 10 MIN: CPT | Performed by: FAMILY MEDICINE

## 2023-02-06 PROCEDURE — 86592 SYPHILIS TEST NON-TREP QUAL: CPT

## 2023-02-06 PROCEDURE — 86803 HEPATITIS C AB TEST: CPT

## 2023-02-06 PROCEDURE — 86706 HEP B SURFACE ANTIBODY: CPT

## 2023-02-06 PROCEDURE — 1124F ACP DISCUSS-NO DSCNMKR DOCD: CPT | Performed by: FAMILY MEDICINE

## 2023-02-06 PROCEDURE — 99214 OFFICE O/P EST MOD 30 MIN: CPT | Performed by: FAMILY MEDICINE

## 2023-02-06 PROCEDURE — G8428 CUR MEDS NOT DOCUMENT: HCPCS | Performed by: FAMILY MEDICINE

## 2023-02-06 PROCEDURE — 87340 HEPATITIS B SURFACE AG IA: CPT

## 2023-02-06 PROCEDURE — 81003 URINALYSIS AUTO W/O SCOPE: CPT

## 2023-02-06 PROCEDURE — G8484 FLU IMMUNIZE NO ADMIN: HCPCS | Performed by: FAMILY MEDICINE

## 2023-02-06 PROCEDURE — 36415 COLL VENOUS BLD VENIPUNCTURE: CPT

## 2023-02-06 PROCEDURE — G8417 CALC BMI ABV UP PARAM F/U: HCPCS | Performed by: FAMILY MEDICINE

## 2023-02-06 PROCEDURE — 86703 HIV-1/HIV-2 1 RESULT ANTBDY: CPT

## 2023-02-06 PROCEDURE — 1036F TOBACCO NON-USER: CPT | Performed by: FAMILY MEDICINE

## 2023-02-06 PROCEDURE — 1090F PRES/ABSN URINE INCON ASSESS: CPT | Performed by: FAMILY MEDICINE

## 2023-02-06 RX ORDER — METRONIDAZOLE 500 MG/1
500 TABLET ORAL 2 TIMES DAILY
Qty: 14 TABLET | Refills: 0 | Status: SHIPPED | OUTPATIENT
Start: 2023-02-06 | End: 2023-02-13

## 2023-02-06 RX ORDER — DORZOLAMIDE HYDROCHLORIDE AND TIMOLOL MALEATE 20; 5 MG/ML; MG/ML
SOLUTION/ DROPS OPHTHALMIC
Qty: 10 ML | Refills: 1 | Status: SHIPPED | OUTPATIENT
Start: 2023-02-06

## 2023-02-06 SDOH — ECONOMIC STABILITY - INCOME SECURITY: PROBLEM RELATED TO HOUSING AND ECONOMIC CIRCUMSTANCES, UNSPECIFIED: Z59.9

## 2023-02-06 SDOH — ECONOMIC STABILITY: HOUSING INSECURITY
IN THE LAST 12 MONTHS, WAS THERE A TIME WHEN YOU DID NOT HAVE A STEADY PLACE TO SLEEP OR SLEPT IN A SHELTER (INCLUDING NOW)?: NO

## 2023-02-06 SDOH — ECONOMIC STABILITY: INCOME INSECURITY: HOW HARD IS IT FOR YOU TO PAY FOR THE VERY BASICS LIKE FOOD, HOUSING, MEDICAL CARE, AND HEATING?: VERY HARD

## 2023-02-06 SDOH — ECONOMIC STABILITY: FOOD INSECURITY: WITHIN THE PAST 12 MONTHS, YOU WORRIED THAT YOUR FOOD WOULD RUN OUT BEFORE YOU GOT MONEY TO BUY MORE.: OFTEN TRUE

## 2023-02-06 SDOH — ECONOMIC STABILITY: FOOD INSECURITY: WITHIN THE PAST 12 MONTHS, THE FOOD YOU BOUGHT JUST DIDN'T LAST AND YOU DIDN'T HAVE MONEY TO GET MORE.: OFTEN TRUE

## 2023-02-06 NOTE — TELEPHONE ENCOUNTER
Last Appointment:  1/12/2023  Future Appointments  2/6/2023   2:40 PM    MD Katerina Thompson [facility]

## 2023-02-06 NOTE — PROGRESS NOTES
[facility]  Department of [facility]  Family Medicine Residency Program    Date of Visit: 2/6/2023           Chief Complaint     Chief Complaint   Patient presents with    Results     Upper GI with BX  would like results     Vaginal Odor     New sexual  partner       History of Present Illness     HPI:  81 y.o. female with PMH of essential hypertension, diverticulosis, GERD who presents for follow-up and results of EGD, and concerns for vaginal odor. Results Follow Up  Patient underwent EGD biopsy on 1/27/2023  States she did not receive the results of her pathology; on review of charts, results were released online  Patient with chronic gastritis, though no inflammatory process or H. pylori noted  Advised to continue with Carafate and Prilosec  Advised that there is no malignancy revealed on pathology    Concern for vaginal odor  Patient with high risk sexual behavior  Patient states she has a new sexual partner that she met in January 2023  She continues to have unprotected sex  Normally does change partners quite frequently  Denies any burning, itching or discharge  Has noticed a foul odor  She states she follows appropriate postcoital hygiene  Would like vaginal exam next visit  Not sure of new partners STD status and would like STD testing today    No other concerns today    Social History     Tobacco Use    Smoking status: Never    Smokeless tobacco: Never   Substance Use Topics    Alcohol use: No     Alcohol/week: 0.0 standard drinks    Drug use: No       ROS:    Reviewed, pertinent as above otherwise negative    Objective:    VS:  Blood pressure 101/65, pulse 66, temperature 98.6 °F (37 °C), temperature source Temporal, resp. rate 16, height 5' 5\" (1.651 m), weight 210 lb (95.3 kg), SpO2 96 %, not currently breastfeeding. Physical Exam  Constitutional:       Appearance: She is not ill-appearing. Cardiovascular:      Rate and Rhythm: Regular rhythm.  Tachycardia present. Pulses: Normal pulses. Heart sounds: No murmur heard. No gallop. Pulmonary:      Effort: Pulmonary effort is normal. No respiratory distress. Breath sounds: Normal breath sounds. No wheezing. Abdominal:      General: Bowel sounds are normal. There is no distension. Palpations: Abdomen is soft. Tenderness: There is abdominal tenderness. Comments: Chronic left lower quadrant tenderness to palpation   Genitourinary:     Comments: Declined, consider next visit  Musculoskeletal:         General: No swelling or tenderness. Normal range of motion. Skin:     General: Skin is warm. Neurological:      Mental Status: She is alert. Assessment/Plan:    1. Vaginal odor  Obtain urinalysis  Treat empirically for BV at this time, with no discharge, there is low gonorrhea/chlamydia  Patient to come back for vaginal examination  - Urinalysis; Future  - metroNIDAZOLE (FLAGYL) 500 MG tablet; Take 1 tablet by mouth 2 times daily for 7 days  Dispense: 14 tablet; Refill: 0    2. High risk heterosexual behavior  Obtain labs as below  Advise importance of barrier protection  - Hepatitis B Surface Antibody; Future  - Hepatitis B Surface Antigen; Future  - RPR; Future  - C. Trachomatis / N. Gonorrhoeae, DNA; Future  - HIV Screen; Future  - Hepatitis C Antibody; Future  - Urinalysis; Future    3. Gastroesophageal reflux disease without esophagitis  Continue Prilosec    4. Encounter for screening involving social determinants of health (SDoH)  Refer to  financial difficulties  - [facility] Referral to Social Work (Primary Care Only)    5. Financial difficulties  As above    RTO 1 weeks or sooner prn for any persistent, new, or worsening symptoms. Please see Patient Instructions for further counseling and information given.        Advised to please be adherent to the treatment plans discussed today, and please call with any questions or concerns, letting the office know of any reasons that the plans may not be followed. The risks of untreated conditions include worsening illness, injury, disability, and possibly, death. Please call if symptoms change in any way, worsen, or fail to completely resolve, as this could necessitate a change to treatment plans. Patient and/or caregiver expressed understanding. Indications and proper use of medication(s) reviewed. Potential side-effects and risks of medication(s) also explained. Patient and/or caregiver was instructed to call if any new symptoms develop prior to next visit. Health risk factors discussed and addressed.      Electronically signed by Michael Ho MD PGY-3 on 2/6/2023 at 3:59 PM  This case was discussed with attending physician: Dr. Gonsalo Heard

## 2023-02-06 NOTE — PATIENT INSTRUCTIONS
Our Office is MOVING! [facility], [facility]      With all future appointments starting February 27, 2023,   please see us at our [address]:     [address] [place] [address]. Please call our office with any questions. We look forward to caring for you at our new location. Thank you!

## 2023-02-06 NOTE — PROGRESS NOTES
S: 81 y.o. female presents today for Results (Upper GI with BX  would like results ) and Vaginal Odor (New sexual  partner )    High risk sexual behavior/ vaginal odor; new bf; hx of unprotected intercourse; no urinary symptoms;   hx of abdominal pain;  recent EGD with gastritis: on prilosec    O: VS: /65   Pulse 66   Temp 98.6 °F (37 °C) (Temporal)   Resp 16   Ht 5' 5\" (1.651 m)   Wt 210 lb (95.3 kg)   SpO2 96%   BMI 34.95 kg/m²   AAO/NAD, appropriate affect for mood  CV:  RRR, no murmur  Resp: CTAB  Abdomen: LLQ ttp; normal bowl sounds  Ext: no edema  : declined today; prefers to consider returning for this    Assessment/Plan:   1) Vaginal odor - empiric treatment for BV; return for  exam  2) Unprotected sex - labs as ordered  3) Gastritis / GERD - continue prilosec    RTO: 1 week      Attending Physician Statement  I have discussed the case, including pertinent history and exam findings with the resident. I agree with the documented assessment and plan.       Electronically signed by Victoriano El MD on 2/9/2023 at 6:01 PM

## 2023-02-07 LAB
BILIRUBIN URINE: NEGATIVE
BLOOD, URINE: NEGATIVE
CLARITY: CLEAR
COLOR: YELLOW
GLUCOSE URINE: NEGATIVE MG/DL
HBV SURFACE AB TITR SER: NORMAL {TITER}
HEPATITIS B SURFACE ANTIGEN INTERPRETATION: NORMAL
HEPATITIS C ANTIBODY INTERPRETATION: NORMAL
HIV-1 AND HIV-2 ANTIBODIES: NORMAL
KETONES, URINE: NEGATIVE MG/DL
LEUKOCYTE ESTERASE, URINE: NEGATIVE
NITRITE, URINE: NEGATIVE
PH UA: 5.5 (ref 5–9)
PROTEIN UA: NEGATIVE MG/DL
RPR: NORMAL
SPECIFIC GRAVITY UA: <=1.005 (ref 1–1.03)
UROBILINOGEN, URINE: 0.2 E.U./DL

## 2023-02-08 ENCOUNTER — CARE COORDINATION (OUTPATIENT)
Dept: CARE COORDINATION | Age: 88
End: 2023-02-08

## 2023-02-08 NOTE — CARE COORDINATION
[facility] made call to pt to initiate SW referral, unable to reach, left message requesting call back to this [facility].     Estrada HOLMAN, Shmuel Goodman   Care Coordinator  [phone]

## 2023-02-15 ENCOUNTER — CARE COORDINATION (OUTPATIENT)
Dept: CARE COORDINATION | Age: 88
End: 2023-02-15

## 2023-02-15 NOTE — CARE COORDINATION
Initial Contact Social Work Note - Ambulatory  2/15/2023      Date of referral: 2/7/2023  Referral received from: PCP  Reason for referral: financial issues    Previous SW referral: No  If yes, brief summary of outcome: N/A    Two Identifiers Verified: Yes    Insurance Provider: Medicare A and B, [facility] [place]    Support System:   No support     Status:  No    Community Providers: Pt is having assessment Friday with [facility]    ADL Assistance Needed: Bathing and Dressing    Housing/Living Concerns or Home Modification Needs: Needs help with housekeeping and laundry, uses a cane     Transportation Concern: No    Medication Cost Concern: Yes, pt would like [facility] to call her, regarding Sucralfate    Medication Adherence Concern: No    Financial Concern(s): Yes     Income (only if applicable): Pension, SS    Ability to Read/Write: Yes    Advance Care Plan:  N/A    Other: N/A    Identified Needs:  Prescription assistance  Aide services  Prepackaged medications  Counseling resources  Rent assistance    Social Work Plan:  [facility], left message  Made referral to [facility] for Prepackaged medications  Pt reports she is going to be visited Fri by [facility] local counseling agencies  Provided pt with [facility] [place] phone number  Next Steps: [facility] to f/u    Method of Communication With Provider (if appropriate): Chart Routing       Goals Addressed    None         CC made call to pt, introduced self/role and reviewed referral, completed SW assessment. Pt reports her rent has gone up, [facility] reviwed [employer] for HEAP/PIPP but pt reports her utilities are included with her rent. Provided pt [employer] phone number to call for rental assistance. Pt reports needing assistance with ADLs such as bathing and dressing and IDLs such as housekeeping and has a DHEO assessment scheduled for Friday.  Pt reports her copays for her prescriptions are around $18 and would like to know if [facility] has a program for pt's Sucralfate, [facility] to call  PAP. Pt is interested in prepackaged medications d/t issues remembering to take her medications. [facility] to make referral to [facility]. Pt is interested in establishing counseling services, for anxiety. Pt denied any HI/SI. [facility] reviewed local counseling agencies [facility], [facility]. CC to f/u with pt and route PCP note. Made call to Ly Muñoz at [facility] and provided referral for Prepackaged medications. Received missed call and message from Ly [place] stating she spoke with pt and her insurance is contracted with [employer] for the lowest copays so pt did not wish to move forward with [facility]. [facility] made call to [facility] PAP, left message requesting call back. Received call back from [facility] MACK Pedro who reports the only prescription assistance program for Sucralfate, is for the liquid form.     Radha HOLMAN, [place]   Care Coordinator  [phone]

## 2023-02-16 DIAGNOSIS — K21.9 GASTROESOPHAGEAL REFLUX DISEASE WITHOUT ESOPHAGITIS: ICD-10-CM

## 2023-02-16 DIAGNOSIS — J30.2 SEASONAL ALLERGIC RHINITIS, UNSPECIFIED TRIGGER: ICD-10-CM

## 2023-02-16 DIAGNOSIS — M15.9 PRIMARY OSTEOARTHRITIS INVOLVING MULTIPLE JOINTS: ICD-10-CM

## 2023-02-16 RX ORDER — FLUTICASONE PROPIONATE 50 MCG
1 SPRAY, SUSPENSION (ML) NASAL DAILY
Qty: 1 EACH | Refills: 3 | Status: SHIPPED | OUTPATIENT
Start: 2023-02-16

## 2023-02-16 RX ORDER — SUCRALFATE 1 G/1
TABLET ORAL
Qty: 360 TABLET | Refills: 2 | Status: SHIPPED | OUTPATIENT
Start: 2023-02-16

## 2023-02-16 NOTE — TELEPHONE ENCOUNTER
Last Appointment:  2/6/2023  Future Appointments  3/6/2023   1:40 PM    MD Beba Schultz [facility]

## 2023-02-17 DIAGNOSIS — R68.89 ITCHY EYES: ICD-10-CM

## 2023-02-17 RX ORDER — AZELASTINE HYDROCHLORIDE 0.5 MG/ML
SOLUTION/ DROPS OPHTHALMIC
Qty: 6 ML | Refills: 2 | Status: SHIPPED | OUTPATIENT
Start: 2023-02-17

## 2023-02-17 NOTE — TELEPHONE ENCOUNTER
Last Appointment:  2/6/2023  Future Appointments  3/6/2023   1:40 PM    MD [facility]        [facility]

## 2023-02-27 ENCOUNTER — CARE COORDINATION (OUTPATIENT)
Dept: CARE COORDINATION | Age: 88
End: 2023-02-27

## 2023-02-27 NOTE — CARE COORDINATION
[facility] made f/u call to pt. Explained that [address] PAP informed this [facility] that there is a prescription assistance program for Sucralfate but only the liquid form. Pt reports she would like to take the liquid form. [facility] to call [address] PAP back for more details. Pt reports she is not unable to make 3-way call d/t being out of town. Pt reports she had assessment with [facility] and was told pt would be approved for program but they have to wait for approval process. Pt reports she has not yet called to schedule counseling appointment. Pt has called [employer] for rent assistance, reports they were unable to assist with rent but did provide her with food. [facility] made call to [address] PAP and left message requesting call back. Spoke with Gillian GIRON Misty who reports she is uncertain why someone said about a prescription assistance program for the liquid form. Misty to call pt.     Celso HOLMAN, [facility]   Care Coordinator  [phone]

## 2023-03-06 ENCOUNTER — OFFICE VISIT (OUTPATIENT)
Dept: FAMILY MEDICINE CLINIC | Age: 88
End: 2023-03-06
Payer: MEDICARE

## 2023-03-06 ENCOUNTER — CARE COORDINATION (OUTPATIENT)
Dept: CARE COORDINATION | Age: 88
End: 2023-03-06

## 2023-03-06 VITALS
OXYGEN SATURATION: 100 % | TEMPERATURE: 96.9 F | HEART RATE: 76 BPM | HEIGHT: 65 IN | WEIGHT: 208 LBS | RESPIRATION RATE: 16 BRPM | DIASTOLIC BLOOD PRESSURE: 64 MMHG | BODY MASS INDEX: 34.66 KG/M2 | SYSTOLIC BLOOD PRESSURE: 107 MMHG

## 2023-03-06 DIAGNOSIS — K59.03 DRUG-INDUCED CONSTIPATION: ICD-10-CM

## 2023-03-06 DIAGNOSIS — K29.50 CHRONIC GASTRITIS WITHOUT BLEEDING, UNSPECIFIED GASTRITIS TYPE: Primary | ICD-10-CM

## 2023-03-06 DIAGNOSIS — R68.89 ITCHY EYES: ICD-10-CM

## 2023-03-06 PROCEDURE — 1124F ACP DISCUSS-NO DSCNMKR DOCD: CPT | Performed by: FAMILY MEDICINE

## 2023-03-06 PROCEDURE — G8427 DOCREV CUR MEDS BY ELIG CLIN: HCPCS | Performed by: FAMILY MEDICINE

## 2023-03-06 PROCEDURE — 1036F TOBACCO NON-USER: CPT | Performed by: FAMILY MEDICINE

## 2023-03-06 PROCEDURE — G8484 FLU IMMUNIZE NO ADMIN: HCPCS | Performed by: FAMILY MEDICINE

## 2023-03-06 PROCEDURE — 99213 OFFICE O/P EST LOW 20 MIN: CPT | Performed by: FAMILY MEDICINE

## 2023-03-06 PROCEDURE — 1090F PRES/ABSN URINE INCON ASSESS: CPT | Performed by: FAMILY MEDICINE

## 2023-03-06 PROCEDURE — G8417 CALC BMI ABV UP PARAM F/U: HCPCS | Performed by: FAMILY MEDICINE

## 2023-03-06 NOTE — PROGRESS NOTES
S: 81 y.o. female presents today for Abdominal Pain (F/u)      Abdominal pain:   Recent EGD f/u results  Intermittent constipation / diverticulosis / chronic opiods;  Previously did have f/u with Dr. Adam Deras; completed EGD found to have chronic mild gastritis  Adherent with prilosec 20mg daily with improved symptoms; occasional continuation of symptoms    Eye itching: grainy and dry at times; improve with eye drops    O: VS: /64 (Site: Left Upper Arm, Position: Sitting, Cuff Size: Large Adult)   Pulse 76   Temp 96.9 °F (36.1 °C) (Temporal)   Resp 16   Ht 5' 5\" (1.651 m)   Wt 208 lb (94.3 kg)   SpO2 100%   BMI 34.61 kg/m²   AAO/NAD, appropriate affect for mood  CV:  RRR, no murmur  Resp: CTAB  Abdomen: SNTND  Ext: no edema    Assessment/Plan:   1) Chronic gastritis GERD - continue prilosec + tums PRN  2) Constipation - continue current regimen as previously discussed; miralax and colace; movantic  3) allergic conjunctivitis / dry eyes - continue azelastine eye drops  RTO: Return in about 3 months (around 6/6/2023) for Follow up. Attending Physician Statement  I have discussed the case, including pertinent history and exam findings with the resident. I agree with the documented assessment and plan.       Electronically signed by Gema Gabriel MD on 3/13/2023 at 10:04 AM

## 2023-03-06 NOTE — PROGRESS NOTES
[facility] [facility]  Department of [facility]  Family Medicine Residency Program    Date of Visit: 3/6/2023     Chief Complaint     Chief Complaint   Patient presents with    Abdominal Pain     F/u        History of Present Illness     HPI:  81 y.o. female presents for follow up on abdominal pain and surgical pathology results    Results  Surgical pathology results again reviewed with patient  EGD in Jan 2023 - Mild chronic gastritis, No Helicobacter pylori organisms. No evidence of intestinal metaplasia or malignancy     Abdominal Pain  This has been an ongoing concern for the patient  She does have history of constipation, diverticulosis , GERD and chronic use of opioids  She is maintained on prilosec 20 mg daily doing well. Symptoms have improved overall , though sometimes she does notice a flare. BM are normal, she denies any worsening strain  Denies any BRBPR or dark tarry stools    No other concerns today    Social History     Tobacco Use    Smoking status: Never    Smokeless tobacco: Never   Substance Use Topics    Alcohol use: No     Alcohol/week: 0.0 standard drinks    Drug use: No       ROS:    Reviewed, pertinent as above otherwise negative    Objective:    VS:  Blood pressure 107/64, pulse 76, temperature 96.9 °F (36.1 °C), temperature source Temporal, resp. rate 16, height 5' 5\" (1.651 m), weight 208 lb (94.3 kg), SpO2 100 %, not currently breastfeeding. Physical Exam  Cardiovascular:      Rate and Rhythm: Normal rate and regular rhythm. Pulses: Normal pulses. Heart sounds: Normal heart sounds. Pulmonary:      Effort: Pulmonary effort is normal.      Breath sounds: Normal breath sounds. Abdominal:      General: Bowel sounds are normal. There is no distension. Palpations: Abdomen is soft. There is no mass. Tenderness: There is no abdominal tenderness. There is no guarding or rebound. Hernia: No hernia is present.    Neurological:      Mental Status: She is alert. Assessment/Plan    1. Chronic gastritis without bleeding, unspecified gastritis type  Patieent with hx GERD , mild gastritis on EGD  Continue with prilosec, KAT prn  Advise monitoring diet, reduced fatty, spicy, deep fried foods    2. Drug-induced constipation  Patient with history of opioid use  Continue with adequate hydration  Continue miralax, colace and movantic  Monitor with adequate fiber in diet    3. Itchy eye/ Allergic Conjunctivitis   Refill optivar drops  - azelastine (OPTIVAR) 0.05 % ophthalmic solution; INSTILL ONE DROP INTO EACH EYE TWICE DAILY  Dispense: 6 mL; Refill: 2    RTO 3 months or sooner prn for any persistent, new, or worsening symptoms. Please see Patient Instructions for further counseling and information given. Advised to please be adherent to the treatment plans discussed today, and please call with any questions or concerns, letting the office know of any reasons that the plans may not be followed. The risks of untreated conditions include worsening illness, injury, disability, and possibly, death. Please call if symptoms change in any way, worsen, or fail to completely resolve, as this could necessitate a change to treatment plans. Patient and/or caregiver expressed understanding. Indications and proper use of medication(s) reviewed. Potential side-effects and risks of medication(s) also explained. Patient and/or caregiver was instructed to call if any new symptoms develop prior to next visit. Health risk factors discussed and addressed.      Electronically signed by Brody Morrow MD PGY-3 on 3/6/2023 at 2:08 PM  This case was discussed with attending physician: Dr. Shah Patient

## 2023-03-06 NOTE — CARE COORDINATION
[facility] made f/u call to pt, pt reports she spoke with  PAP and was told she has to discuss medication with . Israel Ramesh has not yet scheduled counseling appt, reviewed local counseling centers. Pt wrote down [facility] and plans to call. Pt denied needing this [facility] to assist with call. Pt has PCP appt today at 1:40pm. [facility] to f/u in a few weeks.     Bud Suggs MSW, [place]   Care Coordinator  [phone]

## 2023-03-08 RX ORDER — AZELASTINE HYDROCHLORIDE 0.5 MG/ML
SOLUTION/ DROPS OPHTHALMIC
Qty: 6 ML | Refills: 2 | Status: SHIPPED | OUTPATIENT
Start: 2023-03-08

## 2023-03-21 ENCOUNTER — CARE COORDINATION (OUTPATIENT)
Dept: CARE COORDINATION | Age: 88
End: 2023-03-21

## 2023-03-21 NOTE — CARE COORDINATION
[facility] made f/u call to pt, pt reports she has made a counseling appt with [facility]. Pt also reports she spoke with her Dr regarding the medication she spoke with  PAP about and was told there is only a prescription assistance program for liquid form. Pt's  Informed pt there is not a liquid form in the medication she is prescribed. Pt asked about [facility] services, as she had an assessment and was told she is approved but waiting for approval. [facility] made call to [facility] and was told pt was approved for [facility], [facility] and is in enrollment status. Erasmo Diego reports pt's  was Porfirio Sorto [phone]. [facility] made call back to pt and provided this info. Pt reports understanding. [facility] to sign off but encouraged pt to call this [facility] with any SW needs that arise.     [place] HOLMAN, [place]   Care Coordinator  [phone]

## 2023-05-02 NOTE — TELEPHONE ENCOUNTER
Last Appointment:  3/6/2023  Future Appointments  6/8/2023   10:40 AM   MD Caroline Ely [facility]

## 2023-06-15 ENCOUNTER — OFFICE VISIT (OUTPATIENT)
Dept: FAMILY MEDICINE CLINIC | Age: 88
End: 2023-06-15
Payer: MEDICARE

## 2023-06-15 VITALS
WEIGHT: 202 LBS | BODY MASS INDEX: 33.61 KG/M2 | SYSTOLIC BLOOD PRESSURE: 103 MMHG | TEMPERATURE: 97 F | OXYGEN SATURATION: 97 % | HEART RATE: 59 BPM | DIASTOLIC BLOOD PRESSURE: 56 MMHG

## 2023-06-15 DIAGNOSIS — Z23 NEED FOR TDAP VACCINATION: ICD-10-CM

## 2023-06-15 DIAGNOSIS — J30.2 SEASONAL ALLERGIC RHINITIS, UNSPECIFIED TRIGGER: ICD-10-CM

## 2023-06-15 DIAGNOSIS — K59.00 CONSTIPATION, UNSPECIFIED CONSTIPATION TYPE: ICD-10-CM

## 2023-06-15 DIAGNOSIS — R14.3 FLATULENCE: Primary | ICD-10-CM

## 2023-06-15 DIAGNOSIS — I10 ESSENTIAL HYPERTENSION: ICD-10-CM

## 2023-06-15 DIAGNOSIS — Z86.59 HISTORY OF RECURRENT PSYCHOSOCIAL STRESSORS: ICD-10-CM

## 2023-06-15 DIAGNOSIS — Z76.0 ENCOUNTER FOR MEDICATION REFILL: ICD-10-CM

## 2023-06-15 DIAGNOSIS — K12.0 APHTHOUS ULCER OF TONGUE: ICD-10-CM

## 2023-06-15 DIAGNOSIS — I70.213 ATHEROSCLEROSIS OF NATIVE ARTERY OF BOTH LOWER EXTREMITIES WITH INTERMITTENT CLAUDICATION (HCC): ICD-10-CM

## 2023-06-15 DIAGNOSIS — R68.89 ITCHY EYES: ICD-10-CM

## 2023-06-15 DIAGNOSIS — K21.9 GASTROESOPHAGEAL REFLUX DISEASE WITHOUT ESOPHAGITIS: ICD-10-CM

## 2023-06-15 PROBLEM — H04.123 DRY EYES: Status: ACTIVE | Noted: 2021-12-15

## 2023-06-15 PROBLEM — H40.1130 PRIMARY OPEN ANGLE GLAUCOMA (POAG) OF BOTH EYES: Status: ACTIVE | Noted: 2022-08-17

## 2023-06-15 PROCEDURE — 1090F PRES/ABSN URINE INCON ASSESS: CPT | Performed by: FAMILY MEDICINE

## 2023-06-15 PROCEDURE — 90471 IMMUNIZATION ADMIN: CPT | Performed by: FAMILY MEDICINE

## 2023-06-15 PROCEDURE — G8428 CUR MEDS NOT DOCUMENT: HCPCS | Performed by: FAMILY MEDICINE

## 2023-06-15 PROCEDURE — 1036F TOBACCO NON-USER: CPT | Performed by: FAMILY MEDICINE

## 2023-06-15 PROCEDURE — 99213 OFFICE O/P EST LOW 20 MIN: CPT | Performed by: FAMILY MEDICINE

## 2023-06-15 PROCEDURE — 1124F ACP DISCUSS-NO DSCNMKR DOCD: CPT | Performed by: FAMILY MEDICINE

## 2023-06-15 PROCEDURE — 90715 TDAP VACCINE 7 YRS/> IM: CPT | Performed by: FAMILY MEDICINE

## 2023-06-15 PROCEDURE — G8417 CALC BMI ABV UP PARAM F/U: HCPCS | Performed by: FAMILY MEDICINE

## 2023-06-15 RX ORDER — AZELASTINE HYDROCHLORIDE 0.5 MG/ML
SOLUTION/ DROPS OPHTHALMIC
Qty: 6 ML | Refills: 2 | Status: SHIPPED | OUTPATIENT
Start: 2023-06-15

## 2023-06-15 RX ORDER — DOCUSATE SODIUM 100 MG/1
100 CAPSULE, LIQUID FILLED ORAL 2 TIMES DAILY
Qty: 60 CAPSULE | Refills: 2 | Status: SHIPPED | OUTPATIENT
Start: 2023-06-15

## 2023-06-15 RX ORDER — PRAVASTATIN SODIUM 40 MG
TABLET ORAL
Qty: 90 TABLET | Refills: 3 | Status: SHIPPED | OUTPATIENT
Start: 2023-06-15

## 2023-06-15 RX ORDER — SIMETHICONE 125 MG
125 TABLET,CHEWABLE ORAL EVERY 6 HOURS PRN
Qty: 60 TABLET | Refills: 3 | Status: SHIPPED | OUTPATIENT
Start: 2023-06-15

## 2023-06-15 RX ORDER — CHLORHEXIDINE GLUCONATE 0.12 MG/ML
15 RINSE ORAL 2 TIMES DAILY
Qty: 473 ML | Refills: 5 | Status: SHIPPED | OUTPATIENT
Start: 2023-06-15

## 2023-06-15 RX ORDER — DORZOLAMIDE HYDROCHLORIDE AND TIMOLOL MALEATE 20; 5 MG/ML; MG/ML
SOLUTION/ DROPS OPHTHALMIC
Qty: 10 ML | Refills: 1 | Status: CANCELLED | OUTPATIENT
Start: 2023-06-15

## 2023-06-15 RX ORDER — AMLODIPINE BESYLATE AND BENAZEPRIL HYDROCHLORIDE 10; 20 MG/1; MG/1
1 CAPSULE ORAL DAILY
Qty: 30 CAPSULE | Refills: 3 | Status: SHIPPED | OUTPATIENT
Start: 2023-06-15

## 2023-06-15 RX ORDER — OMEPRAZOLE 20 MG/1
20 CAPSULE, DELAYED RELEASE ORAL DAILY
Qty: 90 CAPSULE | Refills: 3 | Status: SHIPPED | OUTPATIENT
Start: 2023-06-15

## 2023-06-15 RX ORDER — LACTOBACILLUS RHAMNOSUS GG 10B CELL
1 CAPSULE ORAL 2 TIMES DAILY
COMMUNITY
Start: 2023-06-15

## 2023-06-15 RX ORDER — FLUTICASONE PROPIONATE 50 MCG
SPRAY, SUSPENSION (ML) NASAL
Qty: 16 G | Refills: 3 | Status: SHIPPED | OUTPATIENT
Start: 2023-06-15

## 2023-06-15 RX ORDER — SUCRALFATE 1 G/1
1 TABLET ORAL 4 TIMES DAILY
COMMUNITY
Start: 2023-05-12

## 2023-06-15 ASSESSMENT — PATIENT HEALTH QUESTIONNAIRE - PHQ9
7. TROUBLE CONCENTRATING ON THINGS, SUCH AS READING THE NEWSPAPER OR WATCHING TELEVISION: 0
4. FEELING TIRED OR HAVING LITTLE ENERGY: 0
SUM OF ALL RESPONSES TO PHQ QUESTIONS 1-9: 0
SUM OF ALL RESPONSES TO PHQ QUESTIONS 1-9: 0
2. FEELING DOWN, DEPRESSED OR HOPELESS: 0
8. MOVING OR SPEAKING SO SLOWLY THAT OTHER PEOPLE COULD HAVE NOTICED. OR THE OPPOSITE, BEING SO FIGETY OR RESTLESS THAT YOU HAVE BEEN MOVING AROUND A LOT MORE THAN USUAL: 0
3. TROUBLE FALLING OR STAYING ASLEEP: 0
6. FEELING BAD ABOUT YOURSELF - OR THAT YOU ARE A FAILURE OR HAVE LET YOURSELF OR YOUR FAMILY DOWN: 0
SUM OF ALL RESPONSES TO PHQ QUESTIONS 1-9: 0
1. LITTLE INTEREST OR PLEASURE IN DOING THINGS: 0
10. IF YOU CHECKED OFF ANY PROBLEMS, HOW DIFFICULT HAVE THESE PROBLEMS MADE IT FOR YOU TO DO YOUR WORK, TAKE CARE OF THINGS AT HOME, OR GET ALONG WITH OTHER PEOPLE: 0
5. POOR APPETITE OR OVEREATING: 0
9. THOUGHTS THAT YOU WOULD BE BETTER OFF DEAD, OR OF HURTING YOURSELF: 0
SUM OF ALL RESPONSES TO PHQ9 QUESTIONS 1 & 2: 0
SUM OF ALL RESPONSES TO PHQ QUESTIONS 1-9: 0

## 2023-09-08 NOTE — PROGRESS NOTES
[facility]  Phone: [phone] Fax: [phone]        Physical Therapy Aquatic Flow Sheet   Date:  2021    Patient Name:  Dayron Borden    :  1935  Restrictions/Precautions:    Diagnosis:  Back and Leg Pain   ICD 10 Code: M54.5   Treatment Diagnosis:  Back and Leg Pain   ICD 10 Code: R27.4   Insurance/Certification information:  : Medicare  Referring Physician:  Quin Hernandez of care signed (Y/N):    Visit# / total visits:  pool  Pain level: 10/10     Electronically signed by:  Anastasiya Guzman PTA  Time In:1000  Time Out:1100    Subjective:Pain noted mainly with weight bearing activity Patient is limited in her tolerance for ADL's by pain      Key  B= Belt DB= Dumbells T= Theratube   H= Hydrotone N= Noodles W= Weights   P= Paddles S= Speedo equipment K= Kickboard     Exercises/Activities   Warm-up/Amb  Minutes  Exercises  Minutes    Slow forward   5  HR/TR  5    Slow sideways  5  Marches  5    Slow backwards  5  Mini-squats  5    Medium forward    4-way SLR  5    Medium sideways    Hip circles/fig 8  5    Small shuffle    Hamstring curls  5    Jog    Knee extension  5    Braiding    Pelvic tilts      Bicycling  5  Scap squeezes          Shoulder flex/ext      Functional    Shoulder abd/add      Step    Shoulder H. abd/add      Lifting    Shoulder IR/ER      Hand to opp knee    Rowing      Push down squat    Bilateral pull down      UE PNF    Push/pull      LE PNF    Push downs      Wall push ups    Arm circles      SLS    Elbow flex/ext          Chin tuck      Stretching    UT shrugs/rolls      Gastroc/Soleus    Rocking horse      Hamstring          SKTC    Other      Piriformis          Hip flexor          Ladder pull          Pec stretch          Post deltoid           Timed Code Treatment Minutes:  60  Total Treatment Minutes:  60    Treatment/Activity Tolerance:  [] Patient tolerated treatment well [x] Patient limited by fatique  [x] Patient limited by pain [] not assessed

## 2023-09-15 ENCOUNTER — OFFICE VISIT (OUTPATIENT)
Dept: FAMILY MEDICINE CLINIC | Age: 88
End: 2023-09-15
Payer: MEDICARE

## 2023-09-15 VITALS
OXYGEN SATURATION: 97 % | HEART RATE: 69 BPM | BODY MASS INDEX: 33.61 KG/M2 | TEMPERATURE: 97.7 F | WEIGHT: 202 LBS | DIASTOLIC BLOOD PRESSURE: 68 MMHG | SYSTOLIC BLOOD PRESSURE: 104 MMHG

## 2023-09-15 DIAGNOSIS — R68.89 ITCHY EYES: ICD-10-CM

## 2023-09-15 DIAGNOSIS — Z76.0 ENCOUNTER FOR MEDICATION REFILL: ICD-10-CM

## 2023-09-15 DIAGNOSIS — M15.9 PRIMARY OSTEOARTHRITIS INVOLVING MULTIPLE JOINTS: ICD-10-CM

## 2023-09-15 DIAGNOSIS — K59.00 CONSTIPATION, UNSPECIFIED CONSTIPATION TYPE: ICD-10-CM

## 2023-09-15 DIAGNOSIS — M79.2 NERVE PAIN: Primary | ICD-10-CM

## 2023-09-15 DIAGNOSIS — J30.2 SEASONAL ALLERGIC RHINITIS, UNSPECIFIED TRIGGER: ICD-10-CM

## 2023-09-15 DIAGNOSIS — K21.9 GASTROESOPHAGEAL REFLUX DISEASE WITHOUT ESOPHAGITIS: ICD-10-CM

## 2023-09-15 DIAGNOSIS — I70.213 ATHEROSCLEROSIS OF NATIVE ARTERY OF BOTH LOWER EXTREMITIES WITH INTERMITTENT CLAUDICATION (HCC): ICD-10-CM

## 2023-09-15 DIAGNOSIS — Z23 FLU VACCINE NEED: ICD-10-CM

## 2023-09-15 DIAGNOSIS — I10 ESSENTIAL HYPERTENSION: ICD-10-CM

## 2023-09-15 DIAGNOSIS — N89.8 ITCHING IN THE VAGINAL AREA: ICD-10-CM

## 2023-09-15 PROCEDURE — G8428 CUR MEDS NOT DOCUMENT: HCPCS

## 2023-09-15 PROCEDURE — 1036F TOBACCO NON-USER: CPT

## 2023-09-15 PROCEDURE — 90694 VACC AIIV4 NO PRSRV 0.5ML IM: CPT | Performed by: FAMILY MEDICINE

## 2023-09-15 PROCEDURE — 99213 OFFICE O/P EST LOW 20 MIN: CPT

## 2023-09-15 PROCEDURE — 1124F ACP DISCUSS-NO DSCNMKR DOCD: CPT

## 2023-09-15 PROCEDURE — G0008 ADMIN INFLUENZA VIRUS VAC: HCPCS | Performed by: FAMILY MEDICINE

## 2023-09-15 PROCEDURE — G8417 CALC BMI ABV UP PARAM F/U: HCPCS

## 2023-09-15 PROCEDURE — 1090F PRES/ABSN URINE INCON ASSESS: CPT

## 2023-09-15 RX ORDER — LIDOCAINE 4 G/G
1 PATCH TOPICAL DAILY
Qty: 30 PATCH | Refills: 0 | Status: SHIPPED | OUTPATIENT
Start: 2023-09-15 | End: 2023-10-15

## 2023-09-15 RX ORDER — NALOXONE HYDROCHLORIDE 4 MG/.1ML
SPRAY NASAL
Refills: 1 | Status: CANCELLED | OUTPATIENT
Start: 2023-09-15

## 2023-09-15 RX ORDER — MULTIVITAMIN
1 TABLET ORAL DAILY
Qty: 30 TABLET | Refills: 5 | Status: SHIPPED | OUTPATIENT
Start: 2023-09-15

## 2023-09-15 RX ORDER — NALOXEGOL OXALATE 12.5 MG/1
12.5 TABLET, FILM COATED ORAL EVERY MORNING
Qty: 90 TABLET | Refills: 3 | Status: SHIPPED | OUTPATIENT
Start: 2023-09-15

## 2023-09-15 RX ORDER — OMEPRAZOLE 20 MG/1
40 CAPSULE, DELAYED RELEASE ORAL DAILY
Qty: 90 CAPSULE | Refills: 3 | Status: SHIPPED | OUTPATIENT
Start: 2023-09-15

## 2023-09-15 RX ORDER — ACETAMINOPHEN 500 MG
1000 TABLET ORAL EVERY 6 HOURS PRN
Qty: 120 TABLET | Refills: 3 | Status: SHIPPED | OUTPATIENT
Start: 2023-09-15

## 2023-09-15 RX ORDER — DORZOLAMIDE HYDROCHLORIDE AND TIMOLOL MALEATE 20; 5 MG/ML; MG/ML
SOLUTION/ DROPS OPHTHALMIC
Qty: 10 ML | Refills: 1 | Status: SHIPPED | OUTPATIENT
Start: 2023-09-15

## 2023-09-15 RX ORDER — SUCRALFATE 1 G/1
1 TABLET ORAL 4 TIMES DAILY
Qty: 120 TABLET | Refills: 0 | Status: SHIPPED | OUTPATIENT
Start: 2023-09-15

## 2023-09-15 RX ORDER — AMLODIPINE BESYLATE AND BENAZEPRIL HYDROCHLORIDE 10; 20 MG/1; MG/1
1 CAPSULE ORAL DAILY
Qty: 30 CAPSULE | Refills: 3 | Status: SHIPPED | OUTPATIENT
Start: 2023-09-15

## 2023-09-15 RX ORDER — CHLORHEXIDINE GLUCONATE ORAL RINSE 1.2 MG/ML
15 SOLUTION DENTAL 2 TIMES DAILY
Qty: 473 ML | Refills: 5 | Status: SHIPPED | OUTPATIENT
Start: 2023-09-15

## 2023-09-15 RX ORDER — OMEPRAZOLE 20 MG/1
20 CAPSULE, DELAYED RELEASE ORAL DAILY
Qty: 90 CAPSULE | Refills: 3 | Status: SHIPPED
Start: 2023-09-15 | End: 2023-09-15

## 2023-09-15 RX ORDER — MULTIVITAMIN WITH IRON
100 TABLET ORAL DAILY
Qty: 30 TABLET | Refills: 5 | Status: SHIPPED | OUTPATIENT
Start: 2023-09-15

## 2023-09-15 RX ORDER — FLUTICASONE PROPIONATE 50 MCG
SPRAY, SUSPENSION (ML) NASAL
Qty: 16 G | Refills: 3 | Status: SHIPPED | OUTPATIENT
Start: 2023-09-15

## 2023-09-15 RX ORDER — VILAZODONE HYDROCHLORIDE 10 MG/1
10 TABLET ORAL DAILY
Qty: 7 TABLET | Refills: 1 | Status: CANCELLED | OUTPATIENT
Start: 2023-09-15

## 2023-09-15 RX ORDER — DOCUSATE SODIUM 100 MG/1
100 CAPSULE, LIQUID FILLED ORAL 2 TIMES DAILY
Qty: 60 CAPSULE | Refills: 2 | Status: SHIPPED | OUTPATIENT
Start: 2023-09-15

## 2023-09-15 RX ORDER — LACTOBACILLUS RHAMNOSUS GG 10B CELL
1 CAPSULE ORAL 2 TIMES DAILY
Qty: 60 CAPSULE | Refills: 5 | Status: SHIPPED | OUTPATIENT
Start: 2023-09-15 | End: 2023-10-15

## 2023-09-15 RX ORDER — LANOLIN ALCOHOL/MO/W.PET/CERES
1000 CREAM (GRAM) TOPICAL DAILY
Qty: 30 TABLET | Refills: 5 | Status: SHIPPED | OUTPATIENT
Start: 2023-09-15

## 2023-09-15 RX ORDER — OXYCODONE AND ACETAMINOPHEN 7.5; 325 MG/1; MG/1
TABLET ORAL
Status: CANCELLED | OUTPATIENT
Start: 2023-09-15

## 2023-09-15 RX ORDER — GLYCERIN/MIN OIL/POLYCARBOPHIL
GEL WITH APPLICATOR (GRAM) VAGINAL
Qty: 35 G | Refills: 1 | Status: SHIPPED | OUTPATIENT
Start: 2023-09-15

## 2023-09-15 RX ORDER — PRAVASTATIN SODIUM 40 MG
TABLET ORAL
Qty: 90 TABLET | Refills: 3 | Status: SHIPPED | OUTPATIENT
Start: 2023-09-15

## 2023-09-15 RX ORDER — AZELASTINE HYDROCHLORIDE 0.5 MG/ML
SOLUTION/ DROPS OPHTHALMIC
Qty: 6 ML | Refills: 2 | Status: SHIPPED | OUTPATIENT
Start: 2023-09-15

## 2023-09-15 RX ORDER — SIMETHICONE 125 MG
125 TABLET,CHEWABLE ORAL EVERY 6 HOURS PRN
Qty: 60 TABLET | Refills: 3 | Status: SHIPPED | OUTPATIENT
Start: 2023-09-15

## 2023-09-15 NOTE — PROGRESS NOTES
Vaccine Information Sheet, \"Influenza - Inactivated\"  given to Dylan Torres, or parent/legal guardian of  Dylan Torres and verbalized understanding. Patient responses:    Have you ever had a reaction to a flu vaccine? No  Do you have any current illness? No  Have you ever had Guillian [place] Syndrome? No  Do you have a serious allergy to any of the follow: Neomycin, Polymyxin, Thimerosal, eggs or egg products? No    Flu vaccine given per order. Please see immunization tab. Risks and benefits explained. Current VIS given.

## 2023-09-15 NOTE — PROGRESS NOTES
[address]  FAMILY MEDICINE RESIDENCY PROGRAM  DATE OF VISIT : 2023    Patient : Puja Rosales   Age : 81 y.o.  : 1935   MRN : [id number]   ______________________________________________________________________    Chief Complaint:   Chief Complaint   Patient presents with    New Patient     Pt states that she has  soar on the right side where her bra is and wants to get the flu shot. HPI:   Puja Rosales is a 81 y.o. female who presents to [facility] care. Sore on back: The patient states she has had a \"sore\" on her back for the last two weeks. She states it is underneath where her bra strap lays, and she has tried to buy bigger bras to avoid irritating it the area, but she still has pain when she wears the bras. She states the area is red and swollen, but denies discharge/bleeding from it. She denies fevers at this time. GERD: The patient takes carafate TID and Prilosec. She has lost 8 lb in the last 7 months, but she states she has decreased oral intake and increased activity in an attempt to lose weight. Patient had EGD done that showed gastritis but was negative for malignancy and H. Pylori. HTN: Takes Lotrel, BP in office 104/68. Denies orthostasis. Open angle glaucoma: Takes Cosopt eye drops. F/u with ophtho q3 mos. Constipation: Takes Colace, Mylicon    HLD: takes Pravachol     Past Medical History:  Past Medical History:   Diagnosis Date    Anxiety 10/11/2010    Breast cancer ([address]) approx 2000    right, treated lumpectomy, radiation, chemo    Depression 10/11/2010    no issues    Diverticular disease 10/11/2010    GERD (gastroesophageal reflux disease)     HTN (hypertension) 10/11/2010    Hyperlipidemia 10/11/2010    Osteoarthritis 10/11/2010    fot left knee arthroplasty 10/21/2016    Osteoporosis 10/11/2010    Spinal stenosis 10/11/2010    Use of cane as ambulatory aid        Allergies:    Allergies   Allergen Reactions    No Known Allergies

## 2023-10-31 ENCOUNTER — OFFICE VISIT (OUTPATIENT)
Dept: FAMILY MEDICINE CLINIC | Age: 88
End: 2023-10-31
Payer: MEDICARE

## 2023-10-31 VITALS
HEART RATE: 66 BPM | DIASTOLIC BLOOD PRESSURE: 75 MMHG | SYSTOLIC BLOOD PRESSURE: 120 MMHG | OXYGEN SATURATION: 95 % | TEMPERATURE: 97.3 F | BODY MASS INDEX: 33.61 KG/M2 | WEIGHT: 202 LBS

## 2023-10-31 DIAGNOSIS — H57.9 ITCHY EYES: ICD-10-CM

## 2023-10-31 DIAGNOSIS — S46.002A INJURY OF LEFT ROTATOR CUFF, INITIAL ENCOUNTER: Primary | ICD-10-CM

## 2023-10-31 PROCEDURE — 1124F ACP DISCUSS-NO DSCNMKR DOCD: CPT

## 2023-10-31 PROCEDURE — G8484 FLU IMMUNIZE NO ADMIN: HCPCS

## 2023-10-31 PROCEDURE — G8428 CUR MEDS NOT DOCUMENT: HCPCS

## 2023-10-31 PROCEDURE — G8417 CALC BMI ABV UP PARAM F/U: HCPCS

## 2023-10-31 PROCEDURE — 1036F TOBACCO NON-USER: CPT

## 2023-10-31 PROCEDURE — 1090F PRES/ABSN URINE INCON ASSESS: CPT

## 2023-10-31 PROCEDURE — 99213 OFFICE O/P EST LOW 20 MIN: CPT

## 2023-10-31 RX ORDER — ACETAMINOPHEN 500 MG
500 TABLET ORAL 4 TIMES DAILY PRN
Qty: 360 TABLET | Refills: 1 | Status: SHIPPED | OUTPATIENT
Start: 2023-10-31

## 2023-10-31 RX ORDER — LIDOCAINE 50 MG/G
1 PATCH TOPICAL DAILY
Qty: 30 PATCH | Refills: 0 | Status: SHIPPED | OUTPATIENT
Start: 2023-10-31 | End: 2023-11-30

## 2023-10-31 RX ORDER — AZELASTINE HYDROCHLORIDE 0.5 MG/ML
SOLUTION/ DROPS OPHTHALMIC
Qty: 6 ML | Refills: 2 | Status: SHIPPED | OUTPATIENT
Start: 2023-10-31

## 2023-10-31 NOTE — PROGRESS NOTES
[address]  FAMILY MEDICINE RESIDENCY PROGRAM  DATE OF VISIT : 2023    Patient : Thomas Hurst   Age : 81 y.o.  : 1935   MRN : [id number]   ______________________________________________________________________    Chief Complaint:   Chief Complaint   Patient presents with    Shoulder Injury     Pt states that she sprang her left shoulder picking up laundry. Pt wants lidocaine patches. Other     Pt wants care giver assistance. HPI:   Thomas Hurst is a 81 y.o. female who presents for left shoulder pain. The patient states that one week ago, she was picking up a bin full of laundry and putting it to the side, when she felt a sudden sharp pain in her left shoulder. She states the pain was worst in her anterior shoulder. She states that initially, she was unable to raise her arm. She is now able to raise her arm, but not beyond 90 degrees due to pain. She has been using a heating pad and Voltaren gel, which have been mildly effective. She is requesting lidocaine patches. She denies numbness/tingling/new onset weakness in her hands bilaterally. Past Medical History:  Past Medical History:   Diagnosis Date    Anxiety 10/11/2010    Breast cancer ([address]) approx 2000    right, treated lumpectomy, radiation, chemo    Depression 10/11/2010    no issues    Diverticular disease 10/11/2010    GERD (gastroesophageal reflux disease)     HTN (hypertension) 10/11/2010    Hyperlipidemia 10/11/2010    Osteoarthritis 10/11/2010    fot left knee arthroplasty 10/21/2016    Osteoporosis 10/11/2010    Spinal stenosis 10/11/2010    Use of cane as ambulatory aid        Allergies: Allergies   Allergen Reactions    No Known Allergies        Medications:    Current Outpatient Medications   Medication Sig Dispense Refill    lidocaine (LIDODERM) 5 % Place 1 patch onto the skin daily 12 hours on, 12 hours off.  30 patch 0    acetaminophen (TYLENOL) 500 MG tablet Take 1 tablet by mouth 4 times daily

## 2023-12-06 ENCOUNTER — OFFICE VISIT (OUTPATIENT)
Dept: FAMILY MEDICINE CLINIC | Age: 88
End: 2023-12-06
Payer: MEDICARE

## 2023-12-06 VITALS
DIASTOLIC BLOOD PRESSURE: 82 MMHG | BODY MASS INDEX: 33.78 KG/M2 | WEIGHT: 203 LBS | SYSTOLIC BLOOD PRESSURE: 121 MMHG | HEART RATE: 67 BPM | OXYGEN SATURATION: 96 % | TEMPERATURE: 97.2 F

## 2023-12-06 DIAGNOSIS — K59.00 CONSTIPATION, UNSPECIFIED CONSTIPATION TYPE: ICD-10-CM

## 2023-12-06 DIAGNOSIS — H57.9 ITCHY EYES: ICD-10-CM

## 2023-12-06 DIAGNOSIS — L98.9 SKIN LESION: ICD-10-CM

## 2023-12-06 DIAGNOSIS — Z13.9 ENCOUNTER FOR SCREENING INVOLVING SOCIAL DETERMINANTS OF HEALTH (SDOH): ICD-10-CM

## 2023-12-06 DIAGNOSIS — M25.512 ACUTE PAIN OF LEFT SHOULDER: Primary | ICD-10-CM

## 2023-12-06 PROCEDURE — G8428 CUR MEDS NOT DOCUMENT: HCPCS

## 2023-12-06 PROCEDURE — G8484 FLU IMMUNIZE NO ADMIN: HCPCS

## 2023-12-06 PROCEDURE — G8417 CALC BMI ABV UP PARAM F/U: HCPCS

## 2023-12-06 PROCEDURE — 1036F TOBACCO NON-USER: CPT

## 2023-12-06 PROCEDURE — 1124F ACP DISCUSS-NO DSCNMKR DOCD: CPT

## 2023-12-06 PROCEDURE — 1090F PRES/ABSN URINE INCON ASSESS: CPT

## 2023-12-06 PROCEDURE — 99213 OFFICE O/P EST LOW 20 MIN: CPT

## 2023-12-06 RX ORDER — AZELASTINE HYDROCHLORIDE 0.5 MG/ML
SOLUTION/ DROPS OPHTHALMIC
Qty: 6 ML | Refills: 2 | Status: SHIPPED | OUTPATIENT
Start: 2023-12-06

## 2023-12-06 RX ORDER — SIMETHICONE 125 MG
125 TABLET,CHEWABLE ORAL EVERY 6 HOURS PRN
Qty: 60 TABLET | Refills: 3 | Status: SHIPPED | OUTPATIENT
Start: 2023-12-06

## 2023-12-06 RX ORDER — DORZOLAMIDE HYDROCHLORIDE AND TIMOLOL MALEATE 20; 5 MG/ML; MG/ML
SOLUTION/ DROPS OPHTHALMIC
Qty: 10 ML | Refills: 1 | Status: SHIPPED | OUTPATIENT
Start: 2023-12-06

## 2023-12-06 NOTE — PATIENT INSTRUCTIONS
If [facility] has not called you in 1 week (12/13/23), please call our clinic at [phone] and Dr. Tyorne Berrios will look into it for you

## 2023-12-06 NOTE — PROGRESS NOTES
[address]  FAMILY MEDICINE RESIDENCY PROGRAM  DATE OF VISIT : 2023    Patient : Milton Lees   Age : 81 y.o.  : 1935   MRN : [id number]   ______________________________________________________________________    Chief Complaint:   Chief Complaint   Patient presents with    Follow-up     Pt c/o 2 red bumps on her face one on the right side of her nose and the other above the right eye brow. HPI:   Milton Lees is a 81 y.o. female who presents for follow-up of left shoulder pain. Left shoulder pain: Patient presented with shoulder pain 10/31/23 determined to most likely be d/t rotator cuff injury. Patient was prescribed shoulder exercises and lidocaine patches. She was unable to get the lidocaine patches because her insurance would not cover them. She states that her shoulder pain has significantly improved, only mildly sore within the joint. Improved ROM. Facial lesions: Patient noticed a lesion on the right of her nose on Friday that has been growing in size to approximately 1 cm. It is erythematous. Denies fevers, facial pain, blurry vision. Need for home assistance: interested in Direction home. Sat with patient and filled out referral for for direction home while in office        Past Medical History:  Past Medical History:   Diagnosis Date    Anxiety 10/11/2010    Breast cancer ([address]) approx 2000    right, treated lumpectomy, radiation, chemo    Depression 10/11/2010    no issues    Diverticular disease 10/11/2010    GERD (gastroesophageal reflux disease)     HTN (hypertension) 10/11/2010    Hyperlipidemia 10/11/2010    Osteoarthritis 10/11/2010    fot left knee arthroplasty 10/21/2016    Osteoporosis 10/11/2010    Spinal stenosis 10/11/2010    Use of cane as ambulatory aid        Allergies:    Allergies   Allergen Reactions    No Known Allergies        Medications:    Current Outpatient Medications   Medication Sig Dispense Refill    azelastine (OPTIVAR) 0.05 %

## 2024-01-22 ENCOUNTER — TRANSCRIBE ORDERS (OUTPATIENT)
Dept: ADMINISTRATIVE | Age: 89
End: 2024-01-22

## 2024-01-22 DIAGNOSIS — M48.07 LUMBOSACRAL STENOSIS: Primary | ICD-10-CM

## 2024-01-23 ENCOUNTER — HOSPITAL ENCOUNTER (OUTPATIENT)
Age: 89
Discharge: HOME | End: 2024-01-23
Payer: COMMERCIAL

## 2024-01-23 ENCOUNTER — OFFICE VISIT (OUTPATIENT)
Dept: FAMILY MEDICINE CLINIC | Age: 89
End: 2024-01-23

## 2024-01-23 VITALS
SYSTOLIC BLOOD PRESSURE: 130 MMHG | HEIGHT: 65 IN | TEMPERATURE: 97.6 F | BODY MASS INDEX: 32.82 KG/M2 | WEIGHT: 197 LBS | OXYGEN SATURATION: 99 % | DIASTOLIC BLOOD PRESSURE: 60 MMHG | HEART RATE: 64 BPM
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
## 2024-01-23 DIAGNOSIS — M75.42 IMPINGEMENT SYNDROME OF LEFT SHOULDER: Primary | ICD-10-CM

## 2024-01-23 LAB
BUN SERPL-MCNC: 7 MG/DL (ref 6–23)
CREAT SERPL-MCNC: 0.8 MG/DL (ref 0.5–1)
GFR SERPL CREATININE-BSD FRML MDRD: >60 ML/MIN/1.73M2

## 2024-01-23 PROCEDURE — 82565 ASSAY OF CREATININE: CPT

## 2024-01-23 PROCEDURE — 84520 ASSAY OF UREA NITROGEN: CPT

## 2024-01-23 PROCEDURE — 36415 COLL VENOUS BLD VENIPUNCTURE: CPT

## 2024-01-23 NOTE — PROGRESS NOTES
Attending Physician Statement    S:   Chief Complaint   Patient presents with    Fall     X5 days bump on head glasses cut her face - headache and dizzy      Fall 5 days ago - mechanical fall, hit her head (foot caught on stair).  No LOC.  Did have HA and one episode of dizziness since, but no current symptoms   Left Rotator cuff - using exercises, improving, not currently doing her exercises  O: Blood pressure 130/60, pulse 64, temperature 97.6 °F (36.4 °C), height 1.651 m (5' 5\"), weight 89.4 kg (197 lb), SpO2 99 %, not currently breastfeeding.   Exam:   Heart - RRR   Lungs - clear   Scar under left eye, healed.  No tenderness over scalp (? Some over left eyebrow)    EENT - normal   Neuro - normal strength, CN's, coordination   MSK - painful empty can and lift-off test on left  A: Fall with closed head injury, impingement syndrome  P:  Exercises   Offered CT of head, but will wait or further symptoms or signs   Follow-up as ordered    I have discussed the case, including pertinent history and exam findings with the resident. I agree with the documented assessment and plan.    Eladio Rodríguez MD           
osteoarthritis of both knees and osteoporosis. This handicap placard is valid from 4/5/2022 to 4/5/2027; a total of 5 years. 1 each 0    omeprazole (PRILOSEC) 20 MG delayed release capsule Take 2 capsules by mouth daily 90 capsule 3    NARCAN 4 MG/0.1ML LIQD nasal spray 0.1 mLs by Nasal route once  1    VILAZODONE HCL 10 MG Tablet Take 1 tablet by mouth daily  1    Magic Mouthwash (MIRACLE MOUTHWASH) Swish and spit 5 mLs 4 times daily as needed for Irritation or Pain (Patient not taking: Reported on 1/23/2024) 60 mL 0     No current facility-administered medications for this visit.      ______________________________________________________________________    Physical Exam:    Blood pressure 130/60, pulse 64, temperature 97.6 °F (36.4 °C), height 1.651 m (5' 5\"), weight 89.4 kg (197 lb), SpO2 99 %, not currently breastfeeding.     General: No acute distress, alert and interacting appropriately  Cardiovascular: regular rate and rhythm, no murmurs/gallops/rubs  Pulmonary: lungs clear to auscultation bilaterally  Abdomen: nondistended, nontender, bowel sounds active  Extremities: no peripheral edema  MSK: 5/5 strength b/l, no focal tenderness b/l, Empty can +ve, liftoff test +ve  Neuro: CN II-XII intact, 5/5 strength upper/lower extremities b/l, sensation to light touch intact in extremities b/l, finger-to-nose WNL        ______________________________________________________________________    Assessment & Plan:  1. Impingement syndrome of left shoulder  Patient advised of and provided exercises for rotator cuff impingement, advised to do them daily and use Tylenol for pain management. Verbalized understanding. Advised to try PT but patient declines at this time    2. Fall, subsequent encounter  No evidence of head trauma, no neurologic deficits, no confusion at present  Will hold off on head imaging at this time, advised patients of precautions for which to go to ED, patient agreed and verbalized

## 2024-01-27 ENCOUNTER — HOSPITAL ENCOUNTER (OUTPATIENT)
Dept: MRI IMAGING | Age: 89
End: 2024-01-27
Payer: COMMERCIAL

## 2024-01-27 DIAGNOSIS — M48.07 LUMBOSACRAL STENOSIS: ICD-10-CM

## 2024-01-27 PROCEDURE — 6360000004 HC RX CONTRAST MEDICATION: Performed by: RADIOLOGY

## 2024-01-27 PROCEDURE — A9579 GAD-BASE MR CONTRAST NOS,1ML: HCPCS | Performed by: RADIOLOGY

## 2024-01-27 PROCEDURE — 72158 MRI LUMBAR SPINE W/O & W/DYE: CPT

## 2024-01-27 RX ADMIN — GADOTERIDOL 18 ML: 279.3 INJECTION, SOLUTION INTRAVENOUS at 14:05

## 2024-01-30 ENCOUNTER — HOSPITAL ENCOUNTER (INPATIENT)
Age: 89
LOS: 1 days | Discharge: NURSING, REHAB OR LONG-TERM CARE | DRG: 312 | End: 2024-02-02
Attending: STUDENT IN AN ORGANIZED HEALTH CARE EDUCATION/TRAINING PROGRAM
Payer: MEDICARE

## 2024-01-30 ENCOUNTER — OFFICE VISIT (OUTPATIENT)
Dept: FAMILY MEDICINE CLINIC | Age: 89
End: 2024-01-30
Payer: MEDICARE

## 2024-01-30 ENCOUNTER — APPOINTMENT (OUTPATIENT)
Dept: CT IMAGING | Age: 89
DRG: 312 | End: 2024-01-30
Payer: MEDICARE

## 2024-01-30 ENCOUNTER — APPOINTMENT (OUTPATIENT)
Dept: GENERAL RADIOLOGY | Age: 89
DRG: 312 | End: 2024-01-30
Payer: MEDICARE

## 2024-01-30 VITALS
HEIGHT: 65 IN | SYSTOLIC BLOOD PRESSURE: 105 MMHG | OXYGEN SATURATION: 97 % | WEIGHT: 207 LBS | TEMPERATURE: 97.2 F | RESPIRATION RATE: 18 BRPM | BODY MASS INDEX: 34.49 KG/M2 | HEART RATE: 69 BPM | DIASTOLIC BLOOD PRESSURE: 66 MMHG

## 2024-01-30 DIAGNOSIS — R55 SYNCOPE AND COLLAPSE: ICD-10-CM

## 2024-01-30 DIAGNOSIS — I49.5 SINUS NODE DYSFUNCTION (HCC): ICD-10-CM

## 2024-01-30 DIAGNOSIS — S69.90XA INJURY TO FINGERNAIL, UNSPECIFIED LATERALITY, INITIAL ENCOUNTER: ICD-10-CM

## 2024-01-30 DIAGNOSIS — I44.1 MOBITZ I: ICD-10-CM

## 2024-01-30 DIAGNOSIS — I45.2 BIFASCICULAR BLOCK: Primary | ICD-10-CM

## 2024-01-30 DIAGNOSIS — Z76.0 MEDICATION REFILL: ICD-10-CM

## 2024-01-30 DIAGNOSIS — R55 NEAR SYNCOPE: ICD-10-CM

## 2024-01-30 DIAGNOSIS — B35.1 ONYCHOMYCOSIS: ICD-10-CM

## 2024-01-30 DIAGNOSIS — M54.50 CHRONIC LOW BACK PAIN, UNSPECIFIED BACK PAIN LATERALITY, UNSPECIFIED WHETHER SCIATICA PRESENT: ICD-10-CM

## 2024-01-30 DIAGNOSIS — Z91.81 AT HIGH RISK FOR FALLS: ICD-10-CM
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
## 2024-01-30 DIAGNOSIS — R42 DIZZINESS: Primary | ICD-10-CM

## 2024-01-30 DIAGNOSIS — G89.29 CHRONIC LOW BACK PAIN, UNSPECIFIED BACK PAIN LATERALITY, UNSPECIFIED WHETHER SCIATICA PRESENT: ICD-10-CM

## 2024-01-30 PROBLEM — K59.09 CHRONIC CONSTIPATION: Status: ACTIVE | Noted: 2024-01-30

## 2024-01-30 PROBLEM — M54.9 CHRONIC BACK PAIN: Status: ACTIVE | Noted: 2024-01-30

## 2024-01-30 LAB
ALBUMIN SERPL-MCNC: 3.9 G/DL (ref 3.5–5.2)
ALP SERPL-CCNC: 89 U/L (ref 35–104)
ALT SERPL-CCNC: 12 U/L (ref 0–32)
ANION GAP SERPL CALCULATED.3IONS-SCNC: 8 MMOL/L (ref 7–16)
AST SERPL-CCNC: 18 U/L (ref 0–31)
BASOPHILS # BLD: 0 K/UL (ref 0–0.2)
BASOPHILS NFR BLD: 0 % (ref 0–2)
BILIRUB SERPL-MCNC: 0.4 MG/DL (ref 0–1.2)
BILIRUB UR QL STRIP: NEGATIVE
BUN SERPL-MCNC: 13 MG/DL (ref 6–23)
CALCIUM SERPL-MCNC: 9.6 MG/DL (ref 8.6–10.2)
CHLORIDE SERPL-SCNC: 101 MMOL/L (ref 98–107)
CLARITY UR: CLEAR
CO2 SERPL-SCNC: 26 MMOL/L (ref 22–29)
COLOR UR: YELLOW
COMMENT: NORMAL
CREAT SERPL-MCNC: 0.9 MG/DL (ref 0.5–1)
EOSINOPHIL # BLD: 0.28 K/UL (ref 0.05–0.5)
EOSINOPHILS RELATIVE PERCENT: 6 % (ref 0–6)
ERYTHROCYTE [DISTWIDTH] IN BLOOD BY AUTOMATED COUNT: 13.3 % (ref 11.5–15)
GFR SERPL CREATININE-BSD FRML MDRD: >60 ML/MIN/1.73M2
GLUCOSE SERPL-MCNC: 95 MG/DL (ref 74–99)
GLUCOSE UR STRIP-MCNC: NEGATIVE MG/DL
HCT VFR BLD AUTO: 39.4 % (ref 34–48)
HGB BLD-MCNC: 13.2 G/DL (ref 11.5–15.5)
HGB UR QL STRIP.AUTO: NEGATIVE
KETONES UR STRIP-MCNC: NEGATIVE MG/DL
LEUKOCYTE ESTERASE UR QL STRIP: NEGATIVE
LYMPHOCYTES NFR BLD: 2.5 K/UL (ref 1.5–4)
LYMPHOCYTES RELATIVE PERCENT: 54 % (ref 20–42)
MAGNESIUM SERPL-MCNC: 1.8 MG/DL (ref 1.6–2.6)
MCH RBC QN AUTO: 31.9 PG (ref 26–35)
MCHC RBC AUTO-ENTMCNC: 33.5 G/DL (ref 32–34.5)
MCV RBC AUTO: 95.2 FL (ref 80–99.9)
MONOCYTES NFR BLD: 0.48 K/UL (ref 0.1–0.95)
MONOCYTES NFR BLD: 11 % (ref 2–12)
NEUTROPHILS NFR BLD: 29 % (ref 43–80)
NEUTS SEG NFR BLD: 1.33 K/UL (ref 1.8–7.3)
NITRITE UR QL STRIP: NEGATIVE
PH UR STRIP: 7 [PH] (ref 5–9)
PLATELET # BLD AUTO: 231 K/UL (ref 130–450)
PMV BLD AUTO: 8.8 FL (ref 7–12)
POTASSIUM SERPL-SCNC: 3.5 MMOL/L (ref 3.5–5)
PROT SERPL-MCNC: 6.9 G/DL (ref 6.4–8.3)
PROT UR STRIP-MCNC: NEGATIVE MG/DL
RBC # BLD AUTO: 4.14 M/UL (ref 3.5–5.5)
RBC # BLD: ABNORMAL 10*6/UL
SODIUM SERPL-SCNC: 135 MMOL/L (ref 132–146)
SP GR UR STRIP: 1.01 (ref 1–1.03)
TROPONIN I SERPL HS-MCNC: 10 NG/L (ref 0–9)
TROPONIN I SERPL HS-MCNC: 9 NG/L (ref 0–9)
UROBILINOGEN UR STRIP-ACNC: 0.2 EU/DL (ref 0–1)
WBC OTHER # BLD: 4.6 K/UL (ref 4.5–11.5)

## 2024-01-30 PROCEDURE — 70450 CT HEAD/BRAIN W/O DYE: CPT

## 2024-01-30 PROCEDURE — G8427 DOCREV CUR MEDS BY ELIG CLIN: HCPCS

## 2024-01-30 PROCEDURE — 71045 X-RAY EXAM CHEST 1 VIEW: CPT

## 2024-01-30 PROCEDURE — 85025 COMPLETE CBC W/AUTO DIFF WBC: CPT

## 2024-01-30 PROCEDURE — 84484 ASSAY OF TROPONIN QUANT: CPT

## 2024-01-30 PROCEDURE — G0378 HOSPITAL OBSERVATION PER HR: HCPCS

## 2024-01-30 PROCEDURE — 99285 EMERGENCY DEPT VISIT HI MDM: CPT

## 2024-01-30 PROCEDURE — 1090F PRES/ABSN URINE INCON ASSESS: CPT

## 2024-01-30 PROCEDURE — 1036F TOBACCO NON-USER: CPT

## 2024-01-30 PROCEDURE — 81003 URINALYSIS AUTO W/O SCOPE: CPT

## 2024-01-30 PROCEDURE — 96361 HYDRATE IV INFUSION ADD-ON: CPT

## 2024-01-30 PROCEDURE — 72125 CT NECK SPINE W/O DYE: CPT

## 2024-01-30 PROCEDURE — 70486 CT MAXILLOFACIAL W/O DYE: CPT

## 2024-01-30 PROCEDURE — 99213 OFFICE O/P EST LOW 20 MIN: CPT

## 2024-01-30 PROCEDURE — 93005 ELECTROCARDIOGRAM TRACING: CPT

## 2024-01-30 PROCEDURE — G8417 CALC BMI ABV UP PARAM F/U: HCPCS

## 2024-01-30 PROCEDURE — 80053 COMPREHEN METABOLIC PANEL: CPT

## 2024-01-30 PROCEDURE — 1124F ACP DISCUSS-NO DSCNMKR DOCD: CPT

## 2024-01-30 PROCEDURE — 96360 HYDRATION IV INFUSION INIT: CPT

## 2024-01-30 PROCEDURE — 83735 ASSAY OF MAGNESIUM: CPT

## 2024-01-30 PROCEDURE — G8484 FLU IMMUNIZE NO ADMIN: HCPCS

## 2024-01-30 PROCEDURE — 84443 ASSAY THYROID STIM HORMONE: CPT

## 2024-01-30 PROCEDURE — 2580000003 HC RX 258: Performed by: INTERNAL MEDICINE

## 2024-01-30 PROCEDURE — 2580000003 HC RX 258

## 2024-01-30 PROCEDURE — 99223 1ST HOSP IP/OBS HIGH 75: CPT | Performed by: INTERNAL MEDICINE

## 2024-01-30 RX ORDER — POLYETHYLENE GLYCOL 3350 17 G/17G
17 POWDER, FOR SOLUTION ORAL DAILY PRN
Status: DISCONTINUED | OUTPATIENT
Start: 2024-01-30 | End: 2024-02-02 | Stop reason: HOSPADM

## 2024-01-30 RX ORDER — SIMETHICONE 125 MG
125 TABLET,CHEWABLE ORAL EVERY 6 HOURS PRN
Status: DISCONTINUED | OUTPATIENT
Start: 2024-01-30 | End: 2024-01-30 | Stop reason: SDUPTHER

## 2024-01-30 RX ORDER — LANOLIN ALCOHOL/MO/W.PET/CERES
1000 CREAM (GRAM) TOPICAL DAILY
Status: DISCONTINUED | OUTPATIENT
Start: 2024-01-31 | End: 2024-02-02 | Stop reason: HOSPADM

## 2024-01-30 RX ORDER — TIMOLOL MALEATE 5 MG/ML
1 SOLUTION/ DROPS OPHTHALMIC 2 TIMES DAILY
Status: DISCONTINUED | OUTPATIENT
Start: 2024-01-30 | End: 2024-02-02 | Stop reason: HOSPADM

## 2024-01-30 RX ORDER — SODIUM CHLORIDE 9 MG/ML
INJECTION, SOLUTION INTRAVENOUS PRN
Status: DISCONTINUED | OUTPATIENT
Start: 2024-01-30 | End: 2024-02-02 | Stop reason: HOSPADM

## 2024-01-30 RX ORDER — POTASSIUM CHLORIDE 7.45 MG/ML
10 INJECTION INTRAVENOUS PRN
Status: DISCONTINUED | OUTPATIENT
Start: 2024-01-30 | End: 2024-02-02 | Stop reason: HOSPADM

## 2024-01-30 RX ORDER — PANTOPRAZOLE SODIUM 40 MG/1
40 TABLET, DELAYED RELEASE ORAL
Status: DISCONTINUED | OUTPATIENT
Start: 2024-01-31 | End: 2024-02-02 | Stop reason: HOSPADM

## 2024-01-30 RX ORDER — SUCRALFATE 1 G/1
1 TABLET ORAL 4 TIMES DAILY
Qty: 120 TABLET | Refills: 0 | Status: CANCELLED | OUTPATIENT
Start: 2024-01-30

## 2024-01-30 RX ORDER — AMLODIPINE BESYLATE 10 MG/1
10 TABLET ORAL DAILY
Status: DISCONTINUED | OUTPATIENT
Start: 2024-01-31 | End: 2024-02-02 | Stop reason: HOSPADM

## 2024-01-30 RX ORDER — FLUTICASONE PROPIONATE 50 MCG
1 SPRAY, SUSPENSION (ML) NASAL DAILY
Status: DISCONTINUED | OUTPATIENT
Start: 2024-01-31 | End: 2024-02-02 | Stop reason: HOSPADM

## 2024-01-30 RX ORDER — SODIUM CHLORIDE 0.9 % (FLUSH) 0.9 %
10 SYRINGE (ML) INJECTION PRN
Status: DISCONTINUED | OUTPATIENT
Start: 2024-01-30 | End: 2024-02-02 | Stop reason: HOSPADM

## 2024-01-30 RX ORDER — CHLORHEXIDINE GLUCONATE ORAL RINSE 1.2 MG/ML
15 SOLUTION DENTAL 2 TIMES DAILY
Status: DISCONTINUED | OUTPATIENT
Start: 2024-01-30 | End: 2024-02-02 | Stop reason: HOSPADM

## 2024-01-30 RX ORDER — MAGNESIUM SULFATE IN WATER 40 MG/ML
2000 INJECTION, SOLUTION INTRAVENOUS PRN
Status: DISCONTINUED | OUTPATIENT
Start: 2024-01-30 | End: 2024-02-02 | Stop reason: HOSPADM

## 2024-01-30 RX ORDER — SIMETHICONE 125 MG
125 TABLET,CHEWABLE ORAL EVERY 6 HOURS PRN
Qty: 60 TABLET | Refills: 3 | Status: CANCELLED | OUTPATIENT
Start: 2024-01-30

## 2024-01-30 RX ORDER — 0.9 % SODIUM CHLORIDE 0.9 %
1000 INTRAVENOUS SOLUTION INTRAVENOUS ONCE
Status: COMPLETED | OUTPATIENT
Start: 2024-01-30 | End: 2024-01-30

## 2024-01-30 RX ORDER — SODIUM CHLORIDE 0.9 % (FLUSH) 0.9 %
5-40 SYRINGE (ML) INJECTION EVERY 12 HOURS SCHEDULED
Status: DISCONTINUED | OUTPATIENT
Start: 2024-01-30 | End: 2024-02-02 | Stop reason: HOSPADM

## 2024-01-30 RX ORDER — SUCRALFATE 1 G/1
1 TABLET ORAL 4 TIMES DAILY
Status: DISCONTINUED | OUTPATIENT
Start: 2024-01-30 | End: 2024-02-02 | Stop reason: HOSPADM

## 2024-01-30 RX ORDER — TERBINAFINE HYDROCHLORIDE 250 MG/1
250 TABLET ORAL DAILY
Qty: 90 TABLET | Refills: 0 | Status: CANCELLED | OUTPATIENT
Start: 2024-01-30 | End: 2024-04-29

## 2024-01-30 RX ORDER — SODIUM CHLORIDE 9 MG/ML
INJECTION, SOLUTION INTRAVENOUS CONTINUOUS
Status: DISCONTINUED | OUTPATIENT
Start: 2024-01-30 | End: 2024-02-02 | Stop reason: HOSPADM

## 2024-01-30 RX ORDER — DOCUSATE SODIUM 100 MG/1
100 CAPSULE, LIQUID FILLED ORAL 2 TIMES DAILY
Status: DISCONTINUED | OUTPATIENT
Start: 2024-01-30 | End: 2024-02-02 | Stop reason: HOSPADM

## 2024-01-30 RX ORDER — ACETAMINOPHEN 650 MG/1
650 SUPPOSITORY RECTAL EVERY 6 HOURS PRN
Status: DISCONTINUED | OUTPATIENT
Start: 2024-01-30 | End: 2024-02-02 | Stop reason: HOSPADM

## 2024-01-30 RX ORDER — ONDANSETRON 2 MG/ML
4 INJECTION INTRAMUSCULAR; INTRAVENOUS EVERY 6 HOURS PRN
Status: DISCONTINUED | OUTPATIENT
Start: 2024-01-30 | End: 2024-02-02 | Stop reason: HOSPADM

## 2024-01-30 RX ORDER — LISINOPRIL 20 MG/1
20 TABLET ORAL DAILY
Status: DISCONTINUED | OUTPATIENT
Start: 2024-01-31 | End: 2024-02-02 | Stop reason: HOSPADM

## 2024-01-30 RX ORDER — AZELASTINE HYDROCHLORIDE 0.5 MG/ML
1 SOLUTION/ DROPS OPHTHALMIC 2 TIMES DAILY
Status: DISCONTINUED | OUTPATIENT
Start: 2024-01-30 | End: 2024-02-02 | Stop reason: CLARIF

## 2024-01-30 RX ORDER — AMLODIPINE BESYLATE AND BENAZEPRIL HYDROCHLORIDE 10; 20 MG/1; MG/1
1 CAPSULE ORAL DAILY
Status: DISCONTINUED | OUTPATIENT
Start: 2024-01-31 | End: 2024-01-30 | Stop reason: CLARIF

## 2024-01-30 RX ORDER — DORZOLAMIDE HYDROCHLORIDE AND TIMOLOL MALEATE 20; 5 MG/ML; MG/ML
1 SOLUTION/ DROPS OPHTHALMIC 2 TIMES DAILY
Status: DISCONTINUED | OUTPATIENT
Start: 2024-01-30 | End: 2024-01-30 | Stop reason: CLARIF

## 2024-01-30 RX ORDER — ENOXAPARIN SODIUM 100 MG/ML
40 INJECTION SUBCUTANEOUS DAILY
Status: DISCONTINUED | OUTPATIENT
Start: 2024-01-31 | End: 2024-02-02 | Stop reason: HOSPADM

## 2024-01-30 RX ORDER — AZELASTINE HYDROCHLORIDE 0.5 MG/ML
SOLUTION/ DROPS OPHTHALMIC
Qty: 6 ML | Refills: 2 | Status: CANCELLED | OUTPATIENT
Start: 2024-01-30

## 2024-01-30 RX ORDER — POTASSIUM CHLORIDE 20 MEQ/1
40 TABLET, EXTENDED RELEASE ORAL PRN
Status: DISCONTINUED | OUTPATIENT
Start: 2024-01-30 | End: 2024-02-02 | Stop reason: HOSPADM

## 2024-01-30 RX ORDER — SIMETHICONE 80 MG
120 TABLET,CHEWABLE ORAL EVERY 6 HOURS PRN
Status: DISCONTINUED | OUTPATIENT
Start: 2024-01-30 | End: 2024-02-02 | Stop reason: HOSPADM

## 2024-01-30 RX ORDER — DORZOLAMIDE HYDROCHLORIDE AND TIMOLOL MALEATE 20; 5 MG/ML; MG/ML
SOLUTION/ DROPS OPHTHALMIC
Qty: 10 ML | Refills: 1 | Status: CANCELLED | OUTPATIENT
Start: 2024-01-30

## 2024-01-30 RX ORDER — ACETAMINOPHEN 325 MG/1
650 TABLET ORAL EVERY 6 HOURS PRN
Status: DISCONTINUED | OUTPATIENT
Start: 2024-01-30 | End: 2024-02-02 | Stop reason: HOSPADM

## 2024-01-30 RX ORDER — VILAZODONE HYDROCHLORIDE 10 MG/1
10 TABLET ORAL DAILY
Status: DISCONTINUED | OUTPATIENT
Start: 2024-01-31 | End: 2024-02-02 | Stop reason: HOSPADM

## 2024-01-30 RX ORDER — ONDANSETRON 4 MG/1
4 TABLET, ORALLY DISINTEGRATING ORAL EVERY 8 HOURS PRN
Status: DISCONTINUED | OUTPATIENT
Start: 2024-01-30 | End: 2024-02-02 | Stop reason: HOSPADM

## 2024-01-30 RX ORDER — MULTIVITAMIN WITH IRON
100 TABLET ORAL DAILY
Status: DISCONTINUED | OUTPATIENT
Start: 2024-01-31 | End: 2024-02-02 | Stop reason: HOSPADM

## 2024-01-30 RX ORDER — DORZOLAMIDE HCL 20 MG/ML
1 SOLUTION/ DROPS OPHTHALMIC 2 TIMES DAILY
Status: DISCONTINUED | OUTPATIENT
Start: 2024-01-30 | End: 2024-02-02 | Stop reason: HOSPADM

## 2024-01-30 RX ADMIN — SODIUM CHLORIDE: 9 INJECTION, SOLUTION INTRAVENOUS at 21:26

## 2024-01-30 RX ADMIN — SODIUM CHLORIDE 1000 ML: 9 INJECTION, SOLUTION INTRAVENOUS at 18:30

## 2024-01-30 ASSESSMENT — PATIENT HEALTH QUESTIONNAIRE - PHQ9
9. THOUGHTS THAT YOU WOULD BE BETTER OFF DEAD, OR OF HURTING YOURSELF: 0
SUM OF ALL RESPONSES TO PHQ QUESTIONS 1-9: 0
2. FEELING DOWN, DEPRESSED OR HOPELESS: 0
10. IF YOU CHECKED OFF ANY PROBLEMS, HOW DIFFICULT HAVE THESE PROBLEMS MADE IT FOR YOU TO DO YOUR WORK, TAKE CARE OF THINGS AT HOME, OR GET ALONG WITH OTHER PEOPLE: 0
6. FEELING BAD ABOUT YOURSELF - OR THAT YOU ARE A FAILURE OR HAVE LET YOURSELF OR YOUR FAMILY DOWN: 0
SUM OF ALL RESPONSES TO PHQ QUESTIONS 1-9: 0
SUM OF ALL RESPONSES TO PHQ QUESTIONS 1-9: 0
SUM OF ALL RESPONSES TO PHQ9 QUESTIONS 1 & 2: 0
7. TROUBLE CONCENTRATING ON THINGS, SUCH AS READING THE NEWSPAPER OR WATCHING TELEVISION: 0
5. POOR APPETITE OR OVEREATING: 0
3. TROUBLE FALLING OR STAYING ASLEEP: 0
SUM OF ALL RESPONSES TO PHQ QUESTIONS 1-9: 0
8. MOVING OR SPEAKING SO SLOWLY THAT OTHER PEOPLE COULD HAVE NOTICED. OR THE OPPOSITE, BEING SO FIGETY OR RESTLESS THAT YOU HAVE BEEN MOVING AROUND A LOT MORE THAN USUAL: 0
4. FEELING TIRED OR HAVING LITTLE ENERGY: 0

## 2024-01-30 NOTE — ED PROVIDER NOTES
[facility] INTERNAL MEDICINE 2  EMERGENCY DEPARTMENT ENCOUNTER        Pt Name: Iyv Smith  MRN: [id number]  Birthdate 1935  Date of evaluation: 1/30/2024  Provider: Farrah Love DO  PCP: Jose C García MD  Note Started: 4:40 PM EST 1/30/24    CHIEF COMPLAINT       Chief Complaint   Patient presents with    Fall     Fell going up the stairs on Friday; +head injury, -thinners, -loc    Dizziness     Dizziness and headaches since the fall        HISTORY OF PRESENT ILLNESS: 1 or more Elements   History From: Patient    Limitations to history : None  Social Determinants : None    Ivy Smith is a 88 y.o. female who presents for fall and dizziness.  Patient states that she fell on Friday and fell onto her face.  She states she did not lose consciousness.  She is not on any blood thinners.  She states since the fall she has been having dizziness and headaches.  She describes the dizziness as lightheadedness.  She localizes the headache to the front of her head.  States they come and go.  She does not have a headache at this time.  She denies any vision changes.  She denies any syncopal episodes.  Denies any fever, chills, n/v, neck tenderness or stiffness, weakness, cp, palpitations, leg swelling/tenderness, sob, cough, abd pain, dysuria, hematuria, diarrhea, constipation, bloody stools.    Nursing Notes were all reviewed and agreed with or any disagreements were addressed in the HPI.    ROS:   Pertinent positives and negatives are stated within HPI, all other systems reviewed and are negative.      --------------------------------------------- PAST HISTORY ---------------------------------------------  Past Medical History:  has a past medical history of Anxiety, Breast cancer (HCC), Depression, Diverticular disease, GERD (gastroesophageal reflux disease), HTN (hypertension), Hyperlipidemia, Osteoarthritis, Osteoporosis, Spinal stenosis, and Use of cane as ambulatory aid.    Past Surgical History:  has a

## 2024-01-30 NOTE — PROGRESS NOTES
[facility]  FAMILY MEDICINE RESIDENCY PROGRAM  DATE OF VISIT : 2024    Patient : Ivy Smith   Age : 88 y.o.    : 1935   MRN : [id number]   ______________________________________________________________________    Chief Complaint:   Chief Complaint   Patient presents with    Nail Problem     X 1 wk, Finger nails and toe nails all broken and covered in fungus, nail salon normally polished       HPI:   History obtained from the patient. Ivy Smith is a 88 y.o. female with a PMHx of spinal stenosis, HTN and HLD who presents for follow-up of fall and concern for nail changes.     Fall/Dizziness: Fell 2 weeks ago. States that she was walking upstairs when she fell and hit the left side of her head. She had a laceration on the side of her face which has since started to heal. Believes she may have been dizzy prior to falling. Since the fall, endorsing feeling continuously dizzy. Appetite has been normal. States she has a cane to ambulate which she does not use. Does not drink alcohol or smoke tobacco. She lives at home alone. She takes her medication daily as prescribed. She denies headaches, visual disturbances, chest pain, palpitations, SOB, loss of bowel/bladder function, syncope, dysarthrias or facial droop.     Nail problem: States she is concerned she has a fungus growing on her nails. Of note, patient recently had dark gel nail polish on her fingers which she states she removed independently. She noticed her fingernail changes after removing the nail polish. As for her toes, she states that she noticed toenail changes prior to having her nails done.     Review of Systems:  Relevant as mentioned in HPI  ______________________________________________________________________    Physical Exam:    Vitals: /66 (Site: Right Upper Arm, Position: Sitting)   Pulse 69   Temp 97.2 °F (36.2 °C)   Resp 18   Ht 1.651 m (5' 5\")   Wt 93.9 kg (207 lb)   SpO2 97%   BMI 34.45 kg/m²

## 2024-01-30 NOTE — PROGRESS NOTES
Attending Physician Statement    S:   Chief Complaint   Patient presents with    Nail Problem     X 1 wk, Finger nails and toe nails all broken and covered in fungus, nail salon normally polished      88/F who presents for follow up of dizziness after a fall and nail changes.    Fall - Patient from standing height about 1 week ago.  Patient endorsing multiple episodes of dizziness since the fall.  Patient denies slurred speech.  Patient is not on blood thinners.   Nails - Patient had recent manicure with black nail polish.  Patient removed gel nail polish resulting in nail damage.  Patient was concerned for fungal infection.  O: Blood pressure 105/66, pulse 69, temperature 97.2 °F (36.2 °C), resp. rate 18, height 1.651 m (5' 5\"), weight 93.9 kg (207 lb), SpO2 97 %, not currently breastfeeding.   Exam:   Heart - RRR   Lungs - clear   Neuro - CNII-XII intact.  No strength or motor deficits detected.  Normal cerebellar testing.  Romberg negative, Finger-to-nose normal.  Heel-to-shin normal.  A: Fall with closed head injury, dizziness and nail changes  P:  Recommend proceeding the to ED for head CT to rule out intracranial bleed   Nail changes likely related to recent manicure/pedicure   Assess liver function prior to treating for onychomycosis   Follow-up as ordered    I have discussed the case, including pertinent history and exam findings with the resident. I agree with the documented assessment and plan.       Ishaan Carson MD    no

## 2024-01-31 PROBLEM — I49.5 SINUS NODE DYSFUNCTION (HCC): Status: ACTIVE | Noted: 2024-01-31

## 2024-01-31 PROBLEM — I44.1 MOBITZ I: Status: ACTIVE | Noted: 2024-01-31

## 2024-01-31 PROBLEM — R06.09 DOE (DYSPNEA ON EXERTION): Status: ACTIVE | Noted: 2024-01-31

## 2024-01-31 LAB
EKG ATRIAL RATE: 53 BPM
EKG P AXIS: 46 DEGREES
EKG P-R INTERVAL: 354 MS
EKG Q-T INTERVAL: 486 MS
EKG QRS DURATION: 158 MS
EKG QTC CALCULATION (BAZETT): 456 MS
EKG R AXIS: -53 DEGREES
EKG VENTRICULAR RATE: 53 BPM
TSH SERPL DL<=0.05 MIU/L-ACNC: 2.66 UIU/ML (ref 0.27–4.2)

## 2024-01-31 PROCEDURE — 99232 SBSQ HOSP IP/OBS MODERATE 35: CPT | Performed by: INTERNAL MEDICINE

## 2024-01-31 PROCEDURE — APPSS60 APP SPLIT SHARED TIME 46-60 MINUTES

## 2024-01-31 PROCEDURE — 6370000000 HC RX 637 (ALT 250 FOR IP): Performed by: INTERNAL MEDICINE

## 2024-01-31 PROCEDURE — 97535 SELF CARE MNGMENT TRAINING: CPT

## 2024-01-31 PROCEDURE — 99223 1ST HOSP IP/OBS HIGH 75: CPT | Performed by: INTERNAL MEDICINE

## 2024-01-31 PROCEDURE — G0378 HOSPITAL OBSERVATION PER HR: HCPCS

## 2024-01-31 PROCEDURE — 93010 ELECTROCARDIOGRAM REPORT: CPT | Performed by: INTERNAL MEDICINE

## 2024-01-31 PROCEDURE — 2580000003 HC RX 258: Performed by: INTERNAL MEDICINE

## 2024-01-31 PROCEDURE — 97161 PT EVAL LOW COMPLEX 20 MIN: CPT

## 2024-01-31 PROCEDURE — 6360000002 HC RX W HCPCS: Performed by: INTERNAL MEDICINE

## 2024-01-31 PROCEDURE — 97165 OT EVAL LOW COMPLEX 30 MIN: CPT

## 2024-01-31 PROCEDURE — 96372 THER/PROPH/DIAG INJ SC/IM: CPT

## 2024-01-31 RX ORDER — OXYCODONE HYDROCHLORIDE AND ACETAMINOPHEN 5; 325 MG/1; MG/1
1.5 TABLET ORAL EVERY 8 HOURS PRN
Status: DISCONTINUED | OUTPATIENT
Start: 2024-01-31 | End: 2024-02-02 | Stop reason: HOSPADM

## 2024-01-31 RX ORDER — POLYETHYLENE GLYCOL 3350 17 G/17G
17 POWDER, FOR SOLUTION ORAL DAILY
Status: DISCONTINUED | OUTPATIENT
Start: 2024-01-31 | End: 2024-02-02 | Stop reason: HOSPADM

## 2024-01-31 RX ORDER — BISACODYL 10 MG
10 SUPPOSITORY, RECTAL RECTAL ONCE
Status: COMPLETED | OUTPATIENT
Start: 2024-01-31 | End: 2024-01-31

## 2024-01-31 RX ORDER — SENNOSIDES A AND B 8.6 MG/1
2 TABLET, FILM COATED ORAL NIGHTLY
Status: DISCONTINUED | OUTPATIENT
Start: 2024-01-31 | End: 2024-02-02 | Stop reason: HOSPADM

## 2024-01-31 RX ADMIN — VILAZODONE HYDROCHLORIDE 10 MG: 10 TABLET ORAL at 09:24

## 2024-01-31 RX ADMIN — PANTOPRAZOLE SODIUM 40 MG: 40 TABLET, DELAYED RELEASE ORAL at 05:47

## 2024-01-31 RX ADMIN — ENOXAPARIN SODIUM 40 MG: 100 INJECTION SUBCUTANEOUS at 09:24

## 2024-01-31 RX ADMIN — 0.12% CHLORHEXIDINE GLUCONATE 15 ML: 1.2 RINSE ORAL at 20:04

## 2024-01-31 RX ADMIN — DORZOLAMIDE HYDROCHLORIDE 1 DROP: 20 SOLUTION/ DROPS OPHTHALMIC at 09:25

## 2024-01-31 RX ADMIN — NALOXEGOL OXALATE 12.5 MG: 12.5 TABLET, FILM COATED ORAL at 09:24

## 2024-01-31 RX ADMIN — SODIUM CHLORIDE, PRESERVATIVE FREE 10 ML: 5 INJECTION INTRAVENOUS at 09:26

## 2024-01-31 RX ADMIN — SUCRALFATE 1 G: 1 TABLET ORAL at 11:52

## 2024-01-31 RX ADMIN — FLUTICASONE PROPIONATE 1 SPRAY: 50 SPRAY, METERED NASAL at 09:25

## 2024-01-31 RX ADMIN — SUCRALFATE 1 G: 1 TABLET ORAL at 09:25

## 2024-01-31 RX ADMIN — DOCUSATE SODIUM 100 MG: 100 CAPSULE, LIQUID FILLED ORAL at 09:25

## 2024-01-31 RX ADMIN — SODIUM CHLORIDE: 9 INJECTION, SOLUTION INTRAVENOUS at 17:00

## 2024-01-31 RX ADMIN — TIMOLOL MALEATE 1 DROP: 5 SOLUTION OPHTHALMIC at 09:25

## 2024-01-31 RX ADMIN — 0.12% CHLORHEXIDINE GLUCONATE 15 ML: 1.2 RINSE ORAL at 09:24

## 2024-01-31 RX ADMIN — AMLODIPINE BESYLATE 10 MG: 10 TABLET ORAL at 09:32

## 2024-01-31 RX ADMIN — BISACODYL 10 MG: 10 SUPPOSITORY RECTAL at 15:10

## 2024-01-31 RX ADMIN — PANTOPRAZOLE SODIUM 40 MG: 40 TABLET, DELAYED RELEASE ORAL at 15:10

## 2024-01-31 RX ADMIN — POLYETHYLENE GLYCOL 3350 17 G: 17 POWDER, FOR SOLUTION ORAL at 15:10

## 2024-01-31 RX ADMIN — Medication 100 MG: at 09:24

## 2024-01-31 RX ADMIN — SUCRALFATE 1 G: 1 TABLET ORAL at 16:59

## 2024-01-31 RX ADMIN — CYANOCOBALAMIN TAB 1000 MCG 1000 MCG: 1000 TAB at 09:25

## 2024-01-31 RX ADMIN — DORZOLAMIDE HYDROCHLORIDE 1 DROP: 20 SOLUTION/ DROPS OPHTHALMIC at 20:05

## 2024-01-31 RX ADMIN — SENNOSIDES 17.2 MG: 8.6 TABLET, COATED ORAL at 20:04

## 2024-01-31 RX ADMIN — OXYCODONE AND ACETAMINOPHEN 1.5 TABLET: 5; 325 TABLET ORAL at 09:24

## 2024-01-31 RX ADMIN — LISINOPRIL 20 MG: 20 TABLET ORAL at 09:25

## 2024-01-31 RX ADMIN — SUCRALFATE 1 G: 1 TABLET ORAL at 20:04

## 2024-01-31 RX ADMIN — SODIUM CHLORIDE, PRESERVATIVE FREE 10 ML: 5 INJECTION INTRAVENOUS at 20:04

## 2024-01-31 RX ADMIN — DOCUSATE SODIUM 100 MG: 100 CAPSULE, LIQUID FILLED ORAL at 20:04

## 2024-01-31 ASSESSMENT — PAIN SCALES - GENERAL
PAINLEVEL_OUTOF10: 9
PAINLEVEL_OUTOF10: 0

## 2024-01-31 NOTE — PROGRESS NOTES
Physical Therapy  Facility/Department: [address] INTERNAL MEDICINE 2  Physical Therapy Initial Assessment    Name: Ivy Smith  : 1935  MRN: [id number]  Date of Service: 2024         Patient Diagnosis(es): The primary encounter diagnosis was Bifascicular block. Diagnoses of Near syncope, Sinus node dysfunction (HCC), and Mobitz I were also pertinent to this visit.  Past Medical History:  has a past medical history of Anxiety, Breast cancer (HCC), Depression, Diverticular disease, GERD (gastroesophageal reflux disease), HTN (hypertension), Hyperlipidemia, Osteoarthritis, Osteoporosis, Spinal stenosis, and Use of cane as ambulatory aid.  Past Surgical History:  has a past surgical history that includes Hysterectomy (); Hemorrhoid surgery (); Carpal tunnel release (); Carpal tunnel release (); Breast lumpectomy (); Spinal fusion (); Cataract removal (); Upper gastrointestinal endoscopy (2011); Colonoscopy (2011); Upper gastrointestinal endoscopy (); Colonoscopy (); Upper gastrointestinal endoscopy (2015); Colonoscopy (2015); back surgery; bone graft; Total knee arthroplasty (Right, 10/21/2016); Total knee arthroplasty (Left, 10/2017); Esophagogastroduodenoscopy (2023); and Upper gastrointestinal endoscopy (N/A, 2023).      Evaluating Therapist: Maricel Randall PT    Equipment recommendation: wheeled walker    Room #:  0403/0403-A  Diagnosis:  Bifascicular block [I45.2]  Near syncope [R55]  PMHx/PSHx:  Breast CA, HTN, OA  Precautions:  falls, alarm      Social:  Pt lives alone in a 1 floor apartment with elevator access. Ambulates with quad cane as needed. Independent all hol[place]     Initial Evaluation  Date: 24 Treatment      Short Term/ Long Term   Goals   Was pt agreeable to Eval/treatment? yes     Does pt have pain? No c/o pain     Bed Mobility  Rolling: SBA  Supine to sit: SBA  Sit to supine: NT  Scooting: SBA  independent

## 2024-01-31 NOTE — PROGRESS NOTES
Hospitalist Progress Note      Synopsis: Patient admitted on 1/30/2024 88 y.o. female patient of Dr Cohn hx below who presents with LH. Had LH and fell 1 wk again.  Hit face, occ HA since. No Syncope or LOC. Feels LH worse w standing, no vertigo or sense of movt. NO CP or palp  In ED AFVSS  Labs unremarkable  EKG sinus emily w bifascicular block  CT head, face, neck unremarkable  Treated  IVF, admit for further eval and treatment    ASSESSMENT:    Principal Problem:    Near syncope  Active Problems:    GERD (gastroesophageal reflux disease)    Essential hypertension    Bifascicular block    Chronic back pain    Chronic constipation  Resolved Problems:    * No resolved hospital problems. *       PLAN:    Lightheadedness, sinus bradycardia with bifascicular block   admit to observation on telemetry  Orthostatics  IV fluids  Echocardiogram  Cardiology cs    Fall denies syncope or loss of consciousness  Fall precautions  PT OT    Constipation 2/2 opiates  Bowel regimen adjusted    Chronic back pain with chronic opiate use      Diet: ADULT DIET; Regular  Code Status: Full Code  PT/OT Eval Status:     DVT Prophylaxis:     Recommended disposition at discharge:      Subjective    Feeling better lightheadedness improving  No CP or SOB  No fever or chills   No uncontrolled pain  No vomiting or diarrhea   No events reported overnight     Exam:  /80   Pulse 72   Temp 98.1 °F (36.7 °C) (Oral)   Resp 18   SpO2 100%   General appearance: No apparent distress, appears stated age and cooperative.  HEENT: Pupils equal, round, and reactive to light. Conjunctivae/corneas clear.  Neck: Supple. No jugular venous distention. Trachea midline.  Respiratory:  Normal respiratory effort. Clear to auscultation, bilaterally without Rales/Wheezes/Rhonchi.  Cardiovascular: Regular rate and rhythm with normal S1/S2 without murmurs, rubs or gallops.  Abdomen: Soft, non-tender, non-distended with normal bowel

## 2024-01-31 NOTE — PROGRESS NOTES
4 Eyes Skin Assessment     NAME:  Ivy Smith  YOB: 1935  MEDICAL RECORD NUMBER:  [id number]    The patient is being assessed for  Admission    I agree that at least one RN has performed a thorough Head to Toe Skin Assessment on the patient. ALL assessment sites listed below have been assessed.      Areas assessed by both nurses:    Head, Face, Ears, Shoulders, Back, Chest, Arms, Elbows, Hands, Sacrum. Buttock, Coccyx, Ischium, Legs. Feet and Heels, and Under Medical Devices         Does the Patient have a Wound? No noted wound(s)       Naseem Prevention initiated by RN: No  Wound Care Orders initiated by RN: No    Pressure Injury (Stage 3,4, Unstageable, DTI, NWPT, and Complex wounds) if present, place Wound referral order by RN under : No    New Ostomies, if present place, Ostomy referral order under : No     Nurse 1 eSignature: Electronically signed by Janna Guerrero RN on 1/30/24 at 10:23 PM EST    **SHARE this note so that the co-signing nurse can place an eSignature**    Nurse 2 eSignature: {Esignature:[id number]}

## 2024-01-31 NOTE — PLAN OF CARE
Problem: Discharge Planning  Goal: Discharge to home or other facility with appropriate resources  1/31/2024 1032 by Gloria Camacho RN  Outcome: Progressing  1/30/2024 2221 by Janna Guerrero RN  Outcome: Progressing     Problem: Safety - Adult  Goal: Free from fall injury  1/31/2024 1032 by Gloria Camacho RN  Outcome: Progressing  1/30/2024 2221 by Janna Guerrero RN  Outcome: Progressing     Problem: Pain  Goal: Verbalizes/displays adequate comfort level or baseline comfort level  Outcome: Progressing

## 2024-01-31 NOTE — H&P
2009    [facility], pins and rods in place    TOTAL KNEE ARTHROPLASTY Right 10/21/2016    TOTAL KNEE ARTHROPLASTY Left 10/2017    replacement, dr guerra    UPPER GASTROINTESTINAL ENDOSCOPY  02/03/2011    gastritis, Dr. Venegas, [facility]    UPPER GASTROINTESTINAL ENDOSCOPY  2013    gastritis, [facility] [place]    UPPER GASTROINTESTINAL ENDOSCOPY  02/24/2015    mild to moderate gastritis and duodenitis, gastric bx for H pylori, Dr. Diaz    UPPER GASTROINTESTINAL ENDOSCOPY N/A 1/27/2023    EGD BIOPSY performed by Nate Warner MD at [facility] ENDOSCOPY       Medications Prior to Admission:    Not in a hospital admission.    Allergies:    Patient has no known allergies.    Social History:    reports that she has never smoked. She has never been exposed to tobacco smoke. She has never used smokeless tobacco. She reports that she does not drink alcohol and does not use drugs.    Family History:   family history includes Arthritis in her sister; Cancer in her brother, brother, and mother; Colon Cancer in her father; Heart Disease in her brother; High Blood Pressure in her mother; Mental Illness in her daughter, father, and son; No Known Problems in her brother; Pacemaker in her brother.    REVIEW OF SYSTEMS:  As above in the HPI, otherwise negative    PHYSICAL EXAM:    Vitals:  BP (!) 149/88   Pulse 58   Temp 97.9 °F (36.6 °C) (Oral)   Resp 17   SpO2 97%     General:  Awake, alert, oriented X 3.  Well developed, well nourished, well groomed.  No apparent distress.  HEENT:  Normocephalic, atraumatic.  Pupils equal, round, reactive to light.  No scleral icterus.  No conjunctival injection.  Normal lips, teeth, and gums.  No nasal discharge.  Neck:  Supple  Heart:  RRR, no murmurs, gallops, rubs  Lungs:  CTA bilaterally, bilat symmetrical expansion, no wheeze, rales, or rhonchi  Abdomen:  Bowel sounds present, soft, nontender, no masses, no organomegaly, no peritoneal signs  Extremities:  No clubbing, cyanosis, or  09/21/2018 08:35 PM    TROPONINI <0.01 09/21/2018 02:45 PM     U/A:    Lab Results   Component Value Date/Time    NITRITE neg 12/13/2021 04:31 PM    COLORU Yellow 02/06/2023 05:26 PM    PROTEINU Negative 02/06/2023 05:26 PM    PHUR 5.5 02/06/2023 05:26 PM    WBCUA 0-1 12/13/2021 12:00 PM    WBCUA NONE 07/29/2011 06:00 PM    RBCUA NONE 12/13/2021 12:00 PM    RBCUA NONE 07/29/2011 06:00 PM    BACTERIA RARE 12/13/2021 12:00 PM    CLARITYU Clear 02/06/2023 05:26 PM    SPECGRAV <=1.005 02/06/2023 05:26 PM    LEUKOCYTESUR Negative 02/06/2023 05:26 PM    UROBILINOGEN 0.2 02/06/2023 05:26 PM    BILIRUBINUR Negative 02/06/2023 05:26 PM    BILIRUBINUR neg 12/13/2021 04:31 PM    BILIRUBINUR NEGATIVE 07/29/2011 06:00 PM    BLOODU Negative 02/06/2023 05:26 PM    GLUCOSEU Negative 02/06/2023 05:26 PM    GLUCOSEU NEGATIVE 07/29/2011 06:00 PM     ABG:  No results found for: \"PH\", \"PCO2\", \"PO2\", \"HCO3\", \"BE\", \"THGB\", \"TCO2\", \"O2SAT\"  HgBA1c:    Lab Results   Component Value Date/Time    LABA1C 6.0 05/12/2016 09:03 AM     FLP:    Lab Results   Component Value Date/Time    TRIG 92 03/18/2021 12:00 PM    HDL 51 03/18/2021 12:00 PM    LDLCALC 76 03/18/2021 12:00 PM     TSH:    Lab Results   Component Value Date/Time    TSH 1.350 01/02/2014 10:45 AM       CT HEAD WO CONTRAST   Final Result      1.  No evidence of acute intracranial process.      2.  Findings of presumed small vessel ischemic deep white matter disease.         CT CERVICAL SPINE WO CONTRAST   Final Result      No evidence of acute fracture with degenerative changes as described.         CT FACIAL BONES WO CONTRAST   Final Result      No acute findings in the face.         XR CHEST PORTABLE   Final Result   No acute process.      Mild cardiomegaly.             ASSESSMENT:      Principal Problem:    Near syncope  Active Problems:    GERD (gastroesophageal reflux disease)    Essential hypertension    Bifascicular block    Chronic back pain  Resolved Problems:    * No resolved

## 2024-01-31 NOTE — ED NOTES
ED to Inpatient Handoff Report    Notified 4W that electronic handoff available and patient ready for transport to room 4033.    Safety Risks: Risk of falls    Patient in Restraints: no    Constant Observer or Patient : no    Telemetry Monitoring Ordered :Yes           Order to transfer to unit without monitor:YES    Last MEWS: 1 Time completed: 2035     Deterioration Index Score:   Predictive Model Details          22 (Normal)  Factor Value    Calculated 1/30/2024 20:38 69% Age 88 years old    Deterioration Index Model 22% Systolic 168     4% Sodium 135 mmol/L     3% Potassium 3.5 mmol/L     1% WBC count 4.6 k/uL     1% Pulse 67     1% Pulse oximetry 97 %     0% Temperature 98 °F (36.7 °C)     0% Hematocrit 39.4 %     0% Respiratory rate 16        Vitals:    01/30/24 1635 01/30/24 2027   BP: (!) 149/88 (!) 168/81   Pulse: 58 67   Resp: 17 16   Temp: 97.9 °F (36.6 °C) 98 °F (36.7 °C)   TempSrc: Oral    SpO2: 97% 97%         Opportunity for questions and clarification was provided.

## 2024-01-31 NOTE — CONSULTS
Inpatient Cardiology Consultation      Reason for Consult: Near syncope, bifascicular block    Consulting Physician: Dr Coats    Requesting Physician:  Griffin Cook MD    Date of Consultation: 1/31/2024    HISTORY OF PRESENT ILLNESS:   Patient is an 88-year-old female who was formally known to [facility] cardiology through Dr. Crane.  She was last seen outpatient by Amita HUMPHREYS 10/3/2018 for RBBB and follow-up of atypical chest pain.  No changes made.  Echocardiogram ordered by primary service.    HPI:  Patient presented to ER 1/30/2024 after fall with dizziness and headaches since fall.  Patient reporting Friday she fell onto her face with no LOC.  Since following she has had dizziness and headaches.  Patient found to have bifascicular block and was admitted for that and near syncope.  Orthostatic VS negative.  VS upon arrival 97.9, 17 respirations, 58 pulse, 149/88, 97% room air.  Labs: Potassium 3.5, BUN 13, creatinine 0.9, magnesium 1.8, glucose 95, serum calcium 9.6, total protein 6.9, troponin 10 > 9, albumin 3.9, WBC 4.6, H&H 13/39, platelet 231, UA noninfectious  CT head: No acute process.  Findings of presumed small vessel ischemic deep white matter disease.  CT cervical: No acute process  CT facial: No acute process  CXR: No acute process.  Mild cardiomegaly    Upon assessment today 1/31/2024 patient is lying  supine in hospital bed on room air.  Patient is alert and oriented, speaking full sentences, is in no apparent distress.  Patient reports over the last year she has noticed increasing fatigue and PENA.  She reports she fell prior to arrival and walking up steps.  She reports her foot caught the top step causing her to fall and hit her face on the concrete.  She denies LOC.  She reports she has had recurrent dizziness and headache since fall and head injury.  She denies chest pain, palpitations, syncope, orthopnea, PND.  She reports intermittent lower extremity swelling.  On exam  consider other treatment modalities other than timolol for treatment of glaucoma.  ZIO XT x 14 days.  If evidence of high degree AVB or symptomatic bradycardia consider outpatient evaluation by electrophysiology.  Continue telemetry monitoring.  Rest per primary service and other consultants.  Case discussed with Dr Coats.  Further recommendations to follow as per above and per Dr Coats.      Electronically signed by Hal Coats MD on 1/31/2024 at 4:50 PM   ______________________________________________________________________  Cardiology attending attestation:  I have independently interviewed and examined the patient.  I personally reviewed pertinent laboratory values and diagnostic testing (including, if applicable, chest xray, electrocardiogram, telemetry, echocardiogram, stress testing, and coronary angiography).  I have reviewed the above documentation completed by JULIANN Velasquez including past medical, social, and family history and agree with the findings, assessment and plan except where noted.  Plan formulated by me.  I participated in all aspects of the medical decision making and performed the substantive portion of the visit by contributing >50% of the total visit time.  Please see my additional contributions to the HPI, physical exam, and assessment / medical decision making:  _______________________________________________________________________    88-year-old female not currently following with cardiology presenting with mechanical fall and subsequent dizziness.  Reports several years of exertional shortness of breath and exercise intolerance.  Denies chest pain.  Denies syncope.  Not on systemic beta-blocker, only ophthalmic timolol.    Physical Exam:  BP (!) 123/57   Pulse 50   Temp 97.9 °F (36.6 °C) (Oral)   Resp 18   SpO2 95%   General appearance: Awake, alert, no acute respiratory distress  Skin: Intact, no rash  ENMT: Moist mucous membranes  Neck: Supple, no carotid bruits.

## 2024-02-01 ENCOUNTER — CARE COORDINATION (OUTPATIENT)
Dept: CARE COORDINATION | Facility: CLINIC | Age: 89
End: 2024-02-01

## 2024-02-01 ENCOUNTER — TELEPHONE (OUTPATIENT)
Dept: CARDIOLOGY CLINIC | Age: 89
End: 2024-02-01

## 2024-02-01 ENCOUNTER — APPOINTMENT (OUTPATIENT)
Age: 89
DRG: 312 | End: 2024-02-01
Attending: INTERNAL MEDICINE
Payer: MEDICARE

## 2024-02-01 LAB
ECHO AO ASC DIAM: 3.7 CM
ECHO AO ASCENDING AORTA INDEX: 1.86 CM/M2
ECHO AV AREA PEAK VELOCITY: 2.1 CM2
ECHO AV AREA VTI: 2 CM2
ECHO AV AREA/BSA PEAK VELOCITY: 1.1 CM2/M2
ECHO AV AREA/BSA VTI: 1 CM2/M2
ECHO AV MEAN GRADIENT: 4 MMHG
ECHO AV MEAN VELOCITY: 1 M/S
ECHO AV PEAK GRADIENT: 8 MMHG
ECHO AV PEAK VELOCITY: 1.5 M/S
ECHO AV VELOCITY RATIO: 0.67
ECHO AV VTI: 31.5 CM
ECHO BSA: 2.07 M2
ECHO EST RA PRESSURE: 3 MMHG
ECHO LA DIAMETER INDEX: 1.96 CM/M2
ECHO LA DIAMETER: 3.9 CM
ECHO LA VOL A-L A2C: 81 ML (ref 22–52)
ECHO LA VOL A-L A4C: 47 ML (ref 22–52)
ECHO LA VOL MOD A2C: 80 ML (ref 22–52)
ECHO LA VOL MOD A4C: 45 ML (ref 22–52)
ECHO LA VOLUME AREA LENGTH: 62 ML
ECHO LA VOLUME INDEX A-L A2C: 41 ML/M2 (ref 16–34)
ECHO LA VOLUME INDEX A-L A4C: 24 ML/M2 (ref 16–34)
ECHO LA VOLUME INDEX AREA LENGTH: 31 ML/M2 (ref 16–34)
ECHO LA VOLUME INDEX MOD A2C: 40 ML/M2 (ref 16–34)
ECHO LA VOLUME INDEX MOD A4C: 23 ML/M2 (ref 16–34)
ECHO LV FRACTIONAL SHORTENING: 30 % (ref 28–44)
ECHO LV INTERNAL DIMENSION DIASTOLE INDEX: 2.01 CM/M2
ECHO LV INTERNAL DIMENSION DIASTOLIC: 4 CM (ref 3.9–5.3)
ECHO LV INTERNAL DIMENSION SYSTOLIC INDEX: 1.41 CM/M2
ECHO LV INTERNAL DIMENSION SYSTOLIC: 2.8 CM
ECHO LV ISOVOLUMETRIC RELAXATION TIME (IVRT): 147.6 MS
ECHO LV IVSD: 1.5 CM (ref 0.6–0.9)
ECHO LV IVSS: 1.7 CM
ECHO LV MASS 2D: 197.6 G (ref 67–162)
ECHO LV MASS INDEX 2D: 99.3 G/M2 (ref 43–95)
ECHO LV POSTERIOR WALL DIASTOLIC: 1.2 CM (ref 0.6–0.9)
ECHO LV POSTERIOR WALL SYSTOLIC: 1.7 CM
ECHO LV RELATIVE WALL THICKNESS RATIO: 0.6
ECHO LVOT AREA: 3.1 CM2
ECHO LVOT AV VTI INDEX: 0.66
ECHO LVOT DIAM: 2 CM
ECHO LVOT MEAN GRADIENT: 2 MMHG
ECHO LVOT PEAK GRADIENT: 4 MMHG
ECHO LVOT PEAK VELOCITY: 1 M/S
ECHO LVOT STROKE VOLUME INDEX: 32.7 ML/M2
ECHO LVOT SV: 65 ML
ECHO LVOT VTI: 20.7 CM
ECHO MV "A" WAVE DURATION: 147.6 MSEC
ECHO MV A VELOCITY: 0.82 M/S
ECHO MV AREA PHT: 4.6 CM2
ECHO MV AREA VTI: 3.1 CM2
ECHO MV E DECELERATION TIME (DT): 324.1 MS
ECHO MV E VELOCITY: 0.61 M/S
ECHO MV E/A RATIO: 0.74
ECHO MV LVOT VTI INDEX: 1
ECHO MV MAX VELOCITY: 1 M/S
ECHO MV MEAN GRADIENT: 1 MMHG
ECHO MV MEAN VELOCITY: 0.6 M/S
ECHO MV PEAK GRADIENT: 4 MMHG
ECHO MV PRESSURE HALF TIME (PHT): 47.5 MS
ECHO MV VTI: 20.8 CM
ECHO PV MAX VELOCITY: 0.8 M/S
ECHO PV MEAN GRADIENT: 1 MMHG
ECHO PV MEAN VELOCITY: 0.5 M/S
ECHO PV PEAK GRADIENT: 3 MMHG
ECHO PV VTI: 14.8 CM
ECHO RIGHT VENTRICULAR SYSTOLIC PRESSURE (RVSP): 27 MMHG
ECHO TV REGURGITANT MAX VELOCITY: 2.44 M/S
ECHO TV REGURGITANT PEAK GRADIENT: 24 MMHG

## 2024-02-01 PROCEDURE — 93306 TTE W/DOPPLER COMPLETE: CPT | Performed by: INTERNAL MEDICINE

## 2024-02-01 PROCEDURE — 93306 TTE W/DOPPLER COMPLETE: CPT

## 2024-02-01 PROCEDURE — 2580000003 HC RX 258: Performed by: INTERNAL MEDICINE

## 2024-02-01 PROCEDURE — 6360000002 HC RX W HCPCS: Performed by: INTERNAL MEDICINE

## 2024-02-01 PROCEDURE — 6370000000 HC RX 637 (ALT 250 FOR IP): Performed by: INTERNAL MEDICINE

## 2024-02-01 PROCEDURE — 99233 SBSQ HOSP IP/OBS HIGH 50: CPT | Performed by: INTERNAL MEDICINE

## 2024-02-01 PROCEDURE — 99232 SBSQ HOSP IP/OBS MODERATE 35: CPT | Performed by: INTERNAL MEDICINE

## 2024-02-01 PROCEDURE — 2060000000 HC ICU INTERMEDIATE R&B

## 2024-02-01 PROCEDURE — 96372 THER/PROPH/DIAG INJ SC/IM: CPT

## 2024-02-01 RX ORDER — DORZOLAMIDE HCL 20 MG/ML
1 SOLUTION/ DROPS OPHTHALMIC 2 TIMES DAILY
Qty: 10 ML | Refills: 1 | Status: SHIPPED | OUTPATIENT
Start: 2024-02-01 | End: 2024-02-02 | Stop reason: ALTCHOICE

## 2024-02-01 RX ADMIN — PANTOPRAZOLE SODIUM 40 MG: 40 TABLET, DELAYED RELEASE ORAL at 05:58

## 2024-02-01 RX ADMIN — FLUTICASONE PROPIONATE 1 SPRAY: 50 SPRAY, METERED NASAL at 09:58

## 2024-02-01 RX ADMIN — DORZOLAMIDE HYDROCHLORIDE 1 DROP: 20 SOLUTION/ DROPS OPHTHALMIC at 20:01

## 2024-02-01 RX ADMIN — Medication 100 MG: at 09:58

## 2024-02-01 RX ADMIN — LISINOPRIL 20 MG: 20 TABLET ORAL at 09:58

## 2024-02-01 RX ADMIN — SUCRALFATE 1 G: 1 TABLET ORAL at 14:00

## 2024-02-01 RX ADMIN — SENNOSIDES 17.2 MG: 8.6 TABLET, COATED ORAL at 20:01

## 2024-02-01 RX ADMIN — DORZOLAMIDE HYDROCHLORIDE 1 DROP: 20 SOLUTION/ DROPS OPHTHALMIC at 09:59

## 2024-02-01 RX ADMIN — DOCUSATE SODIUM 100 MG: 100 CAPSULE, LIQUID FILLED ORAL at 20:01

## 2024-02-01 RX ADMIN — ENOXAPARIN SODIUM 40 MG: 100 INJECTION SUBCUTANEOUS at 09:58

## 2024-02-01 RX ADMIN — SODIUM CHLORIDE: 9 INJECTION, SOLUTION INTRAVENOUS at 06:08

## 2024-02-01 RX ADMIN — POLYETHYLENE GLYCOL 3350 17 G: 17 POWDER, FOR SOLUTION ORAL at 09:58

## 2024-02-01 RX ADMIN — SODIUM CHLORIDE, PRESERVATIVE FREE 10 ML: 5 INJECTION INTRAVENOUS at 20:01

## 2024-02-01 RX ADMIN — AMLODIPINE BESYLATE 10 MG: 10 TABLET ORAL at 09:59

## 2024-02-01 RX ADMIN — 0.12% CHLORHEXIDINE GLUCONATE 15 ML: 1.2 RINSE ORAL at 20:01

## 2024-02-01 RX ADMIN — CYANOCOBALAMIN TAB 1000 MCG 1000 MCG: 1000 TAB at 09:58

## 2024-02-01 RX ADMIN — NALOXEGOL OXALATE 12.5 MG: 12.5 TABLET, FILM COATED ORAL at 09:58

## 2024-02-01 RX ADMIN — DOCUSATE SODIUM 100 MG: 100 CAPSULE, LIQUID FILLED ORAL at 09:59

## 2024-02-01 RX ADMIN — SUCRALFATE 1 G: 1 TABLET ORAL at 16:46

## 2024-02-01 RX ADMIN — VILAZODONE HYDROCHLORIDE 10 MG: 10 TABLET ORAL at 09:58

## 2024-02-01 RX ADMIN — SUCRALFATE 1 G: 1 TABLET ORAL at 09:59

## 2024-02-01 RX ADMIN — OXYCODONE AND ACETAMINOPHEN 1.5 TABLET: 5; 325 TABLET ORAL at 06:05

## 2024-02-01 RX ADMIN — PANTOPRAZOLE SODIUM 40 MG: 40 TABLET, DELAYED RELEASE ORAL at 16:46

## 2024-02-01 RX ADMIN — SUCRALFATE 1 G: 1 TABLET ORAL at 20:01

## 2024-02-01 ASSESSMENT — PAIN SCALES - GENERAL
PAINLEVEL_OUTOF10: 0
PAINLEVEL_OUTOF10: 4
PAINLEVEL_OUTOF10: 0

## 2024-02-01 ASSESSMENT — PAIN DESCRIPTION - LOCATION: LOCATION: BACK

## 2024-02-01 ASSESSMENT — PAIN DESCRIPTION - DESCRIPTORS: DESCRIPTORS: ACHING

## 2024-02-01 ASSESSMENT — PAIN DESCRIPTION - ORIENTATION: ORIENTATION: MID

## 2024-02-01 NOTE — PROGRESS NOTES
INPATIENT CARDIOLOGY FOLLOW-UP    Name: Ivy Smith    Age: 88 y.o.    Date of Admission: 1/30/2024  4:32 PM    Date of Service: 2/1/2024    Primary Cardiologist: None, known to me from this admission    Chief Complaint: Follow-up for bradycardia    Interim History:  Feels better.  Denies chest pain shortness breath palpitations syncope.  Dizziness improved.  Sinus bradycardia/sinus rhythm on the monitor 40s to 60s.  No prolonged pauses, longest pause 2.3 seconds.  Heart rate 60s while awake.    States she follows with Dr[facility] Dorman [facility].    Review of Systems:   Negative except as described above    Problem List:  Patient Active Problem List   Diagnosis    Hyperlipidemia    Personal history of malignant neoplasm of breast    Anxiety    Recurrent major depressive disorder, in partial remission (HCC)    Osteoporosis    GERD (gastroesophageal reflux disease)    Acquired spondylolisthesis    Spinal stenosis of lumbar region    Diverticulosis of colon    Rectal polyp    Gastritis and duodenitis    Primary osteoarthritis of both knees    Essential hypertension    RBBB    First degree AV block    Scalp pruritus    Pruritus    Chronic rhinitis    Asymmetrical hearing loss    Sensorineural hearing loss, bilateral    Macular pseudohole    Pseudophakia    Neck pain on left side    Peripheral venous insufficiency    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

## 2024-02-01 NOTE — TELEPHONE ENCOUNTER
----- Message from Hal Coats MD sent at 2/1/2024  9:20 AM EST -----  Please ask patient's ophthalmologist (she believes Dr Lyon at [facility]) in [place] and let me know response:    Patient on timolol eye drops for glaucoma. She has significant cardiac conduction disease and heart rate is running quite low and I believe systemic absorption of timolol may be contributing. Would it be ok to hold timolol for now without risk of any acute issues with her glaucoma; is there anything we need to watch for; is there anything he would recommend using in place of the timolol. She is still getting dorzolamide.    Thank you,    Hal Coats MD

## 2024-02-01 NOTE — PROGRESS NOTES
SPIRITUAL HEALTH SERVICES - [facility]  PROGRESS NOTE    Name: Ivy Smith                Pentecostalism: Orthodoxy   Anointed (Last Rites): NA    Referral: Routine Visit    Assessment:  Upon entering the room  observes calm patient.      Intervention:  Prayed for patient as requested.    Outcome:  Patient expressed gratitude.    Plan:  Chaplains will remain available to offer spiritual and emotional support as needed.      Electronically signed by Chaplain Camilla, on 2/1/2024 at 4:06 PM.  Spiritual Care Department  [facility]  [phone]

## 2024-02-01 NOTE — PROGRESS NOTES
Pt has decided to go home with home care versus rehab. Verbalized needing assistance a few hours a day 3 days/week and has had home care in past. Will communicate this change to .

## 2024-02-01 NOTE — PROGRESS NOTES
Pt ambulated in neal with supervision of staff. Hr prior to ambulation 55,during ambulation hr 80-90.denies dizziness or vertigo.secure message sent to Dr Coats with update.

## 2024-02-01 NOTE — PROGRESS NOTES
Physician Progress Note      PATIENT:               SUZAN MURPHY  CSN #:                  [id number]  :                       1935  ADMIT DATE:       2024 4:32 PM  DISCH DATE:  RESPONDING  PROVIDER #:        Griffin Cook MD          QUERY TEXT:    Pt admitted with dizziness and fall. Pt noted to have Mobitz Type I block and   eye drops for glaucoma. If possible, please document in progress notes and   discharge summary the cause of dizziness/falls.    The medical record reflects the following:  Risk Factors: advanced age, glaucoma  Clinical Indicators: per cardiology \"...Conduction disease: sinus node   dysfunction/Mobitz 1 AVB/sinus pause.  History of chronic RBBB and   first-degree AVB with LAFB noted on prior EKGs...Mechanical fall with head   injury, no LOC.  Patient reporting nonvertiginous dizziness since after the   fall.  No prior orthostatic dizziness, falls, or syncope at home.  Orthostatic   vital signs negative in hospital...Glaucoma, treated with dorzolamide and   timolol.  Known mild systemic beta-blocker effects from timolol...Significant   conduction disease but no high-grade AV block or prolonged pa  Treatment: Echo, cardiology consult    Thank you,  Anika Viveros RN, BSN, CDIS  Clinical Documentation Integrity  [email or web address]  Options provided:  -- Dizziness/falls due to Timolol drops  -- Dizziness/falls due to bifascicular block  -- Dizziness/falls due to other, please specify, please specify etiology.  -- Other - I will add my own diagnosis  -- Disagree - Not applicable / Not valid  -- Disagree - Clinically unable to determine / Unknown  -- Refer to Clinical Documentation Reviewer    PROVIDER RESPONSE TEXT:    Provider is clinically unable to determine a response to this query.    Query created by: Anika Viveros on 2024 2:00 PM      Electronically signed by:  Griffin Cook MD 2024 4:45 PM

## 2024-02-01 NOTE — CARE COORDINATION
Social Discharge Planning:  Met with patient and completed initial assessment.  Explained Social Work role and discussed transition of care/discharge planning. Patient lives alone in a second floor apartment with elevator access.  PTA she uses a quad cane.  She has a bedside commode, scooter and an emergency response button.  She has a snf history with [facility].  Reviewed therapy score indicating need for rehab and provided patient with [facility] choice list.  Patient first choice is [facility] of the [facility], 2nd-[facility] and 3rd-[facility] at [facility].  Called Nelson with [facility] and confirmed no beds at [place].  Nelson will review patient information for [facility].  Called Maxine with [facility] and left a message in regards to bed availability.  Will continue to follow.  Electronically signed by GEMA Langston on 2/1/2024 at 2:57 PM    Addendum:  Maxine with Deneen at [facility] states facility has no beds available.  Will continue to follow.  Electronically signed by GEMA Langston on 2/1/2024 at 3:07 PM

## 2024-02-01 NOTE — ACP (ADVANCE CARE PLANNING)
Advance Care Planning   Healthcare Decision Maker:    Primary Decision Maker: Amber Hamilton - Brother/Sister - [phone]    Click here to complete Healthcare Decision Makers including selection of the Healthcare Decision Maker Relationship (ie \"Primary\").

## 2024-02-01 NOTE — PLAN OF CARE
Problem: Discharge Planning  Goal: Discharge to home or other facility with appropriate resources  2/1/2024 1709 by Ara Lopez, RN  Outcome: Not Progressing  2/1/2024 1709 by Ara Lopez, RN  Outcome: Not Progressing  Flowsheets (Taken 2/1/2024 0930)  Discharge to home or other facility with appropriate resources: Identify barriers to discharge with patient and caregiver

## 2024-02-01 NOTE — CARE COORDINATION
[facility]    Patient has met all required criteria and is eligible for admission to an approved SNF affiliate under the [facility] 3-day rule waiver.  The ACO verification process is complete and case management has been notified of verification outcome.      For purposes of admission to SNF under the [facility] AC 3DW, the patient is an attributed [facility] beneficiary, but some eligibility criteria are still pending.  Final 3DW verification outcome will be communicated once all criteria has been addressed.      Maryse Queen, MSW, LCSW, LISW  PAC-Team

## 2024-02-01 NOTE — TELEPHONE ENCOUNTER
Zuleyka from Dr. Lyon's office left voicemail ok to D/C Timolol eye drops per Dr. Lyon's recommendation. Michelle is going to fax order over with a new eye drop order.

## 2024-02-01 NOTE — TELEPHONE ENCOUNTER
Please have patient in for 14 day zio AT monitor dx bradycardia  Also patient likely being discharged today, so perhaps Dr Lopez office can send the new eye drops to her pharmacy and please thank his office for their help.

## 2024-02-01 NOTE — CARE COORDINATION
[facility] ACO Provider SNF 3DW Verification    For purposes of admission to SNF under the [facility] ACO 3-day waiver, I have reviewed the evaluation performed by the provider/supplier and agree the beneficiary requires admission to SNF.      Thank you,     Candice Marie M.D., M.P.H  [facility] ACO Provider   [facility]   Phone: [phone]  Fax: [phone]     --Candice Marie MD on 2/1/2024 at 3:56 PM    An electronic signature was used to authenticate this note.

## 2024-02-01 NOTE — TELEPHONE ENCOUNTER
Spoke with Carmita from Dr. Lyon's office. She is going to speak with Dr. Lyon and fax orders over to the office if it's  ok to hold Timolol drops. Carmita will ask if there is anything to watch for and his recommendations on what could be used in place of the Timolol.

## 2024-02-01 NOTE — PROGRESS NOTES
Hospitalist Progress Note      Synopsis: Patient admitted on 1/30/2024 88 y.o. female patient of Dr Cohn hx below who presents with LH. Had LH and fell 1 wk again.  Hit face, occ HA since. No Syncope or LOC. Feels LH worse w standing, no vertigo or sense of movt. NO CP or palp  In ED AFVSS  Labs unremarkable  EKG sinus emily w bifascicular block  CT head, face, neck unremarkable  Treated  IVF, admit for further eval and treatment    ASSESSMENT:    Principal Problem:    Near syncope  Active Problems:    GERD (gastroesophageal reflux disease)    Essential hypertension    Bifascicular block    Chronic back pain    Chronic constipation    Sinus node dysfunction (HCC)    Mobitz I    PENA (dyspnea on exertion)  Resolved Problems:    * No resolved hospital problems. *       PLAN:    Lightheadedness-- improved, sinus bradycardia with bifascicular block   admit to observation on telemetry  Orthostatics  IV fluids  Echocardiogramm   Left Ventricle: Normal left ventricular systolic function with a   visually estimated EF of 55 - 60%. Left ventricle size is normal. Mildly   increased wall thickness. Septal motion is consistent with bundle branch   block. Normal wall motion. Normal diastolic function.     Right Ventricle: Normal systolic function.     Aortic Valve: Mild regurgitation.     Mitral Valve: Mild regurgitation.     Aorta: Normal sized aortic root. Borderline dilated ascending aorta. Ao   ascending diameter is 3.7 cm.     Cardiology Consult appreciated recommending holding timolol eyedrops held due to concern for systemic absorption, echocardiogram and if unremarkable may be discharged today with cardiac monitor    Fall denies syncope or loss of consciousness  Fall precautions  PT OT    Constipation 2/2 opiates  Bowel regimen adjusted    Chronic back pain with chronic opiate use      Diet: ADULT DIET; Regular  Code Status: Full Code  PT/OT Eval Status:     DVT Prophylaxis:     Recommended disposition at

## 2024-02-02 VITALS
OXYGEN SATURATION: 98 % | TEMPERATURE: 98.2 F | BODY MASS INDEX: 33.95 KG/M2 | HEART RATE: 68 BPM | SYSTOLIC BLOOD PRESSURE: 140 MMHG | DIASTOLIC BLOOD PRESSURE: 69 MMHG | WEIGHT: 204 LBS | RESPIRATION RATE: 18 BRPM

## 2024-02-02 LAB
ALBUMIN SERPL-MCNC: 4.1 G/DL (ref 3.5–5.2)
ALP SERPL-CCNC: 89 U/L (ref 35–104)
ALT SERPL-CCNC: 12 U/L (ref 0–32)
ANION GAP SERPL CALCULATED.3IONS-SCNC: 12 MMOL/L (ref 7–16)
AST SERPL-CCNC: 17 U/L (ref 0–31)
BASOPHILS # BLD: 0.04 K/UL (ref 0–0.2)
BASOPHILS NFR BLD: 1 % (ref 0–2)
BILIRUB SERPL-MCNC: 0.4 MG/DL (ref 0–1.2)
BUN SERPL-MCNC: 6 MG/DL (ref 6–23)
CALCIUM SERPL-MCNC: 9.4 MG/DL (ref 8.6–10.2)
CHLORIDE SERPL-SCNC: 103 MMOL/L (ref 98–107)
CO2 SERPL-SCNC: 24 MMOL/L (ref 22–29)
CREAT SERPL-MCNC: 0.7 MG/DL (ref 0.5–1)
EOSINOPHIL # BLD: 0.15 K/UL (ref 0.05–0.5)
EOSINOPHILS RELATIVE PERCENT: 4 % (ref 0–6)
ERYTHROCYTE [DISTWIDTH] IN BLOOD BY AUTOMATED COUNT: 13.3 % (ref 11.5–15)
GFR SERPL CREATININE-BSD FRML MDRD: >60 ML/MIN/1.73M2
GLUCOSE SERPL-MCNC: 131 MG/DL (ref 74–99)
HCT VFR BLD AUTO: 40.2 % (ref 34–48)
HGB BLD-MCNC: 13.5 G/DL (ref 11.5–15.5)
IMM GRANULOCYTES # BLD AUTO: <0.03 K/UL (ref 0–0.58)
IMM GRANULOCYTES NFR BLD: 0 % (ref 0–5)
LYMPHOCYTES NFR BLD: 1.9 K/UL (ref 1.5–4)
LYMPHOCYTES RELATIVE PERCENT: 54 % (ref 20–42)
MAGNESIUM SERPL-MCNC: 1.7 MG/DL (ref 1.6–2.6)
MCH RBC QN AUTO: 31.8 PG (ref 26–35)
MCHC RBC AUTO-ENTMCNC: 33.6 G/DL (ref 32–34.5)
MCV RBC AUTO: 94.8 FL (ref 80–99.9)
MONOCYTES NFR BLD: 0.3 K/UL (ref 0.1–0.95)
MONOCYTES NFR BLD: 9 % (ref 2–12)
NEUTROPHILS NFR BLD: 31 % (ref 43–80)
NEUTS SEG NFR BLD: 1.1 K/UL (ref 1.8–7.3)
PLATELET # BLD AUTO: 233 K/UL (ref 130–450)
PMV BLD AUTO: 8.9 FL (ref 7–12)
POTASSIUM SERPL-SCNC: 3.3 MMOL/L (ref 3.5–5)
PROT SERPL-MCNC: 7.4 G/DL (ref 6.4–8.3)
RBC # BLD AUTO: 4.24 M/UL (ref 3.5–5.5)
SODIUM SERPL-SCNC: 139 MMOL/L (ref 132–146)
WBC OTHER # BLD: 3.5 K/UL (ref 4.5–11.5)

## 2024-02-02 PROCEDURE — 83735 ASSAY OF MAGNESIUM: CPT

## 2024-02-02 PROCEDURE — 99239 HOSP IP/OBS DSCHRG MGMT >30: CPT | Performed by: STUDENT IN AN ORGANIZED HEALTH CARE EDUCATION/TRAINING PROGRAM

## 2024-02-02 PROCEDURE — 6360000002 HC RX W HCPCS: Performed by: INTERNAL MEDICINE

## 2024-02-02 PROCEDURE — 6370000000 HC RX 637 (ALT 250 FOR IP): Performed by: INTERNAL MEDICINE

## 2024-02-02 PROCEDURE — 2580000003 HC RX 258: Performed by: INTERNAL MEDICINE

## 2024-02-02 PROCEDURE — 99232 SBSQ HOSP IP/OBS MODERATE 35: CPT | Performed by: INTERNAL MEDICINE

## 2024-02-02 PROCEDURE — 97530 THERAPEUTIC ACTIVITIES: CPT

## 2024-02-02 PROCEDURE — 85025 COMPLETE CBC W/AUTO DIFF WBC: CPT

## 2024-02-02 PROCEDURE — 80053 COMPREHEN METABOLIC PANEL: CPT

## 2024-02-02 RX ORDER — BRIMONIDINE TARTRATE 2 MG/ML
1 SOLUTION/ DROPS OPHTHALMIC EVERY 12 HOURS
COMMUNITY

## 2024-02-02 RX ORDER — OXYCODONE HYDROCHLORIDE AND ACETAMINOPHEN 5; 325 MG/1; MG/1
1.5 TABLET ORAL EVERY 8 HOURS PRN
Qty: 23 TABLET | Refills: 0 | Status: SHIPPED | DISCHARGE
Start: 2024-02-02 | End: 2024-02-07

## 2024-02-02 RX ORDER — KETOTIFEN FUMARATE 0.35 MG/ML
1 SOLUTION/ DROPS OPHTHALMIC 2 TIMES DAILY
Status: DISCONTINUED | OUTPATIENT
Start: 2024-02-02 | End: 2024-02-02 | Stop reason: HOSPADM

## 2024-02-02 RX ADMIN — SODIUM CHLORIDE, PRESERVATIVE FREE 10 ML: 5 INJECTION INTRAVENOUS at 08:30

## 2024-02-02 RX ADMIN — ENOXAPARIN SODIUM 40 MG: 100 INJECTION SUBCUTANEOUS at 08:31

## 2024-02-02 RX ADMIN — 0.12% CHLORHEXIDINE GLUCONATE 15 ML: 1.2 RINSE ORAL at 08:32

## 2024-02-02 RX ADMIN — POLYETHYLENE GLYCOL 3350 17 G: 17 POWDER, FOR SOLUTION ORAL at 08:33

## 2024-02-02 RX ADMIN — OXYCODONE AND ACETAMINOPHEN 1.5 TABLET: 5; 325 TABLET ORAL at 03:23

## 2024-02-02 RX ADMIN — PANTOPRAZOLE SODIUM 40 MG: 40 TABLET, DELAYED RELEASE ORAL at 05:21

## 2024-02-02 RX ADMIN — SUCRALFATE 1 G: 1 TABLET ORAL at 08:33

## 2024-02-02 RX ADMIN — AMLODIPINE BESYLATE 10 MG: 10 TABLET ORAL at 08:33

## 2024-02-02 RX ADMIN — DOCUSATE SODIUM 100 MG: 100 CAPSULE, LIQUID FILLED ORAL at 08:32

## 2024-02-02 RX ADMIN — SIMETHICONE 120 MG: 80 TABLET, CHEWABLE ORAL at 00:30

## 2024-02-02 RX ADMIN — LISINOPRIL 20 MG: 20 TABLET ORAL at 08:33

## 2024-02-02 RX ADMIN — FLUTICASONE PROPIONATE 1 SPRAY: 50 SPRAY, METERED NASAL at 08:32

## 2024-02-02 RX ADMIN — CYANOCOBALAMIN TAB 1000 MCG 1000 MCG: 1000 TAB at 08:33

## 2024-02-02 RX ADMIN — DORZOLAMIDE HYDROCHLORIDE 1 DROP: 20 SOLUTION/ DROPS OPHTHALMIC at 08:34

## 2024-02-02 RX ADMIN — VILAZODONE HYDROCHLORIDE 10 MG: 10 TABLET ORAL at 08:32

## 2024-02-02 RX ADMIN — NALOXEGOL OXALATE 12.5 MG: 12.5 TABLET, FILM COATED ORAL at 08:32

## 2024-02-02 RX ADMIN — Medication 100 MG: at 08:33

## 2024-02-02 ASSESSMENT — PAIN DESCRIPTION - ORIENTATION: ORIENTATION: LOWER

## 2024-02-02 ASSESSMENT — PAIN - FUNCTIONAL ASSESSMENT: PAIN_FUNCTIONAL_ASSESSMENT: ACTIVITIES ARE NOT PREVENTED

## 2024-02-02 ASSESSMENT — PAIN SCALES - GENERAL
PAINLEVEL_OUTOF10: 0
PAINLEVEL_OUTOF10: 8

## 2024-02-02 ASSESSMENT — PAIN DESCRIPTION - LOCATION: LOCATION: BACK

## 2024-02-02 ASSESSMENT — PAIN DESCRIPTION - DESCRIPTORS: DESCRIPTORS: ACHING

## 2024-02-02 NOTE — DISCHARGE SUMMARY
[facility] Hospitalist Physician Discharge Summary       Ishaan Carson MD  [address]  [place] [address]  [phone]    Schedule an appointment as soon as possible for a visit in 3 days  sports medicine    Hal Coats MD  [address] [address]  [phone]    Schedule an appointment as soon as possible for a visit in 1 week(s)  need outpatient event monitor    ophthamology    Schedule an appointment as soon as possible for a visit  To see your eye doctor as soon as possible regarding your eyedrops     Wendy [address]  [phone]          Activity level: As tolerated     Dispo: SNF    Condition on discharge: Stable     Patient ID:  Ivy Smith  [id number]  88 y.o.  1935    Admit date: 1/30/2024    Discharge date and time:  2/2/2024  10:48 AM    Admission Diagnoses: Principal Problem:    Near syncope  Active Problems:    GERD (gastroesophageal reflux disease)    Essential hypertension    Bifascicular block    Chronic back pain    Chronic constipation    Sinus node dysfunction (HCC)    Mobitz I    PENA (dyspnea on exertion)  Resolved Problems:    * No resolved hospital problems. *      Discharge Diagnoses: Principal Problem:    Near syncope  Active Problems:    GERD (gastroesophageal reflux disease)    Essential hypertension    Bifascicular block    Chronic back pain    Chronic constipation    Sinus node dysfunction (HCC)    Mobitz I    PENA (dyspnea on exertion)  Resolved Problems:    * No resolved hospital problems. *      Consults:  IP CONSULT TO CARDIOLOGY  IP CONSULT TO IV TEAM    Procedures: EKG, echo    Hospital Course:   Patient Ivy Smith is a 88 y.o. presented with Bifascicular block [I45.2]  Near syncope [R55]  Patient has past medical history of glaucoma, anxiety, depression, GERD, hypertension, hyperlipidemia, osteoarthritis, spinal stenosis, osteoporosis, ambulatory dysfunction who

## 2024-02-02 NOTE — PROGRESS NOTES
INPATIENT CARDIOLOGY FOLLOW-UP    Name: Ivy Smith    Age: 88 y.o.    Date of Admission: 1/30/2024  4:32 PM    Date of Service: 2/2/2024    Primary Cardiologist: None, known to me from this admission    Chief Complaint: Follow-up for bradycardia    Interim History:  Feels much better.  Denies chest pain shortness breath palpitations syncope.  Dizziness improved.  Sinus bradycardia/sinus rhythm on the monitor 40s to 60s.  No prolonged pauses, longest pause 2.3 seconds.  Heart rate now 60s-80s while awake.    Ambulatory heart rate 80s to 90s yesterday.  States had a little bit of dizziness ambulating today.    Review of Systems:   Negative except as described above    Problem List:  Patient Active Problem List   Diagnosis    Hyperlipidemia    Personal history of malignant neoplasm of breast    Anxiety    Recurrent major depressive disorder, in partial remission (HCC)    Osteoporosis    GERD (gastroesophageal reflux disease)    Acquired spondylolisthesis    Spinal stenosis of lumbar region    Diverticulosis of colon    Rectal polyp    Gastritis and duodenitis    Primary osteoarthritis of both knees    Essential hypertension    RBBB    First degree AV block    Scalp pruritus    Pruritus    Chronic rhinitis    Asymmetrical hearing loss    Sensorineural hearing loss, bilateral    Macular pseudohole    Pseudophakia    Neck pain on left side    Peripheral venous insufficiency    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

## 2024-02-02 NOTE — PROGRESS NOTES
Physical Therapy  Facility/Department: [address] INTERNAL MEDICINE 2  Physical Therapy Treatment Note    Name: Ivy Smith  : 1935  MRN: [id number]  Date of Service: 2024         Patient Diagnosis(es): The primary encounter diagnosis was Bifascicular block. Diagnoses of Near syncope, Sinus node dysfunction (HCC), Mobitz I, Syncope and collapse, Fall, initial encounter, and Chronic low back pain, unspecified back pain laterality, unspecified whether sciatica present were also pertinent to this visit.  Past Medical History:  has a past medical history of Anxiety, Breast cancer (HCC), Depression, Diverticular disease, GERD (gastroesophageal reflux disease), HTN (hypertension), Hyperlipidemia, Osteoarthritis, Osteoporosis, Spinal stenosis, and Use of cane as ambulatory aid.  Past Surgical History:  has a past surgical history that includes Hysterectomy (); Hemorrhoid surgery (); Carpal tunnel release (); Carpal tunnel release (); Breast lumpectomy (); Spinal fusion (); Cataract removal (); Upper gastrointestinal endoscopy (2011); Colonoscopy (2011); Upper gastrointestinal endoscopy (); Colonoscopy (); Upper gastrointestinal endoscopy (2015); Colonoscopy (2015); back surgery; bone graft; Total knee arthroplasty (Right, 10/21/2016); Total knee arthroplasty (Left, 10/2017); Esophagogastroduodenoscopy (2023); and Upper gastrointestinal endoscopy (N/A, 2023).      Evaluating Therapist: Maricel Randall PT    Equipment recommendation: wheeled walker    Room #:  0403/0403-A  Diagnosis:  Bifascicular block [I45.2]  Near syncope [R55]  PMHx/PSHx:  Breast CA, HTN, OA  Precautions:  falls, alarm      Social:  Pt lives alone in a 1 floor apartment with elevator access. Ambulates with quad cane as needed. Independent all hol[place]     Initial Evaluation  Date: 24 Treatment  2024    Short Term/ Long Term   Goals   Was pt agreeable to

## 2024-02-02 NOTE — PLAN OF CARE
Problem: Discharge Planning  Goal: Discharge to home or other facility with appropriate resources  2/2/2024 0935 by Paulina Khan RN  Outcome: Adequate for Discharge  2/1/2024 2202 by Pacheco Ivey RN  Outcome: Progressing     Problem: Safety - Adult  Goal: Free from fall injury  2/2/2024 0935 by Paulina Khan RN  Outcome: Adequate for Discharge  2/1/2024 2202 by Pacheco Ivey RN  Outcome: Progressing     Problem: Pain  Goal: Verbalizes/displays adequate comfort level or baseline comfort level  2/2/2024 0935 by Paulina Khan RN  Outcome: Adequate for Discharge  2/1/2024 2202 by Pacheco Ivey RN  Outcome: Progressing     Problem: Cardiovascular - Adult  Goal: Absence of cardiac dysrhythmias or at baseline  2/2/2024 0935 by Paulina Khan RN  Outcome: Adequate for Discharge  2/1/2024 2202 by Pacheco Ivey RN  Outcome: Progressing     Problem: Musculoskeletal - Adult  Goal: Return mobility to safest level of function  2/2/2024 0935 by Paulina Khan RN  Outcome: Adequate for Discharge  2/1/2024 2202 by Pacheco Ivey RN  Outcome: Progressing

## 2024-02-02 NOTE — DISCHARGE INSTRUCTIONS
Please make sure to follow-up with your ophthalmologist to discuss adjusting your glaucoma medication.    For now, will switch eyedrops to dorzolamide only. Stop taking the dorzolamide-timolol eyedrops.

## 2024-02-02 NOTE — TELEPHONE ENCOUNTER
Spoke to Loyda at [facility] the [facility] fax eye drop script from [facility] to [phone]. Loyda will call next week to schedule patient to come to office to have Zio put  on. Loyda the  nurse prefers patient comes to the office to have monitor placed.

## 2024-02-02 NOTE — TELEPHONE ENCOUNTER
Hal Coats MD Harvey, Carla A, MA  Patient discharged to rehab today.  Please have the information for eyedrops sent to the rehab I think she went to [place] [facility] in [place].  Please have event monitor sent to the facility as well.

## 2024-02-02 NOTE — CARE COORDINATION
Received notification from [facility] that facility can accept patient for SNF stay today as MSSP  waiver has been approved.   Discussed with patient and grand daughter via phone.    Patient and grand daughter both agreeable to SNF stay at this time  Non emergency transportation has been arranged with facility van,  time 11AM.  Bedside nurse notified of scheduled  time.  ZOIE initiated, envelope completed with demos and [phone].  Chadd Rodas, MSN RN  [facility] Case Management  [phone]

## 2024-02-02 NOTE — DISCHARGE INSTR - COC
Continuity of Care Form    Patient Name: Ivy Smith   :  1935  MRN:  [id number]    Admit date:  2024  Discharge date:  2024    Code Status Order: Full Code   Advance Directives:     Admitting Physician:  No admitting provider for patient encounter.  PCP: Jose C García MD    Discharging Nurse: Paulina Khan RN  Discharging Hospital Unit/Room#: 0403/0403-A  Discharging Unit Phone Number: [phone]    Emergency Contact:   Extended Emergency Contact Information  Primary Emergency Contact: Amber Hamilton   [facility]  Home Phone: [phone]  Mobile Phone: [phone]  Relation: Brother/Sister    Past Surgical History:  Past Surgical History:   Procedure Laterality Date    BACK SURGERY      BONE GRAFT      with back surgery    BREAST LUMPECTOMY      left, with chemo and rad, [facility]    CARPAL TUNNEL RELEASE      left    CARPAL TUNNEL RELEASE      repeat left    CATARACT REMOVAL      bilateral same time,  [facility]    COLONOSCOPY  2011    extensive diverticulosis, Dr. Venegas, [facility]    COLONOSCOPY      \"could not get around colon due to diverticulitis, [facility]    COLONOSCOPY  2015    severe diverticulosis transverse and left colon without diverticulitis, rectal polyp bx, Dr. Diaz, [facility]    ESOPHAGOGASTRODUODENOSCOPY  2023    mid-esophageal varix; minimal gastritis--patricia    HEMORRHOID SURGERY      [facility]    HYSTERECTOMY (CERVIX STATUS UNKNOWN)      still has ovaries, no cancer, [facility]    SPINAL FUSION      [facility], pins and rods in place    TOTAL KNEE ARTHROPLASTY Right 10/21/2016    TOTAL KNEE ARTHROPLASTY Left 10/2017    replacement, dr guerra    UPPER GASTROINTESTINAL ENDOSCOPY  2011    gastritis, Dr. Venegas, [facility]    UPPER GASTROINTESTINAL ENDOSCOPY      gastritis, [facility]    UPPER GASTROINTESTINAL ENDOSCOPY  2015    mild to moderate gastritis and duodenitis,  outpatient to adjust medication, given eye drops for glaucoma without Timolol on discharge    Physician Certification: I certify the above information and transfer of Ivy Smith  is necessary for the continuing treatment of the diagnosis listed and that she requires Skilled Nursing Facility for less 30 days.     Update Admission H&P: No change in H&P    PHYSICIAN SIGNATURE:  Electronically signed by Maurizio Khan MD on 2/2/24 at 10:51 AM EST

## 2024-02-02 NOTE — PROGRESS NOTES
Telephone call to Dr. Khan regarding patient needing a script for percocet to go to facility with per facilities request (Nurse Dora)

## 2024-02-02 NOTE — PLAN OF CARE
Problem: Discharge Planning  Goal: Discharge to home or other facility with appropriate resources  2/1/2024 2202 by Pacheco Ivey RN  Outcome: Progressing  2/1/2024 1709 by Ara Lopez RN  Outcome: Not Progressing  2/1/2024 1709 by Ara Lopez RN  Outcome: Not Progressing  Flowsheets (Taken 2/1/2024 0930)  Discharge to home or other facility with appropriate resources: Identify barriers to discharge with patient and caregiver     Problem: Safety - Adult  Goal: Free from fall injury  2/1/2024 2202 by Pacheco Ivey RN  Outcome: Progressing  2/1/2024 1709 by Ara Lopez RN  Outcome: Progressing  2/1/2024 1709 by Ara Lopez RN  Outcome: Progressing     Problem: Pain  Goal: Verbalizes/displays adequate comfort level or baseline comfort level  2/1/2024 2202 by Pacheco Ivey RN  Outcome: Progressing  2/1/2024 1709 by Ara Lopez RN  Outcome: Progressing  2/1/2024 1709 by Ara Lopez RN  Outcome: Progressing     Problem: Cardiovascular - Adult  Goal: Absence of cardiac dysrhythmias or at baseline  2/1/2024 2202 by Pacheco Ivey RN  Outcome: Progressing  2/1/2024 1709 by Ara Lopez RN  Outcome: Progressing     Problem: Musculoskeletal - Adult  Goal: Return mobility to safest level of function  2/1/2024 2202 by Pacheco Ivey RN  Outcome: Progressing  2/1/2024 1709 by Ara Lopez RN  Outcome: Progressing     Problem: Discharge Planning  Goal: Discharge to home or other facility with appropriate resources  2/1/2024 2202 by Pacheco Ivey RN  Outcome: Progressing  2/1/2024 1709 by Ara Lopez RN  Outcome: Not Progressing  2/1/2024 1709 by Ara Lopez RN  Outcome: Not Progressing  Flowsheets (Taken 2/1/2024 0930)  Discharge to home or other facility with appropriate resources: Identify barriers to discharge with patient and caregiver

## 2024-02-05 ENCOUNTER — CARE COORDINATION (OUTPATIENT)
Dept: CARE COORDINATION | Age: 89
End: 2024-02-05

## 2024-02-06 ENCOUNTER — CARE COORDINATION (OUTPATIENT)
Dept: CARE COORDINATION | Age: 89
End: 2024-02-06

## 2024-02-06 NOTE — TELEPHONE ENCOUNTER
Spoke to Loyda lopez [facility] notified and scheduled patient for Zio At 2/7/24.

## 2024-02-07 ENCOUNTER — NURSE ONLY (OUTPATIENT)
Dept: CARDIOLOGY CLINIC | Age: 89
End: 2024-02-07

## 2024-02-07 NOTE — PROGRESS NOTES
Patient was seen today and a 14 day monitor was placed. Monitor was ordered by Dr. Coats. The monitor was applied, instructions were given to the patient. Patient stated understanding and gave verbalize readback.   Monitor company: [employer]  Serial number: [id number]

## 2024-02-08 NOTE — CARE COORDINATION
Care Transitions Outreach Attempt    Call within 2 business days of discharge: Yes   Attempted to reach patient for transitions of care follow up. Unable to reach patient.    Patient: Ivy Smith Patient : 1935 MRN: <[id number]>    Last Discharge Facility       Date Complaint Diagnosis Description Type Department Provider    24 Fall; Dizziness Bifascicular block ... ED to Hosp-Admission (Discharged) (ADMITTED) Maurizio Neumann MD; Aster Yap...              Was this an external facility discharge? Yes, 24  Discharge Facility Name:  [facility]    Noted following upcoming appointments from discharge chart review:   [facility] follow up appointment(s):   Future Appointments   Date Time Provider Department Center   3/6/2024  1:40 PM Jose C García MD [facility]   2024  1:40 PM Jose C García MD [facility]     See above   follow up appointment(s): 3/6 PCP appt,   Patient had DC from SNF prior to expected UR call on , DC to home , UTR for PATRICIO

## 2024-02-12 ENCOUNTER — OFFICE VISIT (OUTPATIENT)
Dept: FAMILY MEDICINE CLINIC | Age: 89
End: 2024-02-12
Payer: MEDICARE

## 2024-02-12 VITALS
OXYGEN SATURATION: 97 % | DIASTOLIC BLOOD PRESSURE: 66 MMHG | TEMPERATURE: 96 F | RESPIRATION RATE: 18 BRPM | HEIGHT: 65 IN | WEIGHT: 202 LBS | BODY MASS INDEX: 33.66 KG/M2 | SYSTOLIC BLOOD PRESSURE: 135 MMHG | HEART RATE: 75 BPM

## 2024-02-12 DIAGNOSIS — G89.29 CHRONIC RIGHT-SIDED LOW BACK PAIN WITHOUT SCIATICA: ICD-10-CM

## 2024-02-12 DIAGNOSIS — I44.1 MOBITZ I: Primary | ICD-10-CM

## 2024-02-12 DIAGNOSIS — I44.0 FIRST DEGREE AV BLOCK: ICD-10-CM

## 2024-02-12 DIAGNOSIS — M48.061 SPINAL STENOSIS OF LUMBAR REGION, UNSPECIFIED WHETHER NEUROGENIC CLAUDICATION PRESENT: Chronic | ICD-10-CM

## 2024-02-12 DIAGNOSIS — M54.50 CHRONIC RIGHT-SIDED LOW BACK PAIN WITHOUT SCIATICA: ICD-10-CM

## 2024-02-12 DIAGNOSIS — K59.09 CHRONIC CONSTIPATION: ICD-10-CM

## 2024-02-12 DIAGNOSIS — H40.1134 PRIMARY OPEN ANGLE GLAUCOMA (POAG) OF BOTH EYES, INDETERMINATE STAGE: ICD-10-CM

## 2024-02-12 DIAGNOSIS — I10 ESSENTIAL HYPERTENSION: Chronic | ICD-10-CM

## 2024-02-12 DIAGNOSIS — Z09 HOSPITAL DISCHARGE FOLLOW-UP: ICD-10-CM

## 2024-02-12 DIAGNOSIS — I77.810 ASCENDING AORTA DILATATION (HCC): ICD-10-CM

## 2024-02-12 PROCEDURE — G8484 FLU IMMUNIZE NO ADMIN: HCPCS

## 2024-02-12 PROCEDURE — 1124F ACP DISCUSS-NO DSCNMKR DOCD: CPT

## 2024-02-12 PROCEDURE — 1111F DSCHRG MED/CURRENT MED MERGE: CPT

## 2024-02-12 PROCEDURE — G8427 DOCREV CUR MEDS BY ELIG CLIN: HCPCS

## 2024-02-12 PROCEDURE — G8417 CALC BMI ABV UP PARAM F/U: HCPCS

## 2024-02-12 PROCEDURE — 1036F TOBACCO NON-USER: CPT

## 2024-02-12 PROCEDURE — 99213 OFFICE O/P EST LOW 20 MIN: CPT

## 2024-02-12 PROCEDURE — 1090F PRES/ABSN URINE INCON ASSESS: CPT

## 2024-02-12 NOTE — PROGRESS NOTES
[facility]  FAMILY MEDICINE RESIDENCY PROGRAM  DATE OF VISIT : 2024    Patient : Ivy Smith   Age : 88 y.o.    : 1935   MRN : [id number]   ______________________________________________________________________    Chief Complaint :   Chief Complaint   Patient presents with    Hypertension    Follow-up     Ed from fall       HPI : Ivy Smith is 88 y.o. female who presented to the clinic today for hospital follow up.    Got out from nursing home 2024  Was in the hospital for near syncope from 2024 to 2024  Found to have Mobitz type I AV block at nighttime and had a first-degree heart block  Heart palpitations are better today after switching eyedrops. Seen optho last week and he switched to brimonidine  Since then no HA and no dizziness and no vision changes  Wearing Ziopatch, will call cardio after    HTN  On amlodipine benazepril combination, no issues now    Lower back pain  MRI 2024  IMPRESSION:  1. No acute osseous abnormality.  2. Stable alignment of lumbar spine with multilevel laminectomy and posterior  fusion hardware at levels of L3-S1.  3. Loculated fluid collection at level of the laminectomy bed which may  represent a small residual postoperative seroma with areas of granulation  tissue.  4. Multiple levels of severe neural foraminal narrowing as described above.  5. Stable grade 1 spondylolisthesis of L5 on S1.  6. No acute osseous abnormality.    Physical therapy at [facility], would like eferral to [facility]  Taking percocet 7.5mg for pain prescribed by Dr. Curtis (pain management)    Drug induced onstipation  Not really taking anything because insurance not covering, only colace      Last Visit  : 2024    Health Maintenance :  Health Maintenance Due   Topic Date Due    Respiratory Syncytial Virus (RSV) Pregnant or age 60 yrs+ (1 - 1-dose 60+ series) Never done    COVID-19 Vaccine ( season) 2023    Annual Wellness

## 2024-02-12 NOTE — PROGRESS NOTES
S: 88 y.o. female presents today for Hypertension and Follow-up (Ed from fall)      Recent DC from nursing home      Hospital for presyncope found to have Mobitz type I AV block and first-degree heart block  Heart palpitations felt to be related to eye drops timolol, changed  Echo with borderline dilated ascending aorta at 3.7 cm.   Zio patch placed  Dc to acute rehab due to fall risk  follows up with her ophthalmologist     Low back pain; rehab did help at rehab    O: VS: /66 (Site: Left Upper Arm, Position: Sitting, Cuff Size: Large Adult)   Pulse 75   Temp (!) 96 °F (35.6 °C) (Temporal)   Resp 18   Ht 1.651 m (5' 5\")   Wt 91.6 kg (202 lb)   SpO2 97%   BMI 33.61 kg/m²   AAO/NAD, appropriate affect for mood  CV:  RRR, no murmur  Resp: CTAB  Abdomen :SNTND  Ext: no edema    Assessment/Plan:   1) Hospital f/u - med rec  2) arrhythmia - zio; f/u with cards for further recs; remain off of BB  3) glaucoma - per ophtho, discuss no BB   4) Dilated ascending aorta - CTA ; referral to CT as needed  5) low back pain - PT  6) HM as ordered  RTO: 1 month with PCP      Attending Physician Statement  I have discussed the case, including pertinent history and exam findings with the resident.  I agree with the documented assessment and plan.      Electronically signed by Jann Edwards MD on 2/12/2024 at 3:56 PM

## 2024-02-14 PROBLEM — H43.399 VITREOUS FLOATERS: Status: ACTIVE | Noted: 2023-12-30

## 2024-02-14 PROBLEM — B35.1 ONYCHOMYCOSIS: Status: ACTIVE | Noted: 2023-07-15

## 2024-02-14 PROBLEM — L84 CALLUS OF FOOT: Status: ACTIVE | Noted: 2023-09-24

## 2024-02-14 PROBLEM — H40.1134 PRIMARY OPEN ANGLE GLAUCOMA (POAG) OF BOTH EYES, INDETERMINATE STAGE: Status: ACTIVE | Noted: 2024-02-14

## 2024-02-14 PROBLEM — I77.810 ASCENDING AORTA DILATATION (HCC): Status: ACTIVE | Noted: 2024-02-14

## 2024-02-14 RX ORDER — LACTOBACILLUS ACIDOPHILUS 0.5 MG
1 TABLET ORAL DAILY
COMMUNITY

## 2024-02-14 RX ORDER — DESLORATADINE 5 MG/1
5 TABLET ORAL EVERY 24 HOURS
COMMUNITY

## 2024-02-14 RX ORDER — POLYETHYLENE GLYCOL 3350 17 G/17G
POWDER, FOR SOLUTION ORAL
Qty: 510 G | Refills: 0 | Status: SHIPPED | OUTPATIENT
Start: 2024-02-14

## 2024-02-14 RX ORDER — FLUOROMETHOLONE 0.1 %
1 SUSPENSION, DROPS(FINAL DOSAGE FORM)(ML) OPHTHALMIC (EYE) 2 TIMES DAILY PRN
COMMUNITY

## 2024-02-14 RX ORDER — MELATONIN 5 MG
1 CAPSULE ORAL 2 TIMES DAILY
COMMUNITY
Start: 2023-10-02

## 2024-02-14 RX ORDER — OXYCODONE AND ACETAMINOPHEN 7.5; 325 MG/1; MG/1
1 TABLET ORAL EVERY 8 HOURS PRN
COMMUNITY
Start: 2024-02-09

## 2024-02-14 RX ORDER — TIZANIDINE 2 MG/1
2 TABLET ORAL EVERY 6 HOURS PRN
COMMUNITY

## 2024-02-15 ENCOUNTER — CARE COORDINATION (OUTPATIENT)
Dept: CARE COORDINATION | Facility: CLINIC | Age: 89
End: 2024-02-15

## 2024-02-15 NOTE — CARE COORDINATION
Post- Acute Coordination  Initial Contact Social Work Note   2/15/2024      Date of referral: 2/14/2024  Referral received from: Rebekah Barnett RN Post-Acute Care Manager   Reason for referral: Patient admitted to [facility], SNF d/t near syncope, RBBB from 2/2/2024 and discharged on 2/7/2024. Patient lives alone and has no natural supports. Patient is requesting resources for assistance with housekeeping type activities.     Previous SW Referral: No   If yes, brief summary and outcome:  N/A    Two Identifiers Verified: Yes     Insurance Provider: Medicare Part A & B/ [employer]-H[employer]    Support System: Denied having any natural support in place    [place] Status: N/A    Community Providers: None identified    ADL Assistance: Patient is independent with ADLs/IADLs including driving.  However, patent stated that she needs assistance in completing daily tasks within the home such as preparing meals, light housekeeping, washing clothes, etc.     Housing/Living Concerns or Home Modification Needs: Patient lives alone in apartment (Senior Housing Complex).     Transportation Concern: Patient drives herself to/from medical appointments and/or running errands.     Medication Cost Concern: N/A    Medication Education or Adherence Concern: N/A    Financial Concern(s): None identified.    Income (only if applicable): N/A      Ability to Read/Write: Yes     Advance Care Plan: Did not address     Other: Post-Acute  spoke with patient to introduce self/role and discuss reason for social work referral. Patient confirmed that she needs assistance with housekeeping type activities including preparing meals, cleaning, washing clothes, etc. Patient lives alone and denied having any natural supports (family, friends, and etc) to assist with her needs. Patient was referred to [facility] ([facility])  for senior services. Patient stated that an  called her this morning to verity information. Patient is

## 2024-02-19 ENCOUNTER — CARE COORDINATION (OUTPATIENT)
Dept: CARE COORDINATION | Facility: CLINIC | Age: 89
End: 2024-02-19

## 2024-02-19 NOTE — CARE COORDINATION
Post- Acute Coordination  Follow-up Social Work Note   2/19/2024        Date of referral: 2/14/2024  Referral received from: Rebekah Barnett, RN Post-Acute Care Manager   Reason for referral: Patient admitted to [facility], SNF d/t near syncope, RBBB from 2/2/2024 and discharged on 2/7/2024. Patient lives alone and has no natural supports. Patient is requesting resources for assistance with housekeeping type activities.      Previous SW Referral: No   If yes, brief summary and outcome:  N/A     Two Identifiers Verified: No     called patient to discuss service needs. No answer. Left voice message and requested call back.     SW will continue to follow.       Maryse Queen MSW, LISW  PAC Team

## 2024-02-21 ENCOUNTER — CARE COORDINATION (OUTPATIENT)
Dept: CARE COORDINATION | Facility: CLINIC | Age: 89
End: 2024-02-21

## 2024-02-21 NOTE — CARE COORDINATION
Post- Acute Coordination  Follow-up Social Work Note   2/21/2024        Date of referral: 2/14/2024  Referral received from: Rebekah Barnett, RN Post-Acute Care Manager   Reason for referral: Patient admitted to [facility] d/t near syncope, RBBB from 2/2/2024 and discharged on 2/7/2024. Patient lives alone and has no natural supports. Patient is requesting resources for assistance with housekeeping type activities.      Previous SW Referral: No   If yes, brief summary and outcome:  N/A    Two Identifiers Verified: No      spoke with [facility] to follow-up regarding intake referral for services[facility]. Patient qualified for 8 hours of homemaking, personal care, meal/nutrition under [facility]. [facility] will provide services. Services expected to begin within 1-2 weeks.     SW spoke with patient to confirm information regarding service needs. Patient agreeable. SW will continue to follow until services begin.     RIVER Rubio, PERRYW  PAC-Team

## 2024-02-27 ENCOUNTER — CARE COORDINATION (OUTPATIENT)
Dept: CARE COORDINATION | Facility: CLINIC | Age: 89
End: 2024-02-27

## 2024-02-27 DIAGNOSIS — K21.9 GASTROESOPHAGEAL REFLUX DISEASE WITHOUT ESOPHAGITIS: ICD-10-CM

## 2024-02-27 RX ORDER — OMEPRAZOLE 40 MG/1
CAPSULE, DELAYED RELEASE ORAL DAILY
Qty: 90 CAPSULE | Refills: 1 | Status: SHIPPED | OUTPATIENT
Start: 2024-02-27

## 2024-02-27 NOTE — TELEPHONE ENCOUNTER
Last Appointment:  1/23/2024  Future Appointments   Date Time Provider Department Center   3/6/2024  1:40 PM Jose C García MD [facility]   3/14/2024  2:40 PM Jose C García MD [facility]   3/18/2024  4:30 PM SEB CT 1-BD SEBZ CT SEB Radiolog   4/23/2024  1:40 PM Jose C García MD [facility]

## 2024-02-27 NOTE — CARE COORDINATION
Post- Acute Coordination  Follow-up Social Work Note   2/27/2024        Date of referral: 2/14/2024  Referral received from: Rebekah Barnett RN Post-Acute Care Manager   Reason for referral: Patient admitted to [facility] d/t near syncope, RBBB from 2/2/2024 and discharged on 2/7/2024. Patient lives alone and has no natural supports. Patient is requesting resources for assistance with housekeeping type activities.      Previous SW Referral: No   If yes, brief summary and outcome:  N/A     Two Identifiers Verified: Yes     spoke with patient to follow-up concerning in-home aid services. Patient confirmed 8 hours/per week of homemaking, personal care (bathing, laundry) meal/nutrition through [employer] [facility] at no out-of-pocket cost. Services have started this week.     At this time all Social Work goals have been completed and the patient can self-manage.  No further Social Work follow-up scheduled.  Patient has Social Work contact information for further questions, concerns, or needs.     RIVER Rubio, ANGELICA  PAC-Team

## 2024-03-06 ENCOUNTER — OFFICE VISIT (OUTPATIENT)
Dept: FAMILY MEDICINE CLINIC | Age: 89
End: 2024-03-06

## 2024-03-06 VITALS
OXYGEN SATURATION: 98 % | WEIGHT: 200 LBS | TEMPERATURE: 98.4 F | RESPIRATION RATE: 20 BRPM | DIASTOLIC BLOOD PRESSURE: 81 MMHG | BODY MASS INDEX: 33.32 KG/M2 | SYSTOLIC BLOOD PRESSURE: 134 MMHG | HEART RATE: 68 BPM | HEIGHT: 65 IN

## 2024-03-06 DIAGNOSIS — M75.42 IMPINGEMENT SYNDROME OF LEFT SHOULDER: ICD-10-CM

## 2024-03-06 DIAGNOSIS — G31.84 MILD COGNITIVE IMPAIRMENT: Primary | ICD-10-CM

## 2024-03-06 DIAGNOSIS — Z13.9 ENCOUNTER FOR SCREENING INVOLVING SOCIAL DETERMINANTS OF HEALTH (SDOH): ICD-10-CM

## 2024-03-06 DIAGNOSIS — M48.061 SPINAL STENOSIS OF LUMBAR REGION, UNSPECIFIED WHETHER NEUROGENIC CLAUDICATION PRESENT: ICD-10-CM

## 2024-03-06 ASSESSMENT — LIFESTYLE VARIABLES
HOW OFTEN DO YOU HAVE A DRINK CONTAINING ALCOHOL: NEVER
HOW MANY STANDARD DRINKS CONTAINING ALCOHOL DO YOU HAVE ON A TYPICAL DAY: PATIENT DOES NOT DRINK

## 2024-03-06 ASSESSMENT — PATIENT HEALTH QUESTIONNAIRE - PHQ9
4. FEELING TIRED OR HAVING LITTLE ENERGY: 0
6. FEELING BAD ABOUT YOURSELF - OR THAT YOU ARE A FAILURE OR HAVE LET YOURSELF OR YOUR FAMILY DOWN: 0
SUM OF ALL RESPONSES TO PHQ9 QUESTIONS 1 & 2: 0
SUM OF ALL RESPONSES TO PHQ QUESTIONS 1-9: 0
8. MOVING OR SPEAKING SO SLOWLY THAT OTHER PEOPLE COULD HAVE NOTICED. OR THE OPPOSITE, BEING SO FIGETY OR RESTLESS THAT YOU HAVE BEEN MOVING AROUND A LOT MORE THAN USUAL: 0
7. TROUBLE CONCENTRATING ON THINGS, SUCH AS READING THE NEWSPAPER OR WATCHING TELEVISION: 0
1. LITTLE INTEREST OR PLEASURE IN DOING THINGS: 0
3. TROUBLE FALLING OR STAYING ASLEEP: 0
SUM OF ALL RESPONSES TO PHQ QUESTIONS 1-9: 0
5. POOR APPETITE OR OVEREATING: 0
10. IF YOU CHECKED OFF ANY PROBLEMS, HOW DIFFICULT HAVE THESE PROBLEMS MADE IT FOR YOU TO DO YOUR WORK, TAKE CARE OF THINGS AT HOME, OR GET ALONG WITH OTHER PEOPLE: 0
2. FEELING DOWN, DEPRESSED OR HOPELESS: 0
9. THOUGHTS THAT YOU WOULD BE BETTER OFF DEAD, OR OF HURTING YOURSELF: 0

## 2024-03-06 NOTE — PROGRESS NOTES
S: 88 y.o. female here for AWV.   Chronic back pain. Sees pain mngt  Sees ophtho for h/o OAG  Has help at home, but may need adjustment.   [employer]CI. Failed clock drawing test. Pt advised she should not be driving.     O: VS: /81   Pulse 68   Temp 98.4 °F (36.9 °C) (Oral)   Resp 20   Ht 1.651 m (5' 5\")   Wt 90.7 kg (200 lb)   SpO2 98%   BMI 33.28 kg/m²    General: NAD, alert and interacting appropriately.       Impression: AWV, MCI  Plan:   Counseling provided on driving.       Attending Physician Statement  I have discussed the case, including pertinent history and exam findings with the resident.  I agree with the documented assessment and plan.      
(REPLENS) GEL Use once-twice daily Yes Jose C García MD   vitamin B-12 (CYANOCOBALAMIN) 1000 MCG tablet Take 1 tablet by mouth daily Yes Jose C García MD   vitamin B-6 (PYRIDOXINE) 100 MG tablet Take 1 tablet by mouth daily Yes Jose C García MD   Multiple Vitamin TABS Take 1 capsule by mouth daily Hair ,skin and nails Yes Jose C García MD   Handicap Placard MISC by Does not apply route Patient requires handicap placard for physical deconditioning, age, osteoarthritis of both knees and osteoporosis. This handicap placard is valid from 4/5/2022 to 4/5/2027; a total of 5 years. Yes Jose C García MD   Magic Mouthwash (MIRACLE MOUTHWASH) Swish and spit 5 mLs 4 times daily as needed for Irritation or Pain Yes Cassandra Dumont MD   NARCAN 4 MG/0.1ML LIQD nasal spray 0.1 mLs by Nasal route once Yes ProviderEusebia MD   VILAZODONE HCL 10 MG Tablet Take 1 tablet by mouth daily Yes ProviderEusebia MD       CareTeam (Including outside providers/suppliers regularly involved in providing care):   Patient Care Team:  Jose C García MD as PCP - General (Family Medicine)  Jann Edwards MD as PCP - Empaneled Provider  Bennett Crane MD as Consulting Physician (Cardiology)  Kiana Rehman as      Reviewed and updated this visit:  Tobacco  Allergies  Meds  Med Hx  Surg Hx  Soc Hx  Fam Hx

## 2024-03-07 ENCOUNTER — CARE COORDINATION (OUTPATIENT)
Dept: CARE COORDINATION | Age: 89
End: 2024-03-07

## 2024-03-07 ENCOUNTER — TELEPHONE (OUTPATIENT)
Dept: FAMILY MEDICINE CLINIC | Age: 89
End: 2024-03-07

## 2024-03-07 DIAGNOSIS — I10 ESSENTIAL HYPERTENSION: Primary | ICD-10-CM

## 2024-03-07 NOTE — TELEPHONE ENCOUNTER
Radiology called in and the are needing an order for a bun and creatinine for Ivy. She is having a ct of the chest with contrast.    Please advise    Thank you

## 2024-03-07 NOTE — CARE COORDINATION
Initial Contact Social Work Note - Ambulatory  3/7/2024      Date of referral: 3/6/2024  Referral received from: PCP  Reason for referral: transportation    Previous SW referral: Yes  If yes, brief summary of outcome: DHEO referral    Two Identifiers Verified: Yes    Insurance Provider: Medicare a and b and [employer] [place]    Support System:   no support    [place] Status:  No    Community Providers: Local Agency on Aging and Local Behavioral Provider    ADL Assistance Needed: Bathing, Dressing, and food preparation    Housing/Living Concerns or Home Modification Needs: pt has an aide 2x/week    Transportation Concern:     Medication Cost Concern: No     Medication Adherence Concern: Yes, sometimes, pt had a nurse there yesterday from [facility] and she informed her of issues with medications    Financial Concern(s): No    Income (only if applicable): Social Security and pension    Ability to Read/Write: Yes    Advance Care Plan:  N/A    Other: N/A    Identified Needs:  transportation    Social Work Plan:  Made 3way call to [employer], pt will be mailed application to fill out and mail in  Next Steps: SWCC to f/u    Method of Communication With Provider (if appropriate): Chart Routing       Goals Addressed    None      Made call to pt, introduced self and completed SW assessment. Pt is in need of transportation. Made 3way call to [facility], they will send pt application for [employer] to [employer] service. Pt understands to fill out and turn in. Made 3way call to [facility], they do not offer transportation benefits for pt d/t pt plan being supplemental. Pt has aide services from [facility] 2x/week for ADLs/IDLs assistance. Pt is interested in [facility] for her appt with PCP on 3/14. No other questions or needs reported. SWCC to f/u.    Emailed [employer] [facility] transport request for 3/14.  Kiana Rehman MSW, LSW   Care Coordinator  [phone]

## 2024-03-11 ENCOUNTER — CARE COORDINATION (OUTPATIENT)
Dept: CARE COORDINATION | Age: 89
End: 2024-03-11

## 2024-03-11 NOTE — CARE COORDINATION
Received email reply from  Nhan Adams, Jeremie Ernandez stating he is unable to transport pt for requested time on 3/14 at 2:40, but he is available 3/14 at 9:00am/9:30am.   [facility] made f/u call to pt, to inform of above. Pt reports she is not able to switch time of her appt on 3/14. Pt reports she will get a ride to her appt 3/14. Pt denied any other SW needs at this time. [facility] to f/u.    Kiana Rehman MSW, LSW   Care Coordinator  [phone]

## 2024-03-18 ENCOUNTER — HOSPITAL ENCOUNTER (OUTPATIENT)
Age: 89
Discharge: HOME | End: 2024-03-18
Payer: MEDICARE

## 2024-03-18 ENCOUNTER — HOSPITAL ENCOUNTER (OUTPATIENT)
Dept: CT IMAGING | Age: 89
Discharge: HOME | End: 2024-03-20
Payer: MEDICARE

## 2024-03-18 DIAGNOSIS — I10 ESSENTIAL HYPERTENSION: ICD-10-CM

## 2024-03-18 DIAGNOSIS — I77.810 ASCENDING AORTA DILATATION (HCC): ICD-10-CM

## 2024-03-18 LAB
ANION GAP SERPL CALCULATED.3IONS-SCNC: 12 MMOL/L (ref 7–16)
BUN SERPL-MCNC: 14 MG/DL (ref 6–23)
CALCIUM SERPL-MCNC: 9.7 MG/DL (ref 8.6–10.2)
CHLORIDE SERPL-SCNC: 100 MMOL/L (ref 98–107)
CO2 SERPL-SCNC: 24 MMOL/L (ref 22–29)
CREAT SERPL-MCNC: 0.8 MG/DL (ref 0.5–1)
GFR SERPL CREATININE-BSD FRML MDRD: >60 ML/MIN/1.73M2
GLUCOSE SERPL-MCNC: 109 MG/DL (ref 74–99)
POTASSIUM SERPL-SCNC: 4.4 MMOL/L (ref 3.5–5)
SODIUM SERPL-SCNC: 136 MMOL/L (ref 132–146)

## 2024-03-18 PROCEDURE — 6360000004 HC RX CONTRAST MEDICATION: Performed by: RADIOLOGY

## 2024-03-18 PROCEDURE — 80048 BASIC METABOLIC PNL TOTAL CA: CPT

## 2024-03-18 PROCEDURE — 71275 CT ANGIOGRAPHY CHEST: CPT

## 2024-03-18 RX ADMIN — IOPAMIDOL 75 ML: 755 INJECTION, SOLUTION INTRAVENOUS at 16:12

## 2024-03-19 NOTE — RESULT ENCOUNTER NOTE
CTA chest ordered for follow-up on ascending aorta dilation saw on echo.  No evidence of thoracic aortic aneurysm or dissection.  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 No further testing needed.

## 2024-03-22 ENCOUNTER — CARE COORDINATION (OUTPATIENT)
Dept: CARE COORDINATION | Age: 89
End: 2024-03-22

## 2024-03-22 NOTE — CARE COORDINATION
CC made f/u call to pt, unable to reach, left message requesting call back to this [facility].    Kiana Rehman MSW, LSW   Care Coordinator  [phone]

## 2024-03-29 ENCOUNTER — TELEPHONE (OUTPATIENT)
Dept: CARDIOLOGY CLINIC | Age: 89
End: 2024-03-29

## 2024-03-29 DIAGNOSIS — I49.5 SINUS NODE DYSFUNCTION (HCC): ICD-10-CM

## 2024-03-29 DIAGNOSIS — I20.89 OTHER FORMS OF ANGINA PECTORIS (HCC): Primary | ICD-10-CM

## 2024-03-29 DIAGNOSIS — I45.2 BIFASCICULAR BLOCK: ICD-10-CM

## 2024-03-29 DIAGNOSIS — I44.1 MOBITZ I: ICD-10-CM

## 2024-03-29 RX ORDER — REGADENOSON 0.08 MG/ML
0.4 INJECTION, SOLUTION INTRAVENOUS
OUTPATIENT
Start: 2024-03-29

## 2024-03-29 NOTE — TELEPHONE ENCOUNTER
Patient notified of monitor results and Dr. Coats's recommendations to get a pharmacologic stress test and to follow up as scheduled.

## 2024-03-29 NOTE — TELEPHONE ENCOUNTER
----- Message from Hal Coats MD sent at 3/28/2024 11:14 PM EDT -----  Monitor showed overall normal heart rhythm with an average heart rate in the 70s.  There is no significant swelling of the heart or pauses.  There were a few episodes of nonsustained ventricular tachycardia (extra beats from the top chambers of the heart).  Sometimes these extra beats can be associated with underlying coronary artery disease.  Would be reasonable to get a pharmacologic stress test to rule out underlying coronary artery disease.  I would hold off on using beta-blockers because she had very low heart rates in the hospital and seems to be sensitive to those medications.  Notify if any new cardiac issues otherwise follow-up as scheduled, routine office visit in a few months.     FOLLOW IOP CLOSELY BUT VERY GOOD TODAY.

## 2024-04-01 ENCOUNTER — CARE COORDINATION (OUTPATIENT)
Dept: CARE COORDINATION | Age: 89
End: 2024-04-01

## 2024-04-01 NOTE — CARE COORDINATION
CC made f/u call to pt, unable to reach, left message requesting call back to this [facility].    Kiana Rehman MSW, LSW   Care Coordinator  [phone]

## 2024-04-02 ENCOUNTER — CARE COORDINATION (OUTPATIENT)
Dept: CARE COORDINATION | Age: 89
End: 2024-04-02

## 2024-04-02 NOTE — CARE COORDINATION
Received missed call from pt without message.  Returned call to pt, pt reports she does not need any transportation this month d/t not having any appts. [facility] reviewed chart and explained to pt she has Cardiology appt on 4/8. Pt reports she does not have transportation, requested [facility]. [facility] explained that this is late notice to ask [facility] d/t requiring 1 week in advance but this CC can see if [facility] is available. Reviewed [employer], and asked if pt filled out application. Pt reports she never got application. 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 CC to f/u.    Made call to [employer], requested curb to curb service application be mailed again to pt, as it was mailed on 3/7. [facility] will mail application again to pt.    Emailed   Dan request for pt's appt on 4/8.    Kiana HOLMAN, LSW   Care Coordinator  [phone]

## 2024-04-03 ENCOUNTER — CARE COORDINATION (OUTPATIENT)
Dept: CARE COORDINATION | Age: 89
End: 2024-04-03

## 2024-04-03 NOTE — CARE COORDINATION
Received email back from [facility] Nhan Nova stating that pt is scheduled for transport for 4/8 and he will pick pt up around 8:15am. Made multiple attempts to call and reach pt but phone was answered repeatedly then clicked off before being able to speak with pt. [facility] to attempt to reach pt again tomorrow.    Kiana Rehman MSW, LSW   Care Coordinator  [phone]

## 2024-04-04 ENCOUNTER — CARE COORDINATION (OUTPATIENT)
Dept: CARE COORDINATION | Age: 89
End: 2024-04-04

## 2024-04-04 ENCOUNTER — TELEPHONE (OUTPATIENT)
Dept: CARDIOLOGY | Age: 89
End: 2024-04-04

## 2024-04-04 NOTE — CARE COORDINATION
CC made f/u call to pt and informed that MAYELA Adams is able to transport her to her appt on 4/8 and she is to be ready at 8:15am. Pt reports understanding and confirmed her address and address of appt. Pt understands to be ready and have her phone on for when MAYELA Adams [facility] calls her. JUAN to f/u.

## 2024-04-04 NOTE — TELEPHONE ENCOUNTER
Left message with patient to confirm stress test for Monday, 4/8/24, starting at 0930.  Instructions given and medications reviewed. Patient instructed no medication holds and to avoid all forms of caffeine for 12 hours prior to test.  Advised to call with any questions.

## 2024-04-08 ENCOUNTER — CARE COORDINATION (OUTPATIENT)
Dept: CARE COORDINATION | Age: 89
End: 2024-04-08

## 2024-04-08 ENCOUNTER — HOSPITAL ENCOUNTER (OUTPATIENT)
Dept: CARDIOLOGY | Age: 89
Discharge: HOME | End: 2024-04-10
Payer: MEDICARE

## 2024-04-08 VITALS
SYSTOLIC BLOOD PRESSURE: 130 MMHG | HEIGHT: 65 IN | DIASTOLIC BLOOD PRESSURE: 76 MMHG | HEART RATE: 56 BPM | BODY MASS INDEX: 33.32 KG/M2 | RESPIRATION RATE: 16 BRPM | WEIGHT: 200 LBS

## 2024-04-08 DIAGNOSIS — I20.89 OTHER FORMS OF ANGINA PECTORIS (HCC): ICD-10-CM

## 2024-04-08 LAB
ECHO BSA: 2.04 M2
NUC REST EJECTION FRACTION: 68 %
STRESS BASELINE DIAS BP: 76 MMHG
STRESS BASELINE HR: 63 BPM
STRESS BASELINE SYS BP: 130 MMHG
STRESS ESTIMATED WORKLOAD: 1 METS
STRESS PEAK DIAS BP: 84 MMHG
STRESS PEAK SYS BP: 126 MMHG
STRESS PERCENT HR ACHIEVED: 61 %
STRESS POST PEAK HR: 81 BPM
STRESS RATE PRESSURE PRODUCT: NORMAL BPM*MMHG
STRESS TARGET HR: 133 BPM

## 2024-04-08 PROCEDURE — 93016 CV STRESS TEST SUPVJ ONLY: CPT | Performed by: INTERNAL MEDICINE

## 2024-04-08 PROCEDURE — 6360000002 HC RX W HCPCS: Performed by: INTERNAL MEDICINE

## 2024-04-08 PROCEDURE — 3430000000 HC RX DIAGNOSTIC RADIOPHARMACEUTICAL: Performed by: INTERNAL MEDICINE

## 2024-04-08 PROCEDURE — 93018 CV STRESS TEST I&R ONLY: CPT | Performed by: INTERNAL MEDICINE

## 2024-04-08 PROCEDURE — A9500 TC99M SESTAMIBI: HCPCS | Performed by: INTERNAL MEDICINE

## 2024-04-08 PROCEDURE — 2580000003 HC RX 258: Performed by: INTERNAL MEDICINE

## 2024-04-08 PROCEDURE — 78452 HT MUSCLE IMAGE SPECT MULT: CPT | Performed by: INTERNAL MEDICINE

## 2024-04-08 PROCEDURE — 93017 CV STRESS TEST TRACING ONLY: CPT

## 2024-04-08 RX ORDER — TETRAKIS(2-METHOXYISOBUTYLISOCYANIDE)COPPER(I) TETRAFLUOROBORATE 1 MG/ML
31.8 INJECTION, POWDER, LYOPHILIZED, FOR SOLUTION INTRAVENOUS
Status: COMPLETED | OUTPATIENT
Start: 2024-04-08 | End: 2024-04-08

## 2024-04-08 RX ORDER — SODIUM CHLORIDE 0.9 % (FLUSH) 0.9 %
10 SYRINGE (ML) INJECTION PRN
Status: DISCONTINUED | OUTPATIENT
Start: 2024-04-08 | End: 2024-04-11 | Stop reason: HOSPADM

## 2024-04-08 RX ORDER — REGADENOSON 0.08 MG/ML
0.4 INJECTION, SOLUTION INTRAVENOUS
Status: COMPLETED | OUTPATIENT
Start: 2024-04-08 | End: 2024-04-08

## 2024-04-08 RX ORDER — TETRAKIS(2-METHOXYISOBUTYLISOCYANIDE)COPPER(I) TETRAFLUOROBORATE 1 MG/ML
10.4 INJECTION, POWDER, LYOPHILIZED, FOR SOLUTION INTRAVENOUS
Status: COMPLETED | OUTPATIENT
Start: 2024-04-08 | End: 2024-04-08

## 2024-04-08 RX ADMIN — REGADENOSON 0.4 MG: 0.08 INJECTION, SOLUTION INTRAVENOUS at 11:10

## 2024-04-08 RX ADMIN — SODIUM CHLORIDE, PRESERVATIVE FREE 10 ML: 5 INJECTION INTRAVENOUS at 09:12

## 2024-04-08 RX ADMIN — Medication 31.8 MILLICURIE: at 11:11

## 2024-04-08 RX ADMIN — SODIUM CHLORIDE, PRESERVATIVE FREE 10 ML: 5 INJECTION INTRAVENOUS at 11:12

## 2024-04-08 RX ADMIN — SODIUM CHLORIDE, PRESERVATIVE FREE 10 ML: 5 INJECTION INTRAVENOUS at 11:10

## 2024-04-08 RX ADMIN — Medication 10.4 MILLICURIE: at 09:12

## 2024-04-08 NOTE — CARE COORDINATION
Received missed call and message from pt stating she has not yet heard from [employer] as they are to call her before  for her appt.    Made call to [employer] [facility], he plans to call pt shortly before , confirmed he has the correct phone number for pt.    Kiana HOLMAN, LSW   Care Coordinator  [phone]

## 2024-04-09 ENCOUNTER — OFFICE VISIT (OUTPATIENT)
Dept: FAMILY MEDICINE CLINIC | Age: 89
End: 2024-04-09

## 2024-04-09 VITALS
DIASTOLIC BLOOD PRESSURE: 83 MMHG | TEMPERATURE: 98.6 F | OXYGEN SATURATION: 97 % | SYSTOLIC BLOOD PRESSURE: 180 MMHG | HEIGHT: 65 IN | WEIGHT: 197 LBS | RESPIRATION RATE: 16 BRPM | BODY MASS INDEX: 32.82 KG/M2 | HEART RATE: 78 BPM

## 2024-04-09 DIAGNOSIS — R05.1 ACUTE COUGH: Primary | ICD-10-CM

## 2024-04-09 DIAGNOSIS — I10 ESSENTIAL HYPERTENSION: ICD-10-CM

## 2024-04-09 LAB
INFLUENZA A ANTIBODY: NEGATIVE
INFLUENZA B ANTIBODY: NEGATIVE
Lab: NEGATIVE
QC PASS/FAIL: NEGATIVE
SARS-COV-2 RDRP RESP QL NAA+PROBE: NEGATIVE

## 2024-04-09 RX ORDER — GUAIFENESIN 600 MG/1
600 TABLET, EXTENDED RELEASE ORAL 2 TIMES DAILY
Qty: 30 TABLET | Refills: 0 | Status: SHIPPED | OUTPATIENT
Start: 2024-04-09 | End: 2024-04-24

## 2024-04-09 RX ORDER — ACETAMINOPHEN AND CHLORPHENIRAMINE MALEATE 325; 2 MG/1; MG/1
2 TABLET, FILM COATED ORAL EVERY 4 HOURS PRN
Qty: 56 TABLET | Refills: 0 | Status: SHIPPED | OUTPATIENT
Start: 2024-04-09 | End: 2024-04-16

## 2024-04-09 RX ORDER — AMLODIPINE BESYLATE AND BENAZEPRIL HYDROCHLORIDE 10; 20 MG/1; MG/1
1 CAPSULE ORAL DAILY
Qty: 30 CAPSULE | Refills: 3 | Status: SHIPPED | OUTPATIENT
Start: 2024-04-09

## 2024-04-09 NOTE — PROGRESS NOTES
Attending Physician Statement    S:   Chief Complaint   Patient presents with    Cold Symptoms     Patient presents today as a same day patient with c/o having a cough for 3 day today.     Results     Patient states she went to a [facility] Urgent care yesterday and had some testing done and would like the results today.     Hypertension    Medication Refill     Patient states she has been without her BP medication for roughly 2 weeks.       88/F who presents to the clinic for evaluation of cough and chest congestion.  Onset of symptoms about 3 days ago with no known sick contacts.  Patient denies dyspnea on exertion and shortness of breath at rest.  Symptoms mildly improved with OTC medications.  O: Blood pressure (!) 180/83, pulse 78, temperature 98.6 °F (37 °C), temperature source Temporal, resp. rate 16, height 1.651 m (5' 5\"), weight 89.4 kg (197 lb), SpO2 97 %, not currently breastfeeding.   Exam:   Heart - RRR   Lungs - clear   HEENT - TM intact, + PND, no sinus TTP  A: URI likely viral  P:  Supportive care   Follow-up in one week for BP check    I have discussed the case, including pertinent history and exam findings with the resident. I agree with the documented assessment and plan.         
auditory canals patent; tympanic membranes clear; no lymphadenopathy; no sinus tenderness  Cardiovascular: regular rate and rhythm, no murmurs/gallops/rubs  Pulmonary: lungs clear to auscultation bilaterally  Abdomen: nondistended, nontender, bowel sounds active  Extremities: no peripheral edema  ______________________________________________________________________    Assessment & Plan:  1. Acute cough  Likely viral URI. Patient's COVID and influenza were negative  Recommend patient treat symptomatically at this time- advised to avoid pseudoephedrine d/t elevated BP, prescribed Coricidin to manage sxs  Mucinex ordered for congestion relief  Patient advised to call/return to office in 1 week if symptoms do not improve/resolve or sooner if they worsen  - Chlorpheniramine-APAP (CORICIDIN) 2-325 MG TABS; Take 2 tablets by mouth every 4 hours as needed (Cough and runny nose)  Dispense: 56 tablet; Refill: 0  - POCT Influenza A/B  - COVID-19  - guaiFENesin (MUCINEX) 600 MG extended release tablet; Take 1 tablet by mouth 2 times daily for 15 days  Dispense: 30 tablet; Refill: 0  - POCT COVID-19 Rapid, NAAT    2. Essential hypertension  Asymptomatic at this time- uncontrolled d/t med noncompliance and use of Sudafed  Recommend avoidance of Sudafed  Refill meds  - amLODIPine-benazepril (LOTREL) 10-20 MG per capsule; Take 1 capsule by mouth daily  Dispense: 30 capsule; Refill: 3        Return to Office: Return in about 2 weeks (around 4/23/2024).    Jose C García MD   This case was discussed with Dr. Carson

## 2024-04-12 ENCOUNTER — CARE COORDINATION (OUTPATIENT)
Dept: CARE COORDINATION | Age: 89
End: 2024-04-12

## 2024-04-12 NOTE — CARE COORDINATION
[facility] pt, she reports she has a cold and the flu, saw her PCP on Tuesday. Pt was uncertain if she received [employer] application they mailed her. Pt plans to go through her mail. [facility] to make final outreach in a few weeks.    Kiana HOLMAN, [facility]   Care Coordinator  [phone]

## 2024-04-23 ENCOUNTER — OFFICE VISIT (OUTPATIENT)
Dept: FAMILY MEDICINE CLINIC | Age: 89
End: 2024-04-23

## 2024-04-23 VITALS
HEART RATE: 84 BPM | SYSTOLIC BLOOD PRESSURE: 111 MMHG | WEIGHT: 199.8 LBS | BODY MASS INDEX: 33.29 KG/M2 | TEMPERATURE: 97 F | HEIGHT: 65 IN | OXYGEN SATURATION: 97 % | DIASTOLIC BLOOD PRESSURE: 62 MMHG | RESPIRATION RATE: 16 BRPM

## 2024-04-23 DIAGNOSIS — I85.00 ESOPHAGEAL VARICES WITHOUT BLEEDING, UNSPECIFIED ESOPHAGEAL VARICES TYPE (HCC): ICD-10-CM

## 2024-04-23 DIAGNOSIS — M75.42 IMPINGEMENT SYNDROME OF LEFT SHOULDER: ICD-10-CM

## 2024-04-23 DIAGNOSIS — R05.8 POST-VIRAL COUGH SYNDROME: Primary | ICD-10-CM

## 2024-04-23 RX ORDER — BENZONATATE 100 MG/1
100 CAPSULE ORAL 3 TIMES DAILY PRN
Qty: 30 CAPSULE | Refills: 0 | Status: SHIPPED | OUTPATIENT
Start: 2024-04-23 | End: 2024-05-03

## 2024-04-23 RX ORDER — GUAIFENESIN 600 MG/1
600 TABLET, EXTENDED RELEASE ORAL 2 TIMES DAILY
Qty: 30 TABLET | Refills: 0 | Status: CANCELLED | OUTPATIENT
Start: 2024-04-23 | End: 2024-05-08

## 2024-04-23 SDOH — ECONOMIC STABILITY: FOOD INSECURITY: WITHIN THE PAST 12 MONTHS, YOU WORRIED THAT YOUR FOOD WOULD RUN OUT BEFORE YOU GOT MONEY TO BUY MORE.: OFTEN TRUE

## 2024-04-23 SDOH — ECONOMIC STABILITY: FOOD INSECURITY: WITHIN THE PAST 12 MONTHS, THE FOOD YOU BOUGHT JUST DIDN'T LAST AND YOU DIDN'T HAVE MONEY TO GET MORE.: SOMETIMES TRUE

## 2024-04-23 SDOH — ECONOMIC STABILITY: INCOME INSECURITY: HOW HARD IS IT FOR YOU TO PAY FOR THE VERY BASICS LIKE FOOD, HOUSING, MEDICAL CARE, AND HEATING?: HARD

## 2024-04-23 NOTE — PROGRESS NOTES
Patient is an 88-year-old female with a past medical history of hypertension, GERD, osteoarthritis, and right breast cancer status postlumpectomy/radiation/chemo who presents today for a follow-up of upper respiratory infection and impingement syndrome of the left shoulder.    Upper respiratory infection: The patient was seen for acute cough/9/24.  At that time COVID and flu were negative.  It was determined that she would be treated symptomatically at that time.  She states that her cough is significantly improved but is not completely gone.  Reports that it is nonproductive.  Denies shortness of breath, chest pain, fever, nausea/vomiting/diarrhea, lightheadedness/dizziness, sinus pain/pressure, sore throat, and rhinorrhea.    Impingement syndrome of left shoulder: The patient previously declined physical therapy and was instead given a home exercise program.  She was also given diclofenac gel.  She reports that her symptoms have improved substantially with this regimen.  She reports improved range of motion in her shoulder and minimal pain.  The patient does also receive Percocet from pain management which she takes 1/day.  She states this does not seem to have much of an effect on her shoulder pain.    History of esophageal varices: 1 esophageal varix seen on endoscopy 1/23/2023.  At that time Protonix recommended.  Patient reports no GERD symptoms, no dysphagia/odynophagia, no hematemesis/coffee-ground emesis.    Blood pressure 111/62, pulse 84, temperature 97 °F (36.1 °C), temperature source Temporal, resp. rate 16, height 1.651 m (5' 5\"), weight 90.6 kg (199 lb 12.8 oz), SpO2 97 %, not currently breastfeeding.    HEENT WNL     Heart regular    Lungs clear    abd non-tender      No edema    Pulses intact   MSK: Full range of motion in all extremities, no tenderness to palpation of left shoulder unlike previous exam: 5 out of 5 strength in bilateral upper and lower extremity.    Assessment and plan  Upper

## 2024-04-23 NOTE — PROGRESS NOTES
[facility]  FAMILY MEDICINE RESIDENCY PROGRAM  DATE OF VISIT : 2024    Patient : Ivy Smith   Age : 88 y.o.    : 1935   MRN : [id number]   ______________________________________________________________________    Chief Complaint:   Chief Complaint   Patient presents with    Follow-up     Patient presents today for a follow up on DM.     Cough     Patient still has cough        HPI:   Ivy Smith is a 88 y.o. female who presents for f/u shoulder pain and cough    Cough: The patient was seen for cough 24. COVID/flu were negative. The patient states that her cough is significantly better but is not completely gone. She states that her cough is non-productive. She denies shortness of breath, chest pain, and fever. She denies nausea/vomiting/diarrhea. She denies lightheadedness/dizziness. She denies sinus pain/pressure, sore throat, and rhinorrhea.     Impingement syndrome of L shoulder: The patient reports her shoulder pain has significantly improved on a home exercise program. The patient follows with pain management and receives Percocet from them. She has also been using diclofenac gel with good effect.     Hx esophageal varices: EGD on 23 showed singular varix. Patient takes Protonix still with good control of her GERD sxs.      Past Medical History:  Past Medical History:   Diagnosis Date    Anxiety 10/11/2010    Breast cancer (HCC) approx 2000    right, treated lumpectomy, radiation, chemo    Depression 10/11/2010    no issues    Diverticular disease 10/11/2010    GERD (gastroesophageal reflux disease)     HTN (hypertension) 10/11/2010    Hyperlipidemia 10/11/2010    Osteoarthritis 10/11/2010    fot left knee arthroplasty 10/21/2016    Osteoporosis 10/11/2010    Spinal stenosis 10/11/2010    Use of cane as ambulatory aid        Allergies:   No Known Allergies    Medications:    Current Outpatient Medications   Medication Sig Dispense Refill    diclofenac sodium (VOLTAREN)

## 2024-04-23 NOTE — PATIENT INSTRUCTIONS
Homeless Resources/Assistance  [facility] Housing Crisis Line  Phone: (558) 364-5585  Web: [employer]/gethelp  What they offer: Housing and supportive services for those experiencing homelessness or those at-risk of homelessness.   [facility]   Phone: (853) 444-2412  Web: [employer]/our-services  Address: [facility], [address] [place] [address]  What they offer: a place for those who are unhoused or at-risk of becoming unhoused to connect with service providers and resources in the area.  [employer]  Phone: (848) 970-1583  [facility]: ([phone]) [phone]  Web: [employer]/get-help  Address: [address] [place] [address]  What they offer: Assistance for those experiencing homelessness by providing supportive services including medical, legal, food rescue, and job training to include assistance with housing resources.   [facility]  Phone: (217) 739-3163  Web: [employer]/programs/[place]-transitional-housing/  Address: [address] [place] [address] Dr. Smith [address] [place] [address]  What they offer: Transitional housing to offenders who are homeless or nearly homeless with housing services who need a safe environment in which to live temporary while they pursue the long-term goal of securing permanent housing.   [place] [facility]  Web: [employer]  What they offer: Allows you to search affordable housing based on location, price, accessibility, income requirements and more.  [employer]  Phone: (364) 945-6503  Web: [employer]/shelternet  What they offer: Emergency financial assistance to people who are vulnerable to losing their housing in [facility], and [facility].  [facility] for [facility]  Phone: (906) 977-5710  Web: [employer]/housing-assistance/  Address: [address] [place] [address]  What they offer: [facility] administration assists in

## 2024-04-26 ENCOUNTER — CARE COORDINATION (OUTPATIENT)
Dept: CARE COORDINATION | Age: 89
End: 2024-04-26

## 2024-04-26 NOTE — CARE COORDINATION
[facility] made final outreach attempt to f/u with pt, she reports she cannot talk at this time and will call [facility] back. If no return call, [facility] to sign off.    Kiana HOLMAN, LSW   Care Coordinator  [phone]

## 2024-05-06 ENCOUNTER — TELEPHONE (OUTPATIENT)
Dept: CARDIOLOGY CLINIC | Age: 89
End: 2024-05-06

## 2024-05-06 NOTE — TELEPHONE ENCOUNTER
----- Message from Hal Coats MD sent at 5/5/2024  4:19 PM EDT -----  Stress test looked good did not suggest any coronary blockages.  Cont same follow up in 3 mo

## 2024-05-06 NOTE — TELEPHONE ENCOUNTER
Patient notified of Stress results and will continue same medications scheduled 3 month follow up per Dr. Coats's recommendations.

## 2024-05-14 ENCOUNTER — OFFICE VISIT (OUTPATIENT)
Dept: FAMILY MEDICINE CLINIC | Age: 89
End: 2024-05-14
Payer: MEDICARE

## 2024-05-14 VITALS
SYSTOLIC BLOOD PRESSURE: 109 MMHG | WEIGHT: 199 LBS | BODY MASS INDEX: 33.15 KG/M2 | DIASTOLIC BLOOD PRESSURE: 56 MMHG | HEART RATE: 78 BPM | OXYGEN SATURATION: 97 % | RESPIRATION RATE: 18 BRPM | TEMPERATURE: 96.7 F | HEIGHT: 65 IN

## 2024-05-14 DIAGNOSIS — M75.42 IMPINGEMENT SYNDROME OF LEFT SHOULDER: ICD-10-CM

## 2024-05-14 DIAGNOSIS — I95.9 HYPOTENSION, UNSPECIFIED HYPOTENSION TYPE: Primary | ICD-10-CM

## 2024-05-14 PROCEDURE — 1124F ACP DISCUSS-NO DSCNMKR DOCD: CPT

## 2024-05-14 PROCEDURE — 99213 OFFICE O/P EST LOW 20 MIN: CPT

## 2024-05-14 PROCEDURE — G8417 CALC BMI ABV UP PARAM F/U: HCPCS

## 2024-05-14 PROCEDURE — 1090F PRES/ABSN URINE INCON ASSESS: CPT

## 2024-05-14 PROCEDURE — 1036F TOBACCO NON-USER: CPT

## 2024-05-14 PROCEDURE — G8427 DOCREV CUR MEDS BY ELIG CLIN: HCPCS

## 2024-05-14 NOTE — PROGRESS NOTES
[facility]  Pre[facility]ing Note    Subjective:  Follow up L shoulder pain   Impingement syndrome, completed home PT and exercises with initial improvement, now worsening since stopping exercises  On Percocet from Pain management    ROS otherwise negative     Past medical, surgical, family and social history were reviewed, non-contributory, and unchanged unless otherwise stated.    Objective:    BP (!) 109/56 (Site: Left Upper Arm, Position: Sitting, Cuff Size: Large Adult)   Pulse 78   Temp (!) 96.7 °F (35.9 °C) (Temporal)   Resp 18   Ht 1.651 m (5' 5\")   Wt 90.3 kg (199 lb)   SpO2 97%   BMI 33.12 kg/m²     Exam is as noted by resident with the following changes, additions or corrections:    General:  NAD; alert & oriented x 3   TTP left shoulder joint, empty can negative    Assessment/Plan:  L shoulder pain- arthritis vs rotator cuff pathology. Voltaren. Resume home exercises  Follow up 3 months      Attending Physician Statement  I have reviewed the chart, including any radiology or labs. I have discussed the case, including pertinent history and exam findings with the resident.  I agree with the assessment, plan and orders as documented by the resident.  Please refer to the resident note for additional information.      Electronically signed by Zi Niño MD on 5/14/2024 at 1:43 PM    
TIMES A  g 1    amLODIPine-benazepril (LOTREL) 10-20 MG per capsule Take 1 capsule by mouth daily 30 capsule 3    omeprazole (PRILOSEC) 40 MG delayed release capsule TAKE 1 CAPSULE BY MOUTH EVERY DAY 90 capsule 1    desloratadine (CLARINEX) 5 MG tablet Take 1 tablet by mouth every 24 hours      fluorometholone (FML) 0.1 % ophthalmic suspension Place 1 drop into both eyes 2 times daily as needed (dry eyes)      Lactobacillus (ACIDOPHILUS PROBIOTIC) 0.5 MG TABS Take 1 tablet by mouth daily      oxyCODONE-acetaminophen (PERCOCET) 7.5-325 MG per tablet Take 1 tablet by mouth every 8 hours as needed for Pain.      Probiotic Product ([employer] DAILY PROBIOTIC) CAPS Take 1 capsule by mouth 2 times daily      polyethylene glycol (GLYCOLAX) 17 GM/SCOOP powder Use up to 2 capful twice a day. Decrease dosage if experiencing diarrhea. Use that as maintenance dose. 510 g 0    brimonidine (ALPHAGAN) 0.2 % ophthalmic solution Place 1 drop into both eyes in the morning and 1 drop in the evening.      sucralfate (CARAFATE) 1 GM tablet TAKE 1 TABLET BY MOUTH FOUR TIMES A  tablet 0    azelastine (OPTIVAR) 0.05 % ophthalmic solution INSTILL ONE DROP INTO EACH EYE TWICE DAILY 6 mL 2    chlorhexidine (PERIDEX) 0.12 % solution Swish and spit 15 mLs 2 times daily 473 mL 5    docusate sodium (COLACE) 100 MG capsule Take 1 capsule by mouth 2 times daily 60 capsule 2    fluticasone (FLONASE) 50 MCG/ACT nasal spray SPRAY 1 SPRAY INTO EACH NOSTRIL EVERY DAY 16 g 3    pravastatin (PRAVACHOL) 40 MG tablet TAKE 1 TABLET DAILY 90 tablet 3    Cholecalciferol (VITAMIN D3) 125 MCG (5000 UT) CAPS TAKE 1 CAPSULE BY MOUTH EVERY DAY 90 capsule 1    Vaginal Lubricant (REPLENS) GEL Use once-twice daily 35 g 1    vitamin B-12 (CYANOCOBALAMIN) 1000 MCG tablet Take 1 tablet by mouth daily 30 tablet 5    vitamin B-6 (PYRIDOXINE) 100 MG tablet Take 1 tablet by mouth daily 30 tablet 5    Multiple Vitamin TABS Take 1 capsule by mouth daily Hair ,skin and

## 2024-05-20 DIAGNOSIS — I10 ESSENTIAL HYPERTENSION: ICD-10-CM

## 2024-05-21 RX ORDER — AMLODIPINE BESYLATE AND BENAZEPRIL HYDROCHLORIDE 10; 20 MG/1; MG/1
CAPSULE ORAL DAILY
Qty: 90 CAPSULE | Refills: 1 | OUTPATIENT
Start: 2024-05-21

## 2024-07-05 DIAGNOSIS — I10 ESSENTIAL HYPERTENSION: ICD-10-CM

## 2024-07-05 RX ORDER — AMLODIPINE BESYLATE AND BENAZEPRIL HYDROCHLORIDE 10; 20 MG/1; MG/1
CAPSULE ORAL DAILY
Qty: 90 CAPSULE | Refills: 0 | Status: SHIPPED | OUTPATIENT
Start: 2024-07-05

## 2024-07-09 ENCOUNTER — OFFICE VISIT (OUTPATIENT)
Dept: FAMILY MEDICINE CLINIC | Age: 89
End: 2024-07-09
Payer: MEDICARE

## 2024-07-09 VITALS
OXYGEN SATURATION: 97 % | TEMPERATURE: 96.7 F | SYSTOLIC BLOOD PRESSURE: 136 MMHG | HEART RATE: 81 BPM | DIASTOLIC BLOOD PRESSURE: 63 MMHG

## 2024-07-09 DIAGNOSIS — R05.9 COUGH, UNSPECIFIED TYPE: Primary | ICD-10-CM

## 2024-07-09 DIAGNOSIS — J02.8 SORE THROAT (VIRAL): ICD-10-CM

## 2024-07-09 DIAGNOSIS — B97.89 SORE THROAT (VIRAL): ICD-10-CM

## 2024-07-09 LAB
INFLUENZA A ANTIBODY: NEGATIVE
INFLUENZA B ANTIBODY: NEGATIVE
Lab: NORMAL
QC PASS/FAIL: NORMAL
S PYO AG THROAT QL: NORMAL
SARS-COV-2 RDRP RESP QL NAA+PROBE: NEGATIVE

## 2024-07-09 PROCEDURE — 87804 INFLUENZA ASSAY W/OPTIC: CPT

## 2024-07-09 PROCEDURE — 1124F ACP DISCUSS-NO DSCNMKR DOCD: CPT

## 2024-07-09 PROCEDURE — 1090F PRES/ABSN URINE INCON ASSESS: CPT

## 2024-07-09 PROCEDURE — G8417 CALC BMI ABV UP PARAM F/U: HCPCS

## 2024-07-09 PROCEDURE — 99213 OFFICE O/P EST LOW 20 MIN: CPT

## 2024-07-09 PROCEDURE — 87880 STREP A ASSAY W/OPTIC: CPT

## 2024-07-09 PROCEDURE — 1036F TOBACCO NON-USER: CPT

## 2024-07-09 PROCEDURE — G8428 CUR MEDS NOT DOCUMENT: HCPCS

## 2024-07-09 PROCEDURE — 87635 SARS-COV-2 COVID-19 AMP PRB: CPT

## 2024-07-09 RX ORDER — HYDROCORTISONE 1 G/100G
1 CREAM TOPICAL ONCE
Qty: 1 ML | Refills: 0 | Status: SHIPPED | OUTPATIENT
Start: 2024-07-09 | End: 2024-07-09

## 2024-07-09 RX ORDER — IBUPROFEN 200 MG
200 TABLET ORAL EVERY 6 HOURS PRN
Qty: 56 TABLET | Refills: 0 | Status: SHIPPED | OUTPATIENT
Start: 2024-07-09 | End: 2024-07-23

## 2024-07-09 NOTE — PROGRESS NOTES
S: 89 y.o. female here for sore throat and cough <10 days.  No dysphagia, chest pain, wheezing, hemoptysis.  Has had some night sweats, but no fever.  She tried OTC meds for symptoms relief.  No recent sick contacts.      O: VS: /63 (Site: Right Upper Arm, Position: Sitting, Cuff Size: Large Adult)   Pulse 81   Temp (!) 96.7 °F (35.9 °C) (Temporal)   SpO2 97%    General: NAD, appropriate affect and grooming   ENT:  normal TMs; mild posterior pharyngeal erythema, no exudates   Neck: mild shotty lymphadenopathy   CV:  RRR, no gallops, rubs, or murmurs   Resp: CTAB   Abd:  Soft, nontender   Ext:  No edema  Strep and flu neg.  Impression/Plan:   Viral URI-   Ibuprofen, sore throat spray    Attending Physician Statement  I have discussed the case, including pertinent history and exam findings with the resident.  I agree with the documented assessment and plan.

## 2024-07-09 NOTE — PROGRESS NOTES
[facility]  FAMILY MEDICINE RESIDENCY PROGRAM  DATE OF VISIT : 2024    Patient : Ivy Smith   Age : 89 y.o.    : 1935   MRN : [id number]   ______________________________________________________________________    Chief Complaint:   Chief Complaint   Patient presents with    Pharyngitis    Cough    Congestion     Chills and sweating     Fatigue       HPI:   Ivy Smith is a 89 y.o. female who presents for cough and congestion for the last 5 days. She endorses nonproductive cough and severe sore throat. She reports severe odynophagia but denies dysphagia. She denies shortness of breath. Denies hemoptysis. She does endorse night sweats that started since her respiratory symptoms, but she has not taken her temperature. The patient has been taking tessalon and cepacol sore throat spray for symptom relief. She states they have been mildly effective. Denies facial pain/swelling. Denies pain in her chest. Denies nausea/vomiting. Denies peripheral edema.     Past Medical History:  Past Medical History:   Diagnosis Date    Anxiety 10/11/2010    Breast cancer (HCC) approx 2000    right, treated lumpectomy, radiation, chemo    Depression 10/11/2010    no issues    Diverticular disease 10/11/2010    GERD (gastroesophageal reflux disease)     HTN (hypertension) 10/11/2010    Hyperlipidemia 10/11/2010    Osteoarthritis 10/11/2010    fot left knee arthroplasty 10/21/2016    Osteoporosis 10/11/2010    Spinal stenosis 10/11/2010    Use of cane as ambulatory aid        Allergies:   No Known Allergies    Medications:    Current Outpatient Medications   Medication Sig Dispense Refill    ibuprofen (ADVIL;MOTRIN) 200 MG tablet Take 1 tablet by mouth every 6 hours as needed for Pain 56 tablet 0    amLODIPine-benazepril (LOTREL) 10-20 MG per capsule TAKE 1 CAPSULE BY MOUTH EVERY DAY 90 capsule 0    diclofenac sodium (VOLTAREN) 1 % GEL APPLY 2 GRAMS 4 TIMES A  g 1    omeprazole (PRILOSEC) 40 MG

## 2024-07-15 ENCOUNTER — TELEPHONE (OUTPATIENT)
Dept: FAMILY MEDICINE CLINIC | Age: 89
End: 2024-07-15

## 2024-07-22 PROBLEM — B35.1 ONYCHOMYCOSIS: Status: RESOLVED | Noted: 2023-07-15 | Resolved: 2024-07-22

## 2024-07-22 PROBLEM — L29.9 PRURITUS: Status: RESOLVED | Noted: 2019-09-23 | Resolved: 2024-07-22

## 2024-07-23 ENCOUNTER — OFFICE VISIT (OUTPATIENT)
Dept: FAMILY MEDICINE CLINIC | Age: 89
End: 2024-07-23

## 2024-07-23 VITALS
DIASTOLIC BLOOD PRESSURE: 68 MMHG | RESPIRATION RATE: 16 BRPM | TEMPERATURE: 98.1 F | BODY MASS INDEX: 31.32 KG/M2 | WEIGHT: 188 LBS | HEIGHT: 65 IN | HEART RATE: 81 BPM | SYSTOLIC BLOOD PRESSURE: 110 MMHG | OXYGEN SATURATION: 97 %

## 2024-07-23 DIAGNOSIS — J01.90 ACUTE BACTERIAL SINUSITIS: Primary | ICD-10-CM

## 2024-07-23 DIAGNOSIS — M75.42 IMPINGEMENT SYNDROME OF LEFT SHOULDER: ICD-10-CM

## 2024-07-23 DIAGNOSIS — F33.41 RECURRENT MAJOR DEPRESSIVE DISORDER, IN PARTIAL REMISSION (HCC): ICD-10-CM

## 2024-07-23 DIAGNOSIS — H91.93 DECREASED HEARING OF BOTH EARS: ICD-10-CM

## 2024-07-23 DIAGNOSIS — B96.89 ACUTE BACTERIAL SINUSITIS: Primary | ICD-10-CM

## 2024-07-23 DIAGNOSIS — I49.5 SINUS NODE DYSFUNCTION (HCC): ICD-10-CM

## 2024-07-23 DIAGNOSIS — J30.2 SEASONAL ALLERGIC RHINITIS, UNSPECIFIED TRIGGER: ICD-10-CM

## 2024-07-23 DIAGNOSIS — I70.203 ATHEROSCLEROSIS OF NATIVE ARTERY OF BOTH LOWER EXTREMITIES, WITH UNSPECIFIED PRESENCE OF CLINICAL MANIFESTATION (HCC): ICD-10-CM

## 2024-07-23 RX ORDER — NALOXEGOL OXALATE 12.5 MG/1
12.5 TABLET, FILM COATED ORAL
COMMUNITY
Start: 2024-07-19

## 2024-07-23 RX ORDER — FLUTICASONE PROPIONATE 50 MCG
SPRAY, SUSPENSION (ML) NASAL
Qty: 16 G | Refills: 3 | Status: SHIPPED | OUTPATIENT
Start: 2024-07-23

## 2024-07-23 RX ORDER — PHENOL 1.4 %
1 AEROSOL, SPRAY (ML) MUCOUS MEMBRANE
COMMUNITY
Start: 2024-07-09

## 2024-07-23 RX ORDER — AMOXICILLIN AND CLAVULANATE POTASSIUM 875; 125 MG/1; MG/1
1 TABLET, FILM COATED ORAL 2 TIMES DAILY
Qty: 14 TABLET | Refills: 0 | Status: SHIPPED | OUTPATIENT
Start: 2024-07-23 | End: 2024-07-30

## 2024-07-23 RX ORDER — DORZOLAMIDE HCL 20 MG/ML
1 SOLUTION/ DROPS OPHTHALMIC 3 TIMES DAILY
COMMUNITY

## 2024-07-23 RX ORDER — VILAZODONE HYDROCHLORIDE 10 MG/1
10 TABLET ORAL DAILY
COMMUNITY
Start: 2024-06-24

## 2024-07-23 NOTE — PROGRESS NOTES
[facility]  FAMILY MEDICINE RESIDENCY PROGRAM  DATE OF VISIT : 2024    Patient : Ivy Smith   Age : 89 y.o.    : 1935   MRN : [id number]   ______________________________________________________________________    Chief Complaint:   Chief Complaint   Patient presents with    URI     Follow-up no change   wants referral to ENT     Medication Refill       HPI:   Ivy Smith is a 89 y.o. female who presents for follow up of upper respiratory infection and mgmt chronic conditions.     URI: The patient was last seen for this- diagnosed with likely viral URI. She states that since that time, her symptoms of facial pain/pressure, nasal congestion, and rhinorrhea initiallly improved, and then worsened again. She states that it has been continuously worsening since then. States she has felt hot/chilled but has not taken her temperature. She denies shortness of breath and chest pain. She endorses decreased hearing in  both ears and is requesting hearing screening. Denies tinnitus.    Sinus node dysfunction, hx of CAD: Patient follows with Dr. Coats, next appt Aug 2024. Asymptomatic at this time    MDD: f/u with psych, takes Vilazodone. Compliant with medication     Impingement syndrome of the shoulder: improvement with home exercises, follows with pain management. Uses voltaren gel. Declines PT at this time     Past Medical History:  Past Medical History:   Diagnosis Date    Anxiety 10/11/2010    Breast cancer (HCC) approx 2000    right, treated lumpectomy, radiation, chemo    Depression 10/11/2010    no issues    Diverticular disease 10/11/2010    GERD (gastroesophageal reflux disease)     HTN (hypertension) 10/11/2010    Hyperlipidemia 10/11/2010    Osteoarthritis 10/11/2010    fot left knee arthroplasty 10/21/2016    Osteoporosis 10/11/2010    Spinal stenosis 10/11/2010    Use of cane as ambulatory aid        Allergies:   No Known Allergies    Medications:    Current Outpatient Medications

## 2024-07-23 NOTE — PROGRESS NOTES
Patient is an 89-year-old female with a past medical history of sinus node dysfunction, major depressive disorder, impingement syndrome of the shoulder who presents today for follow-up of an upper respiratory infection as well as these chronic conditions    Upper respiratory infection-the patient was last seen for a URI 7/9/2024.  At that time it had been ongoing for less than a week and was determined to most likely be viral in etiology.  She was given Flonase for symptomatic treatment and advised to follow-up with no resolution of the symptoms.  She states that since that last visit, she initially felt improvement in her symptoms and then they began to worsen and has been continuously worsening since that time.  She reports facial pain/pressure, nasal congestion, and still clear rhinorrhea.  She says that she has felt hot/chilled periodically, but has not taken her temperature to obtain an objective fever.  She denies chest pain and shortness of breath.    Sinus node dysfunction-patient was in the hospital with a bifascicular block January 2024.  She was established with Dr. Gatica at that time.  She has a follow-up appointment with him August 19, 2024.  She again denies chest pain, shortness of breath, and heart palpitations.    Major depressive disorder-is on the Vilazodone as prescribed by her psychiatrist.    Impingement syndrome of the shoulder-improved with home exercises.  She follows with pain management and receives chronic opioids from them.  They recently placed her on Movantik to aid with opioid-induced constipation.  She is also been using Voltaren gel prescribed by this MD which she states is also helping.  She states that the shoulder pain has improved where she has better mobility and she is able to sleep on her left side whereas previously she could not.    Blood pressure 110/68, pulse 81, temperature 98.1 °F (36.7 °C), temperature source Temporal, resp. rate 16, height 1.651 m (5' 5\"), weight

## 2024-07-30 ENCOUNTER — OFFICE VISIT (OUTPATIENT)
Dept: FAMILY MEDICINE CLINIC | Age: 89
End: 2024-07-30

## 2024-07-30 VITALS
OXYGEN SATURATION: 98 % | WEIGHT: 198 LBS | HEIGHT: 65 IN | DIASTOLIC BLOOD PRESSURE: 69 MMHG | TEMPERATURE: 97.3 F | HEART RATE: 80 BPM | BODY MASS INDEX: 32.99 KG/M2 | SYSTOLIC BLOOD PRESSURE: 127 MMHG | RESPIRATION RATE: 18 BRPM

## 2024-07-30 DIAGNOSIS — B96.89 ACUTE BACTERIAL SINUSITIS: Primary | ICD-10-CM

## 2024-07-30 DIAGNOSIS — R19.7 DIARRHEA, UNSPECIFIED TYPE: ICD-10-CM

## 2024-07-30 DIAGNOSIS — J01.90 ACUTE BACTERIAL SINUSITIS: Primary | ICD-10-CM

## 2024-07-30 NOTE — PROGRESS NOTES
Patient is an 89-year-old female who presents today for follow-up of acute bacterial sinusitis.  The patient was seen for this last week, she had been experiencing facial pressure and rhinorrhea for the last 2 weeks.  She states at that time that her symptoms had initially improved and then worsened again.  She was then experiencing thick green rhinorrhea and coughing up thick green sputum as well.  At that time patient was diagnosed with acute bacterial sinusitis and she was given Augmentin.  Patient states she has been compliant with Augmentin and took her last dose today.  She reports substantial resolution of her symptoms.  She states she still feeling some pressure behind her ears bilaterally but no pain.  Reports mildly decreased hearing like what she experienced when she first developed her sinus infection.    Patient second concern is diarrhea ongoing for the last week.  She says that to treat her diarrhea, she has been taking milk of magnesia.  Denies abdominal pain, nausea, vomiting and decreased oral intake.    Blood pressure 127/69, pulse 80, temperature 97.3 °F (36.3 °C), temperature source Temporal, resp. rate 18, height 1.651 m (5' 5\"), weight 89.8 kg (198 lb), SpO2 98 %, not currently breastfeeding.    HEENT sinuses nontender to palpation, external auditory canals patent, tympanic membranes clear, not erythematous/bulging and no effusion noted, oropharynx nonedematous/nonerythematous, no lymphadenopathy noted     heart regular    Lungs clear    abd non-tender      No edema    Pulses intact     Assessment and plan  Acute bacterial sinusitis-resolved, patient advised that her muffled hearing likely still due to eustachian tube dysfunction residual from sinus infection and seasonal allergies.  Advised continued use of Flonase and close follow-up if it does not resolve.  Diarrhea-likely medication induced.  Advised patient to stop milk of magnesia, maintain adequate p.o. hydration, and monitor diarrhea 
daily 35 g 1    vitamin B-12 (CYANOCOBALAMIN) 1000 MCG tablet Take 1 tablet by mouth daily 30 tablet 5    vitamin B-6 (PYRIDOXINE) 100 MG tablet Take 1 tablet by mouth daily 30 tablet 5    Multiple Vitamin TABS Take 1 capsule by mouth daily Hair ,skin and nails 30 tablet 5    Handicap Placard MISC by Does not apply route Patient requires handicap placard for physical deconditioning, age, osteoarthritis of both knees and osteoporosis. This handicap placard is valid from 4/5/2022 to 4/5/2027; a total of 5 years. 1 each 0    Magic Mouthwash (MIRACLE MOUTHWASH) Swish and spit 5 mLs 4 times daily as needed for Irritation or Pain 60 mL 0    NARCAN 4 MG/0.1ML LIQD nasal spray 0.1 mLs by Nasal route once  1     No current facility-administered medications for this visit.      ______________________________________________________________________    Physical Exam:    Blood pressure 127/69, pulse 80, temperature 97.3 °F (36.3 °C), temperature source Temporal, resp. rate 18, height 1.651 m (5' 5\"), weight 89.8 kg (198 lb), SpO2 98 %, not currently breastfeeding.    Physical Exam  Constitutional:       Appearance: Normal appearance.   HENT:      Head: Normocephalic and atraumatic.      Comments: No sinus tenderness     Right Ear: Tympanic membrane, ear canal and external ear normal.      Left Ear: Tympanic membrane, ear canal and external ear normal.      Nose: Nose normal.      Mouth/Throat:      Mouth: Mucous membranes are moist.      Pharynx: Oropharynx is clear.   Eyes:      Conjunctiva/sclera: Conjunctivae normal.      Pupils: Pupils are equal, round, and reactive to light.   Cardiovascular:      Rate and Rhythm: Normal rate and regular rhythm.      Pulses: Normal pulses.      Heart sounds: Normal heart sounds.   Pulmonary:      Effort: Pulmonary effort is normal.      Breath sounds: Normal breath sounds.   Abdominal:      General: Bowel sounds are normal. There is no distension.      Palpations: Abdomen is soft.

## 2024-08-05 DIAGNOSIS — K21.9 GASTROESOPHAGEAL REFLUX DISEASE WITHOUT ESOPHAGITIS: ICD-10-CM

## 2024-08-05 RX ORDER — OMEPRAZOLE 40 MG/1
CAPSULE, DELAYED RELEASE ORAL DAILY
Qty: 90 CAPSULE | Refills: 1 | Status: SHIPPED | OUTPATIENT
Start: 2024-08-05

## 2024-08-05 NOTE — TELEPHONE ENCOUNTER
Please refill :)    Last Appointment:  7/30/2024  Future Appointments   Date Time Provider Department Center   8/19/2024  1:30 PM Hal Coats MD [facility]   8/20/2024  2:00 PM Jose C García MD Fam    8/23/2024  2:00 PM SCHEDULE, [facility]   8/30/2024  2:40 PM Jose C García MD Fam [facility] DEP

## 2024-08-19 ENCOUNTER — OFFICE VISIT (OUTPATIENT)
Dept: CARDIOLOGY CLINIC | Age: 89
End: 2024-08-19
Payer: MEDICARE

## 2024-08-19 VITALS
SYSTOLIC BLOOD PRESSURE: 128 MMHG | WEIGHT: 195 LBS | BODY MASS INDEX: 32.49 KG/M2 | HEIGHT: 65 IN | DIASTOLIC BLOOD PRESSURE: 60 MMHG | HEART RATE: 82 BPM | RESPIRATION RATE: 16 BRPM

## 2024-08-19 DIAGNOSIS — I35.1 NONRHEUMATIC AORTIC VALVE INSUFFICIENCY: ICD-10-CM

## 2024-08-19 DIAGNOSIS — I45.2 RBBB (RIGHT BUNDLE BRANCH BLOCK WITH LEFT ANTERIOR FASCICULAR BLOCK): ICD-10-CM

## 2024-08-19 DIAGNOSIS — I47.29 NSVT (NONSUSTAINED VENTRICULAR TACHYCARDIA) (HCC): ICD-10-CM

## 2024-08-19 DIAGNOSIS — I49.5 SINUS NODE DYSFUNCTION (HCC): Primary | ICD-10-CM

## 2024-08-19 DIAGNOSIS — I44.0 FIRST DEGREE AV BLOCK: ICD-10-CM

## 2024-08-19 DIAGNOSIS — I44.1 SECOND DEGREE AV BLOCK, MOBITZ TYPE I: ICD-10-CM

## 2024-08-19 DIAGNOSIS — I34.0 NONRHEUMATIC MITRAL VALVE REGURGITATION: ICD-10-CM

## 2024-08-19 PROCEDURE — 1036F TOBACCO NON-USER: CPT | Performed by: INTERNAL MEDICINE

## 2024-08-19 PROCEDURE — 1124F ACP DISCUSS-NO DSCNMKR DOCD: CPT | Performed by: INTERNAL MEDICINE

## 2024-08-19 PROCEDURE — 1090F PRES/ABSN URINE INCON ASSESS: CPT | Performed by: INTERNAL MEDICINE

## 2024-08-19 PROCEDURE — G8417 CALC BMI ABV UP PARAM F/U: HCPCS | Performed by: INTERNAL MEDICINE

## 2024-08-19 PROCEDURE — 93000 ELECTROCARDIOGRAM COMPLETE: CPT | Performed by: INTERNAL MEDICINE

## 2024-08-19 PROCEDURE — G8427 DOCREV CUR MEDS BY ELIG CLIN: HCPCS | Performed by: INTERNAL MEDICINE

## 2024-08-19 PROCEDURE — 99214 OFFICE O/P EST MOD 30 MIN: CPT | Performed by: INTERNAL MEDICINE

## 2024-08-19 NOTE — PROGRESS NOTES
OUTPATIENT CARDIOLOGY FOLLOW-UP    Name: Ivy Smith    Age: 89 y.o.    Primary Care Physician: Jose C García MD    Date of Service: 8/19/2024    Chief Complaint:   Chief Complaint   Patient presents with    Other     3 month office visit        Interim History:   Here for follow-up of bradycardia.  Seen in the hospital 2/2024, had mechanical fall at the time and noted to have bradycardia.  Was on timolol eyedrops which were discontinued after consultation with her ophthalmologist.  Bradycardia subsequently improved.  Dyspnea also improved.    Feels well denies chest pain shortness breath palpitation syncope.  Not too active able to do house chores and go up and down stairs.    Review of Systems:   Negative except as described above  Past Medical History:  Past Medical History:   Diagnosis Date    Anxiety 10/11/2010    Breast cancer (HCC) approx 2000    right, treated lumpectomy, radiation, chemo    Depression 10/11/2010    no issues    Diverticular disease 10/11/2010    GERD (gastroesophageal reflux disease)     HTN (hypertension) 10/11/2010    Hyperlipidemia 10/11/2010    Osteoarthritis 10/11/2010    fot left knee arthroplasty 10/21/2016    Osteoporosis 10/11/2010    Spinal stenosis 10/11/2010    Use of cane as ambulatory aid        Past Surgical History:  Past Surgical History:   Procedure Laterality Date    BACK SURGERY      BONE GRAFT      with back surgery    BREAST LUMPECTOMY  2000    left, with chemo and rad, [facility]    CARPAL TUNNEL RELEASE  1980s    left    CARPAL TUNNEL RELEASE  1980s    repeat left    CATARACT REMOVAL  2010    [facility]    COLONOSCOPY  02/03/2011    extensive diverticulosis, Dr. Venegas, [facility]    COLONOSCOPY  2013    \"could not get around colon due to diverticulitis, [facility]    COLONOSCOPY  02/24/2015    severe diverticulosis transverse and left colon without diverticulitis, rectal polyp bx, Dr. Diaz, [facility]    ESOPHAGOGASTRODUODENOSCOPY  01/27/2023

## 2024-08-23 ENCOUNTER — HOSPITAL ENCOUNTER (OUTPATIENT)
Dept: AUDIOLOGY | Age: 89
Discharge: HOME | End: 2024-08-23

## 2024-08-23 NOTE — PROGRESS NOTES
This patient was referred for  audiometric and tympanometric  testing by  [facility]  due to hearing loss.  She notes tinnitus and feeling off balance. She reports wearing a hearing aid in the right ear that is possibly 10 years old.  She does not remember where it was dispensed.     Audiometry using pure tone air and bone conduction testing revealed a mild-to-severe  sensorineural hearing loss, in the left ear and a moderately severe to severe sensorineural hearing loss in the left ear. Reliability was good. Speech reception thresholds were in fair agreement with the pure tone averages, bilaterally. Speech discrimination scores were good, in the left ear and poor in the right ear.     Tympanometry revealed normal middle ear peak pressure and compliance, bilaterally.    The results were reviewed with the patient and sent to the ordering provider.     Recommendations for follow up will be made pending physician consult.  An ENT consult is recommended due to the asymmetrical hearing loss and discrimination.      Electronically signed by Gunner Woo on 8/23/2024 at 3:56 PM

## 2024-08-26 ENCOUNTER — TELEPHONE (OUTPATIENT)
Dept: ENT CLINIC | Age: 89
End: 2024-08-26

## 2024-08-26 NOTE — TELEPHONE ENCOUNTER
----- Message from CHRISTEL MORAN MA sent at 8/26/2024  7:50 AM EDT -----    ----- Message -----  From: Sara Coleman AuD  Sent: 8/23/2024   4:01 PM EDT  To: Natalie Nottingham Ent Clinical Staff    Pt being referred for asymmetrical hearing loss and discrimination.  She said she has never seen ENT.  She says she has had the asymmetry for many years.

## 2024-09-05 ENCOUNTER — OFFICE VISIT (OUTPATIENT)
Dept: PRIMARY CARE CLINIC | Age: 89
End: 2024-09-05
Payer: MEDICARE

## 2024-09-05 VITALS
WEIGHT: 198 LBS | SYSTOLIC BLOOD PRESSURE: 159 MMHG | RESPIRATION RATE: 18 BRPM | BODY MASS INDEX: 32.99 KG/M2 | HEART RATE: 83 BPM | TEMPERATURE: 97.8 F | DIASTOLIC BLOOD PRESSURE: 94 MMHG | OXYGEN SATURATION: 98 % | HEIGHT: 65 IN

## 2024-09-05 DIAGNOSIS — R23.8 SKIN BREAKDOWN: Primary | ICD-10-CM

## 2024-09-05 PROCEDURE — 1124F ACP DISCUSS-NO DSCNMKR DOCD: CPT | Performed by: NURSE PRACTITIONER

## 2024-09-05 PROCEDURE — 1090F PRES/ABSN URINE INCON ASSESS: CPT | Performed by: NURSE PRACTITIONER

## 2024-09-05 PROCEDURE — G8427 DOCREV CUR MEDS BY ELIG CLIN: HCPCS | Performed by: NURSE PRACTITIONER

## 2024-09-05 PROCEDURE — G8417 CALC BMI ABV UP PARAM F/U: HCPCS | Performed by: NURSE PRACTITIONER

## 2024-09-05 PROCEDURE — 99203 OFFICE O/P NEW LOW 30 MIN: CPT | Performed by: NURSE PRACTITIONER

## 2024-09-05 PROCEDURE — 1036F TOBACCO NON-USER: CPT | Performed by: NURSE PRACTITIONER

## 2024-09-05 RX ORDER — ANORECTAL OINTMENT 15.7; .44; 24; 20.6 G/100G; G/100G; G/100G; G/100G
OINTMENT TOPICAL DAILY
Qty: 71 G | Refills: 0 | Status: SHIPPED | OUTPATIENT
Start: 2024-09-05 | End: 2024-09-12

## 2024-09-05 NOTE — PROGRESS NOTES
Chief Complaint:   Rash (Patient complains about rash on her buttocks. She said it has been there for about 2 months- She has been putting Voltaren on it without relief. )    History of Present Illness   Source of history provided by:  patient.      Ivy Smith is a 89 y.o. old female who presents to walk-in, for sudden onset of skin breakdown to bilateral inguinal canals which began 2 month(s) prior to arrival.  The symptoms were caused by unknown, but states it started after she was wearing an adult diaper for accidents.  Since onset the symptoms have been gradually improving.   Prior history of similar episodes: No.   Her symptoms are associated with burning and relieved by Voltaren Gel.  She denies any additional symptoms.    ROS   Unless otherwise stated in this report or unable to obtain because of the patient's clinical or mental status as evidenced by the medical record, this patients's positive and negative responses for Review of Systems, constitutional, psych, eyes, ENT, cardiovascular, respiratory, gastrointestinal, neurological, genitourinary, musculoskeletal, integument systems and systems related to the presenting problem are either stated in the preceding or were not pertinent or were negative for the symptoms and/or complaints related to the medical problem.    Past Medical History:  has a past medical history of Anxiety, Breast cancer (HCC), Depression, Diverticular disease, GERD (gastroesophageal reflux disease), HTN (hypertension), Hyperlipidemia, Osteoarthritis, Osteoporosis, Spinal stenosis, and Use of cane as ambulatory aid.   Past Surgical History:  has a past surgical history that includes Hysterectomy (1980s); Hemorrhoid surgery (1980s); Carpal tunnel release (1980s); Carpal tunnel release (1980s); Breast lumpectomy (2000); Spinal fusion (2009); Cataract removal (2010); Upper gastrointestinal endoscopy (02/03/2011); Colonoscopy (02/03/2011); Upper gastrointestinal endoscopy (2013);

## 2024-09-06 ENCOUNTER — TELEPHONE (OUTPATIENT)
Dept: FAMILY MEDICINE CLINIC | Age: 89
End: 2024-09-06

## 2024-09-11 DIAGNOSIS — I70.213 ATHEROSCLEROSIS OF NATIVE ARTERY OF BOTH LOWER EXTREMITIES WITH INTERMITTENT CLAUDICATION (HCC): ICD-10-CM

## 2024-09-12 RX ORDER — DOCUSATE SODIUM 100 MG/1
100 CAPSULE, LIQUID FILLED ORAL 2 TIMES DAILY
Qty: 60 CAPSULE | Refills: 2 | OUTPATIENT
Start: 2024-09-12

## 2024-09-12 NOTE — TELEPHONE ENCOUNTER
Please refill :)    Last Appointment:  7/30/2024  Future Appointments   Date Time Provider Department Center   9/19/2024 10:45 AM Maria Luz Victor DO [place] [facility]   9/20/2024  3:00 PM Jose C García MD [facility] [place] [facility] DEP

## 2024-09-13 DIAGNOSIS — K21.9 GASTROESOPHAGEAL REFLUX DISEASE WITHOUT ESOPHAGITIS: ICD-10-CM

## 2024-09-13 RX ORDER — OMEPRAZOLE 40 MG/1
40 CAPSULE, DELAYED RELEASE ORAL DAILY
Qty: 90 CAPSULE | Refills: 1 | Status: SHIPPED | OUTPATIENT
Start: 2024-09-13

## 2024-09-16 DIAGNOSIS — I70.213 ATHEROSCLEROSIS OF NATIVE ARTERY OF BOTH LOWER EXTREMITIES WITH INTERMITTENT CLAUDICATION (HCC): ICD-10-CM

## 2024-09-16 DIAGNOSIS — I10 ESSENTIAL HYPERTENSION: ICD-10-CM

## 2024-09-16 RX ORDER — AMLODIPINE AND BENAZEPRIL HYDROCHLORIDE 10; 20 MG/1; MG/1
CAPSULE ORAL DAILY
Qty: 90 CAPSULE | Refills: 0 | Status: SHIPPED
Start: 2024-09-16 | End: 2024-09-20 | Stop reason: SDUPTHER

## 2024-09-16 RX ORDER — PRAVASTATIN SODIUM 40 MG
TABLET ORAL
Qty: 90 TABLET | Refills: 3 | Status: SHIPPED | OUTPATIENT
Start: 2024-09-16

## 2024-09-19 ENCOUNTER — OFFICE VISIT (OUTPATIENT)
Dept: ENT CLINIC | Age: 89
End: 2024-09-19
Payer: MEDICARE

## 2024-09-19 VITALS
DIASTOLIC BLOOD PRESSURE: 79 MMHG | HEIGHT: 65 IN | SYSTOLIC BLOOD PRESSURE: 114 MMHG | HEART RATE: 69 BPM | BODY MASS INDEX: 33.04 KG/M2 | WEIGHT: 198.3 LBS

## 2024-09-19 DIAGNOSIS — H90.3 SENSORINEURAL HEARING LOSS (SNHL) OF BOTH EARS: ICD-10-CM

## 2024-09-19 DIAGNOSIS — H91.8X3 ASYMMETRICAL HEARING LOSS: Primary | ICD-10-CM

## 2024-09-19 PROCEDURE — 1124F ACP DISCUSS-NO DSCNMKR DOCD: CPT | Performed by: OTOLARYNGOLOGY

## 2024-09-19 PROCEDURE — 99204 OFFICE O/P NEW MOD 45 MIN: CPT | Performed by: OTOLARYNGOLOGY

## 2024-09-19 ASSESSMENT — ENCOUNTER SYMPTOMS
SORE THROAT: 0
RESPIRATORY NEGATIVE: 1
ALLERGIC/IMMUNOLOGIC NEGATIVE: 1
RHINORRHEA: 0

## 2024-09-20 ENCOUNTER — OFFICE VISIT (OUTPATIENT)
Dept: FAMILY MEDICINE CLINIC | Age: 89
End: 2024-09-20
Payer: MEDICARE

## 2024-09-20 VITALS
BODY MASS INDEX: 32.99 KG/M2 | DIASTOLIC BLOOD PRESSURE: 70 MMHG | SYSTOLIC BLOOD PRESSURE: 149 MMHG | HEART RATE: 75 BPM | WEIGHT: 198 LBS | RESPIRATION RATE: 16 BRPM | HEIGHT: 65 IN | OXYGEN SATURATION: 96 % | TEMPERATURE: 98 F

## 2024-09-20 DIAGNOSIS — I10 ESSENTIAL HYPERTENSION: ICD-10-CM

## 2024-09-20 DIAGNOSIS — Z76.0 ENCOUNTER FOR MEDICATION REFILL: ICD-10-CM

## 2024-09-20 DIAGNOSIS — K21.9 GASTROESOPHAGEAL REFLUX DISEASE WITHOUT ESOPHAGITIS: ICD-10-CM

## 2024-09-20 DIAGNOSIS — Z85.3 PERSONAL HISTORY OF MALIGNANT NEOPLASM OF BREAST: Chronic | ICD-10-CM

## 2024-09-20 DIAGNOSIS — R42 VERTIGO: Primary | ICD-10-CM

## 2024-09-20 PROCEDURE — G8428 CUR MEDS NOT DOCUMENT: HCPCS

## 2024-09-20 PROCEDURE — 1124F ACP DISCUSS-NO DSCNMKR DOCD: CPT

## 2024-09-20 PROCEDURE — 1036F TOBACCO NON-USER: CPT

## 2024-09-20 PROCEDURE — 99214 OFFICE O/P EST MOD 30 MIN: CPT

## 2024-09-20 PROCEDURE — 1090F PRES/ABSN URINE INCON ASSESS: CPT

## 2024-09-20 PROCEDURE — G8417 CALC BMI ABV UP PARAM F/U: HCPCS

## 2024-09-20 RX ORDER — AMLODIPINE AND BENAZEPRIL HYDROCHLORIDE 10; 20 MG/1; MG/1
1 CAPSULE ORAL DAILY
Qty: 90 CAPSULE | Refills: 0 | Status: SHIPPED | OUTPATIENT
Start: 2024-09-20

## 2024-09-20 RX ORDER — LANOLIN ALCOHOL/MO/W.PET/CERES
1000 CREAM (GRAM) TOPICAL DAILY
Qty: 30 TABLET | Refills: 5 | Status: SHIPPED | OUTPATIENT
Start: 2024-09-20

## 2024-09-20 RX ORDER — OMEPRAZOLE 40 MG/1
40 CAPSULE, DELAYED RELEASE ORAL DAILY
Qty: 90 CAPSULE | Refills: 1 | Status: SHIPPED | OUTPATIENT
Start: 2024-09-20

## 2024-09-20 RX ORDER — ONDANSETRON 4 MG/1
4 TABLET, FILM COATED ORAL EVERY 8 HOURS PRN
Qty: 21 TABLET | Refills: 0 | Status: SHIPPED | OUTPATIENT
Start: 2024-09-20 | End: 2024-09-27

## 2024-09-20 RX ORDER — SUCRALFATE 1 G/1
1 TABLET ORAL 3 TIMES DAILY
Qty: 90 TABLET | Refills: 0 | Status: SHIPPED | OUTPATIENT
Start: 2024-09-20 | End: 2024-10-20

## 2024-09-20 RX ORDER — MULTIVITAMIN WITH IRON
100 TABLET ORAL DAILY
Qty: 30 TABLET | Refills: 5 | Status: SHIPPED | OUTPATIENT
Start: 2024-09-20

## 2024-09-27 ENCOUNTER — TELEPHONE (OUTPATIENT)
Dept: FAMILY MEDICINE CLINIC | Age: 89
End: 2024-09-27

## 2024-09-27 NOTE — TELEPHONE ENCOUNTER
Patient called in requesting DME orders be sent to [facility] for bras, inserts and supplies discussed at last visit

## 2024-10-12 DIAGNOSIS — K21.9 GASTROESOPHAGEAL REFLUX DISEASE WITHOUT ESOPHAGITIS: ICD-10-CM

## 2024-10-14 RX ORDER — SUCRALFATE 1 G/1
1 TABLET ORAL 3 TIMES DAILY
Qty: 270 TABLET | Refills: 0 | Status: SHIPPED | OUTPATIENT
Start: 2024-10-14 | End: 2025-01-12

## 2024-10-14 NOTE — TELEPHONE ENCOUNTER
Please refill :)    Last Appointment:  9/20/2024  Future Appointments   Date Time Provider Department Center   11/4/2024  1:30 PM Maria Luz Victor DO [place] [facility]   12/13/2024  1:40 PM Jose C García MD [facility] [place] [facility] DEP

## 2024-11-01 DIAGNOSIS — K21.9 GASTROESOPHAGEAL REFLUX DISEASE WITHOUT ESOPHAGITIS: ICD-10-CM

## 2024-11-01 NOTE — TELEPHONE ENCOUNTER
Please refill :)    Last Appointment:  9/20/2024  Future Appointments   Date Time Provider Department Center   12/13/2024  1:40 PM Jose C García MD [facility] [place] [facility] DEP

## 2024-11-14 ENCOUNTER — TELEPHONE (OUTPATIENT)
Dept: ENT CLINIC | Age: 89
End: 2024-11-14

## 2024-12-13 ENCOUNTER — OFFICE VISIT (OUTPATIENT)
Dept: FAMILY MEDICINE CLINIC | Age: 89
End: 2024-12-13

## 2024-12-13 VITALS
HEART RATE: 73 BPM | BODY MASS INDEX: 31.32 KG/M2 | OXYGEN SATURATION: 99 % | RESPIRATION RATE: 16 BRPM | SYSTOLIC BLOOD PRESSURE: 120 MMHG | TEMPERATURE: 97.2 F | DIASTOLIC BLOOD PRESSURE: 70 MMHG | HEIGHT: 65 IN | WEIGHT: 188 LBS

## 2024-12-13 DIAGNOSIS — H91.93 BILATERAL HEARING LOSS, UNSPECIFIED HEARING LOSS TYPE: ICD-10-CM

## 2024-12-13 DIAGNOSIS — T63.444S BEE STING, UNDETERMINED INTENT, SEQUELA: Primary | ICD-10-CM

## 2024-12-13 DIAGNOSIS — R42 VERTIGO: ICD-10-CM

## 2024-12-13 DIAGNOSIS — I10 ESSENTIAL HYPERTENSION: Chronic | ICD-10-CM

## 2024-12-13 RX ORDER — TIZANIDINE 2 MG/1
2 TABLET ORAL 3 TIMES DAILY PRN
Qty: 15 TABLET | Refills: 0 | Status: SHIPPED | OUTPATIENT
Start: 2024-12-13 | End: 2024-12-18

## 2024-12-13 NOTE — PROGRESS NOTES
S: 89 y.o. female here for f/u bee sting over the summer. Pain at bee sting site R back never subsided. Site still gets red. No fever. No other rash other body parts.     O: VS: /70   Pulse 73   Temp 97.2 °F (36.2 °C) (Temporal)   Resp 16   Ht 1.651 m (5' 5\")   Wt 85.3 kg (188 lb)   SpO2 99%   BMI 31.28 kg/m²    General: NAD, alert and interacting appropriately.    Skin: thoracic paraspinal tightness on R, ? Scoliosis. No appreciable area of sc mass/infxn    Impression: skin rash. Vertigo.   Plan:   CTM. Reassurance provided  F/u ENT    Attending Physician Statement  I have discussed the case, including pertinent history and exam findings with the resident. I also have seen the patient and performed key portions of the examination.  I agree with the documented assessment and plan.      
once-twice daily 35 g 1    Multiple Vitamin TABS Take 1 capsule by mouth daily Hair ,skin and nails 30 tablet 5    Handicap Placard MISC by Does not apply route Patient requires handicap placard for physical deconditioning, age, osteoarthritis of both knees and osteoporosis. This handicap placard is valid from 4/5/2022 to 4/5/2027; a total of 5 years. 1 each 0    Magic Mouthwash (MIRACLE MOUTHWASH) Swish and spit 5 mLs 4 times daily as needed for Irritation or Pain 60 mL 0    NARCAN 4 MG/0.1ML LIQD nasal spray 0.1 mLs by Nasal route once  1     No current facility-administered medications for this visit.      ______________________________________________________________________    Physical Exam:    Blood pressure 120/70, pulse 73, temperature 97.2 °F (36.2 °C), temperature source Temporal, resp. rate 16, height 1.651 m (5' 5\"), weight 85.3 kg (188 lb), SpO2 99%, not currently breastfeeding.    Physical Exam  Constitutional:       Appearance: Normal appearance.   HENT:      Head: Normocephalic and atraumatic.      Nose: Nose normal.      Mouth/Throat:      Mouth: Mucous membranes are moist.      Pharynx: Oropharynx is clear.   Eyes:      Conjunctiva/sclera: Conjunctivae normal.      Pupils: Pupils are equal, round, and reactive to light.   Cardiovascular:      Rate and Rhythm: Normal rate and regular rhythm.      Pulses: Normal pulses.      Heart sounds: Normal heart sounds.   Pulmonary:      Effort: Pulmonary effort is normal.      Breath sounds: Normal breath sounds.   Abdominal:      General: Bowel sounds are normal.      Palpations: Abdomen is soft.      Tenderness: There is no abdominal tenderness.   Musculoskeletal:         General: Swelling (over R posterior rib cage, no noticeable erythema/lesion) present.      Cervical back: Normal range of motion and neck supple.      Right lower leg: No edema.      Left lower leg: No edema.   Skin:     General: Skin is warm and dry.   Neurological:      General: No focal

## 2024-12-14 DIAGNOSIS — K21.9 GASTROESOPHAGEAL REFLUX DISEASE WITHOUT ESOPHAGITIS: ICD-10-CM

## 2024-12-16 NOTE — TELEPHONE ENCOUNTER
Name of Medication(s) Requested:  Requested Prescriptions     Pending Prescriptions Disp Refills    sucralfate (CARAFATE) 1 GM tablet [Pharmacy Med Name: SUCRALFATE 1 GM TABLET] 270 tablet 0     Sig: TAKE 1 TABLET BY MOUTH IN THE MORNING , AT NOON, AND AT BEDTIME       Medication is on current medication list Yes    Dosage and directions were verified? Yes    Quantity verified: 90 day supply     Pharmacy Verified?  Yes    Last Appointment:  12/13/2024    Future appts:  No future appointments.     (If no appt send self scheduling link. .REFILLAPPT)  Scheduling request sent?     [] Yes  [x] No    Does patient need updated?  [] Yes  [x] No

## 2024-12-17 RX ORDER — SUCRALFATE 1 G/1
1 TABLET ORAL 3 TIMES DAILY
Qty: 270 TABLET | Refills: 0 | Status: SHIPPED | OUTPATIENT
Start: 2024-12-17 | End: 2025-03-17

## 2024-12-20 ENCOUNTER — FOLLOWUP TELEPHONE ENCOUNTER (OUTPATIENT)
Dept: AUDIOLOGY | Age: 89
End: 2024-12-20

## 2024-12-20 NOTE — TELEPHONE ENCOUNTER
Patient left message at [facility] office that she wants to proceed with hearing aids from testing done in August. States she now has funds for them.     Called patient to explain that the audiologist is out of town until after 12/25/2024. Dr. Victor gave medical clearance on 9/19/2024. Will forward message to office where she was tested.     Patient was agreeable to plan.     Electronically signed by Gunner Vásquez on 12/20/2024 at 9:10 AM

## 2025-01-03 ENCOUNTER — HOSPITAL ENCOUNTER (OUTPATIENT)
Dept: AUDIOLOGY | Age: 89
Discharge: HOME | End: 2025-01-03

## 2025-01-03 PROCEDURE — 9990000010 HC NO CHARGE VISIT: Performed by: AUDIOLOGIST

## 2025-01-03 NOTE — PROGRESS NOTES
No charge HA consult.  She wants to order ITE/canal hearing aids with batteries.  Discussed her [employer] is through [employer].  She noted they wanted her to pay $10,000 and then they would reimburse some.  I asked if she was sure of the benefits and she said yes, she wanted to proceed here.   Ear impressions taken without incident.  Ordering [facility] canal ITE's with batteries.  She did not want , she did not want to raise her electric bill.  Will call her for HAF when they are in the office.   Electronically signed by Gunner Woo on 1/3/2025 at 11:54 AM

## 2025-01-20 DIAGNOSIS — K59.00 CONSTIPATION, UNSPECIFIED CONSTIPATION TYPE: ICD-10-CM

## 2025-01-20 RX ORDER — LORATADINE 10 MG
TABLET ORAL
Qty: 60 TABLET | Refills: 3 | OUTPATIENT
Start: 2025-01-20

## 2025-01-20 NOTE — TELEPHONE ENCOUNTER
Name of Medication(s) Requested:  Requested Prescriptions     Pending Prescriptions Disp Refills    GAS RELIEF EXTRA STRENGTH 125 MG chewable tablet [Pharmacy Med Name: GAS RELIEF 125 MG CHEW TABLET] 60 tablet 3     Sig: TAKE 1 TABLET BY MOUTH EVERY 6 HOURS AS NEEDED FOR FLATULENCE.       Medication is on current medication list No    Dosage and directions were verified? No        Quantity verified: 30 day supply     Pharmacy Verified?  No    Last Appointment:  12/13/2024    Future appts:  Future Appointments   Date Time Provider Department Center   1/21/2025  1:30 PM SCHEDULE, [facility]   1/22/2025  3:15 PM Jose C García MD [facility] [place] [facility] DEP        (If no appt send self scheduling link. .REFILLAPPT)  Scheduling request sent?     [] Yes  [x] No    Does patient need updated?  [] Yes  [x] No

## 2025-01-21 ENCOUNTER — TELEPHONE (OUTPATIENT)
Dept: FAMILY MEDICINE CLINIC | Age: 89
End: 2025-01-21

## 2025-01-21 ENCOUNTER — HOSPITAL ENCOUNTER (OUTPATIENT)
Dept: AUDIOLOGY | Age: 89
Discharge: HOME | End: 2025-01-21

## 2025-01-21 PROCEDURE — 9990000010 HC NO CHARGE VISIT: Performed by: AUDIOLOGIST

## 2025-01-21 RX ORDER — SIMETHICONE 80 MG
80 TABLET,CHEWABLE ORAL EVERY 6 HOURS PRN
Qty: 180 TABLET | Refills: 3 | Status: SHIPPED | OUTPATIENT
Start: 2025-01-21

## 2025-01-21 NOTE — TELEPHONE ENCOUNTER
Ivy called in for Simethicone 180mg  gas meds chewables to be phoned into her pharmacy. [id number], phone [phone]

## 2025-01-21 NOTE — PROGRESS NOTES
No charge HA fitting for 30 day trial per insurance guidelines.  Fit with Cono-C ITE w/ 312 battery.  Reviewed use and care.  Scanned documents to epic.   Will be cking a new insurance she received for HA benefits.  But did discuss HA benefits outside [facility].  Electronically signed by Gunner Woo on 1/21/2025 at 12:01 PM\   Ambulatory

## 2025-01-22 ENCOUNTER — OFFICE VISIT (OUTPATIENT)
Dept: FAMILY MEDICINE CLINIC | Age: 89
End: 2025-01-22

## 2025-01-22 VITALS
BODY MASS INDEX: 33.66 KG/M2 | HEIGHT: 65 IN | HEART RATE: 70 BPM | DIASTOLIC BLOOD PRESSURE: 66 MMHG | WEIGHT: 202 LBS | OXYGEN SATURATION: 97 % | RESPIRATION RATE: 16 BRPM | TEMPERATURE: 97 F | SYSTOLIC BLOOD PRESSURE: 102 MMHG
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
## 2025-01-22 DIAGNOSIS — M75.42 IMPINGEMENT SYNDROME OF LEFT SHOULDER: ICD-10-CM

## 2025-01-22 RX ORDER — LIDOCAINE 4 G/G
1 PATCH TOPICAL DAILY
Qty: 30 PATCH | Refills: 0 | Status: SHIPPED | OUTPATIENT
Start: 2025-01-22 | End: 2025-02-21

## 2025-01-22 SDOH — ECONOMIC STABILITY: FOOD INSECURITY: WITHIN THE PAST 12 MONTHS, YOU WORRIED THAT YOUR FOOD WOULD RUN OUT BEFORE YOU GOT MONEY TO BUY MORE.: NEVER TRUE

## 2025-01-22 SDOH — ECONOMIC STABILITY: FOOD INSECURITY: WITHIN THE PAST 12 MONTHS, THE FOOD YOU BOUGHT JUST DIDN'T LAST AND YOU DIDN'T HAVE MONEY TO GET MORE.: NEVER TRUE

## 2025-01-22 ASSESSMENT — PATIENT HEALTH QUESTIONNAIRE - PHQ9
1. LITTLE INTEREST OR PLEASURE IN DOING THINGS: NOT AT ALL
6. FEELING BAD ABOUT YOURSELF - OR THAT YOU ARE A FAILURE OR HAVE LET YOURSELF OR YOUR FAMILY DOWN: NOT AT ALL
SUM OF ALL RESPONSES TO PHQ QUESTIONS 1-9: 0
9. THOUGHTS THAT YOU WOULD BE BETTER OFF DEAD, OR OF HURTING YOURSELF: NOT AT ALL
SUM OF ALL RESPONSES TO PHQ QUESTIONS 1-9: 0
8. MOVING OR SPEAKING SO SLOWLY THAT OTHER PEOPLE COULD HAVE NOTICED. OR THE OPPOSITE, BEING SO FIGETY OR RESTLESS THAT YOU HAVE BEEN MOVING AROUND A LOT MORE THAN USUAL: NOT AT ALL
4. FEELING TIRED OR HAVING LITTLE ENERGY: NOT AT ALL
SUM OF ALL RESPONSES TO PHQ9 QUESTIONS 1 & 2: 0
2. FEELING DOWN, DEPRESSED OR HOPELESS: NOT AT ALL
SUM OF ALL RESPONSES TO PHQ QUESTIONS 1-9: 0
7. TROUBLE CONCENTRATING ON THINGS, SUCH AS READING THE NEWSPAPER OR WATCHING TELEVISION: NOT AT ALL
SUM OF ALL RESPONSES TO PHQ QUESTIONS 1-9: 0
3. TROUBLE FALLING OR STAYING ASLEEP: NOT AT ALL
10. IF YOU CHECKED OFF ANY PROBLEMS, HOW DIFFICULT HAVE THESE PROBLEMS MADE IT FOR YOU TO DO YOUR WORK, TAKE CARE OF THINGS AT HOME, OR GET ALONG WITH OTHER PEOPLE: NOT DIFFICULT AT ALL

## 2025-01-22 NOTE — PROGRESS NOTES
[facility]  FAMILY MEDICINE RESIDENCY PROGRAM  DATE OF VISIT : 2025    Patient : Ivy Smith   Age : 89 y.o.    : 1935   MRN : [id number]   ______________________________________________________________________    Chief Complaint:   Chief Complaint   Patient presents with    Fall     Fell 1 week ago    Back Pain    Medication Refill     Stomach medication         HPI:   Ivy Smith is a 89 y.o. female hx osteoporosis who presents for fall. Patient states that she fell down the front steps of her house backwards. The front of her house has two tall steps. She states she was locking her front door when she fell backwards down the stairs. She never experienced LOC and did not hit her head. She states she has diffuse spinal pain but it is significantly worse in her low back. She denies numbness/tingling in her lower extremities, denies weakness in her lower extremities, denies bowel/bladder incontinence. The patient follows with the pain clinic from whom she receives percocet, and she states she has been taking that to help with the pain. Has not tried anything else.    Past Medical History:  Past Medical History:   Diagnosis Date    Anxiety 10/11/2010    Breast cancer (HCC) approx 2000    right, treated lumpectomy, radiation, chemo    Depression 10/11/2010    no issues    Diverticular disease 10/11/2010    GERD (gastroesophageal reflux disease)     HTN (hypertension) 10/11/2010    Hyperlipidemia 10/11/2010    Osteoarthritis 10/11/2010    fot left knee arthroplasty 10/21/2016    Osteoporosis 10/11/2010    Spinal stenosis 10/11/2010    Use of cane as ambulatory aid        Allergies:   No Known Allergies    Medications:    Current Outpatient Medications   Medication Sig Dispense Refill    diclofenac sodium (VOLTAREN) 1 % GEL APPLY 2 GRAMS 4 TIMES A  g 1    lidocaine 4 % external patch Place 1 patch onto the skin daily 30 patch 0    simethicone (MYLICON) 80 MG chewable tablet Take 1

## 2025-01-22 NOTE — PROGRESS NOTES
S: 89 y.o. female presents today for:   Osteoporosis, s/p fall. Fell backward from front steps. Lost balance. Landed on her back. No CP, diaphoresis, SOB, palp, HA, visual issues, NTW, BBI.   Gets percocet from pain clinic. Pain in shoulder bilaterally.    O: VS: /66   Pulse 70   Temp 97 °F (36.1 °C) (Temporal)   Resp 16   Ht 1.651 m (5' 5\")   Wt 91.6 kg (202 lb)   SpO2 97%   BMI 33.61 kg/m²   AAO/NAD, appropriate affect for mood  CV:  RRR, no murmur  Resp: CTAB  MS: pain at 90 abduction, ECT negative, tender PS lumbar, SLR neg, antalgic gait     Impression/Plan:   1) fall complicated by Osteoporosis- imaging,lidocaine      Attending Physician Statement  I have discussed the case, including pertinent history and exam findings with the resident.  I agree with the documented assessment and plan.      Caryl Escobar, DO

## 2025-01-24 ENCOUNTER — HOSPITAL ENCOUNTER (OUTPATIENT)
Age: 89
Discharge: HOME | End: 2025-01-26
Payer: MEDICARE

## 2025-01-24 ENCOUNTER — HOSPITAL ENCOUNTER (OUTPATIENT)
Dept: GENERAL RADIOLOGY | Age: 89
Discharge: HOME | End: 2025-01-26
Payer: MEDICARE
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
## 2025-01-24 PROCEDURE — 72072 X-RAY EXAM THORAC SPINE 3VWS: CPT

## 2025-01-24 PROCEDURE — 72100 X-RAY EXAM L-S SPINE 2/3 VWS: CPT

## 2025-01-28 ENCOUNTER — HOSPITAL ENCOUNTER (OUTPATIENT)
Dept: AUDIOLOGY | Age: 89
Discharge: HOME | End: 2025-01-28

## 2025-01-28 ENCOUNTER — TELEPHONE (OUTPATIENT)
Dept: FAMILY MEDICINE CLINIC | Age: 89
End: 2025-01-28
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
## 2025-01-28 DIAGNOSIS — M75.42 IMPINGEMENT SYNDROME OF LEFT SHOULDER: Primary | ICD-10-CM

## 2025-01-28 PROCEDURE — 9990000010 HC NO CHARGE VISIT: Performed by: AUDIOLOGIST

## 2025-01-28 RX ORDER — IBUPROFEN 600 MG/1
600 TABLET, FILM COATED ORAL EVERY 8 HOURS PRN
Qty: 120 TABLET | Refills: 0 | Status: SHIPPED | OUTPATIENT
Start: 2025-01-28

## 2025-01-28 NOTE — TELEPHONE ENCOUNTER
Patient called and informed of results.  Advised that XRs were negative for acute fracture or dislodgement of hardware in spine, pain likely muscular in nature. Recommended PT and ibuprofen. Discussed appropriate use of medication and how to take. Discussed possible adverse effects / side effects and answered all questions. If medication not working advised close follow-up. Pt agreeable

## 2025-01-28 NOTE — PROGRESS NOTES
No charge HA ck.  Left HA hurting ear.  Attempted to modify in office but shell thin, put hole in shell.  Made new impression, sending to [facility] for remake.  Will call when in for refit and sound adjustments.  She would like the volume turned down and speech clarity adjusted.  Electronically signed by Gunner Woo on 1/28/2025 at 2:35 PM

## 2025-02-03 ENCOUNTER — TELEPHONE (OUTPATIENT)
Dept: FAMILY MEDICINE CLINIC | Age: 89
End: 2025-02-03

## 2025-02-07 ENCOUNTER — HOSPITAL ENCOUNTER (OUTPATIENT)
Dept: PHYSICAL THERAPY | Age: 89
Setting detail: THERAPIES SERIES
Discharge: HOME | End: 2025-02-07
Payer: MEDICARE

## 2025-02-07 PROCEDURE — 97161 PT EVAL LOW COMPLEX 20 MIN: CPT | Performed by: PHYSICAL THERAPIST

## 2025-02-07 ASSESSMENT — PAIN DESCRIPTION - DESCRIPTORS: DESCRIPTORS: ACHING;BURNING

## 2025-02-07 ASSESSMENT — PAIN SCALES - GENERAL: PAINLEVEL_OUTOF10: 10

## 2025-02-07 ASSESSMENT — PAIN DESCRIPTION - ORIENTATION: ORIENTATION: LEFT

## 2025-02-07 ASSESSMENT — PAIN DESCRIPTION - PAIN TYPE: TYPE: ACUTE PAIN;CHRONIC PAIN

## 2025-02-07 ASSESSMENT — PAIN DESCRIPTION - LOCATION: LOCATION: BACK;SHOULDER

## 2025-02-07 NOTE — PROGRESS NOTES
Physical Therapy: Initial Evaluation    Patient: Ivy Smith (89 y.o. female)   Examination Date: 2025  Plan of Care Certification Period: 2025 to        :  1935 ;    Confirmed: Yes MRN: [id number]  CSN: [id number]   Insurance: Payor: MEDICARE / Plan: MEDICARE PART A AND B / Product Type: *No Product type* /   Insurance ID: [id number] - (Medicare) Secondary Insurance (if applicable): [employer]   Referring Physician: Caryl Laura DO     PCP: Jose C García MD Visits to Date/Visits Approved:     No Show/Cancelled Appts:   /       Medical Diagnosis: Impingement syndrome of left shoulder [M75.42]  Fall (on) (from) unspecified stairs and steps, initial encounter [W10.9XXA]    Treatment Diagnosis:       PERTINENT MEDICAL HISTORY           Medical History: Chart Reviewed: Yes   Past Medical History:   Diagnosis Date    Anxiety 10/11/2010    Breast cancer (HCC) approx     right, treated lumpectomy, radiation, chemo    Depression 10/11/2010    no issues    Diverticular disease 10/11/2010    GERD (gastroesophageal reflux disease)     HTN (hypertension) 10/11/2010    Hyperlipidemia 10/11/2010    Osteoarthritis 10/11/2010    fot left knee arthroplasty 10/21/2016    Osteoporosis 10/11/2010    Spinal stenosis 10/11/2010    Use of cane as ambulatory aid      Surgical History:   Past Surgical History:   Procedure Laterality Date    BACK SURGERY      BONE GRAFT      with back surgery    BREAST LUMPECTOMY      left, with chemo and rad, [facility]    CARPAL TUNNEL RELEASE      left    CARPAL TUNNEL RELEASE      repeat left    CATARACT REMOVAL      [facility]    COLONOSCOPY  2011    extensive diverticulosis, Dr. Venegas, [facility]    COLONOSCOPY      \"could not get around colon due to diverticulitis, [facility]    COLONOSCOPY  2015    severe diverticulosis transverse and left colon without diverticulitis, rectal polyp bx, Dr. Diaz, [facility]

## 2025-02-07 NOTE — PROGRESS NOTES
[facility]                Phone: [phone]   Fax: [phone]    Physical Therapy Daily Treatment Note  Date:  2025    Patient Name:  Iyv Smith    :  1935  MRN: [id number]    Evaluating therapist:  EMA García        (25)  Restrictions/Precautions:    Diagnosis:    Treatment Diagnosis:    Insurance/Certification information:  Medicare           cert dates:  25  to  25          ICD-10:    Referring Physician:  DHRUV García  Plan of care signed (Y/N):  Y  Visit# / total visits:  -  Pain level: 10/10 LB/L shoulder   Time In:  Time Out:    Subjective:      Exercises:  Exercise/Equipment Resistance/Repetitions Other comments   StepOne with arms              tball flex/rot            seated hip add                       abd                       flex                 knee ext            toe raises     step ups            pulleys for flex/abd             shrugs     scap ret                            Other Therapeutic Activities:      Home Exercise Program:  provided 25    Manual Treatments:      Modalities:  IFC/MH to LB, MH to L shoulder  PRN     Timed Code Treatment Minutes:      Total Treatment Minutes:      Treatment/Activity Tolerance:  [] Patient tolerated treatment well [] Patient limited by fatique  [] Patient limited by pain  [] Patient limited by other medical complications  [] Other:     Prognosis: [] Good [] Fair  [] Poor    Patient Requires Follow-up: [] Yes  [] No    Plan:   [] Continue per plan of care [] Alter current plan (see comments)  [] Plan of care initiated [] Hold pending MD visit [] Discharge  Plan for Next Session:      See Weekly Progress Note: []  Yes  []  No  Next due:        Electronically signed by:  Jey Alvarez PT

## 2025-02-11 ENCOUNTER — HOSPITAL ENCOUNTER (OUTPATIENT)
Dept: AUDIOLOGY | Age: 89
Discharge: HOME | End: 2025-02-11

## 2025-02-11 ENCOUNTER — HOSPITAL ENCOUNTER (OUTPATIENT)
Dept: PHYSICAL THERAPY | Age: 89
Setting detail: THERAPIES SERIES
Discharge: HOME | End: 2025-02-11
Payer: MEDICARE

## 2025-02-11 PROCEDURE — 9990000010 HC NO CHARGE VISIT: Performed by: AUDIOLOGIST

## 2025-02-11 PROCEDURE — G0283 ELEC STIM OTHER THAN WOUND: HCPCS | Performed by: PHYSICAL THERAPIST

## 2025-02-11 PROCEDURE — 97110 THERAPEUTIC EXERCISES: CPT | Performed by: PHYSICAL THERAPIST

## 2025-02-11 NOTE — PROGRESS NOTES
[facility]                Phone: [phone]   Fax: [phone]    Physical Therapy Daily Treatment Note  Date:  2025    Patient Name:  Ivy Smith    :  1935  MRN: [id number]    Evaluating therapist:  EMA García        (25)  Restrictions/Precautions:    Diagnosis:    Treatment Diagnosis:    Insurance/Certification information:  Medicare           cert dates:  25  to  25          ICD-10:    Referring Physician:  DHRUV García  Plan of care signed (Y/N):  Y  Visit# / total visits:  -  Pain level: 10/10 LB/L shoulder   Time In:  1357  Time Out:  1454    Subjective:      Exercises:  Exercise/Equipment Resistance/Repetitions Other comments   StepOne with arms   10 min            tball flex/rot 5x10s           seated hip add [id number]                      abd [id number]                      flex [id number]                knee ext [id number]           toe raises 2x10    step ups 2x10x4\"           pulleys for flex/abd  3 min ea           shrugs 15x3s    scap ret 15x3s                           Other Therapeutic Activities:      Home Exercise Program:  provided 25    Manual Treatments:      Modalities:  IFC/MH to LB, MH to L shoulder  x 15 min     Timed Code Treatment Minutes:      Total Treatment Minutes:      Treatment/Activity Tolerance:  [] Patient tolerated treatment well [] Patient limited by fatique  [] Patient limited by pain  [] Patient limited by other medical complications  [] Other:     Prognosis: [] Good [] Fair  [] Poor    Patient Requires Follow-up: [] Yes  [] No    Plan:   [] Continue per plan of care [] Alter current plan (see comments)  [] Plan of care initiated [] Hold pending MD visit [] Discharge  Plan for Next Session:      See Weekly Progress Note: []  Yes  []  No  Next due:        Electronically signed by:  Jey Alvarez, SHIMON

## 2025-02-11 NOTE — PROGRESS NOTES
No charge hearing aid refit after remake of left hearing aid.  She said it fit much better.   Also adjusted gain to make hearing aids softer.  She was pleased with adjustments.  Reviewed wax guard.  She will return 3/4/25 for follow up.Will need to bill DANIELS's at that visit if keeping .  Electronically signed by Gunner Woo on 2/11/2025 at 2:28 PM

## 2025-02-13 ENCOUNTER — HOSPITAL ENCOUNTER (OUTPATIENT)
Dept: PHYSICAL THERAPY | Age: 89
Setting detail: THERAPIES SERIES
Discharge: HOME | End: 2025-02-13
Payer: MEDICARE

## 2025-02-13 PROCEDURE — G0283 ELEC STIM OTHER THAN WOUND: HCPCS | Performed by: PHYSICAL THERAPIST

## 2025-02-13 PROCEDURE — 97110 THERAPEUTIC EXERCISES: CPT | Performed by: PHYSICAL THERAPIST

## 2025-02-13 NOTE — PROGRESS NOTES
S:  pt presents to therapy for two of two scheduled visits this week; at week's end she continues to report that her LB and L shoulder continue to ache with most prolonged activities;  pain level given as 10/10 and hampers her during the day; no c/o buckling or LOB over the last week's time; continues to ambulate with cane; HEP going well per pt    O:  performed the exercises/treatments as written in the flowsheet for the week ending 2/14/25; initiated HEP for home management of condition; L shoulder AROM:  flex/abd= 90*/90*, IR/ER= buttock/tip of ear; L shoulder strength grossly 3-/5 for all available AROM's; LB AROM grossly 25% WNL for all ranges B LE strength grossly 4/5 for all planes     A:  donna tx well; pt able to perform all requested tasks with good form and pacing noted; LB AROM and B LE strength grossly stable since last week; L shoulder AROM/strength grossly stable since eval; movement remains slow/guarded due to aching/stiffness; gait slow/guarded with short/equal strides using quad cane; endurance is FAIR/FAIR+ for all prolonged activities     P:  cont with POC of stretching/strengthening for LB/B LE's with modalities as needed

## 2025-02-13 NOTE — PROGRESS NOTES
[facility]                Phone: [phone]   Fax: [phone]    Physical Therapy Daily Treatment Note  Date:  2025    Patient Name:  Ivy Smith    :  1935  MRN: [id number]    Evaluating therapist:  EMA García        (25)  Restrictions/Precautions:    Diagnosis:    Treatment Diagnosis:  falls   Insurance/Certification information:  Medicare           cert dates:  25  to  25          ICD-10:    Referring Physician:  DHRUV García  Plan of care signed (Y/N):  Y  Visit# / total visits:  3/6-  Pain level: 10/10 LB/L shoulder   Time In:  1433  Time Out:  1532    Subjective:      Exercises:  Exercise/Equipment Resistance/Repetitions Other comments   StepOne with arms   10 min            tball flex/rot 5x10s           seated hip add 5z98y2b                      abd 6k97nox                      flex 0u14l0ee                knee ext 3z72z1nq           toe raises 2x10    step ups 2x10x4\"           pulleys for flex/abd  3 min ea           shrugs 15x3s    scap ret 15x3s                           Other Therapeutic Activities:      Home Exercise Program:  provided 25    Manual Treatments:      Modalities:  IFC/MH to LB, MH to L shoulder  x 15 min     Timed Code Treatment Minutes:      Total Treatment Minutes:      Treatment/Activity Tolerance:  [] Patient tolerated treatment well [] Patient limited by fatique  [] Patient limited by pain  [] Patient limited by other medical complications  [] Other:     Prognosis: [] Good [] Fair  [] Poor    Patient Requires Follow-up: [] Yes  [] No    Plan:   [] Continue per plan of care [] Alter current plan (see comments)  [] Plan of care initiated [] Hold pending MD visit [] Discharge  Plan for Next Session:      See Weekly Progress Note: []  Yes  []  No  Next due:        Electronically signed by:  Jey Alvarez, SHIMON

## 2025-02-17 ENCOUNTER — OFFICE VISIT (OUTPATIENT)
Dept: FAMILY MEDICINE CLINIC | Age: 89
End: 2025-02-17

## 2025-02-17 VITALS
OXYGEN SATURATION: 96 % | SYSTOLIC BLOOD PRESSURE: 149 MMHG | HEART RATE: 72 BPM | HEIGHT: 65 IN | TEMPERATURE: 96.4 F | BODY MASS INDEX: 30.99 KG/M2 | RESPIRATION RATE: 18 BRPM | WEIGHT: 186 LBS | DIASTOLIC BLOOD PRESSURE: 83 MMHG

## 2025-02-17 DIAGNOSIS — M54.50 ACUTE BILATERAL LOW BACK PAIN WITHOUT SCIATICA: ICD-10-CM

## 2025-02-17 DIAGNOSIS — R10.13 EPIGASTRIC PAIN: Primary | ICD-10-CM

## 2025-02-17 NOTE — PROGRESS NOTES
S: 89 y.o. female presents today for Abdominal Pain (Burning for 2 weeks ), Medication Refill (Patient needs refills on her stomach  and eye drops ), and OTHER (Patient needs a order for a stair lift and a tens unit for her back )      Epigastric pain: 2 weeks burning pain; non-radiating; worse with eating; decreased PO due to this; lost 16lbs in last month; prilosec 40mg TID for this; carafate 4x per day with no improvement; only 1 ibuprofen during this time; no previous nsaids; no other red flags  Lumbar ss: fell 1 month ago, did get get xrays which were wnl; feels unstable with walking; uses cane / rollator; Request for stair lift  1.5 years burning; swelling pine in the thoracic spine; feels this constant burning for her; hx of spinal surgery previously    O: VS: BP (!) 149/83 (Site: Right Upper Arm, Position: Sitting, Cuff Size: Large Adult)   Pulse 72   Temp (!) 96.4 °F (35.8 °C) (Temporal)   Resp 18   Ht 1.651 m (5' 5\")   Wt 84.4 kg (186 lb)   SpO2 96%   BMI 30.95 kg/m²   AAO/NAD, appropriate affect for mood  CV:  RRR, no murmur  Resp: CTAB  Abdomen: tender epigastric region  Msk: ttp lumbar paraspinal R side tolliver than L side   Ext; no edema    Assessment/Plan:   1) Epigastric pain - CTA abdomen / pelvis with runoff; referral gen surg at this time; labs   2) thoracic swelling - CT lumbar spine   3) fall risk - lift chair  RTO: 4 weeks      Attending Physician Statement  I have discussed the case, including pertinent history and exam findings with the resident.  I agree with the documented assessment and plan.      Electronically signed by Jann Edwards MD on 2/24/2025 at 2:20 PM

## 2025-02-17 NOTE — PROGRESS NOTES
[facility] [place]  FAMILY MEDICINE RESIDENCY PROGRAM  DATE OF VISIT : 2025    Patient : Ivy Smith   Age : 89 y.o.    : 1935   MRN : [id number]   ______________________________________________________________________    Chief Complaint:   Chief Complaint   Patient presents with    Abdominal Pain     Burning for 2 weeks     Medication Refill     Patient needs refills on her stomach  and eye drops     OTHER     Patient needs a order for a stair lift and a tens unit for her back        HPI:   Ivy Smith is a 89 y.o. female who presents for burning epigastric pain for the last two weeks. She states that it is worst with eating and nothing makes it better. She states that she has not been eating as much lately because of it.  Wt Readings from Last 3 Encounters:   25 84.4 kg (186 lb)   25 91.6 kg (202 lb)   24 85.3 kg (188 lb)   She has been taking prilosec and carafate which are somewhat helpful but overall do not alleviate her symptoms. She has taken ibuprofen once since it started and did not take any ibuprofen    Stair lift- patient fell down her stairs. She has a history of lumbar spinal stenosis and has experienced a laminectomy and a spinal fusion. She states that she can walk approximately 15 feet without having to sit down, and she is dependent on her cane to not fall. She states that she feels unstable when walking, and when she utilizes her cane, it does not alleviate this issue. She states that she has 15 steps in her house, and sometimes she has to crawl up them because it is so difficult for her to walk. She also has a rollator at home but she does not have the arm strength to properly maneuver it up the steps and it increases her risk of falls.     Past Medical History:  Past Medical History:   Diagnosis Date    Anxiety 10/11/2010    Breast cancer (HCC) approx 2000    right, treated lumpectomy, radiation, chemo    Depression 10/11/2010    no issues

## 2025-02-18 ENCOUNTER — HOSPITAL ENCOUNTER (OUTPATIENT)
Dept: CT IMAGING | Age: 89
Discharge: HOME | End: 2025-02-20
Payer: MEDICARE

## 2025-02-18 ENCOUNTER — HOSPITAL ENCOUNTER (OUTPATIENT)
Dept: PHYSICAL THERAPY | Age: 89
Setting detail: THERAPIES SERIES
Discharge: HOME | End: 2025-02-18
Payer: MEDICARE

## 2025-02-18 ENCOUNTER — HOSPITAL ENCOUNTER (OUTPATIENT)
Age: 89
Discharge: HOME | End: 2025-02-18
Payer: MEDICARE

## 2025-02-18 DIAGNOSIS — R10.13 EPIGASTRIC PAIN: ICD-10-CM

## 2025-02-18 LAB
ALBUMIN SERPL-MCNC: 4.3 G/DL (ref 3.5–5.2)
ALP SERPL-CCNC: 109 U/L (ref 35–104)
ALT SERPL-CCNC: 13 U/L (ref 0–32)
ANION GAP SERPL CALCULATED.3IONS-SCNC: 12 MMOL/L (ref 7–16)
AST SERPL-CCNC: 18 U/L (ref 0–31)
BILIRUB SERPL-MCNC: 0.3 MG/DL (ref 0–1.2)
BUN SERPL-MCNC: 13 MG/DL (ref 6–23)
CALCIUM SERPL-MCNC: 9.4 MG/DL (ref 8.6–10.2)
CHLORIDE SERPL-SCNC: 101 MMOL/L (ref 98–107)
CO2 SERPL-SCNC: 24 MMOL/L (ref 22–29)
CREAT SERPL-MCNC: 0.9 MG/DL (ref 0.5–1)
GFR, ESTIMATED: 63 ML/MIN/1.73M2
GLUCOSE SERPL-MCNC: 105 MG/DL (ref 74–99)
POTASSIUM SERPL-SCNC: 3.5 MMOL/L (ref 3.5–5)
PROT SERPL-MCNC: 7.7 G/DL (ref 6.4–8.3)
SODIUM SERPL-SCNC: 137 MMOL/L (ref 132–146)

## 2025-02-18 PROCEDURE — 74174 CTA ABD&PLVS W/CONTRAST: CPT

## 2025-02-18 PROCEDURE — 80053 COMPREHEN METABOLIC PANEL: CPT

## 2025-02-18 PROCEDURE — 6360000004 HC RX CONTRAST MEDICATION: Performed by: RADIOLOGY

## 2025-02-18 PROCEDURE — 72132 CT LUMBAR SPINE W/DYE: CPT

## 2025-02-18 PROCEDURE — 36415 COLL VENOUS BLD VENIPUNCTURE: CPT

## 2025-02-18 PROCEDURE — 97110 THERAPEUTIC EXERCISES: CPT | Performed by: PHYSICAL THERAPIST

## 2025-02-18 PROCEDURE — 72129 CT CHEST SPINE W/DYE: CPT

## 2025-02-18 PROCEDURE — G0283 ELEC STIM OTHER THAN WOUND: HCPCS | Performed by: PHYSICAL THERAPIST

## 2025-02-18 RX ORDER — IOPAMIDOL 755 MG/ML
75 INJECTION, SOLUTION INTRAVASCULAR
Status: COMPLETED | OUTPATIENT
Start: 2025-02-18 | End: 2025-02-18

## 2025-02-18 RX ADMIN — IOPAMIDOL 75 ML: 755 INJECTION, SOLUTION INTRAVENOUS at 17:35

## 2025-02-18 NOTE — PROGRESS NOTES
[facility]                Phone: [phone]   Fax: [phone]    Physical Therapy Daily Treatment Note  Date:  2025    Patient Name:  Ivy Smith    :  1935  MRN: [id number]    Evaluating therapist:  EMA García        (25)  Restrictions/Precautions:    Diagnosis:    Treatment Diagnosis:  falls   Insurance/Certification information:  Medicare           cert dates:  25  to  25          ICD-10:    Referring Physician:  DHRUV García  Plan of care signed (Y/N):  Y  Visit# / total visits:  -  Pain level: 10/10 LB/L shoulder   Time In:  1424  Time Out:  1524    Subjective:      Exercises:  Exercise/Equipment Resistance/Repetitions Other comments   StepOne with arms   10 min            tball flex/rot 5x10s           seated hip add 3j26v5h                      abd 7u96qbl                      flex 3w33d0bz                knee ext 3a22r9ja              sit/stand 2x10    toe raises 2x10    step ups 2x10x4\"           pulleys for flex/abd  3 min ea           shrugs 15x3s    scap ret 15x3s                           Other Therapeutic Activities:      Home Exercise Program:  provided 25    Manual Treatments:      Modalities:  IFC/MH to LB, MH to L shoulder  x 15 min     Timed Code Treatment Minutes:      Total Treatment Minutes:      Treatment/Activity Tolerance:  [] Patient tolerated treatment well [] Patient limited by fatique  [] Patient limited by pain  [] Patient limited by other medical complications  [] Other:     Prognosis: [] Good [] Fair  [] Poor    Patient Requires Follow-up: [] Yes  [] No    Plan:   [] Continue per plan of care [] Alter current plan (see comments)  [] Plan of care initiated [] Hold pending MD visit [] Discharge  Plan for Next Session:      See Weekly Progress Note: []  Yes  []  No  Next due:        Electronically signed by:  Jey Alvarez PT

## 2025-02-19 ENCOUNTER — TELEPHONE (OUTPATIENT)
Dept: FAMILY MEDICINE CLINIC | Age: 89
End: 2025-02-19

## 2025-02-19 DIAGNOSIS — M54.50 CHRONIC BILATERAL LOW BACK PAIN WITHOUT SCIATICA: ICD-10-CM

## 2025-02-19 DIAGNOSIS — G89.29 CHRONIC BILATERAL LOW BACK PAIN WITHOUT SCIATICA: ICD-10-CM

## 2025-02-19 DIAGNOSIS — M75.42 IMPINGEMENT SYNDROME OF LEFT SHOULDER: Primary | ICD-10-CM

## 2025-02-19 NOTE — TELEPHONE ENCOUNTER
----- Message from Desean DAN sent at 2/19/2025  3:38 PM EST -----  Regarding: ECC Message to Provider  ECC Message to Provider    Relationship to Patient: Self     Additional Information : patient want  Jose C García MD to send the patients medical records to [email or web address] so that the patient mail box will be move near the patients door because the patient is too old to go out and already fell because of it.  --------------------------------------------------------------------------------------------------------------------------    Call Back Information: OK to leave message on voicemail  Preferred Call Back Number: [phone]    [facility]- post master general  Fax number: [phone]

## 2025-02-19 NOTE — TELEPHONE ENCOUNTER
Patient called and informed of results.  States that her abdominal pain has largely resolved. Awaiting results of remaining blood work.     CT lumbar spine showed fluid around dorsal surgical bed without other acute abnormality or complications. Patient not having any systemic symptoms or neurologic deficits. MRI lumbar spine ordered for further evaluation. Pt advised to call for new/worsening symptoms or any neurologic deficits, otherwise f/u in 1 mo. Patient verbalized understanding and is agreeable to plan.

## 2025-02-20 ENCOUNTER — HOSPITAL ENCOUNTER (OUTPATIENT)
Dept: PHYSICAL THERAPY | Age: 89
Setting detail: THERAPIES SERIES
Discharge: HOME | End: 2025-02-20
Payer: MEDICARE

## 2025-02-20 PROCEDURE — 97110 THERAPEUTIC EXERCISES: CPT | Performed by: PHYSICAL THERAPIST

## 2025-02-20 PROCEDURE — G0283 ELEC STIM OTHER THAN WOUND: HCPCS | Performed by: PHYSICAL THERAPIST

## 2025-02-20 NOTE — PROGRESS NOTES
S:  pt presents to therapy for two of two scheduled visits this week; at week's end she continues to report that her LB and L shoulder continue to ache with most prolonged activities;  pain level given as 10/10 and hampers her during the day; no c/o buckling or LOB over the last week's time; continues to ambulate with cane; HEP going well per pt    O:  performed the exercises/treatments as written in the flowsheet for the week ending 2/21/25;  L shoulder AROM:  flex/abd= 90*/90*, IR/ER= buttock/tip of ear; L shoulder strength grossly 3-/5 for all available AROM's; LB AROM grossly 25% WNL for all ranges B LE strength grossly 4/5 for all planes     A:  donna tx well; pt able to perform all requested tasks with good form and pacing noted; LB AROM and B LE strength grossly stable since last week; L shoulder AROM/strength grossly stable since eval; movement remains slow/guarded due to aching/stiffness; gait slow/guarded with short/equal strides using quad cane; endurance is FAIR/FAIR+ for all prolonged activities     P:  cont with POC of stretching/strengthening for LB/B LE's with modalities as needed

## 2025-02-20 NOTE — PROGRESS NOTES
[facility]                Phone: [phone]   Fax: [phone]    Physical Therapy Daily Treatment Note  Date:  2025    Patient Name:  Ivy Smith    :  1935  MRN: [id number]    Evaluating therapist:  EMA Gacría        (25)  Restrictions/Precautions:    Diagnosis:    Treatment Diagnosis:  falls   Insurance/Certification information:  Medicare           cert dates:  25  to  25          ICD-10:    Referring Physician:  DHRUV García  Plan of care signed (Y/N):  Y  Visit# / total visits:    Pain level: 10/10 LB/L shoulder   Time In:  1424  Time Out:  1510    Subjective:      Exercises:  Exercise/Equipment Resistance/Repetitions Other comments   StepOne with arms   10 min            tball flex/rot 5x10s           seated hip add 9y40m1s                      abd 8z43ocn                      flex 9b04q6rp                knee ext 3p56s5pw              sit/stand 2x10    toe raises NT due to LB aching    step ups \" \"           pulleys for flex/abd  3 min ea           shrugs 15x3s    scap ret 15x3s                           Other Therapeutic Activities:      Home Exercise Program:  provided 25    Manual Treatments:      Modalities:  IFC/MH to LB, MH to L shoulder  x 15 min     Timed Code Treatment Minutes:      Total Treatment Minutes:      Treatment/Activity Tolerance:  [] Patient tolerated treatment well [] Patient limited by fatique  [] Patient limited by pain  [] Patient limited by other medical complications  [] Other:     Prognosis: [] Good [] Fair  [] Poor    Patient Requires Follow-up: [] Yes  [] No    Plan:   [] Continue per plan of care [] Alter current plan (see comments)  [] Plan of care initiated [] Hold pending MD visit [] Discharge  Plan for Next Session:      See Weekly Progress Note: []  Yes  []  No  Next due:        Electronically signed by:  Jey Alvarez, PT

## 2025-02-21 ENCOUNTER — TELEPHONE (OUTPATIENT)
Dept: FAMILY MEDICINE CLINIC | Age: 89
End: 2025-02-21

## 2025-02-21 NOTE — TELEPHONE ENCOUNTER
Pt called to inquire about a letter being sent to the post master about having a mailbox being attached to her house. Pt state she has fallen while getting the mail.  Letter to be fax to: [phone]

## 2025-02-25 ENCOUNTER — HOSPITAL ENCOUNTER (OUTPATIENT)
Dept: PHYSICAL THERAPY | Age: 89
Setting detail: THERAPIES SERIES
Discharge: HOME | End: 2025-02-25
Payer: MEDICARE

## 2025-02-25 ENCOUNTER — HOSPITAL ENCOUNTER (OUTPATIENT)
Dept: AUDIOLOGY | Age: 89
Discharge: HOME | End: 2025-02-25

## 2025-02-25 PROCEDURE — 97110 THERAPEUTIC EXERCISES: CPT | Performed by: PHYSICAL THERAPIST

## 2025-02-25 PROCEDURE — 9990000010 HC NO CHARGE VISIT: Performed by: AUDIOLOGIST

## 2025-02-25 PROCEDURE — G0283 ELEC STIM OTHER THAN WOUND: HCPCS | Performed by: PHYSICAL THERAPIST

## 2025-02-25 NOTE — PROGRESS NOTES
[facility]                Phone: [phone]   Fax: [phone]    Physical Therapy Daily Treatment Note  Date:  2025    Patient Name:  Ivy Smith    :  1935  MRN: [id number]    Evaluating therapist:  EMA García        (25)  Restrictions/Precautions:    Diagnosis:    Treatment Diagnosis:  falls   Insurance/Certification information:  Medicare           cert dates:  25  to  25          ICD-10:    Referring Physician:  DHRUV García  Plan of care signed (Y/N):  Y  Visit# / total visits:  -  Pain level: 10/10 LB/L shoulder   Time In:  1357  Time Out:  1447    Subjective:      Exercises:  Exercise/Equipment Resistance/Repetitions Other comments   StepOne with arms   10 min            tball flex/rot 5x10s           seated hip add 2e59z7b                      abd 7g49utx                      flex 8j16u6it                knee ext 1d08z5tr              sit/stand 2x10    toe raises NT due to LB aching    step ups \" \"           pulleys for flex/abd  3 min ea           shrugs 15x3s    scap ret 15x3s                           Other Therapeutic Activities:      Home Exercise Program:  provided 25    Manual Treatments:      Modalities:  IFC/MH to LB, MH to L shoulder  x 15 min     Timed Code Treatment Minutes:      Total Treatment Minutes:      Treatment/Activity Tolerance:  [] Patient tolerated treatment well [] Patient limited by fatique  [] Patient limited by pain  [] Patient limited by other medical complications  [] Other:     Prognosis: [] Good [] Fair  [] Poor    Patient Requires Follow-up: [] Yes  [] No    Plan:   [] Continue per plan of care [] Alter current plan (see comments)  [] Plan of care initiated [] Hold pending MD visit [] Discharge  Plan for Next Session:      See Weekly Progress Note: []  Yes  []  No  Next due:        Electronically signed by:  Jey Alvarez, PT

## 2025-02-25 NOTE — PROGRESS NOTES
No charge HA ck.  She felt she  could not hear.  Listening ck revealed the hearing aids to be dead.  Changed wax guards and batteries.  Reprogrammed for increased speech gain.  She liked the settings better.  She will try for 1 week and return 3/4/2025.  Billing will be completed at that visit if keeping the HA's. Electronically signed by Gunner Woo on 2/25/2025 at 2:21 PM

## 2025-02-27 ENCOUNTER — HOSPITAL ENCOUNTER (OUTPATIENT)
Dept: PHYSICAL THERAPY | Age: 89
Setting detail: THERAPIES SERIES
Discharge: HOME | End: 2025-02-27
Payer: MEDICARE

## 2025-02-27 PROCEDURE — G0283 ELEC STIM OTHER THAN WOUND: HCPCS | Performed by: PHYSICAL THERAPIST

## 2025-02-27 PROCEDURE — 97110 THERAPEUTIC EXERCISES: CPT | Performed by: PHYSICAL THERAPIST

## 2025-02-27 NOTE — PROGRESS NOTES
S:  pt presents to therapy for two of two scheduled visits this week; at week's end she continues to report that her LB and L shoulder continue to ache with most prolonged activities;  pain level given as 10/10 and hampers her during the day; no c/o buckling or LOB over the last week's time; continues to ambulate with cane; HEP going well per pt    O:  performed the exercises/treatments as written in the flowsheet for the week ending 2/28/25;  L shoulder AROM:  flex/abd= 90*/90*, IR/ER= buttock/tip of ear; L shoulder strength grossly 3-/5 for all available AROM's; LB AROM grossly 25% WNL for all ranges B LE strength grossly 4/5 for all planes     A:  donna tx well; pt able to perform all requested tasks with good form and pacing noted; LB AROM and B LE strength remains grossly stable since last week; L shoulder AROM/strength grossly stable since eval; movement remains slow/guarded due to aching/stiffness; gait slow/guarded with short/equal strides using quad cane; endurance is FAIR/FAIR+ for all prolonged activities     P:  cont with POC of stretching/strengthening for LB/B LE's with modalities as needed

## 2025-02-27 NOTE — PROGRESS NOTES
[facility]                Phone: [phone]   Fax: [phone]    Physical Therapy Daily Treatment Note  Date:  2025    Patient Name:  Ivy Smith    :  1935  MRN: [id number]    Evaluating therapist:  EMA García        (25)  Restrictions/Precautions:    Diagnosis:    Treatment Diagnosis:  falls   Insurance/Certification information:  Medicare           cert dates:  25  to  25          ICD-10:    Referring Physician:  DHRUV García  Plan of care signed (Y/N):  Y  Visit# / total visits:    Pain level: 10/10 LB/L shoulder   Time In:  1418  Time Out:  1518    Subjective:      Exercises:  Exercise/Equipment Resistance/Repetitions Other comments   StepOne with arms   10 min            tball flex/rot 5x10s           seated hip add 0x20l1r                      abd 4o66nuo                      flex 8w33o3eb                knee ext 1d92c8hr              sit/stand 2x10    toe raises NT due to LB aching    step ups \" \"           pulleys for flex/abd  3 min ea           shrugs 15x3s    scap ret 15x3s                           Other Therapeutic Activities:      Home Exercise Program:  provided 25    Manual Treatments:      Modalities:  IFC/MH to LB, MH to L shoulder  x 15 min     Timed Code Treatment Minutes:      Total Treatment Minutes:      Treatment/Activity Tolerance:  [] Patient tolerated treatment well [] Patient limited by fatique  [] Patient limited by pain  [] Patient limited by other medical complications  [] Other:     Prognosis: [] Good [] Fair  [] Poor    Patient Requires Follow-up: [] Yes  [] No    Plan:   [] Continue per plan of care [] Alter current plan (see comments)  [] Plan of care initiated [] Hold pending MD visit [] Discharge  Plan for Next Session:      See Weekly Progress Note: []  Yes  []  No  Next due:        Electronically signed by:  Jey Alvarez PT

## 2025-03-04 ENCOUNTER — HOSPITAL ENCOUNTER (OUTPATIENT)
Dept: AUDIOLOGY | Age: 89
Discharge: HOME | End: 2025-03-04
Payer: MEDICARE

## 2025-03-04 ENCOUNTER — HOSPITAL ENCOUNTER (OUTPATIENT)
Dept: PHYSICAL THERAPY | Age: 89
Setting detail: THERAPIES SERIES
Discharge: HOME | End: 2025-03-04
Payer: MEDICARE

## 2025-03-04 PROCEDURE — V5260 HEARING AID, DIGIT, BIN, ITE: HCPCS | Performed by: AUDIOLOGIST

## 2025-03-04 PROCEDURE — V5160 DISPENSING FEE BINAURAL: HCPCS | Performed by: AUDIOLOGIST

## 2025-03-04 PROCEDURE — G0283 ELEC STIM OTHER THAN WOUND: HCPCS | Performed by: PHYSICAL THERAPIST

## 2025-03-04 PROCEDURE — 97110 THERAPEUTIC EXERCISES: CPT | Performed by: PHYSICAL THERAPIST

## 2025-03-04 NOTE — PROGRESS NOTES
Hearing aid billing post 30 day trial.  Billing to her Medicaid and then she will pay the balance.  She will call as needed. Electronically signed by Gunner Woo on 3/4/2025 at 3:52 PM

## 2025-03-04 NOTE — PROGRESS NOTES
[facility]                Phone: [phone]   Fax: [phone]    Physical Therapy Daily Treatment Note  Date:  3/4/2025    Patient Name:  Ivy Smith    :  1935  MRN: [id number]    Evaluating therapist:  EMA García        (25)  Restrictions/Precautions:    Diagnosis:    Treatment Diagnosis:  falls   Insurance/Certification information:  Medicare           cert dates:  25  to  25          ICD-10:  M75.42, W10.9XXA  Referring Physician:  DHRUV García  Plan of care signed (Y/N):  Y  Visit# / total visits:    Pain level: 10/10 LB/L shoulder   Time In:  1451  Time Out:  1539    Subjective:      Exercises:  Exercise/Equipment Resistance/Repetitions Other comments   StepOne with arms   10 min            tball flex/rot 5x10s           seated hip add 2f23e4y                      abd 7b20shi                      flex 4m03e1sl                knee ext 1k96w4hc              sit/stand 2x10    toe raises NT due to LB aching    step ups \" \"           pulleys for flex/abd  3 min ea           shrugs 15x3s    scap ret 15x3s                           Other Therapeutic Activities:      Home Exercise Program:  provided 25    Manual Treatments:      Modalities:  IFC/MH to LB, MH to L shoulder  x 15 min     Timed Code Treatment Minutes:      Total Treatment Minutes:      Treatment/Activity Tolerance:  [] Patient tolerated treatment well [] Patient limited by fatique  [] Patient limited by pain  [] Patient limited by other medical complications  [] Other:     Prognosis: [] Good [] Fair  [] Poor    Patient Requires Follow-up: [] Yes  [] No    Plan:   [] Continue per plan of care [] Alter current plan (see comments)  [] Plan of care initiated [] Hold pending MD visit [] Discharge  Plan for Next Session:      See Weekly Progress Note: []  Yes  []  No  Next due:        Electronically signed by:  Jey Alvarez PT

## 2025-03-06 ENCOUNTER — HOSPITAL ENCOUNTER (OUTPATIENT)
Dept: PHYSICAL THERAPY | Age: 89
Setting detail: THERAPIES SERIES
Discharge: HOME | End: 2025-03-06
Payer: MEDICARE

## 2025-03-06 PROCEDURE — G0283 ELEC STIM OTHER THAN WOUND: HCPCS | Performed by: PHYSICAL THERAPIST

## 2025-03-06 PROCEDURE — 97110 THERAPEUTIC EXERCISES: CPT | Performed by: PHYSICAL THERAPIST

## 2025-03-06 NOTE — PROGRESS NOTES
[facility]                Phone: [phone]   Fax: [phone]    Physical Therapy Daily Treatment Note  Date:  3/6/2025    Patient Name:  Ivy Smith    :  1935  MRN: [id number]    Evaluating therapist:  EMA García        (25)  Restrictions/Precautions:    Diagnosis:    Treatment Diagnosis:  falls   Insurance/Certification information:  Medicare           cert dates:  25  to  25          ICD-10:  M75.42, W10.9XXA  Referring Physician:  DHRUV García  Plan of care signed (Y/N):  Y  Visit# / total visits:    Pain level: 10/10 LB/L shoulder   Time In:  1433  Time Out:  1530    Subjective:      Exercises:  Exercise/Equipment Resistance/Repetitions Other comments   StepOne with arms   10 min            tball flex/rot 5x10s           seated hip add 7l45y8o                      abd 4w31xwc                      flex 3b90q3ty                knee ext 3u76v2xy              sit/stand 2x10    toe raises NT due to LB aching    step ups \" \"           pulleys for flex/abd  3 min ea           shrugs 15x3s    scap ret 15x3s                           Other Therapeutic Activities:      Home Exercise Program:  provided 25    Manual Treatments:      Modalities:  IFC/MH to LB, MH to L shoulder  x 15 min     Timed Code Treatment Minutes:      Total Treatment Minutes:      Treatment/Activity Tolerance:  [] Patient tolerated treatment well [] Patient limited by fatique  [] Patient limited by pain  [] Patient limited by other medical complications  [] Other:     Prognosis: [] Good [] Fair  [] Poor    Patient Requires Follow-up: [] Yes  [] No    Plan:   [] Continue per plan of care [] Alter current plan (see comments)  [] Plan of care initiated [] Hold pending MD visit [] Discharge  Plan for Next Session:      See Weekly Progress Note: []  Yes  []  No  Next due:        Electronically signed by:  Jey Alvarez PT

## 2025-03-14 ENCOUNTER — HOSPITAL ENCOUNTER (OUTPATIENT)
Dept: MRI IMAGING | Age: 89
Discharge: HOME | End: 2025-03-16
Payer: MEDICARE

## 2025-03-14 DIAGNOSIS — M54.50 CHRONIC BILATERAL LOW BACK PAIN WITHOUT SCIATICA: ICD-10-CM

## 2025-03-14 DIAGNOSIS — G89.29 CHRONIC BILATERAL LOW BACK PAIN WITHOUT SCIATICA: ICD-10-CM

## 2025-03-14 PROCEDURE — 72148 MRI LUMBAR SPINE W/O DYE: CPT

## 2025-03-17 ENCOUNTER — RESULTS FOLLOW-UP (OUTPATIENT)
Dept: MRI IMAGING | Age: 89
End: 2025-03-17

## 2025-03-19 ENCOUNTER — OFFICE VISIT (OUTPATIENT)
Dept: FAMILY MEDICINE CLINIC | Age: 89
End: 2025-03-19

## 2025-03-19 VITALS
TEMPERATURE: 98.7 F | HEART RATE: 62 BPM | WEIGHT: 196 LBS | DIASTOLIC BLOOD PRESSURE: 69 MMHG | BODY MASS INDEX: 32.65 KG/M2 | SYSTOLIC BLOOD PRESSURE: 135 MMHG | OXYGEN SATURATION: 97 % | HEIGHT: 65 IN | RESPIRATION RATE: 18 BRPM

## 2025-03-19 DIAGNOSIS — K21.9 GASTROESOPHAGEAL REFLUX DISEASE, UNSPECIFIED WHETHER ESOPHAGITIS PRESENT: Chronic | ICD-10-CM

## 2025-03-19 DIAGNOSIS — R06.02 SHORTNESS OF BREATH: Primary | ICD-10-CM

## 2025-03-19 DIAGNOSIS — R10.13 EPIGASTRIC PAIN: ICD-10-CM

## 2025-03-19 DIAGNOSIS — M43.10 ACQUIRED SPONDYLOLISTHESIS: Chronic | ICD-10-CM

## 2025-03-19 DIAGNOSIS — I49.5 SINUS NODE DYSFUNCTION (HCC): ICD-10-CM

## 2025-03-19 DIAGNOSIS — B35.1 ONYCHOMYCOSIS: ICD-10-CM

## 2025-03-19 DIAGNOSIS — I10 ESSENTIAL HYPERTENSION: ICD-10-CM

## 2025-03-19 DIAGNOSIS — R06.02 SHORTNESS OF BREATH: ICD-10-CM

## 2025-03-19 DIAGNOSIS — Z91.81 AT HIGH RISK FOR FALLS: ICD-10-CM

## 2025-03-19 PROBLEM — I85.00 ESOPHAGEAL VARICES WITHOUT BLEEDING, UNSPECIFIED ESOPHAGEAL VARICES TYPE (HCC): Status: ACTIVE | Noted: 2025-03-19

## 2025-03-19 LAB
ATYPICAL LYMPHOCYTE ABSOLUTE COUNT: 0.03 K/UL (ref 0–0.46)
ATYPICAL LYMPHOCYTES: 1 % (ref 0–4)
BASOPHILS ABSOLUTE: 0.03 K/UL (ref 0–0.2)
BASOPHILS RELATIVE PERCENT: 1 % (ref 0–2)
EOSINOPHILS ABSOLUTE: 0.31 K/UL (ref 0.05–0.5)
EOSINOPHILS RELATIVE PERCENT: 8 % (ref 0–6)
HCT VFR BLD CALC: 38.5 % (ref 34–48)
HEMOGLOBIN: 13 G/DL (ref 11.5–15.5)
LIPASE: 22 U/L (ref 13–60)
LYMPHOCYTES ABSOLUTE: 1.74 K/UL (ref 1.5–4)
LYMPHOCYTES RELATIVE PERCENT: 41 % (ref 20–42)
MCH RBC QN AUTO: 32.2 PG (ref 26–35)
MCHC RBC AUTO-ENTMCNC: 33.8 G/DL (ref 32–34.5)
MCV RBC AUTO: 95.3 FL (ref 80–99.9)
MONOCYTES ABSOLUTE: 0.42 K/UL (ref 0.1–0.95)
MONOCYTES RELATIVE PERCENT: 10 % (ref 2–12)
NEUTROPHILS ABSOLUTE: 1.67 K/UL (ref 1.8–7.3)
NEUTROPHILS RELATIVE PERCENT: 40 % (ref 43–80)
NT PRO BNP: 205 PG/ML (ref 0–450)
PDW BLD-RTO: 14.2 % (ref 11.5–15)
PLATELET # BLD: 261 K/UL (ref 130–450)
PMV BLD AUTO: 9.5 FL (ref 7–12)
RBC # BLD: 4.04 M/UL (ref 3.5–5.5)
RBC # BLD: ABNORMAL 10*6/UL
WBC # BLD: 4.2 K/UL (ref 4.5–11.5)

## 2025-03-19 RX ORDER — TERBINAFINE HYDROCHLORIDE 250 MG/1
250 TABLET ORAL DAILY
Qty: 42 TABLET | Refills: 0 | Status: SHIPPED | OUTPATIENT
Start: 2025-03-19 | End: 2025-04-30

## 2025-03-19 RX ORDER — AMLODIPINE AND BENAZEPRIL HYDROCHLORIDE 10; 20 MG/1; MG/1
CAPSULE ORAL DAILY
Qty: 90 CAPSULE | Refills: 0 | Status: SHIPPED | OUTPATIENT
Start: 2025-03-19

## 2025-03-19 NOTE — PROGRESS NOTES
[facility]  FAMILY MEDICINE RESIDENCY PROGRAM  DATE OF VISIT : 3/20/2025    Patient : Ivy Smith   Age : 89 y.o.    : 1935   MRN : [id number]       Assessment & Plan:   Diagnosis Orders   1. Shortness of breath  XR CHEST STANDARD (2 VW)    Brain Natriuretic Peptide      2. Gastroesophageal reflux disease, unspecified whether esophagitis present        3. Sinus node dysfunction (HCC)        4. Acquired spondylolisthesis        5. Onychomycosis  terbinafine (LAMISIL) 250 MG tablet    [facility] - Cecilia, Sandi, DPM, Podiatry, [place]      6. At high risk for falls            SOB- differential includes CHF v. Decompensation v. Respiratory illness. Concerning that patient has such a strong family hx lung cancer despite no smoking hx.   Start with CXR and BNP. If suspicions on CXR, consider CT chest  Gerd- Well controlled on prilosec, pt reluctant to d/c at this time  Sinus node dysfunction- Well controlled at this time  Hx spondylolisthesis w/ chronic back pain- advise f/u pain management  Onychomycosis- recent LFTs WNL, start Lamisil. Having issues cutting her own nails, refer to podiatry  At high risk for falls- working to have mailbox moved to mitigate risk of falls.  _____________________________________________________________________    Chief Complaint:   Chief Complaint   Patient presents with    Toe Pain      Pealing thick  itching  skin left foot great toe right foot    Medication Refill       HPI:   Ivy Smith is a 89 y.o. female who presents for discussion of lesion on L foot    Onychomycosis- present x1 mo, pruritic, increasing in size. Otherwise, not bothersome. No pain in the foot. Placed topical anesthetic on the foot for management but it was ineffective. No recent injury to the area. No drainage from the area. She has had no prior similar symptoms. She states that she normally wears tennis shoes, does not wear sandals. Wears stockings with her shoes.     Epigastric pain- has

## 2025-03-19 NOTE — TELEPHONE ENCOUNTER
Name of Medication(s) Requested:  Requested Prescriptions     Pending Prescriptions Disp Refills    amLODIPine-benazepril (LOTREL) 10-20 MG per capsule [Pharmacy Med Name: AMLODIPINE-BENAZEPRIL 10-20 MG] 90 capsule 0     Sig: TAKE 1 CAPSULE BY MOUTH EVERY DAY       Medication is on current medication list Yes    Dosage and directions were verified? Yes    Quantity verified: 90 day supply     Pharmacy Verified?  Yes    Last Appointment:  2/17/2025    Future appts:  Future Appointments   Date Time Provider Department Center   3/19/2025  1:30 PM Jose C García MD [facility] [place] DE[facility]        (If no appt send self scheduling link. .REFILLAPPT)  Scheduling request sent?     [] Yes  [x] No    Does patient need updated?  [] Yes  [x] No

## 2025-03-19 NOTE — PROGRESS NOTES
S: 89 y.o. female here for HTN, L foot lesion, sob.   HTN controlled.   Progressive aparicio 3 mo. No cough, wheeze, fever, sob, orthopnea. 4 brothers, all  of lung cancer. Stress August neg but with some sob.   L foot lesion 1 mo, pruritic, increasing in size, thickening. Voltaren gel not helping. Toenail affected now. Wants podiatry referral.   h/o breast CA s/p Tx and remission    O: VS: /69   Pulse 62   Temp 98.7 °F (37.1 °C) (Temporal)   Resp 18   Ht 1.651 m (5' 5\")   Wt 88.9 kg (196 lb)   SpO2 97%   BMI 32.62 kg/m²    General: NAD, alert and interacting appropriately.    CV:  RRR, no gallops, rubs, or murmurs    Resp: CTAB   Abd:  Soft, nontender   Ext:  trace edema. Possible callus vs other hyperkeratosis. Onychomycosis     Impression: HTN controlled. Aparicio 2/2 deconditioning vs pulm > cardiac. onychomycosis  Plan:   CPM HTN  CXR  Refer podiatry      Attending Physician Statement  I have discussed the case, including pertinent history and exam findings with the resident.  I agree with the documented assessment and plan.

## 2025-03-20 ENCOUNTER — RESULTS FOLLOW-UP (OUTPATIENT)
Dept: FAMILY MEDICINE CLINIC | Age: 89
End: 2025-03-20

## 2025-03-20 LAB — LACTIC ACID: 0.9 MMOL/L (ref 0.5–2.2)

## 2025-03-21 ENCOUNTER — HOSPITAL ENCOUNTER (OUTPATIENT)
Dept: GENERAL RADIOLOGY | Age: 89
Discharge: HOME | End: 2025-03-23
Payer: MEDICARE

## 2025-03-21 ENCOUNTER — HOSPITAL ENCOUNTER (OUTPATIENT)
Age: 89
Discharge: HOME | End: 2025-03-23
Payer: MEDICARE

## 2025-03-21 DIAGNOSIS — R06.02 SHORTNESS OF BREATH: ICD-10-CM

## 2025-03-21 PROCEDURE — 71046 X-RAY EXAM CHEST 2 VIEWS: CPT

## 2025-03-25 ENCOUNTER — RESULTS FOLLOW-UP (OUTPATIENT)
Dept: FAMILY MEDICINE CLINIC | Age: 89
End: 2025-03-25

## 2025-03-25 DIAGNOSIS — R53.81 PHYSICAL DECONDITIONING: ICD-10-CM

## 2025-03-25 DIAGNOSIS — R06.09 DYSPNEA ON EXERTION: ICD-10-CM

## 2025-03-25 DIAGNOSIS — R29.898 MUSCULAR DECONDITIONING: Primary | ICD-10-CM

## 2025-03-25 NOTE — TELEPHONE ENCOUNTER
Patient called and informed of results.  Advised that CXR possibly shows COPD but not certain. Prior CTA showed lung scarring. Patient is lifelong non-smoker, no prior hx of COPD/asthma. She has had no cough or wheezing.     Differential includes deconditioning d/t age v. Lung scarring v. COPD (unlikely based on patient's age). Wells' score is 0 so PE is also low on differential.    Check PFT. Refer to PT for strength training.    Patient verbalized understanding and is agreeable to plan.

## 2025-04-11 ENCOUNTER — TELEPHONE (OUTPATIENT)
Dept: FAMILY MEDICINE CLINIC | Age: 89
End: 2025-04-11

## 2025-04-11 NOTE — TELEPHONE ENCOUNTER
I phoned patient and made her an office visit on 04/14/25 with Dr Chavarria for leg and back pain  Patient was asking to see if doctor will order her a tens unit for the back pain  Please advise  Thank you April

## 2025-04-14 ENCOUNTER — OFFICE VISIT (OUTPATIENT)
Dept: FAMILY MEDICINE CLINIC | Age: 89
End: 2025-04-14

## 2025-04-14 VITALS
SYSTOLIC BLOOD PRESSURE: 139 MMHG | TEMPERATURE: 97.3 F | HEART RATE: 79 BPM | DIASTOLIC BLOOD PRESSURE: 70 MMHG | HEIGHT: 65 IN | BODY MASS INDEX: 32.99 KG/M2 | WEIGHT: 198 LBS | OXYGEN SATURATION: 98 % | RESPIRATION RATE: 18 BRPM

## 2025-04-14 DIAGNOSIS — K21.9 GASTROESOPHAGEAL REFLUX DISEASE WITHOUT ESOPHAGITIS: ICD-10-CM

## 2025-04-14 DIAGNOSIS — M54.50 ACUTE LOW BACK PAIN WITHOUT SCIATICA, UNSPECIFIED BACK PAIN LATERALITY: Primary | ICD-10-CM

## 2025-04-14 DIAGNOSIS — I10 ESSENTIAL HYPERTENSION: ICD-10-CM

## 2025-04-14 RX ORDER — OMEPRAZOLE 40 MG/1
40 CAPSULE, DELAYED RELEASE ORAL DAILY
Qty: 90 CAPSULE | Refills: 1 | Status: SHIPPED | OUTPATIENT
Start: 2025-04-14

## 2025-04-14 RX ORDER — CICLOPIROX 80 MG/ML
SOLUTION TOPICAL
COMMUNITY
Start: 2025-03-31

## 2025-04-14 RX ORDER — AMLODIPINE AND BENAZEPRIL HYDROCHLORIDE 10; 20 MG/1; MG/1
1 CAPSULE ORAL DAILY
Qty: 90 CAPSULE | Refills: 1 | Status: SHIPPED | OUTPATIENT
Start: 2025-04-14

## 2025-04-14 RX ORDER — KETOCONAZOLE 20 MG/G
CREAM TOPICAL
COMMUNITY
Start: 2025-03-31

## 2025-04-14 NOTE — PROGRESS NOTES
[facility]  FAMILY MEDICINE RESIDENCY PROGRAM  DATE OF VISIT : 2025    Patient : Ivy Smith   Age : 89 y.o.    : 1935   MRN : [id number]   ______________________________________________________________________    Assessment & Plan :    1. Acute low back pain without sciatica, unspecified back pain laterality  Had spinal surgery in ; acute flare since Monday  Does not want medication to help but TENS unit  Follows with NSG in [facility]  -     Tens Unit MISC; Starting 2025, Disp-1 each, R-0, Print  2. Gastroesophageal reflux disease without esophagitis  Overview:  No prior scopes; she is not interested in getting scoped - benefits and risks of medicine discussed  Continue with prilosec 40 mg daily  Orders:  -     omeprazole (PRILOSEC) 40 MG delayed release capsule; Take 1 capsule by mouth daily, Disp-90 capsule, R-1DX Code Needed  .Normal  3. Essential hypertension  Overview:  Continue amlodipine-benazepril 10-20 daily  Orders:  -     amLODIPine-benazepril (LOTREL) 10-20 MG per capsule; Take 1 capsule by mouth daily, Disp-90 capsule, R-1Normal      Additional plan and future considerations:     Back pain resolution  Chronic conditions    Return to Office: Return in about 6 weeks (around 2025).    Anh Chavarria MD  Case discussed with Dr. Edwards    ______________________________________________________________________    Chief Complaint :   Chief Complaint   Patient presents with    Lower Back Pain     For 2 weeks    Medication Refill     Patient needs refills on her bp medication        HPI : Ivy Smith is 89 y.o. female who presented to the clinic today for same day visit.    Previous spinal surgery  Fell 2025  Been to PT  HAD XR, CT lumbar an dthoracic and also MRI lumbar    Last seen spine surgeon [facility] Dr. Saxena 2024  L3 to S1 decompression   Was on robaxin    Last week back exacerbated by the weather  TENS unit is what she wants  Denies f/c/pain

## 2025-04-14 NOTE — PROGRESS NOTES
S: 89 y.o. female presents today for Lower Back Pain (For 2 weeks) and Medication Refill (Patient needs refills on her bp medication )    HTN: norvac 10mg, benzapril 20mg  Chronic back pain: previous spinal surgery laminectomy / fusion 2009; f/u 2024; last week had flare; asking for tens unit; this is not new pain; CT lumbar and MRI lumbar completed 2/3 18370  GERD: on prilosec 40mg daily; no prior EGD; not interested in scope at this time    O: VS: /70 (BP Site: Right Upper Arm, Patient Position: Sitting, BP Cuff Size: Large Adult)   Pulse 79   Temp 97.3 °F (36.3 °C) (Temporal)   Resp 18   Ht 1.651 m (5' 5\")   Wt 89.8 kg (198 lb)   SpO2 98%   BMI 32.95 kg/m²   AAO/NAD, appropriate affect for mood  CV:  RRR, no murmur  Resp: CTAB  Abdomen: SNTND  Ext: no edema  Msk: surgical scar lower back; pain ttp above scar midline lumbar region    Assessment/Plan:   1) HTN - stable, cpm  2) GERD - continue PPI; declines referral for EGD  3) Chronic low back pain / hx of lumbar surgery - TENs; call for new or worsening symptoms  RTO: Return in about 6 weeks (around 5/26/2025).    Attending Physician Statement  I have discussed the case, including pertinent history and exam findings with the resident.  I agree with the documented assessment and plan.      Electronically signed by Jann Edwards MD on 4/16/2025 at 11:59 AM

## 2025-04-30 ENCOUNTER — TELEPHONE (OUTPATIENT)
Dept: FAMILY MEDICINE CLINIC | Age: 89
End: 2025-04-30

## 2025-04-30 NOTE — TELEPHONE ENCOUNTER
Pt requesting a letter to be sent to Camille Carrion - [facility] - Fax # [phone]  Phone # [phone],  informing her of the pt inability to cut grass and shovel snow.

## 2025-05-06 ENCOUNTER — OFFICE VISIT (OUTPATIENT)
Dept: FAMILY MEDICINE CLINIC | Age: 89
End: 2025-05-06

## 2025-05-06 VITALS
BODY MASS INDEX: 32.99 KG/M2 | SYSTOLIC BLOOD PRESSURE: 123 MMHG | DIASTOLIC BLOOD PRESSURE: 58 MMHG | HEIGHT: 65 IN | TEMPERATURE: 97 F | WEIGHT: 198 LBS | RESPIRATION RATE: 18 BRPM | OXYGEN SATURATION: 97 % | HEART RATE: 87 BPM

## 2025-05-06 DIAGNOSIS — I85.00 ESOPHAGEAL VARICES WITHOUT BLEEDING, UNSPECIFIED ESOPHAGEAL VARICES TYPE (HCC): ICD-10-CM

## 2025-05-06 DIAGNOSIS — G89.29 CHRONIC LEFT SHOULDER PAIN: Primary | ICD-10-CM

## 2025-05-06 DIAGNOSIS — M25.512 CHRONIC LEFT SHOULDER PAIN: Primary | ICD-10-CM

## 2025-05-06 DIAGNOSIS — K21.9 GASTROESOPHAGEAL REFLUX DISEASE WITHOUT ESOPHAGITIS: ICD-10-CM

## 2025-05-06 DIAGNOSIS — I10 ESSENTIAL HYPERTENSION: ICD-10-CM

## 2025-05-06 RX ORDER — OMEPRAZOLE 40 MG/1
40 CAPSULE, DELAYED RELEASE ORAL DAILY
Qty: 90 CAPSULE | Refills: 1 | Status: SHIPPED | OUTPATIENT
Start: 2025-05-06

## 2025-05-06 RX ORDER — AMLODIPINE AND BENAZEPRIL HYDROCHLORIDE 10; 20 MG/1; MG/1
1 CAPSULE ORAL DAILY
Qty: 90 CAPSULE | Refills: 1 | Status: SHIPPED | OUTPATIENT
Start: 2025-05-06

## 2025-05-06 NOTE — PROGRESS NOTES
Patient is an 89-year-old female with a past medical history of chronic shoulder arthritis who presents today for acute shoulder pain.  She states that since yesterday, she has had a sharp pain located only within her shoulder.  It does not radiate anywhere else.  It seems to be triggered by shoulder internal rotation and abduction.  She does not have any new onset numbness or tingling in the fingers of that hand.  Also denies decreased  strength but states that the arm is generally weaker and has been long-term because of the arthritis.  She does not report any recent injury to the area.  The patient does have a longstanding history of arthritis in the left shoulder as documented on an x-ray in February 2022.  She follows with pain management and gets Percocet for this chronically.  She states that she took a Percocet today and it was ineffective at managing her pain.  She is also using Voltaren gel which is similarly ineffective at managing the pain.    Blood pressure (!) 123/58, pulse 87, temperature 97 °F (36.1 °C), temperature source Temporal, resp. rate 18, height 1.651 m (5' 5\"), weight 89.8 kg (198 lb), SpO2 97%, not currently breastfeeding.    HEENT WNL     Heart regular    Lungs clear    abd non-tender      No edema    Pulses intact   MSK-patient had pain with internal rotation of the shoulder and abduction of the shoulder beyond 90 degrees.  Range of motion of the shoulder limited to approximately 90 degrees.  4 out of 5 strength noted bilaterally    Assessment and plan  Acute shoulder pain-differential includes worsening of the arthritis versus subscapularis tendinopathy given pain with internal rotation.  Trial of physical therapy, repeat shoulder x-rays.  If physical therapy is not effective in managing worsening pain then we will obtain MRI for further evaluation    Attending Physician Statement  I have discussed the case, including pertinent history and exam findings with the resident. I agree 
every 8 hours as needed for Pain 120 tablet 0    simethicone (MYLICON) 80 MG chewable tablet Take 1 tablet by mouth every 6 hours as needed for Flatulence 180 tablet 3    sucralfate (CARAFATE) 1 GM tablet TAKE 1 TABLET BY MOUTH IN THE MORNING , AT NOON, AND AT BEDTIME 270 tablet 0    vitamin B-6 (PYRIDOXINE) 100 MG tablet Take 1 tablet by mouth daily 30 tablet 5    vitamin B-12 (CYANOCOBALAMIN) 1000 MCG tablet Take 1 tablet by mouth daily 30 tablet 5    pravastatin (PRAVACHOL) 40 MG tablet TAKE 1 TABLET DAILY 90 tablet 3    dorzolamide (TRUSOPT) 2 % ophthalmic solution Place 1 drop into both eyes 3 times daily      MOVANTIK 12.5 MG TABS tablet Take 1 tablet by mouth every morning (before breakfast)      CHLORASEPTIC 1.4 % LIQD mouth spray Take 1 drop by mouth every 2 hours as needed      vilazodone hcl 10 MG TABS Take 1 tablet by mouth daily      fluticasone (FLONASE) 50 MCG/ACT nasal spray SPRAY 1 SPRAY INTO EACH NOSTRIL EVERY DAY 16 g 3    desloratadine (CLARINEX) 5 MG tablet Take 1 tablet by mouth every 24 hours      fluorometholone (FML) 0.1 % ophthalmic suspension Place 1 drop into both eyes 2 times daily as needed (dry eyes)      Lactobacillus (ACIDOPHILUS PROBIOTIC) 0.5 MG TABS Take 1 tablet by mouth daily      oxyCODONE-acetaminophen (PERCOCET) 7.5-325 MG per tablet Take 1 tablet by mouth every 8 hours as needed for Pain.      Probiotic Product ([employer] DAILY PROBIOTIC) CAPS Take 1 capsule by mouth 2 times daily      brimonidine (ALPHAGAN) 0.2 % ophthalmic solution Place 1 drop into both eyes in the morning and 1 drop in the evening.      azelastine (OPTIVAR) 0.05 % ophthalmic solution INSTILL ONE DROP INTO EACH EYE TWICE DAILY 6 mL 2    chlorhexidine (PERIDEX) 0.12 % solution Swish and spit 15 mLs 2 times daily 473 mL 5    Cholecalciferol (VITAMIN D3) 125 MCG (5000 UT) CAPS TAKE 1 CAPSULE BY MOUTH EVERY DAY 90 capsule 1    Vaginal Lubricant (REPLENS) GEL Use once-twice daily 35 g 1    Multiple Vitamin TABS Take

## 2025-05-09 ENCOUNTER — HOSPITAL ENCOUNTER (OUTPATIENT)
Dept: GENERAL RADIOLOGY | Age: 89
Discharge: HOME | End: 2025-05-11
Payer: MEDICARE

## 2025-05-09 DIAGNOSIS — M25.512 CHRONIC LEFT SHOULDER PAIN: ICD-10-CM

## 2025-05-09 DIAGNOSIS — G89.29 CHRONIC LEFT SHOULDER PAIN: ICD-10-CM

## 2025-05-09 PROCEDURE — 73030 X-RAY EXAM OF SHOULDER: CPT

## 2025-05-12 ENCOUNTER — RESULTS FOLLOW-UP (OUTPATIENT)
Dept: FAMILY MEDICINE CLINIC | Age: 89
End: 2025-05-12

## 2025-05-13 ENCOUNTER — OFFICE VISIT (OUTPATIENT)
Dept: FAMILY MEDICINE CLINIC | Age: 89
End: 2025-05-13
Payer: MEDICARE

## 2025-05-13 VITALS
HEIGHT: 65 IN | RESPIRATION RATE: 17 BRPM | WEIGHT: 198 LBS | OXYGEN SATURATION: 97 % | BODY MASS INDEX: 32.99 KG/M2 | TEMPERATURE: 97.1 F | DIASTOLIC BLOOD PRESSURE: 64 MMHG | HEART RATE: 78 BPM | SYSTOLIC BLOOD PRESSURE: 132 MMHG

## 2025-05-13 DIAGNOSIS — M19.011 ARTHRITIS OF BOTH SHOULDERS: ICD-10-CM

## 2025-05-13 DIAGNOSIS — M19.012 ARTHRITIS OF BOTH SHOULDERS: ICD-10-CM

## 2025-05-13 DIAGNOSIS — M48.061 SPINAL STENOSIS OF LUMBAR REGION, UNSPECIFIED WHETHER NEUROGENIC CLAUDICATION PRESENT: Primary | Chronic | ICD-10-CM

## 2025-05-13 PROCEDURE — 1090F PRES/ABSN URINE INCON ASSESS: CPT

## 2025-05-13 PROCEDURE — 1036F TOBACCO NON-USER: CPT

## 2025-05-13 PROCEDURE — G8417 CALC BMI ABV UP PARAM F/U: HCPCS

## 2025-05-13 PROCEDURE — G2211 COMPLEX E/M VISIT ADD ON: HCPCS

## 2025-05-13 PROCEDURE — 99213 OFFICE O/P EST LOW 20 MIN: CPT

## 2025-05-13 PROCEDURE — 1124F ACP DISCUSS-NO DSCNMKR DOCD: CPT

## 2025-05-13 PROCEDURE — G8428 CUR MEDS NOT DOCUMENT: HCPCS

## 2025-05-13 NOTE — PROGRESS NOTES
[facility]  Precepting Note    Subjective:  Follow up   Hx of spinal stenosis, requesting power chair  Frequent falls, uses cane and walker. Can't walk more than 100 feet  Arthritis in L shoulder, chronic     ROS otherwise negative     Past medical, surgical, family and social history were reviewed, non-contributory, and unchanged unless otherwise stated.    Objective:    /64 (BP Site: Left Upper Arm, Patient Position: Sitting, BP Cuff Size: Medium Adult)   Pulse 78   Temp 97.1 °F (36.2 °C) (Temporal)   Resp 17   Ht 1.651 m (5' 5\")   Wt 89.8 kg (198 lb)   SpO2 97%   BMI 32.95 kg/m²     Exam is as noted by resident with the following changes, additions or corrections:    Lumbar spine TTP, LROM of L shoulder, normal strength     Assessment/Plan:  Lumbar Spinal stenosis- power chair ordered  Left shoulder arthritis- cont PT     Attending Physician Statement  I have reviewed the chart, including any radiology or labs. I have discussed the case, including pertinent history and exam findings with the resident.  I agree with the assessment, plan and orders as documented by the resident.  Please refer to the resident note for additional information.      Electronically signed by Zi Niño MD on 5/13/2025 at 1:44 PM

## 2025-05-13 NOTE — PROGRESS NOTES
[facility]  FAMILY MEDICINE RESIDENCY PROGRAM  DATE OF VISIT : 2025    Patient : Ivy Smith   Age : 89 y.o.    : 1935   MRN : [id number]       Assessment & Plan:   Diagnosis Orders   1. Spinal stenosis of lumbar region, unspecified whether neurogenic claudication present        2. Arthritis of both shoulders            Spinal stenosis of lumbar region- given patient's multiple falls and unsteady gait, as well as her inability to walk more than 100 feet, she would benefit from motorized scooter to prevent falls and allow her greater mobility and ability to complete ADLs  DME order sent   Shoulder arthritis- discussed options with patient including injections/ortho referral  She declines any further intervention right now and will continue with PT  _____________________________________________________________________    Chief Complaint:   Chief Complaint   Patient presents with    Other     Peer to Peer for electric scooter.       HPI:   Ivy Smith is a 89 y.o. female who presents for discussion of electric scooter.     Spinal stenosis, requesting power scooter- She states that she struggles to walk around stores because of her low back pain. Pt has known hx of spinal stenosis. She states that she can only walk approx 100 feet before she needs to stop and rest because of the back pain. She states that she has fallen twice this year due to instability. She uses both a cane and a walker for ambulatory assistance. She states that when using the walker, she feels more stable and less prone to falls but she is unable to walk any farther than she can without the aid due to her back pain. She states that she is able to sit and stand without issue and does not feel she would have difficulty sitting in a scooter.     L Shoulder OA- significant pain noted in L shoulder at prior appt, longstanding hx of shoudler OA, redemonstrated on XR 25.     Past Medical History:  Past Medical History:

## 2025-05-19 ENCOUNTER — TELEPHONE (OUTPATIENT)
Dept: FAMILY MEDICINE CLINIC | Age: 89
End: 2025-05-19

## 2025-05-19 ENCOUNTER — OFFICE VISIT (OUTPATIENT)
Dept: FAMILY MEDICINE CLINIC | Age: 89
End: 2025-05-19
Payer: MEDICARE

## 2025-05-19 VITALS
OXYGEN SATURATION: 96 % | BODY MASS INDEX: 32.82 KG/M2 | HEIGHT: 65 IN | SYSTOLIC BLOOD PRESSURE: 127 MMHG | RESPIRATION RATE: 17 BRPM | HEART RATE: 84 BPM | WEIGHT: 197 LBS | DIASTOLIC BLOOD PRESSURE: 73 MMHG | TEMPERATURE: 97.5 F

## 2025-05-19 DIAGNOSIS — R42 DYSEQUILIBRIUM: Primary | ICD-10-CM

## 2025-05-19 PROCEDURE — 99213 OFFICE O/P EST LOW 20 MIN: CPT

## 2025-05-19 PROCEDURE — 1036F TOBACCO NON-USER: CPT

## 2025-05-19 PROCEDURE — 1090F PRES/ABSN URINE INCON ASSESS: CPT

## 2025-05-19 PROCEDURE — G8428 CUR MEDS NOT DOCUMENT: HCPCS

## 2025-05-19 PROCEDURE — 1124F ACP DISCUSS-NO DSCNMKR DOCD: CPT

## 2025-05-19 PROCEDURE — G8417 CALC BMI ABV UP PARAM F/U: HCPCS

## 2025-05-19 NOTE — PROGRESS NOTES
S: 89 y.o. female here for dysequilibrium. 3 episodes. No fall. Can happen when sitting. On percocet. Started last summer. Can last short or long time.     O: VS: /73 (BP Site: Right Upper Arm, Patient Position: Sitting, BP Cuff Size: Medium Adult)   Pulse 84   Temp 97.5 °F (36.4 °C) (Temporal)   Resp 17   Ht 1.651 m (5' 5\")   Wt 89.4 kg (197 lb)   SpO2 96%   BMI 32.78 kg/m²    General: NAD, alert and interacting appropriately. Pinpoint pupils b/l. TMs w/ scarring, otherwise wnl aside from Jena.    CV:  RRR, no gallops, rubs, or murmurs    Resp: CTAB   Neg DH. HINTS w/ ? Saccade to left. Slightly improved with HINTS and then worse with DH    Impression: dysequilibrium 2/2 dehydration vs polypharmacy vs visual impairment vs visuospatial disconnect   Plan:   Deprescribe as able  Would benefit from vestibular rehab.   Hydrate      Attending Physician Statement  I have discussed the case, including pertinent history and exam findings with the resident.  I agree with the documented assessment and plan.

## 2025-05-19 NOTE — PROGRESS NOTES
[facility]  FAMILY MEDICINE RESIDENCY PROGRAM  DATE OF VISIT : 2025    Patient : Ivy Smith   Age : 89 y.o.    : 1935   MRN : [id number]   ______________________________________________________________________    Assessment & Plan :    1. Dysequilibrium  -     [facility], Sandra Brito  Ddx include inner ear issues vs BPPV vs dehydration vs chronic opioid use vs hearing loss  HINTS exam shows towards more peripheral cause and Reece Hallpike was negative  Did advise pt to f/u with ENT but she declines due to cost  64 oz of water daily; start slow and aim to reach that in 4 weeks  Offered vestibular therapy and referral placed  Consider reviewing meds and de prescribe as able  Consider social work for hearing aids compensations      Return to Office: Return in about 4 weeks (around 2025).    Anh Chavarria MD  Case discussed with Dr. Herrera    ______________________________________________________________________    Chief Complaint :   Chief Complaint   Patient presents with    Dizziness       HPI : Ivy Smith is 89 y.o. female who presented to the clinic today for same day visit.    Dysequilibrium  Hx of inner ear issues with MRI of posterior fossa that did not show abnormalities; looking for cholesteoma at the time  Hx of hearing loss- her hearing aids has been stolen and she cannot afford new ones  Hx of  presbyopia- has not gotten new glasses yet    Onset of imbalance feeling 3 weeks ago; 3 episodes so far  Can happen when she is sitting or moving  Denies fall  Denies neuropathy  Does take percocet chronically  Denies adequate water intake        Last Visit  : 2025    Health Maintenance :  Health Maintenance Due   Topic Date Due    Respiratory Syncytial Virus (RSV) Pregnant or age 60 yrs+ (1 - 1-dose 75+ series) Never done    Annual Wellness Visit (Medicare)  2025       Review of Systems :  An extended review of symptoms obtained

## 2025-05-28 ENCOUNTER — HOSPITAL ENCOUNTER (OUTPATIENT)
Dept: PHYSICAL THERAPY | Age: 89
Setting detail: THERAPIES SERIES
Discharge: HOME | End: 2025-05-28
Payer: MEDICARE

## 2025-05-28 PROCEDURE — 97161 PT EVAL LOW COMPLEX 20 MIN: CPT | Performed by: PHYSICAL THERAPIST

## 2025-05-28 ASSESSMENT — PAIN SCALES - GENERAL: PAINLEVEL_OUTOF10: 10

## 2025-05-28 ASSESSMENT — PAIN DESCRIPTION - DESCRIPTORS: DESCRIPTORS: ACHING;SORE

## 2025-05-28 ASSESSMENT — PAIN DESCRIPTION - ORIENTATION: ORIENTATION: LEFT

## 2025-05-28 ASSESSMENT — PAIN DESCRIPTION - LOCATION: LOCATION: SHOULDER

## 2025-05-28 ASSESSMENT — PAIN DESCRIPTION - PAIN TYPE: TYPE: CHRONIC PAIN

## 2025-05-28 NOTE — PROGRESS NOTES
Physical Therapy    Physical Therapy: Initial Evaluation    Patient: Ivy Smith (89 y.o. female)   Examination Date: 2025  Plan of Care Certification Period: 2025 to        :  1935 ;    Confirmed: Yes MRN: [id number]  CSN: [id number]   Insurance: Payor: MEDICARE / Plan: MEDICARE PART A AND B / Product Type: *No Product type* /   Insurance ID: [id number] - (Medicare) Secondary Insurance (if applicable): [employer]   Referring Physician: Jose C García MD     PCP: Jose C García MD Visits to Date/Visits Approved: 1 /      No Show/Cancelled Appts:   /       Medical Diagnosis: Chronic left shoulder pain [M25.512, G89.29]    Treatment Diagnosis:       PERTINENT MEDICAL HISTORY           Medical History:     Past Medical History:   Diagnosis Date    Anxiety 10/11/2010    Breast cancer (HCC) approx     right, treated lumpectomy, radiation, chemo    Depression 10/11/2010    no issues    Diverticular disease 10/11/2010    GERD (gastroesophageal reflux disease)     HTN (hypertension) 10/11/2010    Hyperlipidemia 10/11/2010    Osteoarthritis 10/11/2010    fot left knee arthroplasty 10/21/2016    Osteoporosis 10/11/2010    Spinal stenosis 10/11/2010    Use of cane as ambulatory aid      Surgical History:   Past Surgical History:   Procedure Laterality Date    BACK SURGERY      BONE GRAFT      with back surgery    BREAST LUMPECTOMY      left, with chemo and rad, [facility]    CARPAL TUNNEL RELEASE      left    CARPAL TUNNEL RELEASE      repeat left    CATARACT REMOVAL      bilateral same time,  [facility]    COLONOSCOPY  2011    extensive diverticulosis, Dr. Venegas, [facility]    COLONOSCOPY      \"could not get around colon due to diverticulitis, [facility]    COLONOSCOPY  2015    severe diverticulosis transverse and left colon without diverticulitis, rectal polyp bx, Dr. Diaz, [facility]    ESOPHAGOGASTRODUODENOSCOPY  2023    mid-esophageal varix; minimal

## 2025-05-29 ENCOUNTER — HOSPITAL ENCOUNTER (OUTPATIENT)
Dept: PHYSICAL THERAPY | Age: 89
Setting detail: THERAPIES SERIES
Discharge: HOME | End: 2025-05-29
Payer: MEDICARE

## 2025-05-29 PROCEDURE — 97161 PT EVAL LOW COMPLEX 20 MIN: CPT | Performed by: PHYSICAL THERAPIST

## 2025-05-30 ENCOUNTER — OFFICE VISIT (OUTPATIENT)
Dept: FAMILY MEDICINE CLINIC | Age: 89
End: 2025-05-30
Payer: MEDICARE

## 2025-05-30 VITALS
DIASTOLIC BLOOD PRESSURE: 61 MMHG | SYSTOLIC BLOOD PRESSURE: 119 MMHG | BODY MASS INDEX: 32.49 KG/M2 | RESPIRATION RATE: 17 BRPM | WEIGHT: 195 LBS | OXYGEN SATURATION: 98 % | TEMPERATURE: 98.1 F | HEART RATE: 77 BPM | HEIGHT: 65 IN

## 2025-05-30 DIAGNOSIS — F33.41 RECURRENT MAJOR DEPRESSIVE DISORDER, IN PARTIAL REMISSION: ICD-10-CM

## 2025-05-30 DIAGNOSIS — M48.061 SPINAL STENOSIS OF LUMBAR REGION WITHOUT NEUROGENIC CLAUDICATION: Chronic | ICD-10-CM

## 2025-05-30 DIAGNOSIS — R42 DIZZINESS: Primary | ICD-10-CM

## 2025-05-30 PROCEDURE — G2211 COMPLEX E/M VISIT ADD ON: HCPCS

## 2025-05-30 PROCEDURE — 1124F ACP DISCUSS-NO DSCNMKR DOCD: CPT

## 2025-05-30 PROCEDURE — 1036F TOBACCO NON-USER: CPT

## 2025-05-30 PROCEDURE — 1090F PRES/ABSN URINE INCON ASSESS: CPT

## 2025-05-30 PROCEDURE — 99213 OFFICE O/P EST LOW 20 MIN: CPT

## 2025-05-30 PROCEDURE — G8428 CUR MEDS NOT DOCUMENT: HCPCS

## 2025-05-30 PROCEDURE — G8417 CALC BMI ABV UP PARAM F/U: HCPCS

## 2025-05-30 RX ORDER — ARIPIPRAZOLE 2 MG/1
2 TABLET ORAL DAILY
COMMUNITY
Start: 2025-05-28

## 2025-05-30 RX ORDER — LIDOCAINE 4 G/G
1 PATCH TOPICAL DAILY
Qty: 30 PATCH | Refills: 0 | Status: SHIPPED | OUTPATIENT
Start: 2025-05-30 | End: 2025-06-29

## 2025-05-30 NOTE — PROGRESS NOTES
S: 89 y.o. female here for b/l arthritis knees and L shoulder, bppv.   Referred to vestibular therapy, helping.   Spinal stenosis. Chronic percocet w/ pain mngt.   Depression. Vilazodone via psych, controlled. Just started abilify. No SI or HI.       O: VS: /61 (BP Site: Right Upper Arm, Patient Position: Sitting, BP Cuff Size: Medium Adult)   Pulse 77   Temp 98.1 °F (36.7 °C) (Temporal)   Resp 17   Ht 1.651 m (5' 5\")   Wt 88.5 kg (195 lb)   SpO2 98%   BMI 32.45 kg/m²    General: NAD, alert and interacting appropriately.    CV:  RRR, no gallops, rubs, or murmurs    Resp: CTAB   Abd:  Soft, nontender   Ext:  No edema    Impression: arthritis, bppv, SS, depression  Plan:   Start aqua therapy  Hold off abilify for now until vertigo improves more.   F/u psych  F/u pain mngt. Voltaren and lidocaine  Rtc 3 mo for AWV      Attending Physician Statement  I have discussed the case, including pertinent history and exam findings with the resident.  I agree with the documented assessment and plan.      
to restart aquatic PT again.     Major depressive disorder- she is on Vilazodone and her psychiatrist recently ordered her Abilify. She has not started taking it yet. Does not feel her depression is overall well controlled but denies SI/HI    Past Medical History:  Past Medical History:   Diagnosis Date    Anxiety 10/11/2010    Breast cancer (HCC) approx 2000    right, treated lumpectomy, radiation, chemo    Depression 10/11/2010    no issues    Diverticular disease 10/11/2010    GERD (gastroesophageal reflux disease)     HTN (hypertension) 10/11/2010    Hyperlipidemia 10/11/2010    Osteoarthritis 10/11/2010    fot left knee arthroplasty 10/21/2016    Osteoporosis 10/11/2010    Spinal stenosis 10/11/2010    Use of cane as ambulatory aid        Allergies:   No Known Allergies    Medications:    Current Outpatient Medications   Medication Sig Dispense Refill    ARIPiprazole (ABILIFY) 2 MG tablet Take 1 tablet by mouth daily      diclofenac sodium (VOLTAREN) 1 % GEL Apply 2 g topically 4 times daily 240 g 0    lidocaine 4 % external patch Place 1 patch onto the skin daily 30 patch 0    amLODIPine-benazepril (LOTREL) 10-20 MG per capsule Take 1 capsule by mouth daily 90 capsule 1    omeprazole (PRILOSEC) 40 MG delayed release capsule Take 1 capsule by mouth daily 90 capsule 1    ketoconazole (NIZORAL) 2 % cream APPLY 1 APPLICATION TOPICALLY TO AFFECTED AREA 2 TIMES PER DAY FOR 28 DAYS      ciclopirox (PENLAC) 8 % solution PLEASE SEE ATTACHED FOR DETAILED DIRECTIONS      Tens Unit MISC by Does not apply route 1 each 0    ibuprofen (ADVIL;MOTRIN) 600 MG tablet Take 1 tablet by mouth every 8 hours as needed for Pain 120 tablet 0    simethicone (MYLICON) 80 MG chewable tablet Take 1 tablet by mouth every 6 hours as needed for Flatulence 180 tablet 3    sucralfate (CARAFATE) 1 GM tablet TAKE 1 TABLET BY MOUTH IN THE MORNING , AT NOON, AND AT BEDTIME 270 tablet 0    vitamin B-6 (PYRIDOXINE) 100 MG tablet Take 1 tablet by

## 2025-06-02 ENCOUNTER — APPOINTMENT (OUTPATIENT)
Dept: PHYSICAL THERAPY | Age: 89
End: 2025-06-02
Payer: MEDICARE

## 2025-06-02 ENCOUNTER — HOSPITAL ENCOUNTER (OUTPATIENT)
Dept: PHYSICAL THERAPY | Age: 89
Setting detail: THERAPIES SERIES
Discharge: HOME | End: 2025-06-02
Payer: MEDICARE

## 2025-06-02 PROCEDURE — 97113 AQUATIC THERAPY/EXERCISES: CPT

## 2025-06-02 NOTE — PROGRESS NOTES
[facility]  Phone: [phone] Fax: [phone]        Physical Therapy Aquatic Flow Sheet   Date:  2025    Patient Name:  Ivy Smith    :  1935  Restrictions/Precautions:    Diagnosis:    Treatment Diagnosis:    Insurance/Certification information:    Referring Physician:    Plan of care signed (Y/N):    Visit# / total visits:  2/10  Pain level: /10   Electronically signed by:  Rosalba Jimenez PTA  Time In:1030  Time Out:1130    Subjective:      Key  B= Belt DB= Dumbells T= Theratube   H= Hydrotone N= Noodles W= Weights   P= Paddles S= Speedo equipment K= Kickboard     Exercises/Activities   Warm-up/Amb  Minutes  Exercises  Minutes    Slow forward   5  HR/TR  5    Slow sideways  5  Marches  5    Slow backwards  5  Mini-squats  5    Medium forward    Forward backward kick  5    Medium sideways    Hip abduction  5    Small shuffle    Hamstring curls      Jog    Knee extension      Braiding    Pelvic tilts      Bicycling  5  Scap squeezes          Shoulder flex/ext      Functional    Shoulder abd/add      Step    Shoulder H. abd/add      Lifting    Shoulder IR/ER      Hand to opp knee    Rowing      Push down squat    Bilateral pull down      UE PNF    Push/pull      LE PNF    Push downs      Wall push ups    Arm circles      SLS    Elbow flex/ext          Chin tuck      Stretching    UT shrugs/rolls      Gastroc/Soleus  5  Rocking horse      Hamstring  5        SKTC  5  Other      Piriformis          Hip flexor          Ladder pull          Pec stretch          Post deltoid           Timed Code Treatment Minutes:  60    Total Treatment Minutes:  60    Treatment/Activity Tolerance:  [] Patient tolerated treatment well [x] Patient limited by fatique  [x] Patient limited by pain [] Patient limited by other medical complications  [] Other:     Prognosis: [] Good [x] Fair  [] Poor    Patient Requires Follow-up: [x] Yes  [] No    Plan:   [x] Continue per plan of care [] Alter

## 2025-06-03 ENCOUNTER — HOSPITAL ENCOUNTER (OUTPATIENT)
Dept: PULMONOLOGY | Age: 89
Discharge: HOME | End: 2025-06-03
Payer: MEDICARE

## 2025-06-03 DIAGNOSIS — R06.09 DYSPNEA ON EXERTION: ICD-10-CM

## 2025-06-03 PROCEDURE — 94060 EVALUATION OF WHEEZING: CPT

## 2025-06-03 PROCEDURE — 94729 DIFFUSING CAPACITY: CPT

## 2025-06-03 PROCEDURE — 94726 PLETHYSMOGRAPHY LUNG VOLUMES: CPT

## 2025-06-04 ENCOUNTER — HOSPITAL ENCOUNTER (OUTPATIENT)
Dept: PHYSICAL THERAPY | Age: 89
Setting detail: THERAPIES SERIES
Discharge: HOME | End: 2025-06-04
Payer: MEDICARE

## 2025-06-05 NOTE — PROCEDURES
[place] [id number]                           PULMONARY FUNCTION      PATIENT NAME: SUZAN MURPHY               : 1935  MED REC NO: [id number]                        ROOM:   ACCOUNT NO: [id number]                       ADMIT DATE: 2025  PROVIDER: Thad Del Toro MD      DATE OF PROCEDURE: 2025    SURGEON:  Thad Del Toro MD    REFERRING PHYSICIAN:  DEXTER ZARAGOZA    FINDINGS:  FVC and FEV1 are normal.  FEV1 to FVC ratio is normal.  There is a significant increase in volumes after the acute administration of bronchodilators.  Lung volumes show a slightly decreased total lung capacity and diffusing capacity is normal.    IMPRESSION:  Mild obstructive airways disease, which is bronchodilator responsive.  That restores values to normal.  Pulmonary parenchyma is likely normal and the decrease in total lung capacity may be secondary to body habitus.  Clinical correlation is advised.          THAD DEL TORO MD      D:  2025 09:48:49     T:  2025 10:00:22     NGP/VAIBHAV  Job #:  [id number]     Doc#:  [id number]

## 2025-06-06 ENCOUNTER — RESULTS FOLLOW-UP (OUTPATIENT)
Dept: FAMILY MEDICINE CLINIC | Age: 89
End: 2025-06-06

## 2025-06-11 ENCOUNTER — HOSPITAL ENCOUNTER (OUTPATIENT)
Dept: PHYSICAL THERAPY | Age: 89
Setting detail: THERAPIES SERIES
Discharge: HOME | End: 2025-06-11
Payer: MEDICARE

## 2025-06-11 PROCEDURE — 97110 THERAPEUTIC EXERCISES: CPT | Performed by: PHYSICAL THERAPIST

## 2025-06-11 PROCEDURE — 97530 THERAPEUTIC ACTIVITIES: CPT | Performed by: PHYSICAL THERAPIST

## 2025-06-11 NOTE — PROGRESS NOTES
S:  pt presents to therapy for only scheduled visit for the week; at this time she reports that she is doing well and getting around OK;  no c/o buckling or LOB over the last week's time; continues to ambulate with cane; HEP going well per pt    O:  performed the exercises/treatments as written in the flowsheet for the week ending 6/11/25;  initiated HEP for home management of condition; B LE strength grossly 4, 4+/5 for all ranges    A:  donna tx well; pt able to perform all requested tasks with good form and pacing noted; movement remains slow/guarded due to aching/stiffness; gait slow/guarded with short/equal strides using quad cane; endurance is FAIR+ for all prolonged activities     P:  cont with POC of stretching/strengthening for LB/B LE's with modalities as needed

## 2025-06-11 NOTE — PROGRESS NOTES
[facility]                Phone: [phone]   Fax: [phone]    Physical Therapy Daily Treatment Note  Date:  2025    Patient Name:  Ivy Smith    :  1935  MRN: [id number]    Evaluating therapist:  EMA García        (25)  Restrictions/Precautions:    Diagnosis:    Treatment Diagnosis:  dysequilibrium   Insurance/Certification information:  Medicare           cert dates:  25  to 25          ICD-10:  M75.42, W10.9XXA  Referring Physician:  CARLI Chavarria  Plan of care signed (Y/N):  Y  Visit# / total visits:  2/6  Pain level:    Time In:  1356  Time Out:  1444    Subjective:      Exercises:  Exercise/Equipment Resistance/Repetitions Other comments   StepOne    10 min            tball flex/rot 5x10s           seated hip add [id number]                      abd [id number]                      flex [id number]                knee ext [id number]              sit/stand 2x10    toe raises 2x15    step ups            tandem amb  2 min                  side/side  2 min     foam marching 3x30s                                Other Therapeutic Activities:      Home Exercise Program:  provided 25    Manual Treatments:      Modalities:     Timed Code Treatment Minutes:      Total Treatment Minutes:      Treatment/Activity Tolerance:  [] Patient tolerated treatment well [] Patient limited by fatique  [] Patient limited by pain  [] Patient limited by other medical complications  [] Other:     Prognosis: [] Good [] Fair  [] Poor    Patient Requires Follow-up: [] Yes  [] No    Plan:   [] Continue per plan of care [] Alter current plan (see comments)  [] Plan of care initiated [] Hold pending MD visit [] Discharge  Plan for Next Session:      See Weekly Progress Note: []  Yes  []  No  Next due:        Electronically signed by:  Jey Alvarez, PT

## 2025-06-12 DIAGNOSIS — K21.9 GASTROESOPHAGEAL REFLUX DISEASE WITHOUT ESOPHAGITIS: ICD-10-CM

## 2025-06-12 DIAGNOSIS — Z76.0 ENCOUNTER FOR MEDICATION REFILL: ICD-10-CM

## 2025-06-12 DIAGNOSIS — I10 ESSENTIAL HYPERTENSION: ICD-10-CM

## 2025-06-12 DIAGNOSIS — J30.2 SEASONAL ALLERGIC RHINITIS, UNSPECIFIED TRIGGER: ICD-10-CM

## 2025-06-12 DIAGNOSIS — K12.0 APHTHOUS ULCER OF TONGUE: ICD-10-CM

## 2025-06-12 DIAGNOSIS — I70.213 ATHEROSCLEROSIS OF NATIVE ARTERY OF BOTH LOWER EXTREMITIES WITH INTERMITTENT CLAUDICATION: ICD-10-CM

## 2025-06-12 DIAGNOSIS — M15.0 PRIMARY OSTEOARTHRITIS INVOLVING MULTIPLE JOINTS: ICD-10-CM

## 2025-06-12 RX ORDER — AMLODIPINE AND BENAZEPRIL HYDROCHLORIDE 10; 20 MG/1; MG/1
1 CAPSULE ORAL DAILY
Qty: 90 CAPSULE | Refills: 1 | OUTPATIENT
Start: 2025-06-12

## 2025-06-12 RX ORDER — OMEPRAZOLE 40 MG/1
40 CAPSULE, DELAYED RELEASE ORAL DAILY
Qty: 90 CAPSULE | Refills: 1 | OUTPATIENT
Start: 2025-06-12

## 2025-06-12 NOTE — TELEPHONE ENCOUNTER
Name of Medication(s) Requested:  Requested Prescriptions     Pending Prescriptions Disp Refills    Magic Mouthwash (MIRACLE MOUTHWASH) 60 mL 0     Sig: Swish and spit 5 mLs 4 times daily as needed for Irritation or Pain    chlorhexidine (PERIDEX) 0.12 % solution 473 mL 5     Sig: Swish and spit 15 mLs 2 times daily    vitamin D (VITAMIN D3) 125 MCG (5000 UT) CAPS capsule 90 capsule 1     Sig: TAKE 1 CAPSULE BY MOUTH EVERY DAY    Multiple Vitamin TABS 30 tablet 5     Sig: Take 1 capsule by mouth daily Hair ,skin and nails    fluticasone (FLONASE) 50 MCG/ACT nasal spray 16 g 3     Sig: SPRAY 1 SPRAY INTO EACH NOSTRIL EVERY DAY    pravastatin (PRAVACHOL) 40 MG tablet 90 tablet 3     Sig: TAKE 1 TABLET DAILY    vitamin B-6 (PYRIDOXINE) 100 MG tablet 30 tablet 5     Sig: Take 1 tablet by mouth daily    vitamin B-12 (CYANOCOBALAMIN) 1000 MCG tablet 30 tablet 5     Sig: Take 1 tablet by mouth daily    sucralfate (CARAFATE) 1 GM tablet 270 tablet 0     Sig: Take 1 tablet by mouth in the morning, at noon, and at bedtime    simethicone (MYLICON) 80 MG chewable tablet 180 tablet 3     Sig: Take 1 tablet by mouth every 6 hours as needed for Flatulence     Refused Prescriptions Disp Refills    amLODIPine-benazepril (LOTREL) 10-20 MG per capsule 90 capsule 1     Sig: Take 1 capsule by mouth daily    omeprazole (PRILOSEC) 40 MG delayed release capsule 90 capsule 1     Sig: Take 1 capsule by mouth daily       Medication is on current medication list Yes    Dosage and directions were verified? Yes    Quantity verified: 30 day supply     Pharmacy Verified?  Yes    Last Appointment:  5/30/2025    Future appts:  Future Appointments   Date Time Provider Department Center   6/25/2025  2:00 PM Jey Alvarez, PT [facility]        (If no appt send self scheduling link. .REFILLAPPT)  Scheduling request sent?     [] Yes  [x] No    Does patient need updated?  [] Yes  [x] No

## 2025-06-13 RX ORDER — FLUTICASONE PROPIONATE 50 MCG
SPRAY, SUSPENSION (ML) NASAL
Qty: 16 G | Refills: 3 | Status: SHIPPED | OUTPATIENT
Start: 2025-06-13

## 2025-06-13 RX ORDER — SIMETHICONE 80 MG
80 TABLET,CHEWABLE ORAL EVERY 6 HOURS PRN
Qty: 180 TABLET | Refills: 3 | Status: SHIPPED | OUTPATIENT
Start: 2025-06-13

## 2025-06-13 RX ORDER — PRAVASTATIN SODIUM 40 MG
TABLET ORAL
Qty: 90 TABLET | Refills: 3 | Status: SHIPPED | OUTPATIENT
Start: 2025-06-13

## 2025-06-13 RX ORDER — MULTIVITAMIN
1 TABLET ORAL DAILY
Qty: 30 TABLET | Refills: 5 | Status: SHIPPED | OUTPATIENT
Start: 2025-06-13

## 2025-06-13 RX ORDER — MULTIVITAMIN WITH IRON
100 TABLET ORAL DAILY
Qty: 30 TABLET | Refills: 5 | Status: SHIPPED | OUTPATIENT
Start: 2025-06-13

## 2025-06-13 RX ORDER — CHLORHEXIDINE GLUCONATE ORAL RINSE 1.2 MG/ML
15 SOLUTION DENTAL 2 TIMES DAILY
Qty: 473 ML | Refills: 5 | Status: SHIPPED | OUTPATIENT
Start: 2025-06-13

## 2025-06-13 RX ORDER — SUCRALFATE 1 G/1
1 TABLET ORAL 3 TIMES DAILY
Qty: 270 TABLET | Refills: 0 | OUTPATIENT
Start: 2025-06-13 | End: 2025-09-11

## 2025-06-13 RX ORDER — LANOLIN ALCOHOL/MO/W.PET/CERES
1000 CREAM (GRAM) TOPICAL DAILY
Qty: 30 TABLET | Refills: 5 | Status: SHIPPED | OUTPATIENT
Start: 2025-06-13

## 2025-06-16 ENCOUNTER — HOSPITAL ENCOUNTER (OUTPATIENT)
Dept: PHYSICAL THERAPY | Age: 89
Setting detail: THERAPIES SERIES
Discharge: HOME | End: 2025-06-16
Payer: MEDICARE

## 2025-06-16 DIAGNOSIS — K21.9 GASTROESOPHAGEAL REFLUX DISEASE WITHOUT ESOPHAGITIS: ICD-10-CM

## 2025-06-16 RX ORDER — SUCRALFATE 1 G/1
1 TABLET ORAL 3 TIMES DAILY
Qty: 270 TABLET | Refills: 0 | Status: SHIPPED | OUTPATIENT
Start: 2025-06-16 | End: 2025-09-14

## 2025-06-16 NOTE — TELEPHONE ENCOUNTER
Name of Medication(s) Requested:  Requested Prescriptions     Pending Prescriptions Disp Refills    sucralfate (CARAFATE) 1 GM tablet [Pharmacy Med Name: SUCRALFATE 1 GM TABLET] 270 tablet 0     Sig: TAKE 1 TABLET BY MOUTH IN THE MORNING , AT NOON, AND AT BEDTIME       Medication is on current medication list Yes    Dosage and directions were verified? Yes    Quantity verified: 90 day supply     Pharmacy Verified?  Yes    Last Appointment:  5/30/2025    Future appts:  Future Appointments   Date Time Provider Department Center   6/16/2025  1:15 PM Rosalba Jimenez PT [facility]   6/18/2025  1:15 PM Rosalba Jimenez PT [facility]   6/25/2025  2:00 PM Jey Alvarez, PT [facility]        (If no appt send self scheduling link. .REFILLAPPT)  Scheduling request sent?     [x] Yes  [] No    Does patient need updated?  [] Yes  [x] No

## 2025-06-18 ENCOUNTER — HOSPITAL ENCOUNTER (OUTPATIENT)
Dept: PHYSICAL THERAPY | Age: 89
Setting detail: THERAPIES SERIES
Discharge: HOME | End: 2025-06-18
Payer: MEDICARE

## 2025-06-18 PROCEDURE — 97113 AQUATIC THERAPY/EXERCISES: CPT

## 2025-06-18 NOTE — PROGRESS NOTES
[facility]  Phone: [phone] Fax: [phone]        Physical Therapy Aquatic Flow Sheet       Patient Name:  Ivy Smith    :  1935  Restrictions/Precautions:    Diagnosis:    Treatment Diagnosis:    Insurance/Certification information:    Referring Physician:    Plan of care signed (Y/N):    Visit# / total visits:  3/10  Pain level: 8/10   Electronically signed by:  Rosalba Jimenez PTA  Time In:1030  Time Out:1130    Subjective:      Key  B= Belt DB= Dumbells T= Theratube   H= Hydrotone N= Noodles W= Weights   P= Paddles S= Speedo equipment K= Kickboard     Exercises/Activities   Warm-up/Amb  Minutes  Exercises  Minutes    Slow forward   5  HR/TR  5    Slow sideways  5  Marches  5    Slow backwards  5  Mini-squats  5    Medium forward    Forward backward kick  5    Medium sideways    Hip abduction  5    Small shuffle    Hamstring curls      Jog    Knee extension      Braiding    Pelvic tilts      Bicycling  5  Scap squeezes          Shoulder flex/ext      Functional    Shoulder abd/add      Step    Shoulder H. abd/add      Lifting    Shoulder IR/ER      Hand to opp knee    Rowing      Push down squat    Bilateral pull down      UE PNF    Push/pull      LE PNF    Push downs      Wall push ups    Arm circles      SLS    Elbow flex/ext          Chin tuck      Stretching    UT shrugs/rolls      Gastroc/Soleus  5  Rocking horse      Hamstring  5        SKTC  5  Other      Piriformis          Hip flexor          Ladder pull          Pec stretch          Post deltoid           Timed Code Treatment Minutes:  60    Total Treatment Minutes:  60    Treatment/Activity Tolerance:  [] Patient tolerated treatment well [x] Patient limited by fatique  [x] Patient limited by pain [] Patient limited by other medical complications  [] Other:     Prognosis: [] Good [x] Fair  [] Poor    Patient Requires Follow-up: [x] Yes  [] No    Plan:   [x] Continue per plan of care [] Alter current plan

## 2025-06-19 ENCOUNTER — TELEPHONE (OUTPATIENT)
Dept: FAMILY MEDICINE CLINIC | Age: 89
End: 2025-06-19

## 2025-06-19 DIAGNOSIS — M48.061 SPINAL STENOSIS OF LUMBAR REGION WITHOUT NEUROGENIC CLAUDICATION: Primary | ICD-10-CM

## 2025-06-19 NOTE — TELEPHONE ENCOUNTER
Patient is requesting worker's comp to pay for her motorized scooter. Recommend she have formal evaluation through occupational health clinic to properly submit worker's comp claim. Pt amenable to referral

## 2025-06-23 ENCOUNTER — APPOINTMENT (OUTPATIENT)
Dept: PHYSICAL THERAPY | Age: 89
End: 2025-06-23
Payer: MEDICARE

## 2025-06-25 ENCOUNTER — HOSPITAL ENCOUNTER (OUTPATIENT)
Dept: PHYSICAL THERAPY | Age: 89
Setting detail: THERAPIES SERIES
Discharge: HOME | End: 2025-06-25
Payer: MEDICARE

## 2025-06-25 ENCOUNTER — APPOINTMENT (OUTPATIENT)
Dept: PHYSICAL THERAPY | Age: 89
End: 2025-06-25
Payer: MEDICARE

## 2025-06-25 PROCEDURE — 97530 THERAPEUTIC ACTIVITIES: CPT | Performed by: PHYSICAL THERAPIST

## 2025-06-25 PROCEDURE — 97110 THERAPEUTIC EXERCISES: CPT | Performed by: PHYSICAL THERAPIST

## 2025-06-25 NOTE — PROGRESS NOTES
[facility]                Phone: [phone]   Fax: [phone]    Physical Therapy Daily Treatment Note  Date:  2025    Patient Name:  Ivy Smiht    :  1935  MRN: [id number]    Evaluating therapist:  EMA García        (25)  Restrictions/Precautions:    Diagnosis:    Treatment Diagnosis:  dysequilibrium   Insurance/Certification information:  Medicare           cert dates:  25  to 25          ICD-10:  M75.42, W10.9XXA  Referring Physician:  CARLI Chavarria  Plan of care signed (Y/N):  Y  Visit# / total visits:    Pain level:    Time In:  1402  Time Out:  1450    Subjective:      Exercises:  Exercise/Equipment Resistance/Repetitions Other comments   StepOne    10 min            tball flex/rot 5x10s           seated hip add [id number]                      abd 3x79sohen                      flex 2h21j9st                knee ext 8c84m4td              sit/stand 3x10    toe raises 2x15    step ups            tandem amb  2 min                  side/side  2 min     foam marching 3x30s                                Other Therapeutic Activities:      Home Exercise Program:  provided 25    Manual Treatments:      Modalities:     Timed Code Treatment Minutes:      Total Treatment Minutes:      Treatment/Activity Tolerance:  [] Patient tolerated treatment well [] Patient limited by fatique  [] Patient limited by pain  [] Patient limited by other medical complications  [] Other:     Prognosis: [] Good [] Fair  [] Poor    Patient Requires Follow-up: [] Yes  [] No    Plan:   [] Continue per plan of care [] Alter current plan (see comments)  [] Plan of care initiated [] Hold pending MD visit [] Discharge  Plan for Next Session:      See Weekly Progress Note: []  Yes  []  No  Next due:        Electronically signed by:  Jey Alvarez, PT

## 2025-06-25 NOTE — PROGRESS NOTES
S:  pt presents to therapy for only scheduled visit for the week; at this time she reports that she is doing well and getting around OK;  no c/o buckling or LOB over the last week's time; continues to ambulate with cane; HEP going well per pt    O:  performed the exercises/treatments as written in the flowsheet for the week ending 6/27/25;  increased program intensity and updated HEP as needed;  B LE strength grossly 4, 4+/5 for all ranges    A:  donna tx well; pt able to perform all requested tasks with good form and pacing noted; movement remains slow/guarded due to aching/stiffness; gait slow/guarded with short/equal strides using quad cane; endurance is GOOD for all prolonged activities     P:  cont with POC of stretching/strengthening for LB/B LE's with modalities as needed

## 2025-07-02 ENCOUNTER — HOSPITAL ENCOUNTER (OUTPATIENT)
Dept: PHYSICAL THERAPY | Age: 89
Setting detail: THERAPIES SERIES
Discharge: HOME | End: 2025-07-02
Payer: MEDICARE

## 2025-07-02 PROCEDURE — 97530 THERAPEUTIC ACTIVITIES: CPT | Performed by: PHYSICAL THERAPIST

## 2025-07-02 PROCEDURE — 97110 THERAPEUTIC EXERCISES: CPT | Performed by: PHYSICAL THERAPIST

## 2025-07-02 NOTE — PROGRESS NOTES
[facility]                Phone: [phone]   Fax: [phone]    Physical Therapy Daily Treatment Note  Date:  2025    Patient Name:  Ivy Smith    :  1935  MRN: [id number]    Evaluating therapist:  EMA García        (25)  Restrictions/Precautions:    Diagnosis:    Treatment Diagnosis:  dysequilibrium   Insurance/Certification information:  Medicare           cert dates:  25  to 25          ICD-10:  M75.42, W10.9XXA  Referring Physician:  CARLI Chavarria  Plan of care signed (Y/N):  Y  Visit# / total visits:    Pain level:    Time In:  1348  Time Out:  1436    Subjective:      Exercises:  Exercise/Equipment Resistance/Repetitions Other comments   StepOne    10 min            tball flex/rot 5x10s           seated hip add 7r00x4t                      abd 4n07zogny                      flex 5d26l6ag                knee ext 6v01t0cg              sit/stand 3x10    toe raises 2x15    step ups            tandem amb  2 min                  side/side  2 min     foam marching 3x30s                                Other Therapeutic Activities:      Home Exercise Program:  provided 25    Manual Treatments:      Modalities:     Timed Code Treatment Minutes:      Total Treatment Minutes:      Treatment/Activity Tolerance:  [] Patient tolerated treatment well [] Patient limited by fatique  [] Patient limited by pain  [] Patient limited by other medical complications  [] Other:     Prognosis: [] Good [] Fair  [] Poor    Patient Requires Follow-up: [] Yes  [] No    Plan:   [] Continue per plan of care [] Alter current plan (see comments)  [] Plan of care initiated [] Hold pending MD visit [] Discharge  Plan for Next Session:      See Weekly Progress Note: []  Yes  []  No  Next due:        Electronically signed by:  Jey Alvarez, PT

## 2025-07-02 NOTE — PROGRESS NOTES
S:  pt presents to therapy for only scheduled visit for the week; at this time she reports that she is doing well and getting around OK;  no c/o buckling or LOB over the last week's time; continues to ambulate with cane; HEP going well per pt    O:  performed the exercises/treatments as written in the flowsheet for the week ending 7/4/25;  B E strength grossly 4, 4+/5 for all ranges    A:  donna tx well; pt able to perform all requested tasks with good form and pacing noted; movement remains slow/guarded due to aching/stiffness; gait slow/guarded with short/equal strides using quad cane; endurance is GOOD+ for all prolonged activities     P:  cont with POC of stretching/strengthening for LB/B LE's with modalities as needed

## 2025-07-09 ENCOUNTER — HOSPITAL ENCOUNTER (OUTPATIENT)
Dept: PHYSICAL THERAPY | Age: 89
Setting detail: THERAPIES SERIES
Discharge: HOME | End: 2025-07-09
Payer: MEDICARE

## 2025-07-09 PROCEDURE — 97110 THERAPEUTIC EXERCISES: CPT | Performed by: PHYSICAL THERAPIST

## 2025-07-09 PROCEDURE — 97530 THERAPEUTIC ACTIVITIES: CPT | Performed by: PHYSICAL THERAPIST

## 2025-07-09 NOTE — PROGRESS NOTES
[facility]                Phone: [phone]   Fax: [phone]    Physical Therapy Daily Treatment Note  Date:  2025    Patient Name:  Ivy Smith    :  1935  MRN: [id number]    Evaluating therapist:  EMA García        (25)  Restrictions/Precautions:    Diagnosis:    Treatment Diagnosis:  dysequilibrium   Insurance/Certification information:  Medicare           cert dates:  25  to 25          ICD-10:  M75.42, W10.9XXA  Referring Physician:  CARLI Chavarria  Plan of care signed (Y/N):  Y  Visit# / total visits:    Pain level:    Time In:  1354  Time Out:  1435    Subjective:      Exercises:  Exercise/Equipment Resistance/Repetitions Other comments   StepOne    10 min            tball flex/rot 5x10s           seated hip add 8m96c4n                      abd 8q16grvmy                      flex 0d12k7ii                knee ext 0p62h0lm              sit/stand 3x10    toe raises 2x15    step ups            tandem amb  2 min                  side/side  2 min     foam marching 3x30s                                Other Therapeutic Activities:      Home Exercise Program:  provided 25    Manual Treatments:      Modalities:     Timed Code Treatment Minutes:      Total Treatment Minutes:      Treatment/Activity Tolerance:  [] Patient tolerated treatment well [] Patient limited by fatique  [] Patient limited by pain  [] Patient limited by other medical complications  [] Other:     Prognosis: [] Good [] Fair  [] Poor    Patient Requires Follow-up: [] Yes  [] No    Plan:   [] Continue per plan of care [] Alter current plan (see comments)  [] Plan of care initiated [] Hold pending MD visit [] Discharge  Plan for Next Session:      See Weekly Progress Note: []  Yes  []  No  Next due:        Electronically signed by:  Jey Alvarez, PT

## 2025-07-16 ENCOUNTER — HOSPITAL ENCOUNTER (OUTPATIENT)
Dept: PHYSICAL THERAPY | Age: 89
Setting detail: THERAPIES SERIES
Discharge: HOME | End: 2025-07-16
Payer: MEDICARE

## 2025-07-16 PROCEDURE — 97110 THERAPEUTIC EXERCISES: CPT | Performed by: PHYSICAL THERAPIST

## 2025-07-16 PROCEDURE — 97530 THERAPEUTIC ACTIVITIES: CPT | Performed by: PHYSICAL THERAPIST

## 2025-07-16 NOTE — PROGRESS NOTES
[facility]                Phone: [phone]   Fax: [phone]    Physical Therapy Daily Treatment Note  Date:  2025    Patient Name:  Ivy Smith    :  1935  MRN: [id number]    Evaluating therapist:  EMA García        (25)  Restrictions/Precautions:    Diagnosis:    Treatment Diagnosis:  dysequilibrium   Insurance/Certification information:  Medicare           cert dates:  25  to 25          ICD-10:  M75.42, W10.9XXA  Referring Physician:  CARLI Chavarria  Plan of care signed (Y/N):  Y  Visit# / total visits:    Pain level:    Time In:  1437  Time Out:  1520    Subjective:      Exercises:  Exercise/Equipment Resistance/Repetitions Other comments   StepOne    10 min            tball flex/rot 5x10s           seated hip add 2z59l1s                      abd 7d67prqjo                      flex 6i62g6en                knee ext 8i65c9ta              sit/stand 3x10    toe raises 2x15    step ups            tandem amb  3 min                  side/side  3 min     foam marching 3x30s                                Other Therapeutic Activities:      Home Exercise Program:  provided 25    Manual Treatments:      Modalities:     Timed Code Treatment Minutes:      Total Treatment Minutes:      Treatment/Activity Tolerance:  [] Patient tolerated treatment well [] Patient limited by fatique  [] Patient limited by pain  [] Patient limited by other medical complications  [] Other:     Prognosis: [] Good [] Fair  [] Poor    Patient Requires Follow-up: [] Yes  [] No    Plan:   [] Continue per plan of care [] Alter current plan (see comments)  [] Plan of care initiated [] Hold pending MD visit [] Discharge  Plan for Next Session:      See Weekly Progress Note: []  Yes  []  No  Next due:        Electronically signed by:  Jey Alvarez, PT

## 2025-09-02 ENCOUNTER — HOSPITAL ENCOUNTER (OUTPATIENT)
Dept: LAB | Age: 89
Setting detail: SPECIMEN
Discharge: HOME | End: 2025-09-02
Payer: MEDICARE

## 2025-09-02 LAB
CRP SERPL HS-MCNC: 5.1 MG/L (ref 0–5)
ERYTHROCYTE [DISTWIDTH] IN BLOOD BY AUTOMATED COUNT: 14.3 % (ref 11.5–15)
ERYTHROCYTE [SEDIMENTATION RATE] IN BLOOD BY WESTERGREN METHOD: 15 MM/HR (ref 0–20)
HCT VFR BLD AUTO: 40.3 % (ref 34–48)
HGB BLD-MCNC: 13.7 G/DL (ref 11.5–15.5)
MCH RBC QN AUTO: 32.2 PG (ref 26–35)
MCHC RBC AUTO-ENTMCNC: 34 G/DL (ref 32–34.5)
MCV RBC AUTO: 94.6 FL (ref 80–99.9)
PLATELET # BLD AUTO: 288 K/UL (ref 130–450)
PMV BLD AUTO: 8.8 FL (ref 7–12)
RBC # BLD AUTO: 4.26 M/UL (ref 3.5–5.5)
WBC OTHER # BLD: 5.4 K/UL (ref 4.5–11.5)

## 2025-09-02 PROCEDURE — 86812 HLA TYPING A B OR C: CPT

## 2025-09-02 PROCEDURE — 85652 RBC SED RATE AUTOMATED: CPT

## 2025-09-02 PROCEDURE — 86140 C-REACTIVE PROTEIN: CPT

## 2025-09-02 PROCEDURE — 85027 COMPLETE CBC AUTOMATED: CPT

## 2025-09-02 PROCEDURE — 82164 ANGIOTENSIN I ENZYME TEST: CPT

## 2025-09-02 PROCEDURE — 36415 COLL VENOUS BLD VENIPUNCTURE: CPT

## 2025-09-04 LAB — ACE SERPL-CCNC: <10 U/L (ref 16–85)

## 2025-09-05 LAB — HLA B27: NEGATIVE

## (undated) DEVICE — BITEBLOCK 54FR W/ DENT RIM BLOX

## (undated) DEVICE — FORCEPS BX L160CM JAW DIA2.4MM YEL L CAP W/ NDL DISP RAD

## (undated) DEVICE — Z DISCONTINUED NO SUB IDED TUBING ETCO2 AD L6.5FT NSL ORAL CVD PRNG NONFLARED TIP OVR

## (undated) DEVICE — DEFENDO AIR WATER SUCTION AND BIOPSY VALVE KIT FOR  OLYMPUS: Brand: DEFENDO AIR/WATER/SUCTION AND BIOPSY VALVE

## (undated) DEVICE — Z INACTIVE USE 2855131 SPONGE GZ W4XL4IN RAYON POLY FILL CVR W/ NONWOVEN FAB

## (undated) DEVICE — NBF PREFILLED CONTAINER 60ML